# Patient Record
Sex: FEMALE | Race: WHITE | NOT HISPANIC OR LATINO | URBAN - METROPOLITAN AREA
[De-identification: names, ages, dates, MRNs, and addresses within clinical notes are randomized per-mention and may not be internally consistent; named-entity substitution may affect disease eponyms.]

---

## 2017-01-26 ENCOUNTER — EMERGENCY (EMERGENCY)
Facility: HOSPITAL | Age: 82
LOS: 1 days | Discharge: HOME | End: 2017-01-26
Attending: EMERGENCY MEDICINE
Payer: MEDICARE

## 2017-01-26 VITALS
TEMPERATURE: 98 F | HEART RATE: 72 BPM | OXYGEN SATURATION: 97 % | SYSTOLIC BLOOD PRESSURE: 139 MMHG | DIASTOLIC BLOOD PRESSURE: 84 MMHG | RESPIRATION RATE: 18 BRPM

## 2017-01-26 VITALS
DIASTOLIC BLOOD PRESSURE: 75 MMHG | HEART RATE: 77 BPM | SYSTOLIC BLOOD PRESSURE: 146 MMHG | RESPIRATION RATE: 20 BRPM | TEMPERATURE: 98 F | OXYGEN SATURATION: 96 % | HEIGHT: 64 IN | WEIGHT: 130.07 LBS

## 2017-01-26 DIAGNOSIS — R05 COUGH: ICD-10-CM

## 2017-01-26 DIAGNOSIS — Z88.1 ALLERGY STATUS TO OTHER ANTIBIOTIC AGENTS STATUS: ICD-10-CM

## 2017-01-26 DIAGNOSIS — E78.00 PURE HYPERCHOLESTEROLEMIA, UNSPECIFIED: ICD-10-CM

## 2017-01-26 DIAGNOSIS — J40 BRONCHITIS, NOT SPECIFIED AS ACUTE OR CHRONIC: ICD-10-CM

## 2017-01-26 DIAGNOSIS — I10 ESSENTIAL (PRIMARY) HYPERTENSION: ICD-10-CM

## 2017-01-26 DIAGNOSIS — J44.9 CHRONIC OBSTRUCTIVE PULMONARY DISEASE, UNSPECIFIED: ICD-10-CM

## 2017-01-26 LAB
ANION GAP SERPL CALC-SCNC: 14 MMOL/L — SIGNIFICANT CHANGE UP (ref 5–17)
APTT BLD: 26.7 SEC — LOW (ref 27.5–37.4)
BUN SERPL-MCNC: 15 MG/DL — SIGNIFICANT CHANGE UP (ref 8–20)
CALCIUM SERPL-MCNC: 10.5 MG/DL — HIGH (ref 8.6–10.2)
CHLORIDE SERPL-SCNC: 94 MMOL/L — LOW (ref 98–107)
CK MB CFR SERPL CALC: 4.4 NG/ML — SIGNIFICANT CHANGE UP (ref 0–6.7)
CK SERPL-CCNC: 161 U/L — SIGNIFICANT CHANGE UP (ref 25–170)
CO2 SERPL-SCNC: 25 MMOL/L — SIGNIFICANT CHANGE UP (ref 22–29)
CREAT SERPL-MCNC: 0.69 MG/DL — SIGNIFICANT CHANGE UP (ref 0.5–1.3)
GLUCOSE SERPL-MCNC: 153 MG/DL — HIGH (ref 70–115)
HCT VFR BLD CALC: 44.1 % — SIGNIFICANT CHANGE UP (ref 37–47)
HGB BLD-MCNC: 15.2 G/DL — SIGNIFICANT CHANGE UP (ref 12–16)
INR BLD: 1.07 RATIO — SIGNIFICANT CHANGE UP (ref 0.88–1.16)
MCHC RBC-ENTMCNC: 29.9 PG — SIGNIFICANT CHANGE UP (ref 27–31)
MCHC RBC-ENTMCNC: 34.5 G/DL — SIGNIFICANT CHANGE UP (ref 32–36)
MCV RBC AUTO: 86.6 FL — SIGNIFICANT CHANGE UP (ref 81–99)
NT-PROBNP SERPL-SCNC: 89 PG/ML — SIGNIFICANT CHANGE UP (ref 0–300)
PLATELET # BLD AUTO: 220 K/UL — SIGNIFICANT CHANGE UP (ref 150–400)
POTASSIUM SERPL-MCNC: 4.6 MMOL/L — SIGNIFICANT CHANGE UP (ref 3.5–5.3)
POTASSIUM SERPL-SCNC: 4.6 MMOL/L — SIGNIFICANT CHANGE UP (ref 3.5–5.3)
PROTHROM AB SERPL-ACNC: 11.8 SEC — SIGNIFICANT CHANGE UP (ref 10–13.1)
RBC # BLD: 5.09 M/UL — SIGNIFICANT CHANGE UP (ref 4.4–5.2)
RBC # FLD: 13.1 % — SIGNIFICANT CHANGE UP (ref 11–15.6)
SODIUM SERPL-SCNC: 133 MMOL/L — LOW (ref 135–145)
TROPONIN T SERPL-MCNC: <0.01 NG/ML — SIGNIFICANT CHANGE UP (ref 0–0.06)
WBC # BLD: 11.47 K/UL — HIGH (ref 4.8–10.8)
WBC # FLD AUTO: 11.47 K/UL — HIGH (ref 4.8–10.8)

## 2017-01-26 PROCEDURE — 99284 EMERGENCY DEPT VISIT MOD MDM: CPT | Mod: 25

## 2017-01-26 PROCEDURE — 80048 BASIC METABOLIC PNL TOTAL CA: CPT

## 2017-01-26 PROCEDURE — 85027 COMPLETE CBC AUTOMATED: CPT

## 2017-01-26 PROCEDURE — 84484 ASSAY OF TROPONIN QUANT: CPT

## 2017-01-26 PROCEDURE — 71020: CPT | Mod: 26

## 2017-01-26 PROCEDURE — 85730 THROMBOPLASTIN TIME PARTIAL: CPT

## 2017-01-26 PROCEDURE — 71046 X-RAY EXAM CHEST 2 VIEWS: CPT

## 2017-01-26 PROCEDURE — 93005 ELECTROCARDIOGRAM TRACING: CPT

## 2017-01-26 PROCEDURE — 85610 PROTHROMBIN TIME: CPT

## 2017-01-26 PROCEDURE — 82553 CREATINE MB FRACTION: CPT

## 2017-01-26 PROCEDURE — 99284 EMERGENCY DEPT VISIT MOD MDM: CPT

## 2017-01-26 PROCEDURE — 96374 THER/PROPH/DIAG INJ IV PUSH: CPT

## 2017-01-26 PROCEDURE — 83880 ASSAY OF NATRIURETIC PEPTIDE: CPT

## 2017-01-26 PROCEDURE — 82550 ASSAY OF CK (CPK): CPT

## 2017-01-26 PROCEDURE — 94640 AIRWAY INHALATION TREATMENT: CPT

## 2017-01-26 PROCEDURE — 93010 ELECTROCARDIOGRAM REPORT: CPT

## 2017-01-26 RX ORDER — IPRATROPIUM/ALBUTEROL SULFATE 18-103MCG
3 AEROSOL WITH ADAPTER (GRAM) INHALATION ONCE
Qty: 0 | Refills: 0 | Status: COMPLETED | OUTPATIENT
Start: 2017-01-26 | End: 2017-01-26

## 2017-01-26 RX ORDER — ALBUTEROL 90 UG/1
2.5 AEROSOL, METERED ORAL ONCE
Qty: 0 | Refills: 0 | Status: COMPLETED | OUTPATIENT
Start: 2017-01-26 | End: 2017-01-26

## 2017-01-26 RX ORDER — SODIUM CHLORIDE 9 MG/ML
3 INJECTION INTRAMUSCULAR; INTRAVENOUS; SUBCUTANEOUS ONCE
Qty: 0 | Refills: 0 | Status: COMPLETED | OUTPATIENT
Start: 2017-01-26 | End: 2017-01-26

## 2017-01-26 RX ORDER — ALBUTEROL 90 UG/1
1 AEROSOL, METERED ORAL
Qty: 1 | Refills: 0 | OUTPATIENT
Start: 2017-01-26

## 2017-01-26 RX ORDER — ALBUTEROL 90 UG/1
1 AEROSOL, METERED ORAL
Qty: 1 | Refills: 0
Start: 2017-01-26

## 2017-01-26 RX ADMIN — Medication 200 MILLIGRAM(S): at 16:31

## 2017-01-26 RX ADMIN — ALBUTEROL 2.5 MILLIGRAM(S): 90 AEROSOL, METERED ORAL at 16:59

## 2017-01-26 RX ADMIN — SODIUM CHLORIDE 3 MILLILITER(S): 9 INJECTION INTRAMUSCULAR; INTRAVENOUS; SUBCUTANEOUS at 15:48

## 2017-01-26 RX ADMIN — Medication 3 MILLILITER(S): at 15:03

## 2017-01-26 RX ADMIN — Medication 125 MILLIGRAM(S): at 16:32

## 2017-01-26 RX ADMIN — ALBUTEROL 2.5 MILLIGRAM(S): 90 AEROSOL, METERED ORAL at 15:03

## 2017-01-26 NOTE — ED STATDOCS - NS ED MD SCRIBE ATTENDING SCRIBE SECTIONS
VITAL SIGNS( Pullset)/REVIEW OF SYSTEMS/DISPOSITION/PHYSICAL EXAM/PAST MEDICAL/SURGICAL/SOCIAL HISTORY/HISTORY OF PRESENT ILLNESS/HIV

## 2017-01-26 NOTE — ED STATDOCS - OBJECTIVE STATEMENT
82 y/o F with h/o COPD and HTN presents to the ED c/o productive cough with green mucous and SOB x 2 weeks. Pt with no relief on Zithromax and has been on Prednisone since yesterday. PMD- Dr. Melgar sent pt to the MD to r/o pneumonia.

## 2017-01-26 NOTE — ED ADULT NURSE NOTE - DISCHARGE TEACHING
pt advised to continue taking meds and antibiotics as directed by MD. Pt advised to f/u with PMD and return to ED for any new or worsening symptoms

## 2017-01-26 NOTE — ED STATDOCS - PROGRESS NOTE DETAILS
PA NOTE: Pt seen by intake physician and hpi/orders/plan reviewed. PT presenting to ED with complaints of productive cough, sob, and congestion. She was evaluated by her PMD Dr. Mehta yesterday on started on Levaquin however the cough has not improved. PE: GEN: Awake, alert,  NAD,  EYES: PERRL CARDIAC: Reg rate and rhythm, S1,S2, RRR  RESP: No distress noted. Lungs + decreased breath sounds bilaterally.  no wheeze, ronchi, rales. ABD: soft,  non-tender, no guarding. . NEURO: AOx3, no focal deficits   PLAN: will follow up plan per intake. Pt feeling better. Denies SOB at time of discharge.

## 2017-01-26 NOTE — ED STATDOCS - ATTENDING CONTRIBUTION TO CARE
I, Meryl Adhikari, performed the initial face to face bedside interview with this patient regarding history of present illness, review of symptoms and relevant past medical, social and family history.  I completed an independent physical examination.  I was the initial provider who evaluated this patient. I have signed out the follow up of any pending tests (i.e. labs, radiological studies) to the ACP.  I have communicated the patient’s plan of care and disposition with the ACP.  The history, relevant review of systems, past medical and surgical history, medical decision making, and physical examination was documented by the scribe in my presence and I attest to the accuracy of the documentation.

## 2017-01-26 NOTE — ED ADULT NURSE NOTE - OBJECTIVE STATEMENT
Pt c/o cough, chest congestion, and body chills with SOB worsening since last night. Pt reports seeing her PMD Dumphy within the week for cough and chest congestion and was prescribed PO levaquin and prednisone which pt took. Pt came to ED d/t worsening cough and SOB over night and this AM. Denies fever, denies CP. +blood thinners, + sick contacts @ home, pt states her  "has bronchitis". Pt receiving neb tx @ this time. Resp even, unlabored, symm chest rise. MAEx4. #20G IV placed to Left AC, blood work drawn, sent to lab. Updated on POC, Family @ bedside

## 2017-01-26 NOTE — ED STATDOCS - MEDICAL DECISION MAKING DETAILS
Elderly F with productive cough, resistant to outpatient antbx x 2 weeks. Will r/o Pneumonia +/- COPD exacerbation.

## 2018-02-21 ENCOUNTER — EMERGENCY (EMERGENCY)
Facility: HOSPITAL | Age: 83
LOS: 1 days | Discharge: HOME | End: 2018-02-21
Attending: EMERGENCY MEDICINE
Payer: MEDICARE

## 2018-02-21 VITALS
DIASTOLIC BLOOD PRESSURE: 67 MMHG | SYSTOLIC BLOOD PRESSURE: 119 MMHG | RESPIRATION RATE: 18 BRPM | TEMPERATURE: 98 F | HEART RATE: 81 BPM | OXYGEN SATURATION: 94 %

## 2018-02-21 PROBLEM — I21.3 ST ELEVATION (STEMI) MYOCARDIAL INFARCTION OF UNSPECIFIED SITE: Chronic | Status: ACTIVE | Noted: 2017-01-26

## 2018-02-21 LAB
ANION GAP SERPL CALC-SCNC: 10 MMOL/L — SIGNIFICANT CHANGE UP (ref 5–17)
APPEARANCE UR: ABNORMAL
BACTERIA # UR AUTO: ABNORMAL
BILIRUB UR-MCNC: ABNORMAL
BUN SERPL-MCNC: 12 MG/DL — SIGNIFICANT CHANGE UP (ref 8–20)
CALCIUM SERPL-MCNC: 9.7 MG/DL — SIGNIFICANT CHANGE UP (ref 8.6–10.2)
CHLORIDE SERPL-SCNC: 99 MMOL/L — SIGNIFICANT CHANGE UP (ref 98–107)
CO2 SERPL-SCNC: 28 MMOL/L — SIGNIFICANT CHANGE UP (ref 22–29)
COLOR SPEC: ABNORMAL
COMMENT - URINE: SIGNIFICANT CHANGE UP
CREAT SERPL-MCNC: 0.52 MG/DL — SIGNIFICANT CHANGE UP (ref 0.5–1.3)
DIFF PNL FLD: ABNORMAL
EPI CELLS # UR: SIGNIFICANT CHANGE UP
GLUCOSE SERPL-MCNC: 157 MG/DL — HIGH (ref 70–115)
GLUCOSE UR QL: NEGATIVE MG/DL — SIGNIFICANT CHANGE UP
HCT VFR BLD CALC: 39.3 % — SIGNIFICANT CHANGE UP (ref 37–47)
HGB BLD-MCNC: 13.2 G/DL — SIGNIFICANT CHANGE UP (ref 12–16)
KETONES UR-MCNC: NEGATIVE — SIGNIFICANT CHANGE UP
LEUKOCYTE ESTERASE UR-ACNC: ABNORMAL
MCHC RBC-ENTMCNC: 29.6 PG — SIGNIFICANT CHANGE UP (ref 27–31)
MCHC RBC-ENTMCNC: 33.6 G/DL — SIGNIFICANT CHANGE UP (ref 32–36)
MCV RBC AUTO: 88.1 FL — SIGNIFICANT CHANGE UP (ref 81–99)
NITRITE UR-MCNC: POSITIVE
PH UR: 5 — SIGNIFICANT CHANGE UP (ref 5–8)
PLATELET # BLD AUTO: 218 K/UL — SIGNIFICANT CHANGE UP (ref 150–400)
POTASSIUM SERPL-MCNC: 4.1 MMOL/L — SIGNIFICANT CHANGE UP (ref 3.5–5.3)
POTASSIUM SERPL-SCNC: 4.1 MMOL/L — SIGNIFICANT CHANGE UP (ref 3.5–5.3)
PROT UR-MCNC: 30 MG/DL
RBC # BLD: 4.46 M/UL — SIGNIFICANT CHANGE UP (ref 4.4–5.2)
RBC # FLD: 12.8 % — SIGNIFICANT CHANGE UP (ref 11–15.6)
RBC CASTS # UR COMP ASSIST: ABNORMAL /HPF (ref 0–4)
SODIUM SERPL-SCNC: 137 MMOL/L — SIGNIFICANT CHANGE UP (ref 135–145)
SP GR SPEC: 1.02 — SIGNIFICANT CHANGE UP (ref 1.01–1.02)
UROBILINOGEN FLD QL: 8 MG/DL
WBC # BLD: 10.4 K/UL — SIGNIFICANT CHANGE UP (ref 4.8–10.8)
WBC # FLD AUTO: 10.4 K/UL — SIGNIFICANT CHANGE UP (ref 4.8–10.8)
WBC UR QL: >50

## 2018-02-21 PROCEDURE — 80048 BASIC METABOLIC PNL TOTAL CA: CPT

## 2018-02-21 PROCEDURE — 99284 EMERGENCY DEPT VISIT MOD MDM: CPT | Mod: 25

## 2018-02-21 PROCEDURE — 36415 COLL VENOUS BLD VENIPUNCTURE: CPT

## 2018-02-21 PROCEDURE — 99284 EMERGENCY DEPT VISIT MOD MDM: CPT

## 2018-02-21 PROCEDURE — 81001 URINALYSIS AUTO W/SCOPE: CPT

## 2018-02-21 PROCEDURE — 87086 URINE CULTURE/COLONY COUNT: CPT

## 2018-02-21 PROCEDURE — 85027 COMPLETE CBC AUTOMATED: CPT

## 2018-02-21 PROCEDURE — 96374 THER/PROPH/DIAG INJ IV PUSH: CPT

## 2018-02-21 RX ORDER — CEPHALEXIN 500 MG
1 CAPSULE ORAL
Qty: 56 | Refills: 0 | OUTPATIENT
Start: 2018-02-21 | End: 2018-03-06

## 2018-02-21 RX ORDER — CEFTRIAXONE 500 MG/1
INJECTION, POWDER, FOR SOLUTION INTRAMUSCULAR; INTRAVENOUS
Qty: 0 | Refills: 0 | Status: DISCONTINUED | OUTPATIENT
Start: 2018-02-21 | End: 2018-02-25

## 2018-02-21 RX ORDER — CEFTRIAXONE 500 MG/1
1 INJECTION, POWDER, FOR SOLUTION INTRAMUSCULAR; INTRAVENOUS EVERY 24 HOURS
Qty: 0 | Refills: 0 | Status: DISCONTINUED | OUTPATIENT
Start: 2018-02-22 | End: 2018-02-25

## 2018-02-21 RX ORDER — CEFTRIAXONE 500 MG/1
1 INJECTION, POWDER, FOR SOLUTION INTRAMUSCULAR; INTRAVENOUS ONCE
Qty: 0 | Refills: 0 | Status: COMPLETED | OUTPATIENT
Start: 2018-02-21 | End: 2018-02-21

## 2018-02-21 RX ADMIN — CEFTRIAXONE 100 GRAM(S): 500 INJECTION, POWDER, FOR SOLUTION INTRAMUSCULAR; INTRAVENOUS at 14:24

## 2018-02-21 NOTE — ED ADULT NURSE NOTE - OBJECTIVE STATEMENT
c/o urinary discomfort since last sat.  Has been on pyridium and cipro since last sat but still c/o painful urination

## 2018-02-21 NOTE — ED STATDOCS - PHYSICAL EXAMINATION
PE: General: NAD.  Eyes: PERRLA, EOM intact. CV: RRR, no m/r/g. Lungs: CTA b/l, no use of accessory muscles, no wheezes, rales, rhonchi. Skin: warm, dry, no rashes. Psych: normal mood/affect. Abdomen: Normal BS, soft, ND. TTP in lower epigastrum overlying bladder. Musculoskeletal: +left CVA tenderness. Neuro: no neuro deficits

## 2018-02-21 NOTE — ED STATDOCS - ATTENDING CONTRIBUTION TO CARE
82 year old with dysuria on cipro with no improvement.  Patient well appearing and afebrile.  Suprapubic tenderness on exam.  +UA.  Patient's antibiotics was changed and she was instructed to follow-up with PMD.

## 2018-02-21 NOTE — ED STATDOCS - OBJECTIVE STATEMENT
82 y.o F with PMH  CAD, HTN, HLD, hypothyroidsim presents to ED for evaluation of worsening UTI. As per pt, pt states saw PMD saturday, diagnosed with UTI and started on pyridium and cipro. Pt has taken the medications for past 5 days with no relief in symptoms. Pt states she is still experiencing burning on urination. Pt states in the past when she has been treated for UTI's symptoms usually clear up by day 2-3 of antibiotics. Last UTI estimated to be roughly a year ago. Pt denies n/v, fever, chills, headache, dizziness, sob, chest pain, blood in the urine or stool. Pt admits to stress incontinence (losing urine when coughing/sneezing) and also mentions she feels like she does not empty her bladder completely. Pt states has seen urologist in past. 82 y.o F with PMH  CAD, HTN, HLD, hypothyroidism presents to ED for evaluation of worsening UTI. As per pt, pt states saw PMD Saturday, diagnosed with UTI and started on pyridium and cipro. Pt has taken the medications for past 5 days with no relief in symptoms. Pt states she is still experiencing burning on urination. Pt states in the past when she has been treated for UTI's symptoms usually clear up by day 2-3 of antibiotics. Last UTI estimated to be roughly a year ago. Pt denies n/v, fever, chills, headache, dizziness, sob, chest pain, blood in the urine or stool. Pt admits to stress incontinence (losing urine when coughing/sneezing) and also mentions she feels like she does not empty her bladder completely. Pt states has seen urologist in past.

## 2018-02-21 NOTE — ED STATDOCS - CARE PLAN
Principal Discharge DX:	Pyelonephritis  Assessment and plan of treatment:	Will give 1 dose rocephin in ED  DC home with keflex  Follow up with PMD  Return to ED if symptoms worsen.

## 2018-02-21 NOTE — ED STATDOCS - PLAN OF CARE
Will give 1 dose rocephin in ED  DC home with keflex  Follow up with PMD  Return to ED if symptoms worsen.

## 2018-02-21 NOTE — ED STATDOCS - MEDICAL DECISION MAKING DETAILS
Will get UA/ucx, basic labs to assess for leukocytosis and renal function. Will get CT abdo to rule out pyelo given multiple day hx of UTI symptoms and now with Left flank pain. No systemic signs of infection, VSS. Will get UA/ucx, basic labs to assess for leukocytosis and renal function. Will get CT abdo to rule out pyelo given multiple day hx of UTI symptoms and now with Left flank pain. No systemic signs of infection, VSS. Will treat for UTI/pyelo. 1 dose rocephin in ED. Will DC on keflex (discontinue pyridium and cipro).

## 2018-02-21 NOTE — ED STATDOCS - PROGRESS NOTE DETAILS
Labs reviewed. Will treat pt for UTI/pyelo given symptoms and exam findings. PT non toxic appearing, VSS, labs WNL (no leukocytosis or left shift. Kidney function intact). Will given one dose IV rocephin now. Will DC on keflex. Pt to follow up with o/p PMD. Return to ED if symptoms worsen. Labs reviewed. Will treat pt for UTI/pyelo given symptoms and exam findings. PT non toxic appearing, VSS, labs WNL (no leukocytosis or left shift. Kidney function intact). Will given one dose IV Rocephin now. Will DC on keflex. Pt to follow up with o/p PMD. Return to ED if symptoms worsen. Labs reviewed. Will treat pt for UTI/pyel.. PT non toxic appearing, VSS, labs WNL (no leukocytosis or left shift. Kidney function intact). Will given one dose IV Rocephin now. Will DC on keflex. Pt to follow up with o/p PMD. Return to ED if symptoms worsen. Labs reviewed. Unlikely pyleo given pt is non toxic appearing, VSS, labs WNL (no leukocytosis or left shift. Kidney function intact). Will treat UTI with keflex 500 mg PO QID x 14 days.  Will given one dose IV Rocephin now.  Pt to follow up with o/p PMD. Return to ED if symptoms worsen.

## 2018-02-22 LAB
CULTURE RESULTS: SIGNIFICANT CHANGE UP
SPECIMEN SOURCE: SIGNIFICANT CHANGE UP

## 2018-04-11 ENCOUNTER — TRANSCRIPTION ENCOUNTER (OUTPATIENT)
Age: 83
End: 2018-04-11

## 2018-04-11 ENCOUNTER — INPATIENT (INPATIENT)
Facility: HOSPITAL | Age: 83
LOS: 0 days | Discharge: HOME | DRG: 247 | End: 2018-04-12
Attending: INTERNAL MEDICINE | Admitting: INTERNAL MEDICINE
Payer: COMMERCIAL

## 2018-04-11 VITALS
SYSTOLIC BLOOD PRESSURE: 174 MMHG | DIASTOLIC BLOOD PRESSURE: 70 MMHG | RESPIRATION RATE: 18 BRPM | HEART RATE: 64 BPM | OXYGEN SATURATION: 97 % | TEMPERATURE: 98 F

## 2018-04-11 DIAGNOSIS — Z90.49 ACQUIRED ABSENCE OF OTHER SPECIFIED PARTS OF DIGESTIVE TRACT: Chronic | ICD-10-CM

## 2018-04-11 DIAGNOSIS — I25.10 ATHEROSCLEROTIC HEART DISEASE OF NATIVE CORONARY ARTERY WITHOUT ANGINA PECTORIS: ICD-10-CM

## 2018-04-11 DIAGNOSIS — Z98.51 TUBAL LIGATION STATUS: Chronic | ICD-10-CM

## 2018-04-11 DIAGNOSIS — Z98.891 HISTORY OF UTERINE SCAR FROM PREVIOUS SURGERY: Chronic | ICD-10-CM

## 2018-04-11 LAB
ALBUMIN SERPL ELPH-MCNC: 4 G/DL — SIGNIFICANT CHANGE UP (ref 3.3–5.2)
ALP SERPL-CCNC: 91 U/L — SIGNIFICANT CHANGE UP (ref 40–120)
ALT FLD-CCNC: 16 U/L — SIGNIFICANT CHANGE UP
ANION GAP SERPL CALC-SCNC: 11 MMOL/L — SIGNIFICANT CHANGE UP (ref 5–17)
APTT BLD: 26.1 SEC — LOW (ref 27.5–37.4)
AST SERPL-CCNC: 18 U/L — SIGNIFICANT CHANGE UP
BILIRUB SERPL-MCNC: 0.3 MG/DL — LOW (ref 0.4–2)
BUN SERPL-MCNC: 12 MG/DL — SIGNIFICANT CHANGE UP (ref 8–20)
CALCIUM SERPL-MCNC: 10 MG/DL — SIGNIFICANT CHANGE UP (ref 8.6–10.2)
CHLORIDE SERPL-SCNC: 99 MMOL/L — SIGNIFICANT CHANGE UP (ref 98–107)
CO2 SERPL-SCNC: 28 MMOL/L — SIGNIFICANT CHANGE UP (ref 22–29)
CREAT SERPL-MCNC: 0.55 MG/DL — SIGNIFICANT CHANGE UP (ref 0.5–1.3)
GLUCOSE SERPL-MCNC: 135 MG/DL — HIGH (ref 70–115)
HCT VFR BLD CALC: 40.1 % — SIGNIFICANT CHANGE UP (ref 37–47)
HGB BLD-MCNC: 13.5 G/DL — SIGNIFICANT CHANGE UP (ref 12–16)
INR BLD: 0.96 RATIO — SIGNIFICANT CHANGE UP (ref 0.88–1.16)
MCHC RBC-ENTMCNC: 29.2 PG — SIGNIFICANT CHANGE UP (ref 27–31)
MCHC RBC-ENTMCNC: 33.7 G/DL — SIGNIFICANT CHANGE UP (ref 32–36)
MCV RBC AUTO: 86.6 FL — SIGNIFICANT CHANGE UP (ref 81–99)
PLATELET # BLD AUTO: 205 K/UL — SIGNIFICANT CHANGE UP (ref 150–400)
POTASSIUM SERPL-MCNC: 4.4 MMOL/L — SIGNIFICANT CHANGE UP (ref 3.5–5.3)
POTASSIUM SERPL-SCNC: 4.4 MMOL/L — SIGNIFICANT CHANGE UP (ref 3.5–5.3)
PROT SERPL-MCNC: 6.5 G/DL — LOW (ref 6.6–8.7)
PROTHROM AB SERPL-ACNC: 10.5 SEC — SIGNIFICANT CHANGE UP (ref 9.8–12.7)
RBC # BLD: 4.63 M/UL — SIGNIFICANT CHANGE UP (ref 4.4–5.2)
RBC # FLD: 13.3 % — SIGNIFICANT CHANGE UP (ref 11–15.6)
SODIUM SERPL-SCNC: 138 MMOL/L — SIGNIFICANT CHANGE UP (ref 135–145)
WBC # BLD: 6.5 K/UL — SIGNIFICANT CHANGE UP (ref 4.8–10.8)
WBC # FLD AUTO: 6.5 K/UL — SIGNIFICANT CHANGE UP (ref 4.8–10.8)

## 2018-04-11 PROCEDURE — 93010 ELECTROCARDIOGRAM REPORT: CPT | Mod: 77

## 2018-04-11 RX ORDER — ASPIRIN/CALCIUM CARB/MAGNESIUM 324 MG
81 TABLET ORAL DAILY
Qty: 0 | Refills: 0 | Status: DISCONTINUED | OUTPATIENT
Start: 2018-04-12 | End: 2018-04-12

## 2018-04-11 RX ORDER — SODIUM CHLORIDE 9 MG/ML
1000 INJECTION INTRAMUSCULAR; INTRAVENOUS; SUBCUTANEOUS
Qty: 0 | Refills: 0 | Status: DISCONTINUED | OUTPATIENT
Start: 2018-04-11 | End: 2018-04-12

## 2018-04-11 RX ORDER — PANTOPRAZOLE SODIUM 20 MG/1
40 TABLET, DELAYED RELEASE ORAL
Qty: 0 | Refills: 0 | Status: DISCONTINUED | OUTPATIENT
Start: 2018-04-11 | End: 2018-04-12

## 2018-04-11 RX ORDER — AMLODIPINE BESYLATE 2.5 MG/1
5 TABLET ORAL DAILY
Qty: 0 | Refills: 0 | Status: DISCONTINUED | OUTPATIENT
Start: 2018-04-11 | End: 2018-04-12

## 2018-04-11 RX ORDER — BUDESONIDE AND FORMOTEROL FUMARATE DIHYDRATE 160; 4.5 UG/1; UG/1
2 AEROSOL RESPIRATORY (INHALATION)
Qty: 0 | Refills: 0 | Status: DISCONTINUED | OUTPATIENT
Start: 2018-04-11 | End: 2018-04-12

## 2018-04-11 RX ORDER — PHENAZOPYRIDINE HCL 100 MG
0 TABLET ORAL
Qty: 0 | Refills: 0 | COMMUNITY

## 2018-04-11 RX ORDER — ALBUTEROL 90 UG/1
1 AEROSOL, METERED ORAL EVERY 4 HOURS
Qty: 0 | Refills: 0 | Status: DISCONTINUED | OUTPATIENT
Start: 2018-04-11 | End: 2018-04-12

## 2018-04-11 RX ORDER — ATORVASTATIN CALCIUM 80 MG/1
20 TABLET, FILM COATED ORAL AT BEDTIME
Qty: 0 | Refills: 0 | Status: DISCONTINUED | OUTPATIENT
Start: 2018-04-11 | End: 2018-04-12

## 2018-04-11 RX ORDER — CLOPIDOGREL BISULFATE 75 MG/1
75 TABLET, FILM COATED ORAL DAILY
Qty: 0 | Refills: 0 | Status: DISCONTINUED | OUTPATIENT
Start: 2018-04-12 | End: 2018-04-12

## 2018-04-11 RX ORDER — ASPIRIN/CALCIUM CARB/MAGNESIUM 324 MG
81 TABLET ORAL DAILY
Qty: 0 | Refills: 0 | Status: DISCONTINUED | OUTPATIENT
Start: 2018-04-11 | End: 2018-04-11

## 2018-04-11 RX ORDER — ISOSORBIDE MONONITRATE 60 MG/1
30 TABLET, EXTENDED RELEASE ORAL DAILY
Qty: 0 | Refills: 0 | Status: DISCONTINUED | OUTPATIENT
Start: 2018-04-11 | End: 2018-04-12

## 2018-04-11 RX ORDER — CIPROFLOXACIN LACTATE 400MG/40ML
0 VIAL (ML) INTRAVENOUS
Qty: 0 | Refills: 0 | COMMUNITY

## 2018-04-11 RX ORDER — ASPIRIN/CALCIUM CARB/MAGNESIUM 324 MG
324 TABLET ORAL ONCE
Qty: 0 | Refills: 0 | Status: COMPLETED | OUTPATIENT
Start: 2018-04-11 | End: 2018-04-11

## 2018-04-11 RX ADMIN — Medication 324 MILLIGRAM(S): at 12:39

## 2018-04-11 RX ADMIN — ATORVASTATIN CALCIUM 20 MILLIGRAM(S): 80 TABLET, FILM COATED ORAL at 21:40

## 2018-04-11 NOTE — DISCHARGE NOTE ADULT - MEDICATION SUMMARY - MEDICATIONS TO TAKE
I will START or STAY ON the medications listed below when I get home from the hospital:    predniSONE 10 mg oral tablet  --  by mouth   -- day 1 day 2 and day 5  -- Indication: For Copd    aspirin 325 mg oral tablet  -- tab(s) by mouth once a day  -- Indication: For CAD (coronary artery disease)    isosorbide mononitrate 30 mg oral tablet, extended release  --  by mouth 2 times a day  -- Indication: For CAD (coronary artery disease)    pravastatin 40 mg oral tablet  -- 1 tab(s) by mouth once a day  -- Indication: For CAD (coronary artery disease)    Plavix 75 mg oral tablet  -- 1 tab(s) by mouth once a day  -- Indication: For CAD (coronary artery disease)    Advair Diskus 250 mcg-50 mcg inhalation powder  -- 1 puff(s) inhaled 2 times a day  -- Indication: For Copd    Ventolin HFA 90 mcg/inh inhalation aerosol  -- 1 puff(s) inhaled every 4 hours  -- For inhalation only.  It is very important that you take or use this exactly as directed.  Do not skip doses or discontinue unless directed by your doctor.  Obtain medical advice before taking any non-prescription drugs as some may affect the action of this medication.  Shake well before use.      -- Indication: For Copd    amLODIPine 5 mg oral tablet  -- 1 tab(s) by mouth once a day  -- Indication: For Htn    pantoprazole 40 mg oral delayed release tablet  --  by mouth   -- Indication: For GERD (gastroesophageal reflux disease)

## 2018-04-11 NOTE — H&P PST ADULT - HISTORY OF PRESENT ILLNESS
81 yo female with history of CAD/PCI to the LAD and RCA, cardiac cath 2016 which revealed patent stents, hypertension, hyperlipidemia, hypothyroid, GERD on protonix who has recent complaints of chest tightness at rest.  Patient states her chest tightness is relieved when sitting up.  Her symptoms are not associated with exertion.  She presents today for PST/procedure for cardiac cath to r/o further CAD.

## 2018-04-11 NOTE — DISCHARGE NOTE ADULT - CARE PROVIDER_API CALL
Marquez Gonzales), Cardiovascular Disease; Internal Medicine  [address] [place] [address]  Phone: [phone]  Fax: [phone]

## 2018-04-11 NOTE — H&P PST ADULT - FAMILY HISTORY
Mother  Still living? No  Family history of cerebrovascular accident (CVA), Age at diagnosis: Age Unknown  Family history of acute myocardial infarction, Age at diagnosis: Age Unknown  Family history of Alzheimer's disease, Age at diagnosis: Age Unknown     Sibling  Still living? No  Diabetes mellitus, Age at diagnosis: Age Unknown

## 2018-04-11 NOTE — DISCHARGE NOTE ADULT - NS AS ACTIVITY OBS
Showering allowed/No Heavy lifting/straining/Do not make important decisions/Do not drive or operate machinery/Walking-Outdoors allowed/Walking-Indoors allowed

## 2018-04-11 NOTE — DISCHARGE NOTE ADULT - PATIENT PORTAL LINK FT
You can access the [facility] Portal, offered by [facility], by registering with the following website: [email or web address]

## 2018-04-11 NOTE — DISCHARGE NOTE ADULT - PLAN OF CARE
ADL without Chest Pain Restricted use with no heavy lifting of affected arm for 48 hours.  No submerging the arm in water for 48 hours.  You may start showering today.  Call your doctor for any bleeding, swelling, loss of sensation in the hand or fingers, or fingers turning blue.  If heavy bleeding or large lumps form, hold pressure at the spot and come to the Emergency Room.

## 2018-04-11 NOTE — H&P PST ADULT - PMH
Diabetes mellitus    Gastroenteritis    GERD (gastroesophageal reflux disease)    Hiatal hernia    High cholesterol    HTN (hypertension)    Hypothyroid    LBBB (left bundle branch block)    MI (myocardial infarction)    Myocardial infarction    PUD (peptic ulcer disease)    Stented coronary artery  x2  Vocal cord polyps

## 2018-04-11 NOTE — DISCHARGE NOTE ADULT - HOSPITAL COURSE
Assessment and Plan:   · Assessment		  83 yo female with history of CAD/PCI to the LAD and RCA, cardiac cath 2016 which revealed patent stents, hypertension, hyperlipidemia, hypothyroid, GERD on protonix who has recent complaints of chest tightness at rest.  Underwent cardiac cath yesterday and recieved stents to distral rca DESx 1 and Mid LAd Jarad x 1.       Problem/Plan - 1:  ·  Problem: CAD (coronary artery disease).  Plan: S/P JARAD to RCA  JARAD to LAD  Right radial site hematoma resolved, slight bruising, keep site dry  DAPT:  Plavix Aspirin  Life style modifications reviewed:  Diet/ exercise and life style modifications  Patient verbalizes understanding  d/c home  follow up with MD Jennings in one week.

## 2018-04-11 NOTE — PROGRESS NOTE ADULT - SUBJECTIVE AND OBJECTIVE BOX
81 yo female with history of CAD/PCI to the LAD and RCA, cardiac cath 2016 which revealed patent stents, hypertension, hyperlipidemia, hypothyroid, GERD on protonix who has recent complaints of chest tightness at rest.  Patient states her chest tightness is relieved when sitting up.  Her symptoms are not associated with exertion.  She presents today for PST/procedure for cardiac cath to r/o further CAD.    S/P CYNDI to RCA  CYNDI to LAD  Right radial site soft hematoma noted above site.  Manually compressed  Pressure dressing at site  Will monitor  Admit overnight on telemetry   Monitor for arrhythmias and bleeding at the site.  VSS  Post procedure teaching done  Patient verbalizes understanding  Check labs and EKG in the AM  If stable possible d/c home  Post procedure EKG SB LBBB HR 55 no ectopy

## 2018-04-11 NOTE — PROGRESS NOTE ADULT - SUBJECTIVE AND OBJECTIVE BOX
I have seen and examined this patient. There is no change since the last H& P. Consent obtained. Agree with cardiac cath. Risks and benefits fully explained. All questions answered.    Tee Sampson MD

## 2018-04-11 NOTE — DISCHARGE NOTE ADULT - CARE PLAN
Principal Discharge DX:	CAD (coronary artery disease)  Goal:	ADL without Chest Pain  Assessment and plan of treatment:	Restricted use with no heavy lifting of affected arm for 48 hours.  No submerging the arm in water for 48 hours.  You may start showering today.  Call your doctor for any bleeding, swelling, loss of sensation in the hand or fingers, or fingers turning blue.  If heavy bleeding or large lumps form, hold pressure at the spot and come to the Emergency Room.

## 2018-04-12 VITALS
OXYGEN SATURATION: 96 % | HEART RATE: 68 BPM | DIASTOLIC BLOOD PRESSURE: 68 MMHG | RESPIRATION RATE: 18 BRPM | SYSTOLIC BLOOD PRESSURE: 138 MMHG

## 2018-04-12 DIAGNOSIS — I25.10 ATHEROSCLEROTIC HEART DISEASE OF NATIVE CORONARY ARTERY WITHOUT ANGINA PECTORIS: ICD-10-CM

## 2018-04-12 LAB
ANION GAP SERPL CALC-SCNC: 10 MMOL/L — SIGNIFICANT CHANGE UP (ref 5–17)
BASOPHILS # BLD AUTO: 0 K/UL — SIGNIFICANT CHANGE UP (ref 0–0.2)
BASOPHILS NFR BLD AUTO: 0.3 % — SIGNIFICANT CHANGE UP (ref 0–2)
BUN SERPL-MCNC: 9 MG/DL — SIGNIFICANT CHANGE UP (ref 8–20)
CALCIUM SERPL-MCNC: 9.8 MG/DL — SIGNIFICANT CHANGE UP (ref 8.6–10.2)
CHLORIDE SERPL-SCNC: 101 MMOL/L — SIGNIFICANT CHANGE UP (ref 98–107)
CO2 SERPL-SCNC: 29 MMOL/L — SIGNIFICANT CHANGE UP (ref 22–29)
CREAT SERPL-MCNC: 0.48 MG/DL — LOW (ref 0.5–1.3)
EOSINOPHIL # BLD AUTO: 0.2 K/UL — SIGNIFICANT CHANGE UP (ref 0–0.5)
EOSINOPHIL NFR BLD AUTO: 3.3 % — SIGNIFICANT CHANGE UP (ref 0–6)
GLUCOSE SERPL-MCNC: 112 MG/DL — SIGNIFICANT CHANGE UP (ref 70–115)
HCT VFR BLD CALC: 39.7 % — SIGNIFICANT CHANGE UP (ref 37–47)
HGB BLD-MCNC: 13.4 G/DL — SIGNIFICANT CHANGE UP (ref 12–16)
LYMPHOCYTES # BLD AUTO: 1.3 K/UL — SIGNIFICANT CHANGE UP (ref 1–4.8)
LYMPHOCYTES # BLD AUTO: 16.9 % — LOW (ref 20–55)
MCHC RBC-ENTMCNC: 29.3 PG — SIGNIFICANT CHANGE UP (ref 27–31)
MCHC RBC-ENTMCNC: 33.8 G/DL — SIGNIFICANT CHANGE UP (ref 32–36)
MCV RBC AUTO: 86.7 FL — SIGNIFICANT CHANGE UP (ref 81–99)
MONOCYTES # BLD AUTO: 0.9 K/UL — HIGH (ref 0–0.8)
MONOCYTES NFR BLD AUTO: 12.1 % — HIGH (ref 3–10)
NEUTROPHILS # BLD AUTO: 5.1 K/UL — SIGNIFICANT CHANGE UP (ref 1.8–8)
NEUTROPHILS NFR BLD AUTO: 67.3 % — SIGNIFICANT CHANGE UP (ref 37–73)
PLATELET # BLD AUTO: 202 K/UL — SIGNIFICANT CHANGE UP (ref 150–400)
POTASSIUM SERPL-MCNC: 4.4 MMOL/L — SIGNIFICANT CHANGE UP (ref 3.5–5.3)
POTASSIUM SERPL-SCNC: 4.4 MMOL/L — SIGNIFICANT CHANGE UP (ref 3.5–5.3)
RBC # BLD: 4.58 M/UL — SIGNIFICANT CHANGE UP (ref 4.4–5.2)
RBC # FLD: 13.4 % — SIGNIFICANT CHANGE UP (ref 11–15.6)
SODIUM SERPL-SCNC: 140 MMOL/L — SIGNIFICANT CHANGE UP (ref 135–145)
WBC # BLD: 7.6 K/UL — SIGNIFICANT CHANGE UP (ref 4.8–10.8)
WBC # FLD AUTO: 7.6 K/UL — SIGNIFICANT CHANGE UP (ref 4.8–10.8)

## 2018-04-12 PROCEDURE — 93458 L HRT ARTERY/VENTRICLE ANGIO: CPT | Mod: 59

## 2018-04-12 PROCEDURE — C1725: CPT

## 2018-04-12 PROCEDURE — 80048 BASIC METABOLIC PNL TOTAL CA: CPT

## 2018-04-12 PROCEDURE — 99153 MOD SED SAME PHYS/QHP EA: CPT

## 2018-04-12 PROCEDURE — 99152 MOD SED SAME PHYS/QHP 5/>YRS: CPT

## 2018-04-12 PROCEDURE — 92978 ENDOLUMINL IVUS OCT C 1ST: CPT

## 2018-04-12 PROCEDURE — C1753: CPT

## 2018-04-12 PROCEDURE — C1769: CPT

## 2018-04-12 PROCEDURE — 85730 THROMBOPLASTIN TIME PARTIAL: CPT

## 2018-04-12 PROCEDURE — 85027 COMPLETE CBC AUTOMATED: CPT

## 2018-04-12 PROCEDURE — 80053 COMPREHEN METABOLIC PANEL: CPT

## 2018-04-12 PROCEDURE — C1887: CPT

## 2018-04-12 PROCEDURE — 93005 ELECTROCARDIOGRAM TRACING: CPT

## 2018-04-12 PROCEDURE — C9600: CPT | Mod: LC

## 2018-04-12 PROCEDURE — 92979 ENDOLUMINL IVUS OCT C EA: CPT | Mod: RC

## 2018-04-12 PROCEDURE — 85610 PROTHROMBIN TIME: CPT

## 2018-04-12 PROCEDURE — 36415 COLL VENOUS BLD VENIPUNCTURE: CPT

## 2018-04-12 PROCEDURE — C1894: CPT

## 2018-04-12 PROCEDURE — C1874: CPT

## 2018-04-12 PROCEDURE — 93010 ELECTROCARDIOGRAM REPORT: CPT

## 2018-04-12 RX ORDER — ACETAMINOPHEN 500 MG
650 TABLET ORAL EVERY 6 HOURS
Qty: 0 | Refills: 0 | Status: DISCONTINUED | OUTPATIENT
Start: 2018-04-12 | End: 2018-04-12

## 2018-04-12 RX ADMIN — Medication 10 MILLIGRAM(S): at 05:12

## 2018-04-12 RX ADMIN — Medication 650 MILLIGRAM(S): at 06:57

## 2018-04-12 RX ADMIN — PANTOPRAZOLE SODIUM 40 MILLIGRAM(S): 20 TABLET, DELAYED RELEASE ORAL at 05:12

## 2018-04-12 RX ADMIN — AMLODIPINE BESYLATE 5 MILLIGRAM(S): 2.5 TABLET ORAL at 05:12

## 2018-04-12 RX ADMIN — Medication 81 MILLIGRAM(S): at 11:35

## 2018-04-12 RX ADMIN — CLOPIDOGREL BISULFATE 75 MILLIGRAM(S): 75 TABLET, FILM COATED ORAL at 11:35

## 2018-04-12 RX ADMIN — ISOSORBIDE MONONITRATE 30 MILLIGRAM(S): 60 TABLET, EXTENDED RELEASE ORAL at 11:36

## 2018-04-12 NOTE — PROGRESS NOTE ADULT - ASSESSMENT
83 yo female with history of CAD/PCI to the LAD and RCA, cardiac cath 2016 which revealed patent stents, hypertension, hyperlipidemia, hypothyroid, GERD on protonix who has recent complaints of chest tightness at rest.  Underwent cardiac cath yesterday and recieved stents to distral rca DESx 1 and Mid LAd Jarad x 1.

## 2018-04-12 NOTE — PROGRESS NOTE ADULT - SUBJECTIVE AND OBJECTIVE BOX
Updates  81 yo female with history of CAD/PCI to the LAD and RCA, cardiac cath 2016 which revealed patent stents, hypertension, hyperlipidemia, hypothyroid, GERD on protonix who has recent complaints of chest tightness at rest.  Underwent cardiac cath yesterday and recieved stents to distral rca DESx 1 and Mid LAd Jarad x 1.    PMH  LBBB (left bundle branch block)  Vocal cord polyps  Gastroenteritis  PUD (peptic ulcer disease)  Hiatal hernia  Diabetes mellitus  GERD (gastroesophageal reflux disease)  Hypothyroid  MI (myocardial infarction)  Stented coronary artery  High cholesterol  HTN (hypertension)  CAD (coronary artery disease)  ATHSCL HEART DISEASE OF NATIVE    REVIEW OF SYSTEMS  General: Denies fever, chills, pain, no discomfort  Respiratory and Thorax:  Denies cough, sob, or any discomfort  Cardiovascular:  Denies chest pain, palpitations or any discomfort	  Gastrointestinal:  Denies n/v/d, constipation, or any discomfort  Genitourinary:  Denies frequency, burning, or pain  Musculoskeletal:  Denies joint pain, swelling, or any discomfort   Neurological:  Denies headache, dizziness blurred vision, numbing or tingling  Psychiatric:  Denies sadness or depression  Hematology  Mathew bleeding o swelling  	  MEDICATIONS:  amLODIPine   Tablet 5 milliGRAM(s) Oral daily  isosorbide   mononitrate ER Tablet (IMDUR) 30 milliGRAM(s) Oral daily  ALBUTerol    90 MICROgram(s) HFA Inhaler 1 Puff(s) Inhalation every 4 hours PRN  buDESOnide 160 MICROgram(s)/formoterol 4.5 MICROgram(s) Inhaler 2 Puff(s) Inhalation two times a day  acetaminophen   Tablet. 650 milliGRAM(s) Oral every 6 hours PRN  pantoprazole    Tablet 40 milliGRAM(s) Oral before breakfast  atorvastatin 20 milliGRAM(s) Oral at bedtime  predniSONE   Tablet 10 milliGRAM(s) Oral daily  aspirin  chewable 81 milliGRAM(s) Oral daily  clopidogrel Tablet 75 milliGRAM(s) Oral daily  sodium chloride 0.9%. 1000 milliLiter(s) IV Continuous <Continuous>    PHYSICAL EXAM:  T(C): 36.6 (18 @ 05:03), Max: 36.6 (18 @ 19:44)  HR: 70 (18 @ 05:03) (53 - 70)  BP: 134/68 (04-12-18 @ 05:03) (130/65 - 160/70)  RR: 18 (18 @ 05:03) (17 - 18)  SpO2: 95% (18 @ 05:03) (94% - 100%)    I&O's Summary  2018 07:01  -  2018 07:00  --------------------------------------------------------  IN: 0 mL / OUT: 725 mL / NET: -725 mL  Daily Weight in k.9 (2018 05:00)  Appearance: Normal	  HEENT:   Normal oral mucosa, PERRL, EOMI	  Lymphatic: No lymphadenopathy  Cardiovascular: Normal S1 S2, No JVD, No murmurs, No edema  Respiratory: Lungs clear to auscultation	  Psychiatry: A & O x 3, Mood & affect appropriate  Gastrointestinal:  Soft, Non-tender, + BS	  Skin: No rashes, No ecchymoses, No cyanosis  Neurologic: Non-focal  Extremities: Normal range of motion, No clubbing, cyanosis or edema  Vascular: Peripheral pulses palpable 2+ bilaterally  Procedure Site:  Right radial site, no bleeding, no pain, no bruising, no hematoma, +PP no edema.      TELEMETRY: 	  jessica WALKER sats overnight.     	  LABS:	 	               13.4   7.6   )-----------( 202      ( 2018 06:36 )             39.7     140  |  101  |  9.0  ----------------------------<  112  4.4   |  29.0  |  0.48<L>  Ca    9.8      2018 06:36  TPro  6.5<L>  /  Alb  4.0  /  TBili  0.3<L>  /  DBili  x   /  AST  18  /  ALT  16  /  AlkPhos  91  04-11 Updates  83 yo female with history of CAD/PCI to the LAD and RCA, cardiac cath 2016 which revealed patent stents, hypertension, hyperlipidemia, hypothyroid, GERD on protonix who has recent complaints of chest tightness at rest.  Underwent cardiac cath yesterday and recieved stents to distral rca DESx 1 and Mid LAd Jarad x 1.    [facility]  VENTRICLES: LVEF 55%  CORONARY VESSELS: The coronary circulation is right dominant.  LM:   --  LM: Normal.  LAD:   --  Proximal LAD: There was a 0 % stenosis at the site of a prior  stent.  CX:   --  Proximal circumflex: There was a 40 % stenosis.  --  Mid circumflex: There was a 95 % stenosis.  RCA:   --  Proximal RCA: There was a 0 % stenosis at the site of a prior  stent.  --  Distal RCA: There was a 70 % stenosis.  COMPLICATIONS: There were no complications. No complications occurred  during the cath lab visit.  DIAGNOSTIC IMPRESSIONS: Prior stents in proximal LAD and RCA are patent  with severe mid LCX and distal RCA disease treated with PTCA/STENT  (JARAD-CHARLIE) with IVUS  DIAGNOSTIC RECOMMENDATIONS: ASA and Plavix  INTERVENTIONAL IMPRESSIONS: Prior stents in proximal LAD and RCA are patent  with severe mid LCX and distal RCA disease treated with PTCA/STENT  (JARAD-CHARLIE) with IVUS  INTERVENTIONAL RECOMMENDATIONS: ASA and Plavix      PMH  LBBB (left bundle branch block)  Vocal cord polyps  Gastroenteritis  PUD (peptic ulcer disease)  Hiatal hernia  Diabetes mellitus  GERD (gastroesophageal reflux disease)  Hypothyroid  MI (myocardial infarction)  Stented coronary artery  High cholesterol  HTN (hypertension)  CAD (coronary artery disease)  ATHSCL HEART DISEASE OF NATIVE    REVIEW OF SYSTEMS  General: Denies fever, chills, pain, no discomfort  Respiratory and Thorax:  Denies cough, sob, or any discomfort  Cardiovascular:  Denies chest pain, palpitations or any discomfort	  Gastrointestinal:  Denies n/v/d, constipation, or any discomfort  Genitourinary:  Denies frequency, burning, or pain  Musculoskeletal:  Denies joint pain, swelling, or any discomfort   Neurological:  Denies headache, dizziness blurred vision, numbing or tingling  Psychiatric:  Denies sadness or depression  Hematology  Mathew bleeding o swelling  	  MEDICATIONS:  amLODIPine   Tablet 5 milliGRAM(s) Oral daily  isosorbide   mononitrate ER Tablet (IMDUR) 30 milliGRAM(s) Oral daily  ALBUTerol    90 MICROgram(s) HFA Inhaler 1 Puff(s) Inhalation every 4 hours PRN  buDESOnide 160 MICROgram(s)/formoterol 4.5 MICROgram(s) Inhaler 2 Puff(s) Inhalation two times a day  acetaminophen   Tablet. 650 milliGRAM(s) Oral every 6 hours PRN  pantoprazole    Tablet 40 milliGRAM(s) Oral before breakfast  atorvastatin 20 milliGRAM(s) Oral at bedtime  predniSONE   Tablet 10 milliGRAM(s) Oral daily  aspirin  chewable 81 milliGRAM(s) Oral daily  clopidogrel Tablet 75 milliGRAM(s) Oral daily  sodium chloride 0.9%. 1000 milliLiter(s) IV Continuous <Continuous>    PHYSICAL EXAM:  T(C): 36.6 (18 @ 05:03), Max: 36.6 (18 @ 19:44)  HR: 70 (18 @ 05:03) (53 - 70)  BP: 134/68 (18 @ 05:03) (130/65 - 160/70)  RR: 18 (18 @ 05:03) (17 - 18)  SpO2: 95% (18 @ 05:03) (94% - 100%)    I&O's Summary  2018 07:01  -  2018 07:00  --------------------------------------------------------  IN: 0 mL / OUT: 725 mL / NET: -725 mL  Daily Weight in k.9 (2018 05:00)  Appearance: Normal	  HEENT:   Normal oral mucosa, PERRL, EOMI	  Lymphatic: No lymphadenopathy  Cardiovascular: Normal S1 S2, No JVD, No murmurs, No edema  Respiratory: Lungs clear to auscultation	  Psychiatry: A & O x 3, Mood & affect appropriate  Gastrointestinal:  Soft, Non-tender, + BS	  Skin: No rashes, No ecchymoses, No cyanosis  Neurologic: Non-focal  Extremities: Normal range of motion, No clubbing, cyanosis or edema  Vascular: Peripheral pulses palpable 2+ bilaterally  Procedure Site:  Right radial site, no bleeding, no pain, no bruising, no hematoma, +PP no edema.      TELEMETRY: 	  jessica WALKER sats overnight.     	  LABS:	 	               13.4   7.6   )-----------( 202      ( 2018 06:36 )             39.7     140  |  101  |  9.0  ----------------------------<  112  4.4   |  29.0  |  0.48<L>  Ca    9.8      2018 06:36  TPro  6.5<L>  /  Alb  4.0  /  TBili  0.3<L>  /  DBili  x   /  AST  18  /  ALT  16  /  AlkPhos  91  04-11

## 2018-08-24 ENCOUNTER — EMERGENCY (EMERGENCY)
Facility: HOSPITAL | Age: 83
LOS: 1 days | Discharge: HOME | End: 2018-08-24
Attending: EMERGENCY MEDICINE
Payer: COMMERCIAL

## 2018-08-24 VITALS
OXYGEN SATURATION: 100 % | TEMPERATURE: 98 F | RESPIRATION RATE: 20 BRPM | DIASTOLIC BLOOD PRESSURE: 74 MMHG | SYSTOLIC BLOOD PRESSURE: 122 MMHG | HEART RATE: 75 BPM

## 2018-08-24 VITALS — HEIGHT: 64 IN | WEIGHT: 119.93 LBS

## 2018-08-24 DIAGNOSIS — Z90.49 ACQUIRED ABSENCE OF OTHER SPECIFIED PARTS OF DIGESTIVE TRACT: Chronic | ICD-10-CM

## 2018-08-24 DIAGNOSIS — Z98.891 HISTORY OF UTERINE SCAR FROM PREVIOUS SURGERY: Chronic | ICD-10-CM

## 2018-08-24 DIAGNOSIS — Z98.51 TUBAL LIGATION STATUS: Chronic | ICD-10-CM

## 2018-08-24 PROBLEM — E03.9 HYPOTHYROIDISM, UNSPECIFIED: Chronic | Status: ACTIVE | Noted: 2018-04-11

## 2018-08-24 PROBLEM — K21.9 GASTRO-ESOPHAGEAL REFLUX DISEASE WITHOUT ESOPHAGITIS: Chronic | Status: ACTIVE | Noted: 2018-04-11

## 2018-08-24 PROBLEM — I21.9 ACUTE MYOCARDIAL INFARCTION, UNSPECIFIED: Chronic | Status: ACTIVE | Noted: 2018-04-11

## 2018-08-24 PROBLEM — K44.9 DIAPHRAGMATIC HERNIA WITHOUT OBSTRUCTION OR GANGRENE: Chronic | Status: ACTIVE | Noted: 2018-04-11

## 2018-08-24 PROBLEM — K52.9 NONINFECTIVE GASTROENTERITIS AND COLITIS, UNSPECIFIED: Chronic | Status: ACTIVE | Noted: 2018-04-11

## 2018-08-24 PROBLEM — K27.9 PEPTIC ULCER, SITE UNSPECIFIED, UNSPECIFIED AS ACUTE OR CHRONIC, WITHOUT HEMORRHAGE OR PERFORATION: Chronic | Status: ACTIVE | Noted: 2018-04-11

## 2018-08-24 PROBLEM — I44.7 LEFT BUNDLE-BRANCH BLOCK, UNSPECIFIED: Chronic | Status: ACTIVE | Noted: 2018-04-11

## 2018-08-24 PROBLEM — J38.1 POLYP OF VOCAL CORD AND LARYNX: Chronic | Status: ACTIVE | Noted: 2018-04-11

## 2018-08-24 PROCEDURE — 72125 CT NECK SPINE W/O DYE: CPT | Mod: 26

## 2018-08-24 PROCEDURE — 71250 CT THORAX DX C-: CPT | Mod: 26

## 2018-08-24 PROCEDURE — 73562 X-RAY EXAM OF KNEE 3: CPT | Mod: 26,RT

## 2018-08-24 PROCEDURE — 99284 EMERGENCY DEPT VISIT MOD MDM: CPT

## 2018-08-24 PROCEDURE — 71250 CT THORAX DX C-: CPT

## 2018-08-24 PROCEDURE — 73562 X-RAY EXAM OF KNEE 3: CPT

## 2018-08-24 PROCEDURE — 90471 IMMUNIZATION ADMIN: CPT

## 2018-08-24 PROCEDURE — 99284 EMERGENCY DEPT VISIT MOD MDM: CPT | Mod: 25

## 2018-08-24 PROCEDURE — 72125 CT NECK SPINE W/O DYE: CPT

## 2018-08-24 PROCEDURE — 90715 TDAP VACCINE 7 YRS/> IM: CPT

## 2018-08-24 RX ORDER — TETANUS TOXOID, REDUCED DIPHTHERIA TOXOID AND ACELLULAR PERTUSSIS VACCINE, ADSORBED 5; 2.5; 8; 8; 2.5 [IU]/.5ML; [IU]/.5ML; UG/.5ML; UG/.5ML; UG/.5ML
0.5 SUSPENSION INTRAMUSCULAR ONCE
Qty: 0 | Refills: 0 | Status: COMPLETED | OUTPATIENT
Start: 2018-08-24 | End: 2018-08-24

## 2018-08-24 RX ORDER — ACETAMINOPHEN WITH CODEINE 300MG-30MG
1 TABLET ORAL ONCE
Qty: 0 | Refills: 0 | Status: DISCONTINUED | OUTPATIENT
Start: 2018-08-24 | End: 2018-08-24

## 2018-08-24 RX ORDER — ACETAMINOPHEN WITH CODEINE 300MG-30MG
1 TABLET ORAL
Qty: 10 | Refills: 0
Start: 2018-08-24 | End: 2018-08-26

## 2018-08-24 RX ORDER — IBUPROFEN 200 MG
600 TABLET ORAL ONCE
Qty: 0 | Refills: 0 | Status: COMPLETED | OUTPATIENT
Start: 2018-08-24 | End: 2018-08-24

## 2018-08-24 RX ADMIN — TETANUS TOXOID, REDUCED DIPHTHERIA TOXOID AND ACELLULAR PERTUSSIS VACCINE, ADSORBED 0.5 MILLILITER(S): 5; 2.5; 8; 8; 2.5 SUSPENSION INTRAMUSCULAR at 14:29

## 2018-08-24 RX ADMIN — Medication 1 TABLET(S): at 16:13

## 2018-08-24 RX ADMIN — Medication 600 MILLIGRAM(S): at 14:30
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
## 2018-08-24 NOTE — ED STATDOCS - ATTENDING CONTRIBUTION TO CARE
I, Con Torres, performed the initial face to face bedside interview with this patient regarding history of present illness, review of symptoms and relevant past medical, social and family history.  I completed an independent physical examination.  I was the initial provider who evaluated this patient. I have signed out the follow up of any pending tests (i.e. labs, radiological studies) to the ACP.  I have communicated the patient’s plan of care and disposition with the ACP.
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
## 2018-08-24 NOTE — ED STATDOCS - PROGRESS NOTE DETAILS
Pt moved form intake Room. Pt seen and evaluated by intake Physician. HPI, Physical examination performed by intake Physician . Note reviewed and followup examination performed by me consistent with initial assessment. Agrees with intake Physician plan and tests. Pt CT result and X-ray negative for fracture . Pt informed about her results. Pt will F/U with her PCP as discussed. Pt admits to pain on her left rib. Pt tx with Tylenol #3.
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
## 2018-08-24 NOTE — ED STATDOCS - MUSCULOSKELETAL, MLM
range of motion is not limited, cervical paraspinal tenderness, no thoracic or lumbar spinal tenderness , left lower rib tenderness, right knee tenderness
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
## 2018-08-24 NOTE — ED ADULT NURSE NOTE - NSIMPLEMENTINTERV_GEN_ALL_ED
Implemented All Universal Safety Interventions:  [place] to call system. Call bell, personal items and telephone within reach. Instruct patient to call for assistance. Room bathroom lighting operational. Non-slip footwear when patient is off stretcher. Physically safe environment: no spills, clutter or unnecessary equipment. Stretcher in lowest position, wheels locked, appropriate side rails in place.

## 2018-10-11 ENCOUNTER — OTHER (OUTPATIENT)
Age: 83
End: 2018-10-11

## 2018-10-11 DIAGNOSIS — M25.559 PAIN IN UNSPECIFIED HIP: ICD-10-CM

## 2018-10-11 DIAGNOSIS — M25.569 PAIN IN UNSPECIFIED KNEE: ICD-10-CM

## 2018-10-18 ENCOUNTER — APPOINTMENT (OUTPATIENT)
Dept: ORTHOPEDIC SURGERY | Facility: CLINIC | Age: 83
End: 2018-10-18
Payer: COMMERCIAL

## 2018-10-18 VITALS
SYSTOLIC BLOOD PRESSURE: 152 MMHG | HEIGHT: 64 IN | DIASTOLIC BLOOD PRESSURE: 68 MMHG | HEART RATE: 66 BPM | BODY MASS INDEX: 21.17 KG/M2 | WEIGHT: 124 LBS

## 2018-10-18 DIAGNOSIS — M70.61 TROCHANTERIC BURSITIS, RIGHT HIP: ICD-10-CM

## 2018-10-18 DIAGNOSIS — Z78.9 OTHER SPECIFIED HEALTH STATUS: ICD-10-CM

## 2018-10-18 PROCEDURE — 73502 X-RAY EXAM HIP UNI 2-3 VIEWS: CPT | Mod: RT

## 2018-10-18 PROCEDURE — 73564 X-RAY EXAM KNEE 4 OR MORE: CPT | Mod: RT

## 2018-10-18 PROCEDURE — 20610 DRAIN/INJ JOINT/BURSA W/O US: CPT | Mod: 59,RT

## 2018-10-18 PROCEDURE — 99204 OFFICE O/P NEW MOD 45 MIN: CPT | Mod: 25

## 2018-11-05 ENCOUNTER — OTHER (OUTPATIENT)
Age: 83
End: 2018-11-05

## 2018-11-06 ENCOUNTER — APPOINTMENT (OUTPATIENT)
Dept: ORTHOPEDIC SURGERY | Facility: CLINIC | Age: 83
End: 2018-11-06
Payer: MEDICARE

## 2018-11-06 VITALS
HEART RATE: 65 BPM | SYSTOLIC BLOOD PRESSURE: 151 MMHG | DIASTOLIC BLOOD PRESSURE: 69 MMHG | WEIGHT: 124 LBS | HEIGHT: 64 IN | BODY MASS INDEX: 21.17 KG/M2

## 2018-11-06 DIAGNOSIS — M16.12 UNILATERAL PRIMARY OSTEOARTHRITIS, LEFT HIP: ICD-10-CM

## 2018-11-06 PROCEDURE — 99214 OFFICE O/P EST MOD 30 MIN: CPT

## 2018-11-06 PROCEDURE — 73502 X-RAY EXAM HIP UNI 2-3 VIEWS: CPT | Mod: LT

## 2018-11-12 ENCOUNTER — OUTPATIENT (OUTPATIENT)
Dept: OUTPATIENT SERVICES | Facility: HOSPITAL | Age: 83
LOS: 1 days | End: 2018-11-12

## 2018-11-12 ENCOUNTER — APPOINTMENT (OUTPATIENT)
Dept: ULTRASOUND IMAGING | Facility: CLINIC | Age: 83
End: 2018-11-12
Payer: MEDICARE

## 2018-11-12 DIAGNOSIS — Z90.49 ACQUIRED ABSENCE OF OTHER SPECIFIED PARTS OF DIGESTIVE TRACT: Chronic | ICD-10-CM

## 2018-11-12 DIAGNOSIS — Z98.51 TUBAL LIGATION STATUS: Chronic | ICD-10-CM

## 2018-11-12 DIAGNOSIS — M25.559 PAIN IN UNSPECIFIED HIP: ICD-10-CM

## 2018-11-12 DIAGNOSIS — Z98.891 HISTORY OF UTERINE SCAR FROM PREVIOUS SURGERY: Chronic | ICD-10-CM

## 2018-11-12 PROCEDURE — 73525 CONTRAST X-RAY OF HIP: CPT | Mod: 26,LT

## 2018-11-12 PROCEDURE — 27093 INJECTION FOR HIP X-RAY: CPT | Mod: LT

## 2018-11-19 ENCOUNTER — APPOINTMENT (OUTPATIENT)
Dept: ORTHOPEDIC SURGERY | Facility: CLINIC | Age: 83
End: 2018-11-19
Payer: MEDICARE

## 2018-11-19 VITALS
BODY MASS INDEX: 21.17 KG/M2 | DIASTOLIC BLOOD PRESSURE: 57 MMHG | HEART RATE: 68 BPM | SYSTOLIC BLOOD PRESSURE: 145 MMHG | HEIGHT: 64 IN | WEIGHT: 124 LBS

## 2018-11-19 PROCEDURE — 99214 OFFICE O/P EST MOD 30 MIN: CPT | Mod: 25

## 2018-11-19 PROCEDURE — 20610 DRAIN/INJ JOINT/BURSA W/O US: CPT | Mod: LT

## 2019-01-01 ENCOUNTER — TRANSCRIPTION ENCOUNTER (OUTPATIENT)
Age: 84
End: 2019-01-01

## 2019-01-02 ENCOUNTER — OUTPATIENT (OUTPATIENT)
Dept: OUTPATIENT SERVICES | Facility: HOSPITAL | Age: 84
LOS: 1 days | End: 2019-01-02
Payer: MEDICARE

## 2019-01-02 DIAGNOSIS — Z98.891 HISTORY OF UTERINE SCAR FROM PREVIOUS SURGERY: Chronic | ICD-10-CM

## 2019-01-02 DIAGNOSIS — M25.552 PAIN IN LEFT HIP: ICD-10-CM

## 2019-01-02 DIAGNOSIS — Z98.51 TUBAL LIGATION STATUS: Chronic | ICD-10-CM

## 2019-01-02 DIAGNOSIS — Z90.49 ACQUIRED ABSENCE OF OTHER SPECIFIED PARTS OF DIGESTIVE TRACT: Chronic | ICD-10-CM

## 2019-01-02 PROCEDURE — 77002 NEEDLE LOCALIZATION BY XRAY: CPT

## 2019-01-02 PROCEDURE — 20610 DRAIN/INJ JOINT/BURSA W/O US: CPT | Mod: LT

## 2019-01-05 ENCOUNTER — OTHER (OUTPATIENT)
Age: 84
End: 2019-01-05

## 2019-04-10 ENCOUNTER — MOBILE ON CALL (OUTPATIENT)
Age: 84
End: 2019-04-10

## 2019-04-11 ENCOUNTER — APPOINTMENT (OUTPATIENT)
Dept: GASTROENTEROLOGY | Facility: CLINIC | Age: 84
End: 2019-04-11
Payer: MEDICARE

## 2019-04-11 VITALS
WEIGHT: 123 LBS | BODY MASS INDEX: 21 KG/M2 | DIASTOLIC BLOOD PRESSURE: 80 MMHG | HEIGHT: 64 IN | HEART RATE: 78 BPM | SYSTOLIC BLOOD PRESSURE: 130 MMHG

## 2019-04-11 PROCEDURE — 99204 OFFICE O/P NEW MOD 45 MIN: CPT

## 2019-04-11 PROCEDURE — 82272 OCCULT BLD FECES 1-3 TESTS: CPT

## 2019-04-11 RX ORDER — HYDROCODONE BITARTRATE AND ACETAMINOPHEN 10; 325 MG/1; MG/1
10-325 TABLET ORAL
Qty: 60 | Refills: 0 | Status: DISCONTINUED | COMMUNITY
Start: 2019-01-19

## 2019-04-11 RX ORDER — CIPROFLOXACIN HYDROCHLORIDE 500 MG/1
500 TABLET, FILM COATED ORAL
Qty: 14 | Refills: 0 | Status: DISCONTINUED | COMMUNITY
Start: 2019-02-02

## 2019-04-11 NOTE — PHYSICAL EXAM
[General Appearance - Alert] : alert [General Appearance - In No Acute Distress] : in no acute distress [Sclera] : the sclera and conjunctiva were normal [PERRL With Normal Accommodation] : pupils were equal in size, round, and reactive to light [Extraocular Movements] : extraocular movements were intact [Outer Ear] : the ears and nose were normal in appearance [Oropharynx] : the oropharynx was normal [Neck Appearance] : the appearance of the neck was normal [Neck Cervical Mass (___cm)] : no neck mass was observed [Jugular Venous Distention Increased] : there was no jugular-venous distention [Thyroid Nodule] : there were no palpable thyroid nodules [Thyroid Diffuse Enlargement] : the thyroid was not enlarged [Auscultation Breath Sounds / Voice Sounds] : lungs were clear to auscultation bilaterally [Heart Rate And Rhythm] : heart rate was normal and rhythm regular [Heart Sounds] : normal S1 and S2 [Heart Sounds Gallop] : no gallops [Murmurs] : no murmurs [Heart Sounds Pericardial Friction Rub] : no pericardial rub [Edema] : there was no peripheral edema [Bowel Sounds] : normal bowel sounds [Abdomen Soft] : soft [Abdomen Tenderness] : non-tender [Abdomen Mass (___ Cm)] : no abdominal mass palpated [] : no hepato-splenomegaly [Normal Sphincter Tone] : normal sphincter tone [No Rectal Mass] : no rectal mass [Occult Blood Positive] : stool was negative for occult blood [External Hemorrhoid] : external hemorrhoids [No CVA Tenderness] : no ~M costovertebral angle tenderness [Skin Color & Pigmentation] : normal skin color and pigmentation [Oriented To Time, Place, And Person] : oriented to person, place, and time [Impaired Insight] : insight and judgment were intact [Affect] : the affect was normal

## 2019-04-11 NOTE — HISTORY OF PRESENT ILLNESS
[de-identified] : patient presented with complaints of rectal irritation with discomfort and burning sometimes difficulty evacuating but no rectal bleeding or abdominal pain. A colonoscopy done 5 years ago just demonstrated a hyperplastic an inflammatory polyp. She has no nausea vomiting or weight loss.

## 2019-04-11 NOTE — ASSESSMENT
[FreeTextEntry1] : I discussed with the patient I thought her symptoms are related to her hemorrhoids. I did not think she had a colonoscopy this time as she had an unremarkable in 5 years ago she having no bleeding. I advised the following empiric therapy including Citrucel 1 tablespoon in 8 ounces of cold water daily for her bowel movements as well as Tucks wipes, sitz baths and Balmex bid. She will call me in 2 weeks to assess the success of this therapy

## 2019-09-25 ENCOUNTER — MOBILE ON CALL (OUTPATIENT)
Age: 84
End: 2019-09-25

## 2019-10-08 ENCOUNTER — APPOINTMENT (OUTPATIENT)
Dept: GASTROENTEROLOGY | Facility: CLINIC | Age: 84
End: 2019-10-08
Payer: MEDICARE

## 2019-10-08 VITALS
BODY MASS INDEX: 21 KG/M2 | WEIGHT: 123 LBS | HEART RATE: 63 BPM | DIASTOLIC BLOOD PRESSURE: 70 MMHG | HEIGHT: 64 IN | SYSTOLIC BLOOD PRESSURE: 126 MMHG

## 2019-10-08 PROCEDURE — 99214 OFFICE O/P EST MOD 30 MIN: CPT

## 2019-10-08 NOTE — REASON FOR VISIT
[FreeTextEntry1] : rectal bleeding and difficulty moving bowels [Follow-Up: _____] : a [unfilled] follow-up visit

## 2019-10-08 NOTE — HISTORY OF PRESENT ILLNESS
[de-identified] : patient Senior last April complaining of rectal burning. She said she used some cream and felt better. However last few months she's noticed difficulty evacuating moving her bowels and also has had some rectal bleeding with blood mixed with the stool. She has also had some intermittent left lower quadrant discomfort. Her last colonoscopy was almost 6 years ago.\par Patient has a history of angina and was recently put on Isordil as well as Brillinta. She states that her chest pain symptoms are not much better. She last saw her cardiologist 6 weeks ago. The patient states her CBC was normal but that blood work is not available for my review. In addition the patient has a history of asthma and says she's having some bleeding issues and the last time she saw her pulmonologist was 3 years ago.

## 2019-10-08 NOTE — ASSESSMENT
[FreeTextEntry1] : I discussed with the patient given her findings of change in bowel habits rectal bleeding and intermittent left lower quadrant discomfort it would be appropriate to do a colonoscopy to evaluate the symptoms and rule out polyp or neoplasm. However the patient has significant cardiac and pulmonary issues and I advised her that she should follow up with with her cardiologist and pulmonologist to make sure that she is a reasonable candidate to have this procedure performed. In addition I discussed with her that we will would like to stop the balloon to prior to the procedure and she should review this with her cardiologist as well. She will get back to me after she sees both physicians. I also asked her to have her cardiologist for her recent blood work.

## 2019-10-15 ENCOUNTER — APPOINTMENT (OUTPATIENT)
Dept: PULMONOLOGY | Facility: CLINIC | Age: 84
End: 2019-10-15
Payer: MEDICARE

## 2019-10-15 VITALS — WEIGHT: 120 LBS | BODY MASS INDEX: 22.08 KG/M2 | HEIGHT: 62 IN

## 2019-10-15 VITALS
HEART RATE: 60 BPM | SYSTOLIC BLOOD PRESSURE: 124 MMHG | DIASTOLIC BLOOD PRESSURE: 78 MMHG | RESPIRATION RATE: 14 BRPM | OXYGEN SATURATION: 95 %

## 2019-10-15 DIAGNOSIS — Z87.19 PERSONAL HISTORY OF OTHER DISEASES OF THE DIGESTIVE SYSTEM: ICD-10-CM

## 2019-10-15 DIAGNOSIS — Z86.79 PERSONAL HISTORY OF OTHER DISEASES OF THE CIRCULATORY SYSTEM: ICD-10-CM

## 2019-10-15 DIAGNOSIS — I25.2 OLD MYOCARDIAL INFARCTION: ICD-10-CM

## 2019-10-15 DIAGNOSIS — Z01.811 ENCOUNTER FOR PREPROCEDURAL RESPIRATORY EXAMINATION: ICD-10-CM

## 2019-10-15 DIAGNOSIS — Z87.891 PERSONAL HISTORY OF NICOTINE DEPENDENCE: ICD-10-CM

## 2019-10-15 PROCEDURE — 94664 DEMO&/EVAL PT USE INHALER: CPT | Mod: 59

## 2019-10-15 PROCEDURE — 99204 OFFICE O/P NEW MOD 45 MIN: CPT | Mod: 25

## 2019-10-15 PROCEDURE — 94060 EVALUATION OF WHEEZING: CPT

## 2019-10-15 RX ORDER — RANOLAZINE 1000 MG/1
1000 TABLET, FILM COATED, EXTENDED RELEASE ORAL
Refills: 0 | Status: COMPLETED | COMMUNITY
End: 2019-10-15

## 2019-10-15 RX ORDER — ROSUVASTATIN CALCIUM 20 MG/1
20 TABLET, FILM COATED ORAL
Qty: 90 | Refills: 0 | Status: COMPLETED | COMMUNITY
Start: 2018-12-28 | End: 2019-10-15

## 2019-10-15 RX ORDER — LEVOTHYROXINE SODIUM 0.07 MG/1
75 TABLET ORAL
Refills: 0 | Status: COMPLETED | COMMUNITY
End: 2019-10-15

## 2019-10-15 RX ORDER — AMLODIPINE BESYLATE 5 MG/1
5 TABLET ORAL
Refills: 0 | Status: COMPLETED | COMMUNITY
End: 2019-10-15

## 2019-10-15 RX ORDER — PRAVASTATIN SODIUM 40 MG/1
40 TABLET ORAL
Refills: 0 | Status: COMPLETED | COMMUNITY
End: 2019-10-15

## 2019-10-15 RX ORDER — ASPIRIN 81 MG
81 TABLET, DELAYED RELEASE (ENTERIC COATED) ORAL
Refills: 0 | Status: ACTIVE | COMMUNITY

## 2019-10-15 RX ORDER — ASPIRIN 81 MG
81 TABLET, DELAYED RELEASE (ENTERIC COATED) ORAL
Refills: 0 | Status: COMPLETED | COMMUNITY
End: 2019-10-15

## 2019-10-15 RX ORDER — ISOSORBIDE MONONITRATE 60 MG/1
60 TABLET, EXTENDED RELEASE ORAL
Refills: 0 | Status: COMPLETED | COMMUNITY
End: 2019-10-15

## 2019-10-15 RX ORDER — TOBRAMYCIN AND DEXAMETHASONE 3; 1 MG/ML; MG/ML
0.3-0.1 SUSPENSION/ DROPS OPHTHALMIC
Qty: 5 | Refills: 0 | Status: DISCONTINUED | COMMUNITY
Start: 2019-02-11 | End: 2019-10-15

## 2019-10-15 NOTE — REVIEW OF SYSTEMS
[Hypertension] : ~T hypertension [As Noted in HPI] : as noted in HPI [Abdominal Pain] : abdominal pain [Bleeding] : bleeding [Negative] : Sleep Disorder

## 2019-10-15 NOTE — CONSULT LETTER
[Dear  ___] : Dear  [unfilled], [Consult Letter:] : I had the pleasure of evaluating your patient, [unfilled]. [Please see my note below.] : Please see my note below. [Consult Closing:] : Thank you very much for allowing me to participate in the care of this patient.  If you have any questions, please do not hesitate to contact me. [Sincerely,] : Sincerely, [FreeTextEntry3] : Mansoor Romeo MD\par

## 2019-10-16 ENCOUNTER — MOBILE ON CALL (OUTPATIENT)
Age: 84
End: 2019-10-16

## 2019-10-17 ENCOUNTER — OTHER (OUTPATIENT)
Age: 84
End: 2019-10-17

## 2019-11-19 ENCOUNTER — OUTPATIENT (OUTPATIENT)
Dept: OUTPATIENT SERVICES | Facility: HOSPITAL | Age: 84
LOS: 1 days | End: 2019-11-19
Payer: MEDICARE

## 2019-11-19 ENCOUNTER — RESULT REVIEW (OUTPATIENT)
Age: 84
End: 2019-11-19

## 2019-11-19 ENCOUNTER — APPOINTMENT (OUTPATIENT)
Dept: GASTROENTEROLOGY | Facility: GI CENTER | Age: 84
End: 2019-11-19
Payer: MEDICARE

## 2019-11-19 DIAGNOSIS — K64.0 FIRST DEGREE HEMORRHOIDS: ICD-10-CM

## 2019-11-19 DIAGNOSIS — Z98.51 TUBAL LIGATION STATUS: Chronic | ICD-10-CM

## 2019-11-19 DIAGNOSIS — K62.5 HEMORRHAGE OF ANUS AND RECTUM: ICD-10-CM

## 2019-11-19 DIAGNOSIS — Z90.49 ACQUIRED ABSENCE OF OTHER SPECIFIED PARTS OF DIGESTIVE TRACT: Chronic | ICD-10-CM

## 2019-11-19 DIAGNOSIS — Z98.891 HISTORY OF UTERINE SCAR FROM PREVIOUS SURGERY: Chronic | ICD-10-CM

## 2019-11-19 DIAGNOSIS — K63.5 POLYP OF COLON: ICD-10-CM

## 2019-11-19 DIAGNOSIS — K57.90 DIVERTICULOSIS OF INTESTINE, PART UNSPECIFIED, W/OUT PERFORATION OR ABSCESS W/OUT BLEEDING: ICD-10-CM

## 2019-11-19 PROCEDURE — 45380 COLONOSCOPY AND BIOPSY: CPT

## 2019-11-19 PROCEDURE — 88305 TISSUE EXAM BY PATHOLOGIST: CPT | Mod: 26

## 2019-11-19 PROCEDURE — 88305 TISSUE EXAM BY PATHOLOGIST: CPT

## 2019-11-19 NOTE — PROCEDURE
[With Polypectomy] : polypectomy [With Biopsy] : with biopsy [Allergies Reviewed] : allergies reviewed. [Hematochezia] : hematochezia [Procedure Explained] : The procedure was explained [Risks] : Risks [Benefits] : benefits [Alternatives] : alternatives [Bleeding] : bleeding risk [Consent Obtained] : written consent was obtained prior to the procedure and is detailed in the patient's record [Patient] : the patient [Automated Blood Pressure Cuff] : automated blood pressure cuff [Cardiac Monitor] : cardiac monitor [Propofol ___ mg IV] : Propofol [unfilled] ~Umg intravenously [Pulse Oximeter] : pulse oximeter [Prep Qualtiy: ___] : Prep Quality:  [unfilled] [___ L/min Oxygen via NC] : [unfilled] ~Uliters/minute oxygen via nasal cannula [Cecum (Landmarks/Transillum)] : and guided to the cecum which was identified by the anatomic landmarks of the appendiceal orifice and ileocecal valve and by transillumination in the right lower quadrant [Withdrawal Time: ___] : Withdrawal Time:  [unfilled] [Polyps] : polyps [Biopsy] : biopsy [Normal] : Normal [Diverticulosis] : diverticulosis [FreeTextEntry2] : [facility] [phone] [Hemorrhoids] : hemorrhoids [de-identified] : Polyps\par 3 mm prox ascending\par 4 mm misd ascending both removed with forceps

## 2019-11-19 NOTE — PHYSICAL EXAM
[General Appearance - Alert] : alert [Auscultation Breath Sounds / Voice Sounds] : lungs were clear to auscultation bilaterally [General Appearance - In No Acute Distress] : in no acute distress [Heart Rate And Rhythm] : heart rate was normal and rhythm regular [Heart Sounds] : normal S1 and S2 [Murmurs] : no murmurs [Heart Sounds Gallop] : no gallops [Heart Sounds Pericardial Friction Rub] : no pericardial rub [Abdomen Tenderness] : non-tender [Abdomen Soft] : soft [Bowel Sounds] : normal bowel sounds [Abdomen Mass (___ Cm)] : no abdominal mass palpated [] : no hepato-splenomegaly

## 2019-11-19 NOTE — PROCEDURE
[With Biopsy] : with biopsy [With Polypectomy] : polypectomy [Hematochezia] : hematochezia [Allergies Reviewed] : allergies reviewed. [Procedure Explained] : The procedure was explained [Risks] : Risks [Alternatives] : alternatives [Bleeding] : bleeding risk [Benefits] : benefits [Consent Obtained] : written consent was obtained prior to the procedure and is detailed in the patient's record [Automated Blood Pressure Cuff] : automated blood pressure cuff [Patient] : the patient [Cardiac Monitor] : cardiac monitor [Pulse Oximeter] : pulse oximeter [Propofol ___ mg IV] : Propofol [unfilled] ~Umg intravenously [___ L/min Oxygen via NC] : [unfilled] ~Uliters/minute oxygen via nasal cannula [Prep Qualtiy: ___] : Prep Quality:  [unfilled] [Withdrawal Time: ___] : Withdrawal Time:  [unfilled] [Cecum (Landmarks/Transillum)] : and guided to the cecum which was identified by the anatomic landmarks of the appendiceal orifice and ileocecal valve and by transillumination in the right lower quadrant [Biopsy] : biopsy [Polyps] : polyps [Normal] : Normal [Diverticulosis] : diverticulosis [FreeTextEntry2] : [facility] [phone] [Hemorrhoids] : hemorrhoids [de-identified] : Polyps\par 3 mm prox ascending\par 4 mm misd ascending both removed with forceps

## 2019-11-19 NOTE — PHYSICAL EXAM
[General Appearance - Alert] : alert [General Appearance - In No Acute Distress] : in no acute distress [Auscultation Breath Sounds / Voice Sounds] : lungs were clear to auscultation bilaterally [Heart Rate And Rhythm] : heart rate was normal and rhythm regular [Heart Sounds] : normal S1 and S2 [Heart Sounds Pericardial Friction Rub] : no pericardial rub [Murmurs] : no murmurs [Heart Sounds Gallop] : no gallops [Abdomen Tenderness] : non-tender [Bowel Sounds] : normal bowel sounds [Abdomen Soft] : soft [Abdomen Mass (___ Cm)] : no abdominal mass palpated [] : no hepato-splenomegaly

## 2019-11-19 NOTE — REASON FOR VISIT
[Procedure: _________] : a [unfilled] procedure visit [FreeTextEntry1] : Rectal Bleeding [Colonoscopy] : a colonoscopy [FreeTextEntry2] : Rectal Bleeding

## 2019-11-21 LAB — SURGICAL PATHOLOGY STUDY: SIGNIFICANT CHANGE UP

## 2019-12-13 ENCOUNTER — APPOINTMENT (OUTPATIENT)
Dept: OBGYN | Facility: CLINIC | Age: 84
End: 2019-12-13
Payer: MEDICARE

## 2019-12-13 VITALS
HEIGHT: 64 IN | WEIGHT: 118 LBS | BODY MASS INDEX: 20.14 KG/M2 | DIASTOLIC BLOOD PRESSURE: 58 MMHG | SYSTOLIC BLOOD PRESSURE: 118 MMHG

## 2019-12-13 DIAGNOSIS — Z01.419 ENCOUNTER FOR GYNECOLOGICAL EXAMINATION (GENERAL) (ROUTINE) W/OUT ABNORMAL FINDINGS: ICD-10-CM

## 2019-12-13 DIAGNOSIS — Z87.39 PERSONAL HISTORY OF OTHER DISEASES OF THE MUSCULOSKELETAL SYSTEM AND CONNECTIVE TISSUE: ICD-10-CM

## 2019-12-13 DIAGNOSIS — Z82.49 FAMILY HISTORY OF ISCHEMIC HEART DISEASE AND OTHER DISEASES OF THE CIRCULATORY SYSTEM: ICD-10-CM

## 2019-12-13 DIAGNOSIS — Z83.3 FAMILY HISTORY OF DIABETES MELLITUS: ICD-10-CM

## 2019-12-13 PROCEDURE — 99214 OFFICE O/P EST MOD 30 MIN: CPT

## 2019-12-13 RX ORDER — ROSUVASTATIN CALCIUM 20 MG/1
20 TABLET, FILM COATED ORAL
Refills: 0 | Status: DISCONTINUED | COMMUNITY
End: 2019-12-13

## 2020-01-08 ENCOUNTER — APPOINTMENT (OUTPATIENT)
Dept: FAMILY MEDICINE | Facility: CLINIC | Age: 85
End: 2020-01-08
Payer: MEDICARE

## 2020-01-08 VITALS
WEIGHT: 121 LBS | HEIGHT: 64 IN | BODY MASS INDEX: 20.66 KG/M2 | HEART RATE: 62 BPM | DIASTOLIC BLOOD PRESSURE: 60 MMHG | SYSTOLIC BLOOD PRESSURE: 140 MMHG

## 2020-01-08 DIAGNOSIS — I25.10 ATHEROSCLEROTIC HEART DISEASE OF NATIVE CORONARY ARTERY W/OUT ANGINA PECTORIS: ICD-10-CM

## 2020-01-08 PROCEDURE — 99204 OFFICE O/P NEW MOD 45 MIN: CPT

## 2020-01-08 RX ORDER — SODIUM SULFATE, POTASSIUM SULFATE, MAGNESIUM SULFATE 17.5; 3.13; 1.6 G/ML; G/ML; G/ML
17.5-3.13-1.6 SOLUTION, CONCENTRATE ORAL
Qty: 1 | Refills: 0 | Status: DISCONTINUED | COMMUNITY
Start: 2019-10-17 | End: 2020-01-08

## 2020-01-09 RX ORDER — ISOSORBIDE MONONITRATE 60 MG/1
60 TABLET, EXTENDED RELEASE ORAL DAILY
Qty: 90 | Refills: 0 | Status: ACTIVE | COMMUNITY
Start: 1900-01-01 | End: 1900-01-01

## 2020-01-11 PROBLEM — I25.10 CAD (CORONARY ARTERY DISEASE): Status: ACTIVE | Noted: 2020-01-08

## 2020-01-11 NOTE — PHYSICAL EXAM
[No Acute Distress] : no acute distress [Well Nourished] : well nourished [Well Developed] : well developed [Well-Appearing] : well-appearing [Normal Sclera/Conjunctiva] : normal sclera/conjunctiva [EOMI] : extraocular movements intact [PERRL] : pupils equal round and reactive to light [Normal Outer Ear/Nose] : the outer ears and nose were normal in appearance [No JVD] : no jugular venous distention [Normal Oropharynx] : the oropharynx was normal [No Lymphadenopathy] : no lymphadenopathy [Supple] : supple [No Respiratory Distress] : no respiratory distress  [Thyroid Normal, No Nodules] : the thyroid was normal and there were no nodules present [No Accessory Muscle Use] : no accessory muscle use [Normal Rate] : normal rate  [Regular Rhythm] : with a regular rhythm [Normal S1, S2] : normal S1 and S2 [No Murmur] : no murmur heard [No Carotid Bruits] : no carotid bruits [No Abdominal Bruit] : a ~M bruit was not heard ~T in the abdomen [No Varicosities] : no varicosities [Pedal Pulses Present] : the pedal pulses are present [No Edema] : there was no peripheral edema [No Palpable Aorta] : no palpable aorta [No Extremity Clubbing/Cyanosis] : no extremity clubbing/cyanosis [Soft] : abdomen soft [Non Tender] : non-tender [Non-distended] : non-distended [No Masses] : no abdominal mass palpated [No HSM] : no HSM [Normal Bowel Sounds] : normal bowel sounds [Normal Posterior Cervical Nodes] : no posterior cervical lymphadenopathy [No CVA Tenderness] : no CVA  tenderness [Normal Anterior Cervical Nodes] : no anterior cervical lymphadenopathy [No Spinal Tenderness] : no spinal tenderness [No Joint Swelling] : no joint swelling [Grossly Normal Strength/Tone] : grossly normal strength/tone [No Rash] : no rash [Coordination Grossly Intact] : coordination grossly intact [No Focal Deficits] : no focal deficits [Normal Gait] : normal gait [Deep Tendon Reflexes (DTR)] : deep tendon reflexes were 2+ and symmetric [Normal Insight/Judgement] : insight and judgment were intact [Normal Affect] : the affect was normal [de-identified] : decrease air entry through out the lung

## 2020-01-11 NOTE — PLAN
[FreeTextEntry1] : COPD exacebaration- start prednisone 40 mg for 3 days and then reduce by 10 mg every 3 days\par albuterol via nebulizer\par Z pack\par .PND-Supportive and conservative therapies reviewed and advised: to consider:\par Increase fluids  and rest.\par Consider saline nasal spray/ irrigation  3-4 times daily\par Salt water gargles 3-4 times daily\par Cool mist humidifier. \par HAND WASHING/PURELL\par Acetaminophen/Advil for fever,  headache, myalgias\par \par follow up in 3 months

## 2020-01-11 NOTE — HISTORY OF PRESENT ILLNESS
[Cold Symptoms] : cold symptoms [Moderate] : moderate [Constant] : constant [___ Weeks ago] :  [unfilled] weeks ago [Congestion] : congestion [Cough] : cough [Wheezing] : wheezing [Chills] : chills [Shortness Of Breath] : shortness of breath [Fatigue] : fatigue [Fever] : fever [Rest] : rest [Worsening] : worsening [Anorexia] : no anorexia [Earache] : no earache [Headache] : no headache [FreeTextEntry1] : using Advair  and not helping much [FreeTextEntry2] : and chest tightness

## 2020-01-16 NOTE — ASU PATIENT PROFILE, ADULT - PATIENT KNOW
yes Localized Dermabrasion Text: The patient was draped in routine manner.  Localized dermabrasion using 3 x 17 mm wire brush was performed in routine manner to papillary dermis. This spot dermabrasion is being performed to complete skin cancer reconstruction. It also will eliminate the other sun damaged precancerous cells that are known to be part of the regional effect of a lifetime's worth of sun exposure. This localized dermabrasion is therapeutic and should not be considered cosmetic in any regard. Localized Dermabrasion With Wire Brush Text: The patient was draped in routine manner.  Localized dermabrasion using 3 x 17 mm wire brush was performed in routine manner to papillary dermis. This spot dermabrasion is being performed to complete skin cancer reconstruction. It also will eliminate the other sun damaged precancerous cells that are known to be part of the regional effect of a lifetime's worth of sun exposure. This localized dermabrasion is therapeutic and should not be considered cosmetic in any regard.

## 2020-01-21 ENCOUNTER — APPOINTMENT (OUTPATIENT)
Dept: FAMILY MEDICINE | Facility: CLINIC | Age: 85
End: 2020-01-21
Payer: MEDICARE

## 2020-01-21 VITALS
HEIGHT: 64 IN | DIASTOLIC BLOOD PRESSURE: 60 MMHG | BODY MASS INDEX: 20.32 KG/M2 | SYSTOLIC BLOOD PRESSURE: 126 MMHG | WEIGHT: 119 LBS | HEART RATE: 66 BPM

## 2020-01-21 DIAGNOSIS — E03.9 HYPOTHYROIDISM, UNSPECIFIED: ICD-10-CM

## 2020-01-21 DIAGNOSIS — E78.00 PURE HYPERCHOLESTEROLEMIA, UNSPECIFIED: ICD-10-CM

## 2020-01-21 PROCEDURE — 99214 OFFICE O/P EST MOD 30 MIN: CPT

## 2020-01-21 NOTE — HISTORY OF PRESENT ILLNESS
[FreeTextEntry1] : Patient here for follow up and labs [de-identified] : Patient is here for a routine follow up and to have labs done. She states that she feels well today and previously reported cough and congestion has resolved. \par Here also to get routine labs

## 2020-01-21 NOTE — END OF VISIT
[FreeTextEntry3] : All medical entries made by the Scribe were at my, Dr. Monsalve, direction and personally dictated by me on [1/21/2020]. I have reviewed the chart and agree that the record accurately reflects my personal performance of the history, physical exam, assessment and plan. I have also personally directed, reviewed, and agreed with the chart.\par

## 2020-01-21 NOTE — PLAN
[FreeTextEntry1] : Labs ordered today. \par \par Mammogram/ Sonogram done on 1/15/2020- normal \par \par Physical next visit/follow up in 3 months

## 2020-01-21 NOTE — ADDENDUM
[FreeTextEntry1] : I, Luz Carlos, acted solely as a scribe for Dr. Yang on this date [1/21/2020].\par

## 2020-01-21 NOTE — PHYSICAL EXAM
[No Acute Distress] : no acute distress [Well Nourished] : well nourished [Well Developed] : well developed [Normal Sclera/Conjunctiva] : normal sclera/conjunctiva [Well-Appearing] : well-appearing [PERRL] : pupils equal round and reactive to light [EOMI] : extraocular movements intact [Normal Outer Ear/Nose] : the outer ears and nose were normal in appearance [Normal Oropharynx] : the oropharynx was normal [No JVD] : no jugular venous distention [No Lymphadenopathy] : no lymphadenopathy [Supple] : supple [Thyroid Normal, No Nodules] : the thyroid was normal and there were no nodules present [No Respiratory Distress] : no respiratory distress  [No Accessory Muscle Use] : no accessory muscle use [Clear to Auscultation] : lungs were clear to auscultation bilaterally [Normal Rate] : normal rate  [Regular Rhythm] : with a regular rhythm [Normal S1, S2] : normal S1 and S2 [No Murmur] : no murmur heard [No Carotid Bruits] : no carotid bruits [No Abdominal Bruit] : a ~M bruit was not heard ~T in the abdomen [No Varicosities] : no varicosities [Pedal Pulses Present] : the pedal pulses are present [No Edema] : there was no peripheral edema [No Palpable Aorta] : no palpable aorta [No Extremity Clubbing/Cyanosis] : no extremity clubbing/cyanosis [Soft] : abdomen soft [Non Tender] : non-tender [Non-distended] : non-distended [No HSM] : no HSM [No Masses] : no abdominal mass palpated [Normal Bowel Sounds] : normal bowel sounds [Normal Posterior Cervical Nodes] : no posterior cervical lymphadenopathy [Normal Anterior Cervical Nodes] : no anterior cervical lymphadenopathy [No CVA Tenderness] : no CVA  tenderness [No Joint Swelling] : no joint swelling [No Spinal Tenderness] : no spinal tenderness [Grossly Normal Strength/Tone] : grossly normal strength/tone [Coordination Grossly Intact] : coordination grossly intact [No Rash] : no rash [No Focal Deficits] : no focal deficits [Deep Tendon Reflexes (DTR)] : deep tendon reflexes were 2+ and symmetric [Normal Gait] : normal gait [Normal Affect] : the affect was normal [Normal Insight/Judgement] : insight and judgment were intact

## 2020-03-02 ENCOUNTER — APPOINTMENT (OUTPATIENT)
Dept: FAMILY MEDICINE | Facility: CLINIC | Age: 85
End: 2020-03-02
Payer: MEDICARE

## 2020-03-02 VITALS
DIASTOLIC BLOOD PRESSURE: 60 MMHG | WEIGHT: 121 LBS | BODY MASS INDEX: 20.66 KG/M2 | HEIGHT: 64 IN | OXYGEN SATURATION: 95 % | HEART RATE: 54 BPM | SYSTOLIC BLOOD PRESSURE: 110 MMHG | TEMPERATURE: 98.5 F

## 2020-03-02 DIAGNOSIS — R07.0 PAIN IN THROAT: ICD-10-CM

## 2020-03-02 DIAGNOSIS — I10 ESSENTIAL (PRIMARY) HYPERTENSION: ICD-10-CM

## 2020-03-02 PROCEDURE — 99214 OFFICE O/P EST MOD 30 MIN: CPT

## 2020-03-02 RX ORDER — PNEUMOCOCCAL 23-VAL P-SAC VAC 25MCG/0.5
25 VIAL (ML) INJECTION
Qty: 1 | Refills: 0 | Status: ACTIVE | COMMUNITY
Start: 2019-10-21

## 2020-03-02 NOTE — PHYSICAL EXAM
[Well Nourished] : well nourished [No Acute Distress] : no acute distress [Well Developed] : well developed [Well-Appearing] : well-appearing [Normal Outer Ear/Nose] : the outer ears and nose were normal in appearance [EOMI] : extraocular movements intact [Normal Sclera/Conjunctiva] : normal sclera/conjunctiva [PERRL] : pupils equal round and reactive to light [No JVD] : no jugular venous distention [Thyroid Normal, No Nodules] : the thyroid was normal and there were no nodules present [No Lymphadenopathy] : no lymphadenopathy [Supple] : supple [No Accessory Muscle Use] : no accessory muscle use [No Respiratory Distress] : no respiratory distress  [Normal Rate] : normal rate  [Normal S1, S2] : normal S1 and S2 [Regular Rhythm] : with a regular rhythm [No Murmur] : no murmur heard [No Carotid Bruits] : no carotid bruits [No Abdominal Bruit] : a ~M bruit was not heard ~T in the abdomen [No Varicosities] : no varicosities [Pedal Pulses Present] : the pedal pulses are present [No Edema] : there was no peripheral edema [No Extremity Clubbing/Cyanosis] : no extremity clubbing/cyanosis [No Palpable Aorta] : no palpable aorta [Non Tender] : non-tender [Soft] : abdomen soft [No HSM] : no HSM [Non-distended] : non-distended [No Masses] : no abdominal mass palpated [Normal Posterior Cervical Nodes] : no posterior cervical lymphadenopathy [Normal Bowel Sounds] : normal bowel sounds [No CVA Tenderness] : no CVA  tenderness [Normal Anterior Cervical Nodes] : no anterior cervical lymphadenopathy [No Spinal Tenderness] : no spinal tenderness [No Rash] : no rash [No Joint Swelling] : no joint swelling [Grossly Normal Strength/Tone] : grossly normal strength/tone [Coordination Grossly Intact] : coordination grossly intact [No Focal Deficits] : no focal deficits [Deep Tendon Reflexes (DTR)] : deep tendon reflexes were 2+ and symmetric [Normal Gait] : normal gait [Normal Affect] : the affect was normal [Normal Insight/Judgement] : insight and judgment were intact [de-identified] : Injected Pharynx [de-identified] : Decrease air entry through out lung fields

## 2020-03-02 NOTE — END OF VISIT
[FreeTextEntry3] : All medical entries made by the Scribe were at my, Dr. Yang's, direction and personally dictated by me on [3/2/2020]. I have reviewed the chart and agree that the record accurately reflects my personal performance of the history, physical exam, assessment and plan. I have also personally directed, reviewed, and agreed with the chart.\par

## 2020-03-02 NOTE — PLAN
[FreeTextEntry1] : COPD exacerbation- start prednisone 10 mg / Levaquin 500 mg PO 1 tab daily/ Advair \par .PND-Supportive and conservative therapies reviewed and advised: to consider:\par Increase fluids  and rest.\par Consider saline nasal spray/ irrigation  3-4 times daily\par Salt water gargles 3-4 times daily\par Cool mist humidifier. \par HAND WASHING/PURELL\par Acetaminophen/Advil for fever,  headache, myalgias\par Throat pain- levaquin\par HTN- stable\par If no improvement in next few days return to office may need chest xray\par follow up 2 weeks

## 2020-03-02 NOTE — ADDENDUM
[FreeTextEntry1] : I, Luz Carlos, acted solely as a scribe for Dr. Yang on this date [3/2/2020].\par

## 2020-03-02 NOTE — HISTORY OF PRESENT ILLNESS
[Cold Symptoms] : cold symptoms [Constant] : constant [Cough] : cough [Congestion] : congestion [Sore Throat] : sore throat [Shortness Of Breath] : shortness of breath [Chills] : chills [Fatigue] : fatigue [Headache] : headache [OTC Remedies] : OTC remedies [Worsening] : worsening [At Night] : at night [Wheezing] : no wheezing [Earache] : no earache [Fever] : no fever [FreeTextEntry1] : couple of weeks  [FreeTextEntry5] : Nexium [FreeTextEntry8] : She states cough presents at night and after she eats. She feels like she has something stuck in her throat. \par She has also been taking Advair and albuterol with little to no improvement. \par

## 2020-03-10 ENCOUNTER — APPOINTMENT (OUTPATIENT)
Dept: FAMILY MEDICINE | Facility: CLINIC | Age: 85
End: 2020-03-10
Payer: MEDICARE

## 2020-03-10 VITALS
TEMPERATURE: 99.5 F | BODY MASS INDEX: 20.49 KG/M2 | DIASTOLIC BLOOD PRESSURE: 60 MMHG | HEART RATE: 61 BPM | OXYGEN SATURATION: 96 % | WEIGHT: 120 LBS | HEIGHT: 64 IN | SYSTOLIC BLOOD PRESSURE: 122 MMHG

## 2020-03-10 VITALS — TEMPERATURE: 98.7 F

## 2020-03-10 DIAGNOSIS — Z87.09 PERSONAL HISTORY OF OTHER DISEASES OF THE RESPIRATORY SYSTEM: ICD-10-CM

## 2020-03-10 PROCEDURE — 99214 OFFICE O/P EST MOD 30 MIN: CPT

## 2020-03-16 NOTE — PLAN
[FreeTextEntry1] : COPD exacerbation- continue Advair 1 puff daily/ continue Allegra \par CXR ordered \par .PND- advised Flonase and azelastine \par Supportive and conservative therapies reviewed and advised: to consider:\par Increase fluids  and rest.\par Consider saline nasal spray/ irrigation  3-4 times daily\par Salt water gargles 3-4 times daily\par Cool mist humidifier. \par HAND WASHING/PURELL\par Acetaminophen/Advil for fever,  headache, myalgias\par \par HTN- stable\par

## 2020-03-16 NOTE — PHYSICAL EXAM
[No Acute Distress] : no acute distress [Well Nourished] : well nourished [Well Developed] : well developed [Well-Appearing] : well-appearing [Normal Sclera/Conjunctiva] : normal sclera/conjunctiva [PERRL] : pupils equal round and reactive to light [EOMI] : extraocular movements intact [Normal Outer Ear/Nose] : the outer ears and nose were normal in appearance [Normal TMs] : both tympanic membranes were normal [Normal Nasal Mucosa] : the nasal mucosa was normal [No JVD] : no jugular venous distention [No Lymphadenopathy] : no lymphadenopathy [Supple] : supple [Thyroid Normal, No Nodules] : the thyroid was normal and there were no nodules present [No Respiratory Distress] : no respiratory distress  [No Accessory Muscle Use] : no accessory muscle use [Clear to Auscultation] : lungs were clear to auscultation bilaterally [Normal Rate] : normal rate  [Regular Rhythm] : with a regular rhythm [Normal S1, S2] : normal S1 and S2 [No Murmur] : no murmur heard [No Carotid Bruits] : no carotid bruits [No Abdominal Bruit] : a ~M bruit was not heard ~T in the abdomen [No Varicosities] : no varicosities [Pedal Pulses Present] : the pedal pulses are present [No Edema] : there was no peripheral edema [No Palpable Aorta] : no palpable aorta [No Extremity Clubbing/Cyanosis] : no extremity clubbing/cyanosis [Soft] : abdomen soft [Non Tender] : non-tender [Non-distended] : non-distended [No Masses] : no abdominal mass palpated [No HSM] : no HSM [Normal Bowel Sounds] : normal bowel sounds [Normal Posterior Cervical Nodes] : no posterior cervical lymphadenopathy [Normal Anterior Cervical Nodes] : no anterior cervical lymphadenopathy [No CVA Tenderness] : no CVA  tenderness [No Spinal Tenderness] : no spinal tenderness [No Joint Swelling] : no joint swelling [Grossly Normal Strength/Tone] : grossly normal strength/tone [No Rash] : no rash [Coordination Grossly Intact] : coordination grossly intact [No Focal Deficits] : no focal deficits [Normal Gait] : normal gait [Deep Tendon Reflexes (DTR)] : deep tendon reflexes were 2+ and symmetric [Normal Affect] : the affect was normal [Normal Insight/Judgement] : insight and judgment were intact [de-identified] : pharynx with erythema no exudate

## 2020-03-16 NOTE — REVIEW OF SYSTEMS
[Chills] : chills [Fatigue] : fatigue [Sore Throat] : sore throat [Cough] : cough [Negative] : Heme/Lymph [Fever] : no fever [Shortness Of Breath] : no shortness of breath [Wheezing] : no wheezing [Dyspnea on Exertion] : no dyspnea on exertion [FreeTextEntry4] : HPI

## 2020-03-16 NOTE — HISTORY OF PRESENT ILLNESS
[Moderate] : moderate [___ Weeks ago] :  [unfilled] weeks ago [Constant] : constant [Congestion] : congestion [Cough] : cough [Sore Throat] : sore throat [Chills] : chills [Fatigue] : fatigue [Headache] : headache [Stable] : stable [Wheezing] : no wheezing [Anorexia] : no anorexia [Shortness Of Breath] : no shortness of breath [Earache] : no earache [Fever] : no fever [FreeTextEntry8] : She states inhalers and antibiotics mildly helped. She is also taking Allegra at night. Cough presents mostly during the daytime. She states she feels a tickle in her throat.

## 2020-03-16 NOTE — END OF VISIT
[FreeTextEntry3] : All medical entries made by the Scribe were at my, Dr. Yang's, direction and personally dictated by me on [3/10/2020]. I have reviewed the chart and agree that the record accurately reflects my personal performance of the history, physical exam, assessment and plan. I have also personally directed, reviewed, and agreed with the chart.\par

## 2020-03-16 NOTE — ADDENDUM
[FreeTextEntry1] : I, Luz Carlos, acted solely as a scribe for Dr. Yang on this date [3/10/2020].\par

## 2020-03-20 ENCOUNTER — APPOINTMENT (OUTPATIENT)
Dept: FAMILY MEDICINE | Facility: CLINIC | Age: 85
End: 2020-03-20

## 2020-03-22 ENCOUNTER — INPATIENT (INPATIENT)
Facility: HOSPITAL | Age: 85
LOS: 2 days | Discharge: HOME | DRG: 641 | End: 2020-03-25
Attending: HOSPITALIST
Payer: MEDICARE

## 2020-03-22 VITALS
DIASTOLIC BLOOD PRESSURE: 61 MMHG | SYSTOLIC BLOOD PRESSURE: 129 MMHG | RESPIRATION RATE: 18 BRPM | WEIGHT: 139.99 LBS | HEART RATE: 43 BPM | TEMPERATURE: 98 F | OXYGEN SATURATION: 99 % | HEIGHT: 67 IN

## 2020-03-22 DIAGNOSIS — Z98.891 HISTORY OF UTERINE SCAR FROM PREVIOUS SURGERY: Chronic | ICD-10-CM

## 2020-03-22 DIAGNOSIS — Z98.51 TUBAL LIGATION STATUS: Chronic | ICD-10-CM

## 2020-03-22 DIAGNOSIS — Z90.49 ACQUIRED ABSENCE OF OTHER SPECIFIED PARTS OF DIGESTIVE TRACT: Chronic | ICD-10-CM

## 2020-03-22 DIAGNOSIS — R55 SYNCOPE AND COLLAPSE: ICD-10-CM

## 2020-03-22 LAB
ALBUMIN SERPL ELPH-MCNC: 3.9 G/DL — SIGNIFICANT CHANGE UP (ref 3.3–5.2)
ALP SERPL-CCNC: 66 U/L — SIGNIFICANT CHANGE UP (ref 40–120)
ALT FLD-CCNC: 20 U/L — SIGNIFICANT CHANGE UP
ANION GAP SERPL CALC-SCNC: 12 MMOL/L — SIGNIFICANT CHANGE UP (ref 5–17)
APPEARANCE UR: CLEAR — SIGNIFICANT CHANGE UP
AST SERPL-CCNC: 21 U/L — SIGNIFICANT CHANGE UP
BACTERIA # UR AUTO: NEGATIVE — SIGNIFICANT CHANGE UP
BASOPHILS # BLD AUTO: 0.03 K/UL — SIGNIFICANT CHANGE UP (ref 0–0.2)
BASOPHILS NFR BLD AUTO: 0.3 % — SIGNIFICANT CHANGE UP (ref 0–2)
BILIRUB SERPL-MCNC: 0.3 MG/DL — LOW (ref 0.4–2)
BILIRUB UR-MCNC: NEGATIVE — SIGNIFICANT CHANGE UP
BUN SERPL-MCNC: 13 MG/DL — SIGNIFICANT CHANGE UP (ref 8–20)
CALCIUM SERPL-MCNC: 10.6 MG/DL — HIGH (ref 8.6–10.2)
CHLORIDE SERPL-SCNC: 94 MMOL/L — LOW (ref 98–107)
CO2 SERPL-SCNC: 28 MMOL/L — SIGNIFICANT CHANGE UP (ref 22–29)
COLOR SPEC: YELLOW — SIGNIFICANT CHANGE UP
CREAT SERPL-MCNC: 0.5 MG/DL — SIGNIFICANT CHANGE UP (ref 0.5–1.3)
DIFF PNL FLD: NEGATIVE — SIGNIFICANT CHANGE UP
EOSINOPHIL # BLD AUTO: 0.11 K/UL — SIGNIFICANT CHANGE UP (ref 0–0.5)
EOSINOPHIL NFR BLD AUTO: 1.1 % — SIGNIFICANT CHANGE UP (ref 0–6)
EPI CELLS # UR: SIGNIFICANT CHANGE UP
GLUCOSE SERPL-MCNC: 162 MG/DL — HIGH (ref 70–99)
GLUCOSE UR QL: NEGATIVE MG/DL — SIGNIFICANT CHANGE UP
HCT VFR BLD CALC: 42.1 % — SIGNIFICANT CHANGE UP (ref 34.5–45)
HGB BLD-MCNC: 14.2 G/DL — SIGNIFICANT CHANGE UP (ref 11.5–15.5)
IMM GRANULOCYTES NFR BLD AUTO: 0.4 % — SIGNIFICANT CHANGE UP (ref 0–1.5)
KETONES UR-MCNC: NEGATIVE — SIGNIFICANT CHANGE UP
LEUKOCYTE ESTERASE UR-ACNC: ABNORMAL
LIDOCAIN IGE QN: 5 U/L — LOW (ref 22–51)
LYMPHOCYTES # BLD AUTO: 0.75 K/UL — LOW (ref 1–3.3)
LYMPHOCYTES # BLD AUTO: 7.6 % — LOW (ref 13–44)
MCHC RBC-ENTMCNC: 30 PG — SIGNIFICANT CHANGE UP (ref 27–34)
MCHC RBC-ENTMCNC: 33.7 GM/DL — SIGNIFICANT CHANGE UP (ref 32–36)
MCV RBC AUTO: 88.8 FL — SIGNIFICANT CHANGE UP (ref 80–100)
MONOCYTES # BLD AUTO: 0.62 K/UL — SIGNIFICANT CHANGE UP (ref 0–0.9)
MONOCYTES NFR BLD AUTO: 6.3 % — SIGNIFICANT CHANGE UP (ref 2–14)
NEUTROPHILS # BLD AUTO: 8.36 K/UL — HIGH (ref 1.8–7.4)
NEUTROPHILS NFR BLD AUTO: 84.3 % — HIGH (ref 43–77)
NITRITE UR-MCNC: NEGATIVE — SIGNIFICANT CHANGE UP
NT-PROBNP SERPL-SCNC: 127 PG/ML — SIGNIFICANT CHANGE UP (ref 0–300)
PH UR: 6 — SIGNIFICANT CHANGE UP (ref 5–8)
PLATELET # BLD AUTO: 192 K/UL — SIGNIFICANT CHANGE UP (ref 150–400)
POTASSIUM SERPL-MCNC: 4.9 MMOL/L — SIGNIFICANT CHANGE UP (ref 3.5–5.3)
POTASSIUM SERPL-SCNC: 4.9 MMOL/L — SIGNIFICANT CHANGE UP (ref 3.5–5.3)
PROT SERPL-MCNC: 6.7 G/DL — SIGNIFICANT CHANGE UP (ref 6.6–8.7)
PROT UR-MCNC: NEGATIVE MG/DL — SIGNIFICANT CHANGE UP
RBC # BLD: 4.74 M/UL — SIGNIFICANT CHANGE UP (ref 3.8–5.2)
RBC # FLD: 13.4 % — SIGNIFICANT CHANGE UP (ref 10.3–14.5)
RBC CASTS # UR COMP ASSIST: SIGNIFICANT CHANGE UP /HPF (ref 0–4)
SODIUM SERPL-SCNC: 134 MMOL/L — LOW (ref 135–145)
SP GR SPEC: 1.02 — SIGNIFICANT CHANGE UP (ref 1.01–1.02)
TROPONIN T SERPL-MCNC: <0.01 NG/ML — SIGNIFICANT CHANGE UP (ref 0–0.06)
UROBILINOGEN FLD QL: NEGATIVE MG/DL — SIGNIFICANT CHANGE UP
WBC # BLD: 9.91 K/UL — SIGNIFICANT CHANGE UP (ref 3.8–10.5)
WBC # FLD AUTO: 9.91 K/UL — SIGNIFICANT CHANGE UP (ref 3.8–10.5)
WBC UR QL: SIGNIFICANT CHANGE UP

## 2020-03-22 PROCEDURE — 93010 ELECTROCARDIOGRAM REPORT: CPT

## 2020-03-22 PROCEDURE — 93880 EXTRACRANIAL BILAT STUDY: CPT | Mod: 26

## 2020-03-22 PROCEDURE — 70450 CT HEAD/BRAIN W/O DYE: CPT | Mod: 26

## 2020-03-22 PROCEDURE — 99223 1ST HOSP IP/OBS HIGH 75: CPT | Mod: AI

## 2020-03-22 PROCEDURE — 71045 X-RAY EXAM CHEST 1 VIEW: CPT | Mod: 26

## 2020-03-22 PROCEDURE — 99285 EMERGENCY DEPT VISIT HI MDM: CPT

## 2020-03-22 RX ORDER — TIOTROPIUM BROMIDE 18 UG/1
1 CAPSULE ORAL; RESPIRATORY (INHALATION) DAILY
Refills: 0 | Status: DISCONTINUED | OUTPATIENT
Start: 2020-03-22 | End: 2020-03-25

## 2020-03-22 RX ORDER — ATORVASTATIN CALCIUM 80 MG/1
20 TABLET, FILM COATED ORAL AT BEDTIME
Refills: 0 | Status: DISCONTINUED | OUTPATIENT
Start: 2020-03-22 | End: 2020-03-25

## 2020-03-22 RX ORDER — IPRATROPIUM/ALBUTEROL SULFATE 18-103MCG
3 AEROSOL WITH ADAPTER (GRAM) INHALATION ONCE
Refills: 0 | Status: DISCONTINUED | OUTPATIENT
Start: 2020-03-22 | End: 2020-03-22

## 2020-03-22 RX ORDER — ISOSORBIDE MONONITRATE 60 MG/1
30 TABLET, EXTENDED RELEASE ORAL DAILY
Refills: 0 | Status: DISCONTINUED | OUTPATIENT
Start: 2020-03-22 | End: 2020-03-25

## 2020-03-22 RX ORDER — ASPIRIN/CALCIUM CARB/MAGNESIUM 324 MG
325 TABLET ORAL DAILY
Refills: 0 | Status: DISCONTINUED | OUTPATIENT
Start: 2020-03-22 | End: 2020-03-25

## 2020-03-22 RX ORDER — CLOPIDOGREL BISULFATE 75 MG/1
75 TABLET, FILM COATED ORAL DAILY
Refills: 0 | Status: DISCONTINUED | OUTPATIENT
Start: 2020-03-22 | End: 2020-03-23

## 2020-03-22 RX ORDER — ONDANSETRON 8 MG/1
4 TABLET, FILM COATED ORAL EVERY 6 HOURS
Refills: 0 | Status: DISCONTINUED | OUTPATIENT
Start: 2020-03-22 | End: 2020-03-22

## 2020-03-22 RX ORDER — BENZOCAINE AND MENTHOL 5; 1 G/100ML; G/100ML
1 LIQUID ORAL
Refills: 0 | Status: DISCONTINUED | OUTPATIENT
Start: 2020-03-22 | End: 2020-03-25

## 2020-03-22 RX ORDER — PANTOPRAZOLE SODIUM 20 MG/1
40 TABLET, DELAYED RELEASE ORAL
Refills: 0 | Status: DISCONTINUED | OUTPATIENT
Start: 2020-03-22 | End: 2020-03-25

## 2020-03-22 RX ORDER — CEFTRIAXONE 500 MG/1
1000 INJECTION, POWDER, FOR SOLUTION INTRAMUSCULAR; INTRAVENOUS ONCE
Refills: 0 | Status: DISCONTINUED | OUTPATIENT
Start: 2020-03-22 | End: 2020-03-22

## 2020-03-22 RX ORDER — IPRATROPIUM/ALBUTEROL SULFATE 18-103MCG
3 AEROSOL WITH ADAPTER (GRAM) INHALATION EVERY 6 HOURS
Refills: 0 | Status: DISCONTINUED | OUTPATIENT
Start: 2020-03-22 | End: 2020-03-25

## 2020-03-22 RX ORDER — IPRATROPIUM/ALBUTEROL SULFATE 18-103MCG
3 AEROSOL WITH ADAPTER (GRAM) INHALATION
Refills: 0 | Status: DISCONTINUED | OUTPATIENT
Start: 2020-03-22 | End: 2020-03-22

## 2020-03-22 RX ORDER — ASPIRIN/CALCIUM CARB/MAGNESIUM 324 MG
162 TABLET ORAL ONCE
Refills: 0 | Status: COMPLETED | OUTPATIENT
Start: 2020-03-22 | End: 2020-03-22

## 2020-03-22 RX ORDER — AZITHROMYCIN 500 MG/1
500 TABLET, FILM COATED ORAL ONCE
Refills: 0 | Status: DISCONTINUED | OUTPATIENT
Start: 2020-03-22 | End: 2020-03-22

## 2020-03-22 RX ORDER — AMLODIPINE BESYLATE 2.5 MG/1
5 TABLET ORAL DAILY
Refills: 0 | Status: DISCONTINUED | OUTPATIENT
Start: 2020-03-22 | End: 2020-03-23

## 2020-03-22 RX ORDER — ONDANSETRON 8 MG/1
4 TABLET, FILM COATED ORAL EVERY 6 HOURS
Refills: 0 | Status: DISCONTINUED | OUTPATIENT
Start: 2020-03-22 | End: 2020-03-25

## 2020-03-22 RX ORDER — ALBUTEROL 90 UG/1
2 AEROSOL, METERED ORAL EVERY 4 HOURS
Refills: 0 | Status: DISCONTINUED | OUTPATIENT
Start: 2020-03-22 | End: 2020-03-25

## 2020-03-22 RX ORDER — FLUTICASONE PROPIONATE 50 MCG
1 SPRAY, SUSPENSION NASAL
Refills: 0 | Status: DISCONTINUED | OUTPATIENT
Start: 2020-03-22 | End: 2020-03-25

## 2020-03-22 RX ORDER — BUDESONIDE AND FORMOTEROL FUMARATE DIHYDRATE 160; 4.5 UG/1; UG/1
2 AEROSOL RESPIRATORY (INHALATION)
Refills: 0 | Status: DISCONTINUED | OUTPATIENT
Start: 2020-03-22 | End: 2020-03-25

## 2020-03-22 RX ADMIN — Medication 1 SPRAY(S): at 17:26

## 2020-03-22 RX ADMIN — BENZOCAINE AND MENTHOL 1 LOZENGE: 5; 1 LIQUID ORAL at 23:10

## 2020-03-22 RX ADMIN — Medication 3 MILLILITER(S): at 14:15

## 2020-03-22 RX ADMIN — Medication 162 MILLIGRAM(S): at 12:01

## 2020-03-22 RX ADMIN — Medication 3 MILLILITER(S): at 20:09

## 2020-03-22 RX ADMIN — BENZOCAINE AND MENTHOL 1 LOZENGE: 5; 1 LIQUID ORAL at 19:04

## 2020-03-22 RX ADMIN — ATORVASTATIN CALCIUM 20 MILLIGRAM(S): 80 TABLET, FILM COATED ORAL at 21:49

## 2020-03-22 RX ADMIN — Medication 125 MILLIGRAM(S): at 12:02

## 2020-03-22 NOTE — ED PROVIDER NOTE - CARE PLAN
Principal Discharge DX:	Near syncope  Secondary Diagnosis:	COPD (chronic obstructive pulmonary disease)  Secondary Diagnosis:	Pneumonia

## 2020-03-22 NOTE — H&P ADULT - HISTORY OF PRESENT ILLNESS
84 year old female with PMH COPD, CAD s/p PCI, HTN, Hypothyroidism presents with almost passing out. As per patient she has a chronic cough which is productive of clear to scant phlegm and was fine up till this morning when she had a coughing fit associated with throat pain, rhinorrhea, dyspnea, vomiting and diarrhea.  She felt like she had a 'seizure or heart attack' this morning, and felt ready to faint.  Denies any palpitation syncope, dysuria, abdominal pain or chest pain.  Of note, she had tooth extraction 2 days ago and is currently on Amoxil.  Patient poor historian as far as medications are concerned- unsure if on BB; as per ER physician patient with bradycardia in 40-50s

## 2020-03-22 NOTE — H&P ADULT - ASSESSMENT
84 year old female with PMH COPD, CAD s/p PCI, HTN, Hypothyroidism presents with almost passing out.      Near Syncope - bradycardia on EKG; doubt infectious etiology. Possibly ischemic  - Admit to monitored bed  - Orthostatic VS  - TTE  - Carotids  - Avoid any BB, CCB  - Cycle CE, Troponin  - cardiology evaluation - [facility] called as per ER physician    COPD, unsure if acute exacerbation. Chest X-Ray with LLL opacity (likely scar tissue or atelectasis)  -   - Supplemental O2  - Albuterol  - Advair or substitute      CAD  - Continue DAPT, statin  - TTE  - CE  - Cardiology    HTN  - Continue CCB    Hypothyroidism  - Unclear home dose- will obtain more information  - Check TSH    GERD  - Continue PPI    Prophylactic measure  - VCD for VTEp  - PT evaluation    Disposition - pending work up

## 2020-03-22 NOTE — CONSULT NOTE ADULT - ASSESSMENT
Assessment and Plan:  In summary, PRESLEY BECK is an 84y Female with LBBB, CAD s/p MI with remote PCI to RCA, LAD and then LCx in 2018, HTN, LBBB, COPD, former smoker presents with diarrhea, vomiting and near syncope.      Near syncope- in the setting of vomiting/diarrhea/coughing fit.  EKG shows sinus bradycardia with 1st degree block and LBBB.  Can monitor on telemetry.  Echo pending.  It is unclear whether this is cardiac in nature.  Can perform modified stress test to evaluate for chronotropic competence and need for PPM.  This is not an emergency can can be done prior to discharge.  If she does not increase her heart rate to stress she will likely need PPM.    -tele monitroing  -if heart rate does not improve, can order treadmill stress to eval for chronotropy  -f/u echo    Vomiting/Diarrhea- viral gastroenteritis as potential etiology.  Unlikely cardiac.  CXR suggests LLL opacity c/w PNA.  Rx per medicine team if necessary.     CAD- can discontinue plavix since last intervention was in 2018          Thank you for allowing [facility] to participate in the care of this patient.  Please feel free to call with any questions or concerns.

## 2020-03-22 NOTE — ED PROVIDER NOTE - PRINCIPAL DIAGNOSIS
CHIEF COMPLAINT:    Chief Complaint   Patient presents with   • Vaginal Discharge        HISTORY OF PRESENT ILLNESS:  Marielos Olson is a 46 year old female who presents with vaginal itching and discharge that is white, as well as vaginal odor for the past 4-5 days.  She has been sexually active with one male partner, has also been taking baths more frequently. Has a history of BV but has not had it in many months. She believes that she has BV again but would also like GC/C testing.      HISTORIES:      Past Medical History:   Past Medical History:   Diagnosis Date   • Abnormal Pap 10/09/2006    ASC-US AND HPV   • Allergic rhinitis, cause unspecified 5/6/2002    active problem   • Allergic rhinitis, cause unspecified 4/30/2007   • Anxiety    • Bicipital tenosynovitis 10/2/2002   • Esophageal reflux    • Essential (primary) hypertension     Controlled on PO medication   • Herpes Simplex    • Hypovitaminosis D 2013   • Nongonococcal urethritis (KING) due to chlamydia trachomatis     Chlamydia   • Obesity, unspecified 1/23/2004    BMI 46   • Other genital herpes 2005    2-3x per year   • PONV (postoperative nausea and vomiting)    • 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 1 Inhaler 0   • olopatadine (PATANOL) 0.1 % ophthalmic solution Place 1 drop into both eyes 2 times daily. 5 mL 5   • aspirin 81 MG tablet Take 1 tablet by mouth daily. 90 tablet 3   • losartan-hydrochlorothiazide (HYZAAR) 100-25 MG per tablet Take 1 tablet by mouth daily. 90 tablet 0   • EPINEPHrine (EPIPEN 2-JOSE) 0.3 MG/0.3ML Solution Auto-injector Inject 0.3 mLs into the muscle as needed (To be given for acute allergic  emergencies. May repreat dose in 20 minutes if needed.). 1 each 0   • valACYclovir (VALTREX) 500 MG tablet TAKE 1 TABLET BY MOUTH ONE TIME A DAY  30 tablet 3   • triamcinolone (NASACORT AQ) 55 MCG/ACT nasal inhaler 2 drops in each nostril daily. 1 Inhaler 11   • montelukast (SINGULAIR) 10 MG tablet 1 tablet by mouth daily. 30 tablet 11   • Olopatadine HCl (PATANASE) 0.6 % Solution 2 sprays into each nostril BID prn. 1 Bottle 3   • cetirizine (ZYRTEC) 10 MG tablet TAKE 1 TABLET BY MOUTH ONE TIME A DAY  30 tablet 7   • citalopram (CELEXA) 40 MG tablet TAKE 1 TABLET BY MOUTH ONE TIME A DAY  90 tablet 3   • Probiotic Product (PROBIOTIC DAILY) Cap Take 1 tablet by mouth daily. 90 each 4   • azelaic acid (FINACEA) 15 % gel Apply  topically 2 times daily. 50 g prn   • pantoprazole (PROTONIX) 40 MG tablet Take 1 tablet by mouth daily. 30 tablet 11   • metroNIDAZOLE (FLAGYL) 500 MG tablet Take 1 tablet by mouth 2 times daily for 7 days. 14 tablet 0   • DISPENSE Boric Acid Suppositories 600 mg one to vagina nightly for 21 days 21 each 0   • metroNIDAZOLE (METROGEL VAGINAL) 0.75 % vaginal gel One applicator per vagina for 5 nights 70 g 0   • predniSONE (DELTASONE) 20 MG tablet 1 pill PO BID x 5 days (starting 1/6/2017) 10 tablet 0   • omeprazole (PRILOSEC) 40 MG capsule 1 capsule by mouth thirty minutes before eating once daily. 30 capsule 11   • triamcinolone (ARISTOCORT) 0.1 % ointment Apply to affected area on scalp and ears bid prn. 80 g 2   • urea (CARMOL 20) 20 % cream Apply to affected area on back and elbows bid orn. 85 g 3   • CARBOXYMethylcellulose (REFRESH) 1 % ophthalmic solution Refresh Liquigel.  One drop in each eye at bedtime and repeat 1-2 times daily 30 mL 12     No current facility-administered medications for this visit.        Allergies:    ALLERGIES:   Allergen Reactions   • Sulfa Antibiotics HIVES   • Percocet [Oxycodone-Acetaminophen] NAUSEA       SOCIAL HISTORY:  Social History   Substance Use Topics    • Smoking status: Never Smoker   • Smokeless tobacco: Never Used   • Alcohol use 1.8 oz/week     3 Standard drinks or equivalent per week      Comment: soc         Drug Use:    No                I have reviewed the past medical history, medications and allergies listed in the medical record as obtained by my nursing staff and support staff. I also reviewed RN's note today.      REVIEW OF SYSTEMS:     GENERAL: denies recent weight changes, fatigue, weakness, fever, chills.  CARDIOVASCULAR: denies chest pain, palpitations, fatigue, dyspnea with exertion, or peripheral edema.  RESPIRATORY: denies chest tightness, shortness of breath, or wheezing.  GASTROINTESTINAL: denies black or tarry stools, rectal pain or bleeding.  denies nausea, vomiting, abdominal pain, diarrhea, or change in appetite.  GENITOURINARY: denies dysuria, urgency, frequency, hematuria or flank pain. Denies vaginal pain or lesions. Positive for vaginal itching and discharge.      PHYSICAL EXAM:   Visit Vitals  /90 (BP Location: [facility], Patient Position: Sitting, Cuff Size: Regular)   Pulse 80   Temp 99.3 °F (37.4 °C) (Oral)   Resp 18   Wt 124.7 kg   LMP 06/27/2013 (Exact Date)   BMI 45.76 kg/m²       GENERAL: Healthy, alert, in no distress, cooperative   HEART: regular rate and rhythm and no murmurs, clicks, or gallops   LUNGS: clear to auscultation   ABD: soft, non-tender, bowel sounds normal, no masses, hepatomegaly or splenomegaly noted   BACK: CVA tenderness is not present}   /FEMALE: External genitalia including the vulva without open sores.  On internal exam, the vaginal walls are not erythematous, with watery discharge, positive odor.  Exam of the Os, reveals  non-friable mucosa.  Cultures preformed. negative cervical motion tenderness.            DATA:    [place]et [place]: positive for BV  GC/C Pending      ASSESSMENT/PLAN:    Marielos was seen today for vaginal discharge.    Diagnoses and all orders for this visit:    BV (bacterial  vaginosis)    Vaginal discharge  -     CHLAMYDIA/GC BY NUCLEIC ACID AMPLIFICATION  -     WET [place]    Other orders  -     metroNIDAZOLE (FLAGYL) 500 MG tablet; Take 1 tablet by mouth 2 times daily for 7 days.        Follow up: The patient was advised to follow up with primary physician or to recheck with the urgent care clinic sooner if symptoms get worse or if new symptoms appear.    The patient indicated understanding of the diagnosis and agreed with the plan of care.      SHIMON Lipscomb  Collaborating physician Dr. Kel Flowers     Near syncope

## 2020-03-22 NOTE — ED ADULT NURSE NOTE - OBJECTIVE STATEMENT
Patient A&Ox4 complaining of slight headache. Stated is chronically short of breath, is at baseline at this time. Stated went to take laundry out this morning, "felt tired, awful", vomited & had large BM. Was unable to get off toilet, felt weak. Cough x couple of months. Stated feels like a spasm in throat. Was evaluated by PCP for it. Airway patent. Lung sounds diminished B/L. Respirations even & unlabored. Cardiac monitor in place. Patient actively coughing during assessment, producing clear phlegm. Has O2 in place via nasal cannula @3LPM.

## 2020-03-22 NOTE — CONSULT NOTE ADULT - SUBJECTIVE AND OBJECTIVE BOX
[facility], [facility]                                   [address] [place] [address]                                                      PHONE: [phone]                                                         FAX: [phone]  [email or web address]  ---------------------------------------------------------------------------------------------------------------------------------    HPI:  PRESLEY BECK is an 84y Female with LBBB, CAD s/p MI with remote PCI to RCA, LAD and then LCx in 2018, HTN, LBBB, COPD, former smoker presents with diarrhea, vomiting and near syncope.  She reports a dry cough for the last 1-2 months.  She feels that her throat is dry and then starts to cough. This has not wrosened.  No fevers, chills, myalgias or throat pain.  Today, she was on the toilet and had loose stool.  She began to vomit and felt like her head was spinning.  Then, she start to cough and felt that she might pass out.  She did not pass out or lose consciousness.  She decided to come to the ER for further workup.     PAST MEDICAL & SURGICAL HISTORY:  LBBB (left bundle branch block)  Vocal cord polyps  Gastroenteritis  PUD (peptic ulcer disease)  Hiatal hernia  Diabetes mellitus: Patient denies T2DM  Myocardial infarction  GERD (gastroesophageal reflux disease)  Hypothyroid  MI (myocardial infarction)  Stented coronary artery: x2  High cholesterol  HTN (hypertension)  S/P  section  S/P cholecystectomy  S/P tubal ligation  S/P appendectomy      Bactrim (Vomiting)  No Known Allergies      MEDICATIONS  (STANDING):  albuterol/ipratropium for Nebulization 3 milliLiter(s) Nebulizer every 6 hours  amLODIPine   Tablet 5 milliGRAM(s) Oral daily  aspirin 325 milliGRAM(s) Oral daily  atorvastatin 20 milliGRAM(s) Oral at bedtime  budesonide 160 MICROgram(s)/formoterol 4.5 MICROgram(s) Inhaler 2 Puff(s) Inhalation two times a day  clopidogrel Tablet 75 milliGRAM(s) Oral daily  fluticasone propionate 50 MICROgram(s)/spray Nasal Spray 1 Spray(s) Both Nostrils two times a day  isosorbide   mononitrate ER Tablet (IMDUR) 30 milliGRAM(s) Oral daily  pantoprazole    Tablet 40 milliGRAM(s) Oral before breakfast  predniSONE   Tablet 10 milliGRAM(s) Oral daily  tiotropium 18 MICROgram(s) Capsule 1 Capsule(s) Inhalation daily    MEDICATIONS  (PRN):  ALBUTerol    90 MICROgram(s) HFA Inhaler 2 Puff(s) Inhalation every 4 hours PRN Shortness of Breath  benzocaine 15 mG/menthol 3.6 mG (Sugar-Free) Lozenge 1 Lozenge Oral five times a day PRN Sore Throat  ondansetron Injectable 4 milliGRAM(s) IV Push every 6 hours PRN Nausea and/or Vomiting      Family History: Pt denies hx of early cad, SCD, or congenital heart disease.      Social History:  Cigarettes:   former                 Alchohol:   no              Illicit Drug Abuse:  no    ROS:    Extensively Reviewed with pertinents as per HPI the remainder were negative.      Vital Signs Last 24 Hrs  T(C): 36.4 (22 Mar 2020 15:41), Max: 36.4 (22 Mar 2020 11:01)  T(F): 97.5 (22 Mar 2020 15:41), Max: 97.5 (22 Mar 2020 11:01)  HR: 63 (22 Mar 2020 15:41) (18 - 63)  BP: 129/54 (22 Mar 2020 15:41) (129/54 - 129/61)  BP(mean): 83 (22 Mar 2020 14:13) (83 - 83)  RR: 18 (22 Mar 2020 15:41) (18 - 18)  SpO2: 97% (22 Mar 2020 15:41) (97% - 99%)  ICU Vital Signs Last 24 Hrs  [facility]  I&O's Detail    I&O's Summary    Drug Dosing Weight  PRES[facility]      PHYSICAL EXAM:  General: Appears well developed, well nourished alert and cooperative.  HEENT: Head; normocephalic, atraumatic.  Eyes: Pupils reactive, cornea wnl.  Neck: Supple, no nodes adenopathy, no NVD or carotid bruit or thyromegaly.  CARDIOVASCULAR: Normal S1 and S2, No murmur, rub, gallop or lift.   LUNGS: No rales, rhonchi or wheeze. Normal breath sounds bilaterally.  ABDOMEN: Soft, nontender without mass or organomegaly. bowel sounds normoactive.  EXTREMITIES: No clubbing, cyanosis or edema. Distal pulses wnl.   SKIN: warm and dry with normal turgor.  NEURO: Alert/oriented x 3/normal motor exam.   PSYCH: normal affect.        LABS:                        14.2   9.91  )-----------( 192      ( 22 Mar 2020 11:59 )             42.1     03-    134<L>  |  94<L>  |  13.0  ----------------------------<  162<H>  4.9   |  28.0  |  0.50    Ca    10.6<H>      22 Mar 2020 11:59    TPro  6.7  /  Alb  3.9  /  TBili  0.3<L>  /  DBili  x   /  AST  21  /  ALT  20  /  AlkPhos  66  03-22    [facility]  CARDIAC MARKERS ( 22 Mar 2020 11:59 )  x     / <0.01 ng/mL / x     / x     / x            Urinalysis Basic - ( 22 Mar 2020 15:00 )    Color: Yellow / Appearance: Clear / S.020 / pH: x  Gluc: x / Ketone: Negative  / Bili: Negative / Urobili: Negative mg/dL   Blood: x / Protein: Negative mg/dL / Nitrite: Negative   Leuk Esterase: Trace / RBC: 0-2 /HPF / WBC 3-5   Sq Epi: x / Non Sq Epi: Occasional / Bacteria: Negative        RADIOLOGY & ADDITIONAL STUDIES:    INTERPRETATION OF TELEMETRY (personally reviewed):    ECG: Sinus bradycardia, LBBB, 1st degree AV block    ECHO: pending

## 2020-03-22 NOTE — H&P ADULT - NSICDXPASTSURGICALHX_GEN_ALL_CORE_FT
PAST SURGICAL HISTORY:  S/P appendectomy     S/P  section     S/P cholecystectomy     S/P tubal ligation

## 2020-03-22 NOTE — H&P ADULT - NSICDXFAMILYHX_GEN_ALL_CORE_FT
FAMILY HISTORY:  Family history of acute myocardial infarction  Family history of Alzheimer's disease  Family history of cerebrovascular accident (CVA)    Sibling  Still living? No  Diabetes mellitus, Age at diagnosis: Age Unknown

## 2020-03-22 NOTE — H&P ADULT - NSICDXPASTMEDICALHX_GEN_ALL_CORE_FT
PAST MEDICAL HISTORY:  Diabetes mellitus Patient denies T2DM    Gastroenteritis     GERD (gastroesophageal reflux disease)     Hiatal hernia     High cholesterol     HTN (hypertension)     Hypothyroid     LBBB (left bundle branch block)     MI (myocardial infarction)     Myocardial infarction     PUD (peptic ulcer disease)     Stented coronary artery x2    Vocal cord polyps

## 2020-03-22 NOTE — ED ADULT NURSE REASSESSMENT NOTE - NS ED NURSE REASSESS COMMENT FT1
Patient A&Ox4, denies any pain or discomfort. Cardiac monitor in place. Denies any chest pain, shortness of breath, nausea or dizziness. Respirations even & unlabored. Saline lock in place, patent, negative s/s phlebitis or infiltration. Spoke to family on phone with patients permission. Aware of plan of care. Will continue to monitor.

## 2020-03-23 LAB
ALBUMIN SERPL ELPH-MCNC: 3.8 G/DL — SIGNIFICANT CHANGE UP (ref 3.3–5.2)
ALP SERPL-CCNC: 67 U/L — SIGNIFICANT CHANGE UP (ref 40–120)
ALT FLD-CCNC: 18 U/L — SIGNIFICANT CHANGE UP
ANION GAP SERPL CALC-SCNC: 13 MMOL/L — SIGNIFICANT CHANGE UP (ref 5–17)
APPEARANCE UR: CLEAR — SIGNIFICANT CHANGE UP
AST SERPL-CCNC: 17 U/L — SIGNIFICANT CHANGE UP
BILIRUB SERPL-MCNC: 0.3 MG/DL — LOW (ref 0.4–2)
BILIRUB UR-MCNC: NEGATIVE — SIGNIFICANT CHANGE UP
BUN SERPL-MCNC: 11 MG/DL — SIGNIFICANT CHANGE UP (ref 8–20)
CALCIUM SERPL-MCNC: 10.7 MG/DL — HIGH (ref 8.6–10.2)
CALCIUM SERPL-MCNC: 11.1 MG/DL — HIGH (ref 8.4–10.5)
CHLORIDE SERPL-SCNC: 92 MMOL/L — LOW (ref 98–107)
CO2 SERPL-SCNC: 25 MMOL/L — SIGNIFICANT CHANGE UP (ref 22–29)
COLOR SPEC: YELLOW — SIGNIFICANT CHANGE UP
CREAT SERPL-MCNC: 0.59 MG/DL — SIGNIFICANT CHANGE UP (ref 0.5–1.3)
DIFF PNL FLD: NEGATIVE — SIGNIFICANT CHANGE UP
EPI CELLS # UR: SIGNIFICANT CHANGE UP
GLUCOSE SERPL-MCNC: 166 MG/DL — HIGH (ref 70–99)
GLUCOSE UR QL: NEGATIVE MG/DL — SIGNIFICANT CHANGE UP
HCT VFR BLD CALC: 41.9 % — SIGNIFICANT CHANGE UP (ref 34.5–45)
HGB BLD-MCNC: 14.1 G/DL — SIGNIFICANT CHANGE UP (ref 11.5–15.5)
KETONES UR-MCNC: NEGATIVE — SIGNIFICANT CHANGE UP
LEUKOCYTE ESTERASE UR-ACNC: ABNORMAL
MCHC RBC-ENTMCNC: 29.9 PG — SIGNIFICANT CHANGE UP (ref 27–34)
MCHC RBC-ENTMCNC: 33.7 GM/DL — SIGNIFICANT CHANGE UP (ref 32–36)
MCV RBC AUTO: 88.8 FL — SIGNIFICANT CHANGE UP (ref 80–100)
NITRITE UR-MCNC: NEGATIVE — SIGNIFICANT CHANGE UP
OSMOLALITY SERPL: 292 MOSMOL/KG — SIGNIFICANT CHANGE UP (ref 280–301)
OSMOLALITY UR: 593 MOSM/KG — SIGNIFICANT CHANGE UP (ref 300–1000)
PH UR: 6 — SIGNIFICANT CHANGE UP (ref 5–8)
PLATELET # BLD AUTO: 227 K/UL — SIGNIFICANT CHANGE UP (ref 150–400)
POTASSIUM SERPL-MCNC: 4.6 MMOL/L — SIGNIFICANT CHANGE UP (ref 3.5–5.3)
POTASSIUM SERPL-SCNC: 4.6 MMOL/L — SIGNIFICANT CHANGE UP (ref 3.5–5.3)
PROT SERPL-MCNC: 6.3 G/DL — LOW (ref 6.6–8.7)
PROT UR-MCNC: 15 MG/DL
PTH-INTACT FLD-MCNC: 84 PG/ML — HIGH (ref 15–65)
RBC # BLD: 4.72 M/UL — SIGNIFICANT CHANGE UP (ref 3.8–5.2)
RBC # FLD: 13.3 % — SIGNIFICANT CHANGE UP (ref 10.3–14.5)
RBC CASTS # UR COMP ASSIST: NEGATIVE /HPF — SIGNIFICANT CHANGE UP (ref 0–4)
SODIUM SERPL-SCNC: 130 MMOL/L — LOW (ref 135–145)
SODIUM UR-SCNC: 30 MMOL/L — SIGNIFICANT CHANGE UP
SP GR SPEC: 1.02 — SIGNIFICANT CHANGE UP (ref 1.01–1.02)
UROBILINOGEN FLD QL: NEGATIVE MG/DL — SIGNIFICANT CHANGE UP
WBC # BLD: 13.95 K/UL — HIGH (ref 3.8–10.5)
WBC # FLD AUTO: 13.95 K/UL — HIGH (ref 3.8–10.5)
WBC UR QL: SIGNIFICANT CHANGE UP

## 2020-03-23 PROCEDURE — 99232 SBSQ HOSP IP/OBS MODERATE 35: CPT

## 2020-03-23 PROCEDURE — 93306 TTE W/DOPPLER COMPLETE: CPT | Mod: 26

## 2020-03-23 PROCEDURE — 71250 CT THORAX DX C-: CPT | Mod: 26

## 2020-03-23 RX ORDER — AMOXICILLIN 250 MG/5ML
500 SUSPENSION, RECONSTITUTED, ORAL (ML) ORAL THREE TIMES A DAY
Refills: 0 | Status: DISCONTINUED | OUTPATIENT
Start: 2020-03-23 | End: 2020-03-25

## 2020-03-23 RX ORDER — AMLODIPINE BESYLATE 2.5 MG/1
10 TABLET ORAL DAILY
Refills: 0 | Status: DISCONTINUED | OUTPATIENT
Start: 2020-03-23 | End: 2020-03-25

## 2020-03-23 RX ORDER — SACCHAROMYCES BOULARDII 250 MG
250 POWDER IN PACKET (EA) ORAL
Refills: 0 | Status: COMPLETED | OUTPATIENT
Start: 2020-03-23 | End: 2020-03-25

## 2020-03-23 RX ADMIN — ISOSORBIDE MONONITRATE 30 MILLIGRAM(S): 60 TABLET, EXTENDED RELEASE ORAL at 13:17

## 2020-03-23 RX ADMIN — Medication 1 SPRAY(S): at 05:53

## 2020-03-23 RX ADMIN — CLOPIDOGREL BISULFATE 75 MILLIGRAM(S): 75 TABLET, FILM COATED ORAL at 13:18

## 2020-03-23 RX ADMIN — PANTOPRAZOLE SODIUM 40 MILLIGRAM(S): 20 TABLET, DELAYED RELEASE ORAL at 05:53

## 2020-03-23 RX ADMIN — Medication 500 MILLIGRAM(S): at 21:21

## 2020-03-23 RX ADMIN — AMLODIPINE BESYLATE 5 MILLIGRAM(S): 2.5 TABLET ORAL at 05:53

## 2020-03-23 RX ADMIN — Medication 10 MILLIGRAM(S): at 05:53

## 2020-03-23 RX ADMIN — Medication 325 MILLIGRAM(S): at 13:17

## 2020-03-23 RX ADMIN — BENZOCAINE AND MENTHOL 1 LOZENGE: 5; 1 LIQUID ORAL at 17:54

## 2020-03-23 RX ADMIN — Medication 3 MILLILITER(S): at 10:43

## 2020-03-23 RX ADMIN — BENZOCAINE AND MENTHOL 1 LOZENGE: 5; 1 LIQUID ORAL at 21:21

## 2020-03-23 RX ADMIN — Medication 250 MILLIGRAM(S): at 17:54

## 2020-03-23 RX ADMIN — Medication 1 SPRAY(S): at 17:55

## 2020-03-23 RX ADMIN — Medication 3 MILLILITER(S): at 16:15

## 2020-03-23 RX ADMIN — ATORVASTATIN CALCIUM 20 MILLIGRAM(S): 80 TABLET, FILM COATED ORAL at 21:21

## 2020-03-23 NOTE — PROGRESS NOTE ADULT - ASSESSMENT
84 year old female with PMH COPD former smoker, , LBBB, CAD s/p MI with remote PCI to RCA, LAD and then LCx in 2018, HTN, Hypothyroidism presents with pre-syncopal episode associated w nausea and diarrhea, now resolved.    Near Syncope   - EKG shows sinus bradycardia with 1st degree block and LBBB.    - Setting of vomiting/diarrhea/coughing fit. Telemetry shows normal sinus rhythm HR 60-80 bpm  - cardiology consult appreciated  - Negative orthostatics  - TTE performed awaiting results  - Carotid US without stenosis  - Avoid BB, holding home dose Metoprolol  - Troponin negative     Vomiting/Diarrhea  - viral gastroenteritis as potential etiology  - no further episodes at this time      poss PNA  - CXR suggests LLL opacity   - Pt afebrile, non toxic  - Was on Amoxicillin at home for dental infection and received Azithro and Ceftriaxone X 1 dose in ED  - Resume Amoxicillin   - Patient complains of bad cough X 2 weeks  - Obtain CT chest  - Urine legionella pending    COPD, unsure if acute exacerbation. Chest X-Ray with LLL opacity (poss scar tissue or atelectasis)  -   - Supplemental O2  - Albuterol  - Advair or substitute  - CT chest pending    CAD  - Continue DAPT, statin  - TTE pending results  - CE negative  - Cardiology consult appreciated    HTN  - Continue Amlodipine- will increase dose to 10 mg qd as BP elevated  - Holding Metoprolol 2/2 bradycardia    Hypothyroidism  - Unclear home dose- will obtain more information  - Check TSH    GERD  - Continue PPI    Prophylactic measure  - VCD for VTEp  - PT evaluation    Disposition - pending TTE, CT chest

## 2020-03-23 NOTE — PHYSICAL THERAPY INITIAL EVALUATION ADULT - CRITERIA FOR SKILLED THERAPEUTIC INTERVENTIONS
therapy frequency/anticipated equipment needs at discharge/anticipated discharge recommendation/impairments found/functional limitations in following categories/risk reduction/prevention/rehab potential/predicted duration of therapy intervention

## 2020-03-23 NOTE — PROGRESS NOTE ADULT - SUBJECTIVE AND OBJECTIVE BOX
PRESLEY BECK Female 84y [id number]    Patient is a 84y old  Female who presents with a chief complaint of almost fainted (23 Mar 2020 10:11)      Subjective/objective:  Pt seen and examined at bedside by Attending and PA, no over night event reported by night staff. Pt reports a headache, cough productive of clear sputum. States no further vomiting or diarrhea.     Review of system:  No fever, chills, nausea, vomiting, headache, dizziness, chest pain, SOB or palpitation at this time.      PHYSICAL EXAM:    Vital Signs Last 24 Hrs  T(C): 36.7 (23 Mar 2020 15:48), Max: 36.9 (22 Mar 2020 21:43)  T(F): 98.1 (23 Mar 2020 15:48), Max: 98.5 (22 Mar 2020 21:43)  HR: 63 (23 Mar 2020 15:48) (63 - 75)  BP: 152/66 (23 Mar 2020 15:48) (121/62 - 152/66)  BP(mean): --  RR: 18 (23 Mar 2020 15:48) (18 - 18)  SpO2: 94% (23 Mar 2020 15:48) (94% - 96%)    GENERAL: Elderly female, sitting up in bed, alert and appears comfortable.   HEENT: NC/AT.  NECK: soft, Supple, No JVD,   CHEST/LUNG: Clear to auscultation bilaterally; No wheezing.  HEART: S1S2+, Regular rate and rhythm; No murmurs.  ABDOMEN: Soft, Nontender, Nondistended; Bowel sounds present.  MUSCULOSKELETAL: No obvious deformity.  SKIN: No rashes or lesions.  NEURO: AAOX3, grossly intact.  PSYCH: normal mood.          MEDICATIONS  (STANDING):  albuterol/ipratropium for Nebulization 3 milliLiter(s) Nebulizer every 6 hours  amLODIPine   Tablet 5 milliGRAM(s) Oral daily  aspirin 325 milliGRAM(s) Oral daily  atorvastatin 20 milliGRAM(s) Oral at bedtime  budesonide 160 MICROgram(s)/formoterol 4.5 MICROgram(s) Inhaler 2 Puff(s) Inhalation two times a day  clopidogrel Tablet 75 milliGRAM(s) Oral daily  fluticasone propionate 50 MICROgram(s)/spray Nasal Spray 1 Spray(s) Both Nostrils two times a day  isosorbide   mononitrate ER Tablet (IMDUR) 30 milliGRAM(s) Oral daily  pantoprazole    Tablet 40 milliGRAM(s) Oral before breakfast  predniSONE   Tablet 10 milliGRAM(s) Oral daily  tiotropium 18 MICROgram(s) Capsule 1 Capsule(s) Inhalation daily    MEDICATIONS  (PRN):  ALBUTerol    90 MICROgram(s) HFA Inhaler 2 Puff(s) Inhalation every 4 hours PRN Shortness of Breath  benzocaine 15 mG/menthol 3.6 mG (Sugar-Free) Lozenge 1 Lozenge Oral five times a day PRN Sore Throat  ondansetron Injectable 4 milliGRAM(s) IV Push every 6 hours PRN Nausea and/or Vomiting        Labs:  LABS:                        14.1   13.95 )-----------( 227      ( 23 Mar 2020 07:16 )             41.9     03-23    130<L>  |  92<L>  |  11.0  ----------------------------<  166<H>  4.6   |  25.0  |  0.59    Ca    10.7<H>      23 Mar 2020 07:16    TPro  6.3<L>  /  Alb  3.8  /  TBili  0.3<L>  /  DBili  x   /  AST  17  /  ALT  18  /  AlkPhos  67  03-23        LIVER FUNCTIONS - ( 23 Mar 2020 07:16 )  Alb: 3.8 g/dL / Pro: 6.3 g/dL / ALK PHOS: 67 U/L / ALT: 18 U/L / AST: 17 U/L / GGT: x           Urinalysis Basic - ( 22 Mar 2020 15:00 )    Color: Yellow / Appearance: Clear / S.020 / pH: x  Gluc: x / Ketone: Negative  / Bili: Negative / Urobili: Negative mg/dL   Blood: x / Protein: Negative mg/dL / Nitrite: Negative   Leuk Esterase: Trace / RBC: 0-2 /HPF / WBC 3-5   Sq Epi: x / Non Sq Epi: Occasional / Bacteria: Negative        CARDIAC MARKERS ( 22 Mar 2020 11:59 )  x     / <0.01 ng/mL / x     / x     / x            < from: Xray Chest 1 View AP/PA. (20 @ 12:41) >    IMPRESSION: Left basilar opacity suspicious for pneumonia.   Calcific tendinosis of the right shoulder noted.         < from: CT Head No Cont (20 @ 11:49) >  IMPRESSION:    1)  scattered chronic ischemic changes with volume loss and involutional change. These have progressed as compared to prior CT. However no acute abnormality is suggested.  2)  clear sinuses and mastoids..        < from: US Duplex Carotid Arteries Complete, Bilateral (20 @ 15:20) >    IMPRESSION:    Bilateral carotid plaque. No hemodynamically significant stenosis bilaterally. PRESLEY BECK Female 84y [id number]    Patient is a 84y old  Female who presents with a chief complaint of almost fainted (23 Mar 2020 10:11)      Subjective/objective:  Pt seen and examined at bedside by Attending and PA, no over night event reported by night staff. Pt reports a headache, cough productive of clear sputum. States no further vomiting or diarrhea.     Review of system:  No fever, chills, nausea, vomiting, headache, dizziness, chest pain, SOB or palpitation at this time.      PHYSICAL EXAM:    Vital Signs Last 24 Hrs  T(C): 36.7 (23 Mar 2020 15:48), Max: 36.9 (22 Mar 2020 21:43)  T(F): 98.1 (23 Mar 2020 15:48), Max: 98.5 (22 Mar 2020 21:43)  HR: 63 (23 Mar 2020 15:48) (63 - 75)  BP: 152/66 (23 Mar 2020 15:48) (121/62 - 152/66)  BP(mean): --  RR: 18 (23 Mar 2020 15:48) (18 - 18)  SpO2: 94% (23 Mar 2020 15:48) (94% - 96%)    GENERAL: Elderly female, sitting up in bed, alert and appears comfortable.   HEENT: NC/AT.  NECK: soft, Supple, No JVD,   CHEST/LUNG: Clear to auscultation bilaterally; No wheezing.  HEART: S1S2+, Regular rate and rhythm; No murmurs.  ABDOMEN: Soft, Nontender, Nondistended; Bowel sounds present.  MUSCULOSKELETAL: No obvious deformity.  SKIN: No rashes or lesions.  NEURO: AAOX3, grossly intact.  PSYCH: normal mood.          MEDICATIONS  (STANDING):  albuterol/ipratropium for Nebulization 3 milliLiter(s) Nebulizer every 6 hours  amLODIPine   Tablet 5 milliGRAM(s) Oral daily  aspirin 325 milliGRAM(s) Oral daily  atorvastatin 20 milliGRAM(s) Oral at bedtime  budesonide 160 MICROgram(s)/formoterol 4.5 MICROgram(s) Inhaler 2 Puff(s) Inhalation two times a day  clopidogrel Tablet 75 milliGRAM(s) Oral daily  fluticasone propionate 50 MICROgram(s)/spray Nasal Spray 1 Spray(s) Both Nostrils two times a day  isosorbide   mononitrate ER Tablet (IMDUR) 30 milliGRAM(s) Oral daily  pantoprazole    Tablet 40 milliGRAM(s) Oral before breakfast  predniSONE   Tablet 10 milliGRAM(s) Oral daily  tiotropium 18 MICROgram(s) Capsule 1 Capsule(s) Inhalation daily    MEDICATIONS  (PRN):  ALBUTerol    90 MICROgram(s) HFA Inhaler 2 Puff(s) Inhalation every 4 hours PRN Shortness of Breath  benzocaine 15 mG/menthol 3.6 mG (Sugar-Free) Lozenge 1 Lozenge Oral five times a day PRN Sore Throat  ondansetron Injectable 4 milliGRAM(s) IV Push every 6 hours PRN Nausea and/or Vomiting        Labs:  LABS:                        14.1   13.95 )-----------( 227      ( 23 Mar 2020 07:16 )             41.9     03-23    130<L>  |  92<L>  |  11.0  ----------------------------<  166<H>  4.6   |  25.0  |  0.59    Ca    10.7<H>      23 Mar 2020 07:16    TPro  6.3<L>  /  Alb  3.8  /  TBili  0.3<L>  /  DBili  x   /  AST  17  /  ALT  18  /  AlkPhos  67  03-23        LIVER FUNCTIONS - ( 23 Mar 2020 07:16 )  Alb: 3.8 g/dL / Pro: 6.3 g/dL / ALK PHOS: 67 U/L / ALT: 18 U/L / AST: 17 U/L / GGT: x           Urinalysis Basic - ( 22 Mar 2020 15:00 )    Color: Yellow / Appearance: Clear / S.020 / pH: x  Gluc: x / Ketone: Negative  / Bili: Negative / Urobili: Negative mg/dL   Blood: x / Protein: Negative mg/dL / Nitrite: Negative   Leuk Esterase: Trace / RBC: 0-2 /HPF / WBC 3-5   Sq Epi: x / Non Sq Epi: Occasional / Bacteria: Negative        CARDIAC MARKERS ( 22 Mar 2020 11:59 )  x     / <0.01 ng/mL / x     / x     / x            < from: Xray Chest 1 View AP/PA. (20 @ 12:41) >    IMPRESSION: Left basilar opacity suspicious for pneumonia.   Calcific tendinosis of the right shoulder noted.         < from: CT Head No Cont (20 @ 11:49) >  IMPRESSION:    1)  scattered chronic ischemic changes with volume loss and involutional change. These have progressed as compared to prior CT. However no acute abnormality is suggested.  2)  clear sinuses and mastoids..        < from: US Duplex Carotid Arteries Complete, Bilateral (20 @ 15:20) >    IMPRESSION:    Bilateral carotid plaque. No hemodynamically significant stenosis bilaterally.

## 2020-03-23 NOTE — PHYSICAL THERAPY INITIAL EVALUATION ADULT - ADDITIONAL COMMENTS
pt. lives in the mother daughter house with no steps to enter. (-) DME, daughter available to assist as needed upon D/C home

## 2020-03-23 NOTE — PATIENT PROFILE ADULT - NSPROSPIRITUALVALUESFT_GEN_A_NUR
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  At her first post operative examination she was found to have bilateral cellulitis; she did not start antibiotics until POD 4 and had not been taking clindamycin appropriately.  On October 1, her antibiotics were switched to Bactrim which she completed.  Her Bactrim was refilled on October 8.  \par \par She reports improvement in her pain, going from pain scale 3/10 at her last visit, to 0/10 today.  Chest wall erythema is no longer present on the left side and is significantly decreased on the right.  Her temperature is normal today and she denies fever and chills.  All of her dressings were changed.  She does not have an accurate recording of her Lance drain output, she left it at home, however, the output is now mostly clear and monique in color, and is approximately 50 cc or more per 24 hours.  Both Lance drains will remain in place.  She will follow up on Tuesday for another post operative check. I refilled her prescription for Bactrim DS.  All of her and her family member's questions were appropriately answered. 
Faith

## 2020-03-23 NOTE — PROGRESS NOTE ADULT - SUBJECTIVE AND OBJECTIVE BOX
[facility], THE [facility]ART [facility]ENTER                                   [address] [place] [address]                                                      PHONE: [phone]                                                         FAX: [phone]  [email or web address]  ---------------------------------------------------------------------------------------------------------------------------------    Overnight events/patient complaints: patient seen at bedside. she complains of a cough.      Bactrim (Vomiting)  No Known Allergies    MEDICATIONS  (STANDING):  albuterol/ipratropium for Nebulization 3 milliLiter(s) Nebulizer every 6 hours  amLODIPine   Tablet 5 milliGRAM(s) Oral daily  aspirin 325 milliGRAM(s) Oral daily  atorvastatin 20 milliGRAM(s) Oral at bedtime  budesonide 160 MICROgram(s)/formoterol 4.5 MICROgram(s) Inhaler 2 Puff(s) Inhalation two times a day  clopidogrel Tablet 75 milliGRAM(s) Oral daily  fluticasone propionate 50 MICROgram(s)/spray Nasal Spray 1 Spray(s) Both Nostrils two times a day  isosorbide   mononitrate ER Tablet (IMDUR) 30 milliGRAM(s) Oral daily  pantoprazole    Tablet 40 milliGRAM(s) Oral before breakfast  predniSONE   Tablet 10 milliGRAM(s) Oral daily  tiotropium 18 MICROgram(s) Capsule 1 Capsule(s) Inhalation daily    MEDICATIONS  (PRN):  ALBUTerol    90 MICROgram(s) HFA Inhaler 2 Puff(s) Inhalation every 4 hours PRN Shortness of Breath  benzocaine 15 mG/menthol 3.6 mG (Sugar-Free) Lozenge 1 Lozenge Oral five times a day PRN Sore Throat  ondansetron Injectable 4 milliGRAM(s) IV Push every 6 hours PRN Nausea and/or Vomiting      Vital Signs Last 24 Hrs  T(C): 36.6 (23 Mar 2020 08:05), Max: 36.9 (22 Mar 2020 21:43)  T(F): 97.8 (23 Mar 2020 08:05), Max: 98.5 (22 Mar 2020 21:43)  HR: 75 (23 Mar 2020 08:05) (18 - 75)  BP: 130/68 (23 Mar 2020 08:05) (121/62 - 147/72)  BP(mean): 83 (22 Mar 2020 14:13) (83 - 83)  RR: 18 (23 Mar 2020 08:05) (18 - 18)  SpO2: 95% (23 Mar 2020 08:05) (95% - 99%)  ICU Vital Signs Last 24 Hrs  [facility]  I&O's Detail    Drug Dosing Weight  [facility]      PHYSICAL EXAM:  General: Appears well developed, well nourished alert and cooperative.  HEENT: Head; normocephalic, atraumatic.  Eyes: Pupils reactive, cornea wnl.  Neck: Supple, no nodes adenopathy, no NVD or carotid bruit or thyromegaly.  CARDIOVASCULAR: Normal S1 and S2, No murmur, rub, gallop or lift.   LUNGS: No rales, rhonchi or wheeze. Normal breath sounds bilaterally.  ABDOMEN: Soft, nontender without mass or organomegaly. bowel sounds normoactive.  EXTREMITIES: No clubbing, cyanosis or edema. Distal pulses wnl.   SKIN: warm and dry with normal turgor.  NEURO: Alert/oriented x 3/normal motor exam. No pathologic reflexes.    PSYCH: normal affect.        LABS:                        14.1   13.95 )-----------( 227      ( 23 Mar 2020 07:16 )             41.9     03-23    130<L>  |  92<L>  |  11.0  ----------------------------<  166<H>  4.6   |  25.0  |  0.59    Ca    10.7<H>      23 Mar 2020 07:16    TPro  6.3<L>  /  Alb  3.8  /  TBili  0.3<L>  /  DBili  x   /  AST  17  /  ALT  18  /  AlkPhos  67  03-23    PRESLEY BECK  CARDIAC MARKERS ( 22 Mar 2020 11:59 )  x     / <0.01 ng/mL / x     / x     / x            Urinalysis Basic - ( 22 Mar 2020 15:00 )    Color: Yellow / Appearance: Clear / S.020 / pH: x  Gluc: x / Ketone: Negative  / Bili: Negative / Urobili: Negative mg/dL   Blood: x / Protein: Negative mg/dL / Nitrite: Negative   Leuk Esterase: Trace / RBC: 0-2 /HPF / WBC 3-5   Sq Epi: x / Non Sq Epi: Occasional / Bacteria: Negative        RADIOLOGY & ADDITIONAL STUDIES:    INTERPRETATION OF TELEMETRY (personally reviewed): normal sinus rhythm HR 60-80 bpm    ASSESSMENT AND PLAN:  In summary, PRESLEY BECK is an 84y Female with LBBB, CAD s/p MI with remote PCI to RCA, LAD and then LCx in 2018, HTN, LBBB, COPD, former smoker presents with diarrhea, vomiting and near syncope.      Near syncope- in the setting of vomiting/diarrhea/coughing fit.  EKG shows sinus bradycardia with 1st degree block and LBBB.  Telemetry overnight shows normal sinus rhythm HR 60-80 bpm  - f/u echo    Vomiting/Diarrhea- viral gastroenteritis as potential etiology.  Unlikely cardiac.  CXR suggests LLL opacity c/w PNA.  Patient complains of bad cough. Would rule out viral pneumonia.    Will follow with you.

## 2020-03-23 NOTE — PHYSICAL THERAPY INITIAL EVALUATION ADULT - IMPAIRMENTS FOUND, PT EVAL
gait, locomotion, and balance/muscle strength/neuromotor development and sensory integration/aerobic capacity/endurance

## 2020-03-24 ENCOUNTER — APPOINTMENT (OUTPATIENT)
Dept: RHEUMATOLOGY | Facility: CLINIC | Age: 85
End: 2020-03-24

## 2020-03-24 LAB
ALBUMIN SERPL ELPH-MCNC: 3.7 G/DL — SIGNIFICANT CHANGE UP (ref 3.3–5.2)
ALP SERPL-CCNC: 60 U/L — SIGNIFICANT CHANGE UP (ref 40–120)
ALT FLD-CCNC: 21 U/L — SIGNIFICANT CHANGE UP
ANION GAP SERPL CALC-SCNC: 11 MMOL/L — SIGNIFICANT CHANGE UP (ref 5–17)
AST SERPL-CCNC: 22 U/L — SIGNIFICANT CHANGE UP
BILIRUB SERPL-MCNC: 0.3 MG/DL — LOW (ref 0.4–2)
BUN SERPL-MCNC: 13 MG/DL — SIGNIFICANT CHANGE UP (ref 8–20)
CALCIUM SERPL-MCNC: 10.4 MG/DL — HIGH (ref 8.6–10.2)
CHLORIDE SERPL-SCNC: 91 MMOL/L — LOW (ref 98–107)
CO2 SERPL-SCNC: 27 MMOL/L — SIGNIFICANT CHANGE UP (ref 22–29)
CREAT SERPL-MCNC: 0.58 MG/DL — SIGNIFICANT CHANGE UP (ref 0.5–1.3)
GLUCOSE SERPL-MCNC: 126 MG/DL — HIGH (ref 70–99)
HCT VFR BLD CALC: 38.6 % — SIGNIFICANT CHANGE UP (ref 34.5–45)
HGB BLD-MCNC: 12.7 G/DL — SIGNIFICANT CHANGE UP (ref 11.5–15.5)
LEGIONELLA AG UR QL: NEGATIVE — SIGNIFICANT CHANGE UP
MAGNESIUM SERPL-MCNC: 2 MG/DL — SIGNIFICANT CHANGE UP (ref 1.6–2.6)
MCHC RBC-ENTMCNC: 29.5 PG — SIGNIFICANT CHANGE UP (ref 27–34)
MCHC RBC-ENTMCNC: 32.9 GM/DL — SIGNIFICANT CHANGE UP (ref 32–36)
MCV RBC AUTO: 89.8 FL — SIGNIFICANT CHANGE UP (ref 80–100)
PLATELET # BLD AUTO: 214 K/UL — SIGNIFICANT CHANGE UP (ref 150–400)
POTASSIUM SERPL-MCNC: 4 MMOL/L — SIGNIFICANT CHANGE UP (ref 3.5–5.3)
POTASSIUM SERPL-SCNC: 4 MMOL/L — SIGNIFICANT CHANGE UP (ref 3.5–5.3)
PROT SERPL-MCNC: 5.9 G/DL — LOW (ref 6.6–8.7)
RBC # BLD: 4.3 M/UL — SIGNIFICANT CHANGE UP (ref 3.8–5.2)
RBC # FLD: 13.7 % — SIGNIFICANT CHANGE UP (ref 10.3–14.5)
SODIUM SERPL-SCNC: 129 MMOL/L — LOW (ref 135–145)
VIT D25+D1,25 OH+D1,25 PNL SERPL-MCNC: 91.3 PG/ML — HIGH (ref 19.9–79.3)
WBC # BLD: 10.68 K/UL — HIGH (ref 3.8–10.5)
WBC # FLD AUTO: 10.68 K/UL — HIGH (ref 3.8–10.5)

## 2020-03-24 PROCEDURE — 99232 SBSQ HOSP IP/OBS MODERATE 35: CPT

## 2020-03-24 RX ADMIN — BENZOCAINE AND MENTHOL 1 LOZENGE: 5; 1 LIQUID ORAL at 11:53

## 2020-03-24 RX ADMIN — Medication 10 MILLIGRAM(S): at 05:39

## 2020-03-24 RX ADMIN — Medication 1 SPRAY(S): at 17:28

## 2020-03-24 RX ADMIN — PANTOPRAZOLE SODIUM 40 MILLIGRAM(S): 20 TABLET, DELAYED RELEASE ORAL at 05:39

## 2020-03-24 RX ADMIN — Medication 1 SPRAY(S): at 05:39

## 2020-03-24 RX ADMIN — Medication 250 MILLIGRAM(S): at 05:40

## 2020-03-24 RX ADMIN — Medication 3 MILLILITER(S): at 03:49

## 2020-03-24 RX ADMIN — Medication 500 MILLIGRAM(S): at 13:10

## 2020-03-24 RX ADMIN — Medication 250 MILLIGRAM(S): at 17:29

## 2020-03-24 RX ADMIN — Medication 3 MILLILITER(S): at 09:05

## 2020-03-24 RX ADMIN — ISOSORBIDE MONONITRATE 30 MILLIGRAM(S): 60 TABLET, EXTENDED RELEASE ORAL at 11:53

## 2020-03-24 RX ADMIN — Medication 500 MILLIGRAM(S): at 21:54

## 2020-03-24 RX ADMIN — Medication 325 MILLIGRAM(S): at 11:53

## 2020-03-24 RX ADMIN — Medication 3 MILLILITER(S): at 15:55

## 2020-03-24 RX ADMIN — BENZOCAINE AND MENTHOL 1 LOZENGE: 5; 1 LIQUID ORAL at 21:56

## 2020-03-24 RX ADMIN — Medication 500 MILLIGRAM(S): at 05:40

## 2020-03-24 RX ADMIN — Medication 3 MILLILITER(S): at 21:02

## 2020-03-24 RX ADMIN — BENZOCAINE AND MENTHOL 1 LOZENGE: 5; 1 LIQUID ORAL at 05:45

## 2020-03-24 RX ADMIN — AMLODIPINE BESYLATE 10 MILLIGRAM(S): 2.5 TABLET ORAL at 05:40

## 2020-03-24 RX ADMIN — ATORVASTATIN CALCIUM 20 MILLIGRAM(S): 80 TABLET, FILM COATED ORAL at 21:54

## 2020-03-24 NOTE — PROGRESS NOTE ADULT - SUBJECTIVE AND OBJECTIVE BOX
[facility], THE [facility]                                   [address] [place] [address]                                                      PHONE: [phone]                                                         FAX: [phone]  [email or web address]  ---------------------------------------------------------------------------------------------------------------------------------    Overnight events/patient complaints: no events overnight.  Normal sinus rhythm on telemetry.       PAST MEDICAL & SURGICAL HISTORY:  LBBB (left bundle branch block)  Vocal cord polyps  Gastroenteritis  PUD (peptic ulcer disease)  Hiatal hernia  Diabetes mellitus: Patient denies T2DM  Myocardial infarction  GERD (gastroesophageal reflux disease)  Hypothyroid  MI (myocardial infarction)  Stented coronary artery: x2  High cholesterol  HTN (hypertension)  S/P  section  S/P cholecystectomy  S/P tubal ligation  S/P appendectomy      Bactrim (Vomiting)  No Known Allergies    MEDICATIONS  (STANDING):  albuterol/ipratropium for Nebulization 3 milliLiter(s) Nebulizer every 6 hours  amLODIPine   Tablet 10 milliGRAM(s) Oral daily  amoxicillin 500 milliGRAM(s) Oral three times a day  aspirin 325 milliGRAM(s) Oral daily  atorvastatin 20 milliGRAM(s) Oral at bedtime  budesonide 160 MICROgram(s)/formoterol 4.5 MICROgram(s) Inhaler 2 Puff(s) Inhalation two times a day  fluticasone propionate 50 MICROgram(s)/spray Nasal Spray 1 Spray(s) Both Nostrils two times a day  isosorbide   mononitrate ER Tablet (IMDUR) 30 milliGRAM(s) Oral daily  pantoprazole    Tablet 40 milliGRAM(s) Oral before breakfast  predniSONE   Tablet 10 milliGRAM(s) Oral daily  saccharomyces boulardii 250 milliGRAM(s) Oral two times a day  tiotropium 18 MICROgram(s) Capsule 1 Capsule(s) Inhalation daily    MEDICATIONS  (PRN):  ALBUTerol    90 MICROgram(s) HFA Inhaler 2 Puff(s) Inhalation every 4 hours PRN Shortness of Breath  benzocaine 15 mG/menthol 3.6 mG (Sugar-Free) Lozenge 1 Lozenge Oral five times a day PRN Sore Throat  ondansetron Injectable 4 milliGRAM(s) IV Push every 6 hours PRN Nausea and/or Vomiting      Vital Signs Last 24 Hrs  T(C): 36.5 (24 Mar 2020 07:44), Max: 36.7 (23 Mar 2020 15:48)  T(F): 97.7 (24 Mar 2020 07:44), Max: 98.1 (23 Mar 2020 15:48)  HR: 65 (24 Mar 2020 07:44) (63 - 80)  BP: 151/56 (24 Mar 2020 07:44) (131/70 - 152/66)  BP(mean): --  RR: 18 (24 Mar 2020 07:44) (18 - 18)  SpO2: 96% (24 Mar 2020 07:44) (94% - 99%)  ICU Vital Signs Last 24 Hrs  [facility]  I&O's Detail    I&O's Summary    Drug Dosing Weight  [facility]      PHYSICAL EXAM:  General: Appears well developed, well nourished alert and cooperative.  HEENT: Head; normocephalic, atraumatic.  Eyes: Pupils reactive, cornea wnl.  Neck: Supple, no nodes adenopathy, no NVD or carotid bruit or thyromegaly.  CARDIOVASCULAR: Normal S1 and S2, No murmur, rub, gallop or lift.   LUNGS: No rales, rhonchi or wheeze. Normal breath sounds bilaterally.  ABDOMEN: Soft, nontender without mass or organomegaly. bowel sounds normoactive.  EXTREMITIES: No clubbing, cyanosis or edema. Distal pulses wnl.   SKIN: warm and dry with normal turgor.  NEURO: Alert/oriented x 3/normal motor exam.   PSYCH: normal affect.        LABS:                        12.7   10.68 )-----------( 214      ( 24 Mar 2020 07:18 )             38.6     03-24    129<L>  |  91<L>  |  13.0  ----------------------------<  126<H>  4.0   |  27.0  |  0.58    Ca    10.4<H>      24 Mar 2020 07:18  Mg     2.0     03-24    TPro  5.9<L>  /  Alb  3.7  /  TBili  0.3<L>  /  DBili  x   /  AST  22  /  ALT  21  /  AlkPhos  60  03-24    [facility]  CARDIAC MARKERS ( 22 Mar 2020 11:59 )  x     / <0.01 ng/mL / x     / x     / x            Urinalysis Basic - ( 23 Mar 2020 21:47 )    Color: Yellow / Appearance: Clear / S.020 / pH: x  Gluc: x / Ketone: Negative  / Bili: Negative / Urobili: Negative mg/dL   Blood: x / Protein: 15 mg/dL / Nitrite: Negative   Leuk Esterase: Trace / RBC: Negative /HPF / WBC 0-2   Sq Epi: x / Non Sq Epi: Few / Bacteria: x        RADIOLOGY & ADDITIONAL STUDIES:    INTERPRETATION OF TELEMETRY (personally reviewed):    ECG: LBBB, sinus rhythm, 1st degree block    ECHO:     Summary:   1. Normal global left ventricular systolic function. Left ventricular ejection fraction, by visual estimation, is 65 to 70%.   2. Spectral Doppler shows impaired relaxation pattern of left ventricular myocardial filling (Grade I diastolic dysfunction).   3. There is mild concentric left ventricular hypertrophy.   4. Normal right ventricular size and function.   5. Mild mitral valve regurgitation.   6. There is no evidence of pericardial effusion.   7. ** Compared to TTE dated 2016, no significant changes.      STRESS TEST:    CARDIAC CATHETERIZATION:

## 2020-03-24 NOTE — PROGRESS NOTE ADULT - SUBJECTIVE AND OBJECTIVE BOX
Interval History: 84 year old female with CC of near syncope.     Subjective: Patient seen and evaluated at bedside. Patient was sitting up in bed comfortably. Patient has no new complaints and states she is feeling much better. Denies any chest pain, SOB, dizziness, N/V/D.     PHYSICAL EXAM:    Vital Signs Last 24 Hrs  T(C): 36.5 (24 Mar 2020 07:44), Max: 36.7 (23 Mar 2020 15:48)  T(F): 97.7 (24 Mar 2020 07:44), Max: 98.1 (23 Mar 2020 15:48)  HR: 75 (24 Mar 2020 09:06) (63 - 80)  BP: 151/56 (24 Mar 2020 07:44) (131/70 - 152/66)  BP(mean): --  RR: 18 (24 Mar 2020 07:44) (18 - 18)  SpO2: 96% (24 Mar 2020 09:06) (94% - 99%)    GENERAL: Pt sitting up in bed comfortably, NAD.  ENMT: PERRL, +EOMI.  NECK: soft, Supple, No JVD,   CHEST/LUNG: Clear to auscultation bilaterally; No wheezing.  HEART: S1S2+, Regular rate and rhythm; No murmurs.  ABDOMEN: Soft, Nontender, Nondistended; Bowel sounds present.  MUSCULOSKELETAL: Normal range of motion.  SKIN: No rashes or lesions.  NEURO: AAOX3, no focal deficits, no motor r sensory loss.  PSYCH: pleasant, normal mood.    MEDICATIONS  (STANDING):  albuterol/ipratropium for Nebulization 3 milliLiter(s) Nebulizer every 6 hours  amLODIPine   Tablet 10 milliGRAM(s) Oral daily  amoxicillin 500 milliGRAM(s) Oral three times a day  aspirin 325 milliGRAM(s) Oral daily  atorvastatin 20 milliGRAM(s) Oral at bedtime  budesonide 160 MICROgram(s)/formoterol 4.5 MICROgram(s) Inhaler 2 Puff(s) Inhalation two times a day  fluticasone propionate 50 MICROgram(s)/spray Nasal Spray 1 Spray(s) Both Nostrils two times a day  isosorbide   mononitrate ER Tablet (IMDUR) 30 milliGRAM(s) Oral daily  pantoprazole    Tablet 40 milliGRAM(s) Oral before breakfast  predniSONE   Tablet 10 milliGRAM(s) Oral daily  saccharomyces boulardii 250 milliGRAM(s) Oral two times a day  tiotropium 18 MICROgram(s) Capsule 1 Capsule(s) Inhalation daily    MEDICATIONS  (PRN):  ALBUTerol    90 MICROgram(s) HFA Inhaler 2 Puff(s) Inhalation every 4 hours PRN Shortness of Breath  benzocaine 15 mG/menthol 3.6 mG (Sugar-Free) Lozenge 1 Lozenge Oral five times a day PRN Sore Throat  ondansetron Injectable 4 milliGRAM(s) IV Push every 6 hours PRN Nausea and/or Vomiting    LABS:                        12.7   10.68 )-----------( 214      ( 24 Mar 2020 07:18 )             38.6     03-24    129<L>  |  91<L>  |  13.0  ----------------------------<  126<H>  4.0   |  27.0  |  0.58    Ca    10.4<H>      24 Mar 2020 07:18  Mg     2.0     -    TPro  5.9<L>  /  Alb  3.7  /  TBili  0.3<L>  /  DBili  x   /  AST  22  /  ALT  21  /  AlkPhos  60  03-24        LIVER FUNCTIONS - ( 24 Mar 2020 07:18 )  Alb: 3.7 g/dL / Pro: 5.9 g/dL / ALK PHOS: 60 U/L / ALT: 21 U/L / AST: 22 U/L / GGT: x           Urinalysis Basic - ( 23 Mar 2020 21:47 )    Color: Yellow / Appearance: Clear / S.020 / pH: x  Gluc: x / Ketone: Negative  / Bili: Negative / Urobili: Negative mg/dL   Blood: x / Protein: 15 mg/dL / Nitrite: Negative   Leuk Esterase: Trace / RBC: Negative /HPF / WBC 0-2   Sq Epi: x / Non Sq Epi: Few / Bacteria: x    < from: CT Chest No Cont (20 @ 20:13) >  IMPRESSION:     Bibasilar mild bronchiectasis. No evidence of pulmonary infiltrate.      < end of copied text >  < from: TTE Echo Complete w/ Contrast w/ Doppler (20 @ 14:34) >  Summary:   1. Normal global left ventricular systolic function. Left ventricular ejection fraction, by visual estimation, is 65 to 70%.   2. Spectral Doppler shows impaired relaxation pattern of left ventricular myocardial filling (Grade I diastolic dysfunction).   3. There is mild concentric left ventricular hypertrophy.   4. Normal right ventricular size and function.   5. Mild mitral valve regurgitation.   6. There is no evidence of pericardial effusion.   7. ** Compared to TTE dated 2016, no significant changes.    < end of copied text > Interval History: 84 year old female with CC of near syncope.     Subjective: Patient seen and evaluated at bedside. Patient was sitting up in bed comfortably. Patient has no new complaints and states she is feeling much better. Denies any chest pain, SOB, dizziness, N/V/D.     PHYSICAL EXAM:    Vital Signs Last 24 Hrs  T(C): 36.5 (24 Mar 2020 07:44), Max: 36.7 (23 Mar 2020 15:48)  T(F): 97.7 (24 Mar 2020 07:44), Max: 98.1 (23 Mar 2020 15:48)  HR: 75 (24 Mar 2020 09:06) (63 - 80)  BP: 151/56 (24 Mar 2020 07:44) (131/70 - 152/66)   RR: 18 (24 Mar 2020 07:44) (18 - 18)  SpO2: 96% (24 Mar 2020 09:06) (94% - 99%)    GENERAL: Pt sitting up in bed comfortably, NAD.  ENMT: PERRL, +EOMI.  NECK: soft, Supple, No JVD,   CHEST/LUNG: Clear to auscultation bilaterally; No wheezing.  HEART: S1S2+, Regular rate and rhythm; No murmurs.  ABDOMEN: Soft, Nontender, Nondistended; Bowel sounds present.  MUSCULOSKELETAL: Normal range of motion.  SKIN: No rashes or lesions.  NEURO: AAOX3, no focal deficits, no motor r sensory loss.  PSYCH: pleasant, normal mood.    MEDICATIONS  (STANDING):  albuterol/ipratropium for Nebulization 3 milliLiter(s) Nebulizer every 6 hours  amLODIPine   Tablet 10 milliGRAM(s) Oral daily  amoxicillin 500 milliGRAM(s) Oral three times a day  aspirin 325 milliGRAM(s) Oral daily  atorvastatin 20 milliGRAM(s) Oral at bedtime  budesonide 160 MICROgram(s)/formoterol 4.5 MICROgram(s) Inhaler 2 Puff(s) Inhalation two times a day  fluticasone propionate 50 MICROgram(s)/spray Nasal Spray 1 Spray(s) Both Nostrils two times a day  isosorbide   mononitrate ER Tablet (IMDUR) 30 milliGRAM(s) Oral daily  pantoprazole    Tablet 40 milliGRAM(s) Oral before breakfast  predniSONE   Tablet 10 milliGRAM(s) Oral daily  saccharomyces boulardii 250 milliGRAM(s) Oral two times a day  tiotropium 18 MICROgram(s) Capsule 1 Capsule(s) Inhalation daily    MEDICATIONS  (PRN):  ALBUTerol    90 MICROgram(s) HFA Inhaler 2 Puff(s) Inhalation every 4 hours PRN Shortness of Breath  benzocaine 15 mG/menthol 3.6 mG (Sugar-Free) Lozenge 1 Lozenge Oral five times a day PRN Sore Throat  ondansetron Injectable 4 milliGRAM(s) IV Push every 6 hours PRN Nausea and/or Vomiting    LABS:                        12.7   10.68 )-----------( 214      ( 24 Mar 2020 07:18 )             38.6     03-24    129<L>  |  91<L>  |  13.0  ----------------------------<  126<H>  4.0   |  27.0  |  0.58    Ca    10.4<H>      24 Mar 2020 07:18  Intact PTH: 84: PTH METHOD: Roche  Guide for Interpretation of PTH and Calcium Results                           Calcium             PTH                           MG/DL               PG/ML  Normal                   8.4-10.5            15-65  Primary  Hyperparathyroidism      >10.5               >50  Non-PTH Hypercalcemia    >10.5               0-20  Hypoparathyroidism       <8.4                0-20  Pseudohypoparathyroid    <8.4                >50  This is intended as a guide only. Factors such as sunlight exposure,  Vitamin D status and renal function should be evaluated along with  clinical presentation. pg/mL (20 @ 21:49)    Vitamin D, 1, 25-Dihydroxy: 91.3 pg/mL (20 @ 21:44)      Mg     2.0     -    TPro  5.9<L>  /  Alb  3.7  /  TBili  0.3<L>  /  DBili  x   /  AST  22  /  ALT  21  /  AlkPhos  60  -24        LIVER FUNCTIONS - ( 24 Mar 2020 07:18 )  Alb: 3.7 g/dL / Pro: 5.9 g/dL / ALK PHOS: 60 U/L / ALT: 21 U/L / AST: 22 U/L / GGT: x           Urinalysis Basic - ( 23 Mar 2020 21:47 )    Color: Yellow / Appearance: Clear / S.020 / pH: x  Gluc: x / Ketone: Negative  / Bili: Negative / Urobili: Negative mg/dL   Blood: x / Protein: 15 mg/dL / Nitrite: Negative   Leuk Esterase: Trace / RBC: Negative /HPF / WBC 0-2   Sq Epi: x / Non Sq Epi: Few / Bacteria: x    < from: CT Chest No Cont (20 @ 20:13) >  IMPRESSION:     Bibasilar mild bronchiectasis. No evidence of pulmonary infiltrate.      < end of copied text >  < from: TTE Echo Complete w/ Contrast w/ Doppler (20 @ 14:34) >  Summary:   1. Normal global left ventricular systolic function. Left ventricular ejection fraction, by visual estimation, is 65 to 70%.   2. Spectral Doppler shows impaired relaxation pattern of left ventricular myocardial filling (Grade I diastolic dysfunction).   3. There is mild concentric left ventricular hypertrophy.   4. Normal right ventricular size and function.   5. Mild mitral valve regurgitation.   6. There is no evidence of pericardial effusion.   7. ** Compared to TTE dated 2016, no significant changes.    < end of copied text >

## 2020-03-24 NOTE — PROGRESS NOTE ADULT - ATTENDING COMMENTS
Near syncope - work up in progress, negative so far  Continue to monitor for bradycardia - hold AVN blocking agents  If TTE, CT negative anticipate discharge home soon - PT evaluation
84 year old female admitted with near syncope  Orthostatic -, CTH, TTE, Carotid dopplers without any contributory finding.  Hyponatremia - mild, asymptomatic. Fluid restriction, repeat in am  Hypercalcemia secondary to Primary Hyperparathyroidism - to follow up with Endocrinology    PT recommends home    Likely home in next 24 hours

## 2020-03-24 NOTE — PROGRESS NOTE ADULT - ASSESSMENT
84 year old female with PMH COPD former smoker, , LBBB, CAD s/p MI with remote PCI to RCA, LAD and then LCx in 2018, HTN, Hypothyroidism presents with pre-syncopal episode associated w nausea and diarrhea, now resolved.    Near Syncope   - EKG shows sinus bradycardia with 1st degree block and LBBB.    - Telemetry shows normal sinus rhythm HR 60-80 bpm  - Negative orthostatics  - TTE performed on 3/23 - no significant changes from previous EMILE on 1/14/16 LVEF 65-70%, grade 1 diastolic dysfunction  - Chest CT 3/23 - showed Bibasilar mild bronchiectasis. No evidence of pulmonary infiltrate.  - Carotid US without stenosis  - Troponin negative     Vomiting/Diarrhea  - viral gastroenteritis as potential etiology  - no further episodes at this time      Poss PNA  - CXR suggested LLL opacity, no consolidation seen on CT but with mild bronchiectasis  - Pt afebrile, non toxic  - Was on Amoxicillin at home for dental infection and received Azithro and Ceftriaxone X 1 dose in ED  - Continue Amoxicillin (for dental infection)   - Patient complains of persistent cough X 2 weeks with clear sputum  - Urine legionella is negative    COPD, unsure if acute exacerbation.   - Chest X-Ray with LLL opacity (poss scar tissue or atelectasis), CT with emphysematous changes and mild bronchiectasis  - , tolerating room air, 91% on RA with ambulation  - Albuterol  - Advair or substitute      CAD  - Continue ASA, statin  - TTE performed on 3/23 - no significant changes from previous EMILE on 1/14/16  - CE negative  - Cardiology ruled no further cardiac workup needed.    HTN  - Continue Amlodipine 10 mg qd   - Continue holding Metoprolol 2/2 bradycardia     Hyperparathyroidism   -Mild case of hyperparathyroidism can be treated outpatient as per Endo      Hyponatremia  - Sodium 130 on 3/23 and 129 on 3/34   - F/u AM labs      GERD  - Continue PPI    Prophylactic measure  - VCD for VTEp      Disposition - probable discharge tomorrow, if sodium is improved. 84 year old female with PMH COPD former smoker, , LBBB, CAD s/p MI with remote PCI to RCA, LAD and then LCx in 2018, HTN, Hypothyroidism presents with pre-syncopal episode associated w nausea and diarrhea, now resolved.    Near Syncope   - EKG shows sinus bradycardia with 1st degree block and LBBB - BB held at admission  - Telemetry shows normal sinus rhythm HR 60-80 bpm  - Negative orthostatics  - TTE performed on 3/23 - no significant changes from previous EMILE on 1/14/16 LVEF 65-70%, grade 1 diastolic dysfunction  - Chest CT 3/23 - showed Bibasilar mild bronchiectasis. No evidence of pulmonary infiltrate.  - Carotid US without stenosis  - Troponin negative       Vomiting/Diarrhea  - viral gastroenteritis as potential etiology  - no further episodes at this time      Poss PNA  - CXR suggested LLL opacity, no consolidation seen on CT but with mild bronchiectasis  - Pt afebrile, non toxic  - Was on Amoxicillin at home for dental infection and received Azithro and Ceftriaxone X 1 dose in ED  - Continue Amoxicillin (for dental infection)   - Patient complains of persistent cough X 2 weeks with clear sputum  - Urine legionella is negative    COPD, without acute exacerbation.   - Chest X-Ray with LLL opacity (possible scar tissue or atelectasis), CT with emphysematous changes and mild bronchiectasis  - , tolerating room air, 91% on RA with ambulation  - Albuterol  - Advair or substitute      CAD  - Continue ASA, statin  - TTE performed on 3/23 - no significant changes from previous EMILE on 1/14/16  - CE negative  - Cardiology ruled no further cardiac workup needed.    HTN  - Continue Amlodipine 10 mg qd   - Continue holding Metoprolol 2/2 bradycardia     Hyperparathyroidism   -Mild case of hyperparathyroidism can be treated outpatient as per Endo      Hyponatremia  - Sodium 130 on 3/23 and 129 on 3/34   - F/u AM labs      GERD  - Continue PPI    Prophylactic measure  - VCD for VTEp      Disposition - probable discharge tomorrow, if sodium is improved.

## 2020-03-24 NOTE — PROGRESS NOTE ADULT - ASSESSMENT
ASSESSMENT AND PLAN:  In summary, PRESLEY BECK is an 84y Female with past medical history significant for with LBBB, CAD s/p MI with PCI to RCA, LAD, LCx, COPD, former smoker presents w/ N/V and diarrhea.    Near syncope- echo normal LV function.  No events on telemetry.  She has never had an episode of syncope or another event similar to this.  No further cardiac workup necessary.  Most likely in the setting of dehydration and n/v/diarrhea.      CAD- no longer needs plavix.     Thank you for allowing [facility] to participate in the care of this patient.  Please feel free to call with any questions or concerns.

## 2020-03-24 NOTE — PROGRESS NOTE ADULT - SUBJECTIVE AND OBJECTIVE BOX
INTERVAL HPI/OVERNIGHT EVENTS:      MEDICATIONS  (STANDING):  albuterol/ipratropium for Nebulization 3 milliLiter(s) Nebulizer every 6 hours  amLODIPine   Tablet 10 milliGRAM(s) Oral daily  amoxicillin 500 milliGRAM(s) Oral three times a day  aspirin 325 milliGRAM(s) Oral daily  atorvastatin 20 milliGRAM(s) Oral at bedtime  budesonide 160 MICROgram(s)/formoterol 4.5 MICROgram(s) Inhaler 2 Puff(s) Inhalation two times a day  fluticasone propionate 50 MICROgram(s)/spray Nasal Spray 1 Spray(s) Both Nostrils two times a day  isosorbide   mononitrate ER Tablet (IMDUR) 30 milliGRAM(s) Oral daily  pantoprazole    Tablet 40 milliGRAM(s) Oral before breakfast  predniSONE   Tablet 10 milliGRAM(s) Oral daily  saccharomyces boulardii 250 milliGRAM(s) Oral two times a day  tiotropium 18 MICROgram(s) Capsule 1 Capsule(s) Inhalation daily    MEDICATIONS  (PRN):  ALBUTerol    90 MICROgram(s) HFA Inhaler 2 Puff(s) Inhalation every 4 hours PRN Shortness of Breath  benzocaine 15 mG/menthol 3.6 mG (Sugar-Free) Lozenge 1 Lozenge Oral five times a day PRN Sore Throat  ondansetron Injectable 4 milliGRAM(s) IV Push every 6 hours PRN Nausea and/or Vomiting      Allergies    No Known Allergies    Intolerances    Bactrim (Vomiting)      Vital Signs Last 24 Hrs  T(C): 36.5 (24 Mar 2020 07:44), Max: 36.7 (23 Mar 2020 15:48)  T(F): 97.7 (24 Mar 2020 07:44), Max: 98.1 (23 Mar 2020 15:48)  HR: 75 (24 Mar 2020 09:06) (63 - 80)  BP: 151/56 (24 Mar 2020 07:44) (131/70 - 152/66)  BP(mean): --  RR: 18 (24 Mar 2020 07:44) (18 - 18)  SpO2: 96% (24 Mar 2020 09:06) (94% - 99%)    LABS:                        12.7   10.68 )-----------( 214      ( 24 Mar 2020 07:18 )             38.6     03-24    129<L>  |  91<L>  |  13.0  ----------------------------<  126<H>  4.0   |  27.0  |  0.58    Ca    10.4<H>      24 Mar 2020 07:18 PTH 81 with repeat calcium of 11.1  Mg     2.0         TPro  5.9<L>  /  Alb  3.7  /  TBili  0.3<L>  /  DBili  x   /  AST  22  /  ALT  21  /  AlkPhos  60  -24    Urinalysis Basic - ( 23 Mar 2020 21:47 )    Color: Yellow / Appearance: Clear / S.020 / pH: x  Gluc: x / Ketone: Negative  / Bili: Negative / Urobili: Negative mg/dL   Blood: x / Protein: 15 mg/dL / Nitrite: Negative   Leuk Esterase: Trace / RBC: Negative /HPF / WBC 0-2   Sq Epi: x / Non Sq Epi: Few / Bacteria: x

## 2020-03-24 NOTE — PROGRESS NOTE ADULT - ASSESSMENT
Chart reviewed and discussed with attending.  lab data diagnostic of primary hyperparathyroidism, which is mild and can be followed as an outpatient. Will arrange follow up.

## 2020-03-25 ENCOUNTER — TRANSCRIPTION ENCOUNTER (OUTPATIENT)
Age: 85
End: 2020-03-25

## 2020-03-25 VITALS
TEMPERATURE: 98 F | DIASTOLIC BLOOD PRESSURE: 61 MMHG | OXYGEN SATURATION: 97 % | HEART RATE: 67 BPM | SYSTOLIC BLOOD PRESSURE: 134 MMHG | RESPIRATION RATE: 18 BRPM

## 2020-03-25 LAB
ANION GAP SERPL CALC-SCNC: 13 MMOL/L — SIGNIFICANT CHANGE UP (ref 5–17)
BUN SERPL-MCNC: 14 MG/DL — SIGNIFICANT CHANGE UP (ref 8–20)
CALCIUM SERPL-MCNC: 10.2 MG/DL — SIGNIFICANT CHANGE UP (ref 8.6–10.2)
CHLORIDE SERPL-SCNC: 95 MMOL/L — LOW (ref 98–107)
CO2 SERPL-SCNC: 28 MMOL/L — SIGNIFICANT CHANGE UP (ref 22–29)
CREAT SERPL-MCNC: 0.48 MG/DL — LOW (ref 0.5–1.3)
GLUCOSE SERPL-MCNC: 117 MG/DL — HIGH (ref 70–99)
POTASSIUM SERPL-MCNC: 4 MMOL/L — SIGNIFICANT CHANGE UP (ref 3.5–5.3)
POTASSIUM SERPL-SCNC: 4 MMOL/L — SIGNIFICANT CHANGE UP (ref 3.5–5.3)
SODIUM SERPL-SCNC: 136 MMOL/L — SIGNIFICANT CHANGE UP (ref 135–145)

## 2020-03-25 PROCEDURE — 80048 BASIC METABOLIC PNL TOTAL CA: CPT

## 2020-03-25 PROCEDURE — 97163 PT EVAL HIGH COMPLEX 45 MIN: CPT

## 2020-03-25 PROCEDURE — 82652 VIT D 1 25-DIHYDROXY: CPT

## 2020-03-25 PROCEDURE — 85027 COMPLETE CBC AUTOMATED: CPT

## 2020-03-25 PROCEDURE — 71045 X-RAY EXAM CHEST 1 VIEW: CPT

## 2020-03-25 PROCEDURE — 70450 CT HEAD/BRAIN W/O DYE: CPT

## 2020-03-25 PROCEDURE — 80053 COMPREHEN METABOLIC PANEL: CPT

## 2020-03-25 PROCEDURE — 36415 COLL VENOUS BLD VENIPUNCTURE: CPT

## 2020-03-25 PROCEDURE — 96374 THER/PROPH/DIAG INJ IV PUSH: CPT

## 2020-03-25 PROCEDURE — 83880 ASSAY OF NATRIURETIC PEPTIDE: CPT

## 2020-03-25 PROCEDURE — 97110 THERAPEUTIC EXERCISES: CPT

## 2020-03-25 PROCEDURE — 71250 CT THORAX DX C-: CPT

## 2020-03-25 PROCEDURE — C8929: CPT

## 2020-03-25 PROCEDURE — 83735 ASSAY OF MAGNESIUM: CPT

## 2020-03-25 PROCEDURE — 93880 EXTRACRANIAL BILAT STUDY: CPT

## 2020-03-25 PROCEDURE — 99285 EMERGENCY DEPT VISIT HI MDM: CPT | Mod: 25

## 2020-03-25 PROCEDURE — 94640 AIRWAY INHALATION TREATMENT: CPT

## 2020-03-25 PROCEDURE — 87449 NOS EACH ORGANISM AG IA: CPT

## 2020-03-25 PROCEDURE — 84484 ASSAY OF TROPONIN QUANT: CPT

## 2020-03-25 PROCEDURE — 83935 ASSAY OF URINE OSMOLALITY: CPT

## 2020-03-25 PROCEDURE — 83970 ASSAY OF PARATHORMONE: CPT

## 2020-03-25 PROCEDURE — 83930 ASSAY OF BLOOD OSMOLALITY: CPT

## 2020-03-25 PROCEDURE — 84300 ASSAY OF URINE SODIUM: CPT

## 2020-03-25 PROCEDURE — 82310 ASSAY OF CALCIUM: CPT

## 2020-03-25 PROCEDURE — 83690 ASSAY OF LIPASE: CPT

## 2020-03-25 PROCEDURE — 97116 GAIT TRAINING THERAPY: CPT

## 2020-03-25 PROCEDURE — 99239 HOSP IP/OBS DSCHRG MGMT >30: CPT

## 2020-03-25 PROCEDURE — 81001 URINALYSIS AUTO W/SCOPE: CPT

## 2020-03-25 PROCEDURE — 93005 ELECTROCARDIOGRAM TRACING: CPT

## 2020-03-25 RX ORDER — FLUTICASONE PROPIONATE 50 MCG
1 SPRAY, SUSPENSION NASAL
Qty: 0 | Refills: 0 | DISCHARGE
Start: 2020-03-25

## 2020-03-25 RX ORDER — METOPROLOL TARTRATE 50 MG
12.5 TABLET ORAL
Qty: 0 | Refills: 0 | DISCHARGE

## 2020-03-25 RX ORDER — ALBUTEROL 90 UG/1
2 AEROSOL, METERED ORAL
Qty: 0 | Refills: 0 | DISCHARGE
Start: 2020-03-25

## 2020-03-25 RX ORDER — TIOTROPIUM BROMIDE 18 UG/1
1 CAPSULE ORAL; RESPIRATORY (INHALATION)
Qty: 30 | Refills: 0
Start: 2020-03-25 | End: 2020-04-23

## 2020-03-25 RX ADMIN — ISOSORBIDE MONONITRATE 30 MILLIGRAM(S): 60 TABLET, EXTENDED RELEASE ORAL at 10:59

## 2020-03-25 RX ADMIN — Medication 500 MILLIGRAM(S): at 05:35

## 2020-03-25 RX ADMIN — Medication 1 SPRAY(S): at 05:36

## 2020-03-25 RX ADMIN — Medication 3 MILLILITER(S): at 08:23

## 2020-03-25 RX ADMIN — PANTOPRAZOLE SODIUM 40 MILLIGRAM(S): 20 TABLET, DELAYED RELEASE ORAL at 05:35

## 2020-03-25 RX ADMIN — Medication 250 MILLIGRAM(S): at 05:35

## 2020-03-25 RX ADMIN — AMLODIPINE BESYLATE 10 MILLIGRAM(S): 2.5 TABLET ORAL at 05:35

## 2020-03-25 RX ADMIN — Medication 10 MILLIGRAM(S): at 05:35

## 2020-03-25 RX ADMIN — Medication 325 MILLIGRAM(S): at 11:00

## 2020-03-25 NOTE — DISCHARGE NOTE PROVIDER - PROVIDER TOKENS
PROVIDER:[TOKEN:[[id number]]],FREE:[LAST:[[facility]],PHONE:[(   )    -],FAX:[(   )    -]] PROVIDER:[TOKEN:[[id number]]],PROVIDER:[TOKEN:[[id number]]],FREE:[LAST:[[facility]],PHONE:[(   )    -],FAX:[(   )    -]]

## 2020-03-25 NOTE — DISCHARGE NOTE NURSING/CASE MANAGEMENT/SOCIAL WORK - PATIENT PORTAL LINK FT
You can access the FollowMyHealth Patient Portal offered by [facility] by registering at the following website: [email or web address]. By joining [facility]’s FollowMyHealth portal, you will also be able to view your health information using other applications (apps) compatible with our system.

## 2020-03-25 NOTE — DISCHARGE NOTE PROVIDER - CARE PROVIDER_API CALL
Dunphy, Ronald (DO)  Family Medicine  [address] [place] [address]  Phone: [phone]  Fax: [phone]  Follow Up Time:     [facility],   Phone: (   )    -  Fax: (   )    -  Follow Up Time: Dunphy, Ronald ()  Family Medicine  [address] [place] [address]  Phone: [phone]  Fax: [phone]  Follow Up Time:     Barron Lowery)  EndocrinologyMetabDiabetes; Internal Medicine  [address] [place] [address]  Phone: [phone]  Fax: [phone]  Follow Up Time:     [facility],   Phone: (   )    -  Fax: (   )    -  Follow Up Time:

## 2020-03-25 NOTE — DISCHARGE NOTE PROVIDER - CARE PROVIDERS DIRECT ADDRESSES
,DirectAddress_Unknown,DirectAddress_Unknown ,DirectAddress_Unknown,[email or web address],DirectAddress_Unknown

## 2020-03-25 NOTE — DISCHARGE NOTE PROVIDER - NSDCMRMEDTOKEN_GEN_ALL_CORE_FT
Advair Diskus 250 mcg-50 mcg inhalation powder: 1 puff(s) inhaled 2 times a day  albuterol 90 mcg/inh inhalation aerosol: 2 puff(s) inhaled every 4 hours, As needed, Shortness of Breath  amLODIPine 5 mg oral tablet: 1 tab(s) orally once a day  aspirin 81 mg oral tablet: 1 tab(s) orally once a day  atorvastatin 20 mg oral tablet: 1 tab(s) orally once a day  fluticasone 50 mcg/inh nasal spray: 1 spray(s) nasal 2 times a day  isosorbide mononitrate 30 mg oral tablet, extended release:  orally 2 times a day  pantoprazole 40 mg oral delayed release tablet:  orally   predniSONE 10 mg oral tablet: 1 tab(s) orally once a day  Ranexa 500 mg oral tablet, extended release: 1 tab(s) orally 2 times a day  Synthroid 75 mcg (0.075 mg) oral tablet: 1 tab(s) orally once a day  tiotropium 18 mcg inhalation capsule: 1 cap(s) inhaled once a day

## 2020-03-25 NOTE — DISCHARGE NOTE PROVIDER - NSDCCPCAREPLAN_GEN_ALL_CORE_FT
PRINCIPAL DISCHARGE DIAGNOSIS  Diagnosis: Near syncope  Assessment and Plan of Treatment: Continue current medications as prescribed, Metoprolol discontinued.  Follow up with primary doctor.      SECONDARY DISCHARGE DIAGNOSES  Diagnosis: CAD (coronary artery disease)  Assessment and Plan of Treatment: Continue current medications as prescribed.  Follow up primary doctor and cardiology.    Diagnosis: COPD (chronic obstructive pulmonary disease)  Assessment and Plan of Treatment: Continue current medications. PRINCIPAL DISCHARGE DIAGNOSIS  Diagnosis: Near syncope  Assessment and Plan of Treatment: Continue current medications as prescribed, Metoprolol discontinued.  Follow up with primary doctor.      SECONDARY DISCHARGE DIAGNOSES  Diagnosis: Hyperparathyroidism  Assessment and Plan of Treatment: Follow up with Endocrinology    Diagnosis: Hypercalcemia  Assessment and Plan of Treatment: Follow up with Endocrinology    Diagnosis: Hyponatremia  Assessment and Plan of Treatment: Resolved    Diagnosis: CAD (coronary artery disease)  Assessment and Plan of Treatment: Continue current medications as prescribed.  Follow up primary doctor and cardiology.    Diagnosis: COPD (chronic obstructive pulmonary disease)  Assessment and Plan of Treatment: Continue current medications.

## 2020-03-25 NOTE — DISCHARGE NOTE PROVIDER - HOSPITAL COURSE
84 year old female with PMH COPD former smoker, LBBB, CAD s/p MI with remote PCI to RCA, LAD and then LCx in 2018, HTN, Hypothyroidism presents with pre-syncopal episode associated w nausea and diarrhea, now resolved.  EKG shows sinus bradycardia with 1st degree block and LBBB - BB held at admission.  Negative orthostatics.  TTE performed on 3/23 - no significant changes from previous EMILE on 1/14/16 LVEF 65-70%, grade 1 diastolic dysfunction.  CXR suggested LLL opacity, no consolidation.  Chest CT 3/23 - showed Bibasilar mild bronchiectasis; no evidence of pulmonary infiltrate.  Received Zithromax and Rocephin in ER.  No need to continue on antibiotics.  Carotid US without stenosis.  Troponin negative.  Cardiology following.  Patient found to have mild case of hyperparathyroidism, endocrine consulted, can be treated outpatient as per Endo.  Patient had hyponatremia on labs, that has improved.  Patient stable for discharge to home with outpatient follow up with primary doctor, cardiology and endocrinology.        Vital Signs Last 24 Hrs    T(C): 36.4 (25 Mar 2020 08:13), Max: 36.7 (25 Mar 2020 05:30)    T(F): 97.6 (25 Mar 2020 08:13), Max: 98.1 (25 Mar 2020 05:30)    HR: 67 (25 Mar 2020 08:13) (67 - 90)    BP: 134/61 (25 Mar 2020 08:13) (104/54 - 134/61)    BP(mean): --    RR: 18 (25 Mar 2020 08:13) (16 - 18)    SpO2: 97% (25 Mar 2020 08:13) (92% - 98%)        PHYSICAL EXAM:    GENERAL: NAD, well-groomed, well-developed    HEAD:  Atraumatic, Normocephalic    NECK: Supple, No JVD, Normal thyroid    NERVOUS SYSTEM:  Alert & Oriented X3, Good concentration; Motor Strength 5/5 B/L upper and lower extremities    CHEST/LUNG: Clear to auscultation bilaterally; No rales, rhonchi, wheezing, or rubs    HEART: Regular rate and rhythm; No murmurs, rubs, or gallops    ABDOMEN: Soft, Nontender, Nondistended; Bowel sounds present    EXTREMITIES:  2+ Peripheral Pulses, No clubbing, cyanosis, or edema 84 year old female with PMH COPD former smoker, LBBB, CAD s/p MI with remote PCI to RCA, LAD and then LCx in 2018, HTN, Hypothyroidism presents with pre-syncopal episode associated w nausea and diarrhea, now resolved.  EKG shows sinus bradycardia with 1st degree block and LBBB - BB held at admission.  Negative orthostatics.  TTE performed on 3/23 - no significant changes from previous TTE on 1/14/16 LVEF 65-70%, grade 1 diastolic dysfunction.  Carotid US without stenosis.  Troponin negative. Evaluated by Cardiology, opine near syncope secondary to dehydration.        Noted to have mild hypercalcemia, PTH and Vitamin D elevated pointing towards primary hyperparathyroidism. Endocrine consulted, can be treated outpatient as per Endo.          Also noted to have mild hyponatremia on labs, that has resolved.  Patient stable for discharge to home with outpatient follow up with primary doctor, cardiology and endocrinology.        Vital Signs Last 24 Hrs    T(C): 36.4 (25 Mar 2020 08:13), Max: 36.7 (25 Mar 2020 05:30)    T(F): 97.6 (25 Mar 2020 08:13), Max: 98.1 (25 Mar 2020 05:30)    HR: 67 (25 Mar 2020 08:13) (67 - 90)    BP: 134/61 (25 Mar 2020 08:13) (104/54 - 134/61)     RR: 18 (25 Mar 2020 08:13) (16 - 18)    SpO2: 97% (25 Mar 2020 08:13) (92% - 98%)        PHYSICAL EXAM:    GENERAL: NAD, well-groomed, well-developed    HEAD:  Atraumatic, Normocephalic    NECK: Supple, No JVD, Normal thyroid    NERVOUS SYSTEM:  Alert & Oriented X3, Good concentration; Motor Strength 5/5 B/L upper and lower extremities    CHEST/LUNG: Clear to auscultation bilaterally; No rales, rhonchi, wheezing, or rubs    HEART: Regular rate and rhythm; No murmurs, rubs, or gallops    ABDOMEN: Soft, Nontender, Nondistended; Bowel sounds present    EXTREMITIES:  2+ Peripheral Pulses, No clubbing, cyanosis, or edema                Evaluated by PT - recommendations for home        Discharge time 32 minutes

## 2020-04-06 ENCOUNTER — RX RENEWAL (OUTPATIENT)
Age: 85
End: 2020-04-06

## 2020-04-07 ENCOUNTER — APPOINTMENT (OUTPATIENT)
Dept: RHEUMATOLOGY | Facility: CLINIC | Age: 85
End: 2020-04-07

## 2020-04-09 PROBLEM — M25.559 HIP PAIN: Status: ACTIVE | Noted: 2018-10-11

## 2020-04-20 ENCOUNTER — RX RENEWAL (OUTPATIENT)
Age: 85
End: 2020-04-20

## 2020-04-28 ENCOUNTER — RX RENEWAL (OUTPATIENT)
Age: 85
End: 2020-04-28

## 2020-05-06 ENCOUNTER — RX RENEWAL (OUTPATIENT)
Age: 85
End: 2020-05-06

## 2020-08-25 ENCOUNTER — RX RENEWAL (OUTPATIENT)
Age: 85
End: 2020-08-25

## 2020-08-27 PROBLEM — E11.9 TYPE 2 DIABETES MELLITUS WITHOUT COMPLICATIONS: Chronic | Status: ACTIVE | Noted: 2018-04-11

## 2020-08-31 ENCOUNTER — APPOINTMENT (OUTPATIENT)
Dept: PULMONOLOGY | Facility: CLINIC | Age: 85
End: 2020-08-31
Payer: MEDICARE

## 2020-08-31 VITALS
WEIGHT: 113 LBS | BODY MASS INDEX: 19.4 KG/M2 | DIASTOLIC BLOOD PRESSURE: 68 MMHG | HEART RATE: 60 BPM | SYSTOLIC BLOOD PRESSURE: 142 MMHG | OXYGEN SATURATION: 95 %

## 2020-08-31 DIAGNOSIS — Z09 ENCOUNTER FOR FOLLOW-UP EXAMINATION AFTER COMPLETED TREATMENT FOR CONDITIONS OTHER THAN MALIGNANT NEOPLASM: ICD-10-CM

## 2020-08-31 PROCEDURE — 99214 OFFICE O/P EST MOD 30 MIN: CPT

## 2020-08-31 RX ORDER — RANOLAZINE 1000 MG/1
1000 TABLET, EXTENDED RELEASE ORAL
Qty: 180 | Refills: 0 | Status: ACTIVE | COMMUNITY
Start: 2020-04-06

## 2020-08-31 RX ORDER — AMLODIPINE BESYLATE 5 MG/1
5 TABLET ORAL
Qty: 90 | Refills: 0 | Status: ACTIVE | COMMUNITY
Start: 2020-04-28

## 2020-08-31 RX ORDER — LEVOFLOXACIN 500 MG/1
500 TABLET, FILM COATED ORAL DAILY
Qty: 7 | Refills: 0 | Status: DISCONTINUED | COMMUNITY
Start: 2019-12-16 | End: 2020-08-31

## 2020-08-31 RX ORDER — FLUTICASONE PROPIONATE AND SALMETEROL 250; 50 UG/1; UG/1
250-50 POWDER RESPIRATORY (INHALATION)
Qty: 3 | Refills: 1 | Status: DISCONTINUED | COMMUNITY
End: 2020-08-31

## 2020-08-31 RX ORDER — RANOLAZINE 500 MG/1
500 TABLET, EXTENDED RELEASE ORAL
Refills: 0 | Status: DISCONTINUED | COMMUNITY
End: 2020-08-31

## 2020-08-31 RX ORDER — FLUTICASONE PROPIONATE 50 UG/1
50 SPRAY, METERED NASAL
Refills: 0 | Status: ACTIVE | COMMUNITY

## 2020-08-31 RX ORDER — PREDNISONE 10 MG/1
10 TABLET ORAL
Qty: 30 | Refills: 6 | Status: DISCONTINUED | COMMUNITY
Start: 2020-01-08 | End: 2020-08-31

## 2020-08-31 RX ORDER — FLUTICASONE PROPIONATE 50 UG/1
50 SPRAY, METERED NASAL
Qty: 1 | Refills: 1 | Status: DISCONTINUED | COMMUNITY
Start: 2020-03-10 | End: 2020-08-31

## 2020-08-31 RX ORDER — AZELASTINE HYDROCHLORIDE 137 UG/1
0.1 SPRAY, METERED NASAL TWICE DAILY
Qty: 30 | Refills: 0 | Status: DISCONTINUED | COMMUNITY
Start: 2020-03-10 | End: 2020-08-31

## 2020-08-31 RX ORDER — AZITHROMYCIN 250 MG/1
250 TABLET, FILM COATED ORAL
Qty: 1 | Refills: 0 | Status: DISCONTINUED | COMMUNITY
Start: 2020-01-08 | End: 2020-08-31

## 2020-08-31 RX ORDER — PANTOPRAZOLE 40 MG/1
40 TABLET, DELAYED RELEASE ORAL
Qty: 90 | Refills: 0 | Status: ACTIVE | COMMUNITY
Start: 2020-04-20

## 2020-08-31 RX ORDER — LEVOTHYROXINE SODIUM 0.07 MG/1
75 TABLET ORAL DAILY
Qty: 90 | Refills: 0 | Status: ACTIVE | COMMUNITY

## 2020-08-31 NOTE — PHYSICAL EXAM
[No Acute Distress] : no acute distress [Normal Oropharynx] : normal oropharynx [Normal Appearance] : normal appearance [No Neck Mass] : no neck mass [Normal Rate/Rhythm] : normal rate/rhythm [Normal S1, S2] : normal s1, s2 [No Murmurs] : no murmurs [Clear to Auscultation Bilaterally] : clear to auscultation bilaterally [No Resp Distress] : no resp distress [Tenderness: ___] : tenderness: [unfilled] [Benign] : benign [Normal Gait] : normal gait [No Cyanosis] : no cyanosis [No Clubbing] : no clubbing [No Edema] : no edema [FROM] : FROM [Normal Color/ Pigmentation] : normal color/ pigmentation [No Focal Deficits] : no focal deficits [Oriented x3] : oriented x3 [Normal Affect] : normal affect

## 2020-08-31 NOTE — REASON FOR VISIT
[Follow-Up] : a follow-up visit [Chest Pain] : chest pain [Asthma] : asthma [Cough] : cough [COPD] : COPD

## 2020-09-22 ENCOUNTER — APPOINTMENT (OUTPATIENT)
Dept: PULMONOLOGY | Facility: CLINIC | Age: 85
End: 2020-09-22
Payer: MEDICARE

## 2020-09-22 VITALS
HEART RATE: 59 BPM | HEIGHT: 61.5 IN | OXYGEN SATURATION: 94 % | WEIGHT: 117 LBS | BODY MASS INDEX: 21.81 KG/M2 | DIASTOLIC BLOOD PRESSURE: 60 MMHG | SYSTOLIC BLOOD PRESSURE: 126 MMHG

## 2020-09-22 DIAGNOSIS — R93.89 ABNORMAL FINDINGS ON DIAGNOSTIC IMAGING OF OTHER SPECIFIED BODY STRUCTURES: ICD-10-CM

## 2020-09-22 DIAGNOSIS — Z11.59 ENCOUNTER FOR SCREENING FOR OTHER VIRAL DISEASES: ICD-10-CM

## 2020-09-22 PROCEDURE — 99214 OFFICE O/P EST MOD 30 MIN: CPT | Mod: CS

## 2020-09-22 RX ORDER — AZITHROMYCIN 250 MG/1
250 TABLET, FILM COATED ORAL
Qty: 1 | Refills: 0 | Status: DISCONTINUED | COMMUNITY
Start: 2020-08-31 | End: 2020-09-22

## 2020-09-22 RX ORDER — METHYLPREDNISOLONE 4 MG/1
4 TABLET ORAL
Qty: 1 | Refills: 0 | Status: DISCONTINUED | COMMUNITY
Start: 2020-08-31 | End: 2020-09-22

## 2020-09-22 NOTE — REVIEW OF SYSTEMS
[Cough] : cough [Sputum] : sputum [SOB on Exertion] : sob on exertion [Negative] : Endocrine [TextBox_30] : Per HPI

## 2020-09-22 NOTE — REASON FOR VISIT
[Follow-Up] : a follow-up visit [Cough] : cough [COPD] : COPD [Shortness of Breath] : shortness of breath

## 2020-10-02 ENCOUNTER — APPOINTMENT (OUTPATIENT)
Dept: PULMONOLOGY | Facility: CLINIC | Age: 85
End: 2020-10-02
Payer: MEDICARE

## 2020-10-02 PROCEDURE — ZZZZZ: CPT

## 2020-10-06 ENCOUNTER — APPOINTMENT (OUTPATIENT)
Dept: PULMONOLOGY | Facility: CLINIC | Age: 85
End: 2020-10-06
Payer: MEDICARE

## 2020-10-06 VITALS
OXYGEN SATURATION: 95 % | SYSTOLIC BLOOD PRESSURE: 120 MMHG | RESPIRATION RATE: 126 BRPM | HEART RATE: 66 BPM | DIASTOLIC BLOOD PRESSURE: 60 MMHG

## 2020-10-06 VITALS — BODY MASS INDEX: 21.96 KG/M2 | HEIGHT: 62 IN | WEIGHT: 119.31 LBS

## 2020-10-06 DIAGNOSIS — R06.00 DYSPNEA, UNSPECIFIED: ICD-10-CM

## 2020-10-06 DIAGNOSIS — R49.0 DYSPHONIA: ICD-10-CM

## 2020-10-06 LAB — SARS-COV-2 N GENE NPH QL NAA+PROBE: NOT DETECTED

## 2020-10-06 PROCEDURE — 94664 DEMO&/EVAL PT USE INHALER: CPT

## 2020-10-06 PROCEDURE — 94010 BREATHING CAPACITY TEST: CPT

## 2020-10-06 PROCEDURE — 94727 GAS DIL/WSHOT DETER LNG VOL: CPT

## 2020-10-06 PROCEDURE — 85018 HEMOGLOBIN: CPT | Mod: QW

## 2020-10-06 PROCEDURE — 99214 OFFICE O/P EST MOD 30 MIN: CPT | Mod: 25

## 2020-10-06 NOTE — CONSULT LETTER
[Dear  ___] : Dear  [unfilled], [Courtesy Letter:] : I had the pleasure of seeing your patient, [unfilled], in my office today. [Please see my note below.] : Please see my note below. [Consult Closing:] : Thank you very much for allowing me to participate in the care of this patient.  If you have any questions, please do not hesitate to contact me. [Sincerely,] : Sincerely, [FreeTextEntry3] : Mansoor Romeo MD\par

## 2020-10-06 NOTE — REASON FOR VISIT
[Follow-Up] : a follow-up visit [Abnormal CXR/ Chest CT] : an abnormal CXR/ chest CT [Asthma] : asthma [Cough] : cough [COPD] : COPD [Shortness of Breath] : shortness of breath

## 2020-10-06 NOTE — REVIEW OF SYSTEMS
[Cough] : cough [Sputum] : sputum [SOB on Exertion] : sob on exertion [GERD] : gerd [Negative] : Endocrine [TextBox_30] : Per HPI

## 2020-10-06 NOTE — PHYSICAL EXAM
[No Acute Distress] : no acute distress [Normal Oropharynx] : normal oropharynx [Normal Appearance] : normal appearance [No Neck Mass] : no neck mass [Normal Rate/Rhythm] : normal rate/rhythm [Normal S1, S2] : normal s1, s2 [No Murmurs] : no murmurs [No Resp Distress] : no resp distress [No Acc Muscle Use] : no acc muscle use [Normal Rhythm and Effort] : normal rhythm and effort [Clear to Auscultation Bilaterally] : clear to auscultation bilaterally [Tenderness: ___] : tenderness: [unfilled] [Benign] : benign [Normal Gait] : normal gait [No Clubbing] : no clubbing [No Cyanosis] : no cyanosis [No Edema] : no edema [FROM] : FROM [Normal Color/ Pigmentation] : normal color/ pigmentation [No Focal Deficits] : no focal deficits [Oriented x3] : oriented x3 [Normal Affect] : normal affect [TextBox_68] : Bronchial breath sounds LLL

## 2020-10-23 ENCOUNTER — APPOINTMENT (OUTPATIENT)
Dept: DISASTER EMERGENCY | Facility: CLINIC | Age: 85
End: 2020-10-23

## 2020-11-09 NOTE — PHYSICAL EXAM
Pt is calling because she is past due for her Ultrasound carotid duplex and appt with Dr Maldonado.    Need an order for her ultrasound please. Does she do these the same day?   [General Appearance - Alert] : alert [General Appearance - In No Acute Distress] : in no acute distress [Sclera] : the sclera and conjunctiva were normal [PERRL With Normal Accommodation] : pupils were equal in size, round, and reactive to light [Extraocular Movements] : extraocular movements were intact [Outer Ear] : the ears and nose were normal in appearance [Neck Appearance] : the appearance of the neck was normal [Oropharynx] : the oropharynx was normal [Neck Cervical Mass (___cm)] : no neck mass was observed [Thyroid Nodule] : there were no palpable thyroid nodules [Thyroid Diffuse Enlargement] : the thyroid was not enlarged [Jugular Venous Distention Increased] : there was no jugular-venous distention [Auscultation Breath Sounds / Voice Sounds] : lungs were clear to auscultation bilaterally [Heart Rate And Rhythm] : heart rate was normal and rhythm regular [Heart Sounds] : normal S1 and S2 [Heart Sounds Gallop] : no gallops [Murmurs] : no murmurs [Heart Sounds Pericardial Friction Rub] : no pericardial rub [Edema] : there was no peripheral edema [Bowel Sounds] : normal bowel sounds [Abdomen Soft] : soft [Abdomen Tenderness] : non-tender [] : no hepato-splenomegaly [Abdomen Mass (___ Cm)] : no abdominal mass palpated [No Rectal Mass] : no rectal mass [Normal Sphincter Tone] : normal sphincter tone [Oriented To Time, Place, And Person] : oriented to person, place, and time [Occult Blood Positive] : stool was negative for occult blood [Affect] : the affect was normal [Impaired Insight] : insight and judgment were intact

## 2020-11-10 ENCOUNTER — APPOINTMENT (OUTPATIENT)
Dept: PULMONOLOGY | Facility: CLINIC | Age: 85
End: 2020-11-10

## 2020-12-21 PROBLEM — Z01.419 ENCOUNTER FOR GYNECOLOGICAL EXAMINATION: Status: RESOLVED | Noted: 2019-12-13 | Resolved: 2020-12-21

## 2020-12-23 PROBLEM — Z87.09 HISTORY OF ACUTE PHARYNGITIS: Status: RESOLVED | Noted: 2020-03-10 | Resolved: 2020-12-23

## 2021-01-21 ENCOUNTER — APPOINTMENT (OUTPATIENT)
Dept: OBGYN | Facility: CLINIC | Age: 86
End: 2021-01-21
Payer: MEDICARE

## 2021-01-21 VITALS
BODY MASS INDEX: 22.45 KG/M2 | DIASTOLIC BLOOD PRESSURE: 60 MMHG | SYSTOLIC BLOOD PRESSURE: 130 MMHG | HEIGHT: 62 IN | WEIGHT: 122 LBS

## 2021-01-21 DIAGNOSIS — Z01.419 ENCOUNTER FOR GYNECOLOGICAL EXAMINATION (GENERAL) (ROUTINE) W/OUT ABNORMAL FINDINGS: ICD-10-CM

## 2021-01-21 DIAGNOSIS — Z78.0 ASYMPTOMATIC MENOPAUSAL STATE: ICD-10-CM

## 2021-01-21 PROCEDURE — G0101: CPT

## 2021-01-21 NOTE — HISTORY OF PRESENT ILLNESS
[Y] : Positive pregnancy history [Menarche Age: ____] : age at menarche was [unfilled] [Menopause Age: ____] : age at menopause was [unfilled] [PGHxTotal] : 3 [[facility] : [facility] [PGHxPremature] : 0 [PGHxAbortions] : 0 [[facility] Center[facility]] : 3 [PGHxABInduced] : 0 [PGHxABSpont] : 0 [PGHxEctopic] : 0 [PGHxMultBirths] : 0

## 2021-01-21 NOTE — PHYSICAL EXAM
[Awake] : awake [Alert] : alert [Soft] : soft [Oriented x3] : oriented to person, place, and time [Vulvar Atrophy] : vulvar atrophy [Normal] : uterus [Atrophy] : atrophy [No Bleeding] : there was no active vaginal bleeding [Uterine Adnexae] : were not tender and not enlarged [No Tenderness] : no rectal tenderness [Nl Sphincter Tone] : normal sphincter tone [Acute Distress] : no acute distress [Mass] : no breast mass [Nipple Discharge] : no nipple discharge [Axillary LAD] : no axillary lymphadenopathy [Tender] : non tender

## 2021-01-21 NOTE — OB HISTORY
[Breech Position] : breech position [___] : of [unfilled] [Pregnancy History] : girl [FreeTextEntry1] : Spontaneous AB

## 2021-01-29 NOTE — ED ADULT NURSE NOTE - NURSING MUSC JOINTS
Please notify her that labs have been ordered. She will need to be fasting for labs.    Px in SR - SB as low as 46 bpm- non sustained and symptomatic. no pain, swelling or deformity of joints

## 2021-03-25 ENCOUNTER — APPOINTMENT (OUTPATIENT)
Dept: MAMMOGRAPHY | Facility: CLINIC | Age: 86
End: 2021-03-25
Payer: MEDICARE

## 2021-03-25 ENCOUNTER — RESULT REVIEW (OUTPATIENT)
Age: 86
End: 2021-03-25

## 2021-03-25 ENCOUNTER — OUTPATIENT (OUTPATIENT)
Dept: OUTPATIENT SERVICES | Facility: HOSPITAL | Age: 86
LOS: 1 days | End: 2021-03-25
Payer: MEDICARE

## 2021-03-25 ENCOUNTER — TRANSCRIPTION ENCOUNTER (OUTPATIENT)
Age: 86
End: 2021-03-25

## 2021-03-25 ENCOUNTER — APPOINTMENT (OUTPATIENT)
Dept: RADIOLOGY | Facility: CLINIC | Age: 86
End: 2021-03-25
Payer: MEDICARE

## 2021-03-25 ENCOUNTER — APPOINTMENT (OUTPATIENT)
Dept: ULTRASOUND IMAGING | Facility: CLINIC | Age: 86
End: 2021-03-25
Payer: MEDICARE

## 2021-03-25 DIAGNOSIS — Z90.49 ACQUIRED ABSENCE OF OTHER SPECIFIED PARTS OF DIGESTIVE TRACT: Chronic | ICD-10-CM

## 2021-03-25 DIAGNOSIS — Z98.51 TUBAL LIGATION STATUS: Chronic | ICD-10-CM

## 2021-03-25 DIAGNOSIS — Z13.820 ENCOUNTER FOR SCREENING FOR OSTEOPOROSIS: ICD-10-CM

## 2021-03-25 DIAGNOSIS — Z98.891 HISTORY OF UTERINE SCAR FROM PREVIOUS SURGERY: Chronic | ICD-10-CM

## 2021-03-25 DIAGNOSIS — R92.8 OTHER ABNORMAL AND INCONCLUSIVE FINDINGS ON DIAGNOSTIC IMAGING OF BREAST: ICD-10-CM

## 2021-03-25 PROCEDURE — 76641 ULTRASOUND BREAST COMPLETE: CPT | Mod: 26,50

## 2021-03-25 PROCEDURE — 76641 ULTRASOUND BREAST COMPLETE: CPT

## 2021-03-25 PROCEDURE — 77063 BREAST TOMOSYNTHESIS BI: CPT | Mod: 26

## 2021-03-25 PROCEDURE — 77067 SCR MAMMO BI INCL CAD: CPT

## 2021-03-25 PROCEDURE — 77080 DXA BONE DENSITY AXIAL: CPT | Mod: 26

## 2021-03-25 PROCEDURE — 77067 SCR MAMMO BI INCL CAD: CPT | Mod: 26

## 2021-03-25 PROCEDURE — 77080 DXA BONE DENSITY AXIAL: CPT

## 2021-03-25 PROCEDURE — 77063 BREAST TOMOSYNTHESIS BI: CPT

## 2021-04-15 ENCOUNTER — APPOINTMENT (OUTPATIENT)
Dept: OBGYN | Facility: CLINIC | Age: 86
End: 2021-04-15
Payer: MEDICARE

## 2021-04-15 VITALS
SYSTOLIC BLOOD PRESSURE: 125 MMHG | BODY MASS INDEX: 22.45 KG/M2 | HEIGHT: 62 IN | DIASTOLIC BLOOD PRESSURE: 60 MMHG | WEIGHT: 122 LBS

## 2021-04-15 PROCEDURE — 99212 OFFICE O/P EST SF 10 MIN: CPT

## 2021-05-01 ENCOUNTER — TRANSCRIPTION ENCOUNTER (OUTPATIENT)
Age: 86
End: 2021-05-01

## 2021-05-01 ENCOUNTER — INPATIENT (INPATIENT)
Facility: HOSPITAL | Age: 86
LOS: 9 days | Discharge: NURSING, REHAB OR LONG-TERM CARE | DRG: 521 | End: 2021-05-11
Attending: SURGERY | Admitting: SURGERY
Payer: MEDICARE

## 2021-05-01 VITALS
OXYGEN SATURATION: 97 % | SYSTOLIC BLOOD PRESSURE: 157 MMHG | HEIGHT: 67 IN | DIASTOLIC BLOOD PRESSURE: 74 MMHG | HEART RATE: 58 BPM | TEMPERATURE: 98 F | RESPIRATION RATE: 18 BRPM

## 2021-05-01 DIAGNOSIS — Z98.51 TUBAL LIGATION STATUS: Chronic | ICD-10-CM

## 2021-05-01 DIAGNOSIS — Z90.49 ACQUIRED ABSENCE OF OTHER SPECIFIED PARTS OF DIGESTIVE TRACT: Chronic | ICD-10-CM

## 2021-05-01 DIAGNOSIS — S72.009A FRACTURE OF UNSPECIFIED PART OF NECK OF UNSPECIFIED FEMUR, INITIAL ENCOUNTER FOR CLOSED FRACTURE: ICD-10-CM

## 2021-05-01 DIAGNOSIS — Z98.891 HISTORY OF UTERINE SCAR FROM PREVIOUS SURGERY: Chronic | ICD-10-CM

## 2021-05-01 LAB
ALBUMIN SERPL ELPH-MCNC: 3.7 G/DL — SIGNIFICANT CHANGE UP (ref 3.3–5.2)
ALP SERPL-CCNC: 88 U/L — SIGNIFICANT CHANGE UP (ref 40–120)
ALT FLD-CCNC: 22 U/L — SIGNIFICANT CHANGE UP
ANION GAP SERPL CALC-SCNC: 8 MMOL/L — SIGNIFICANT CHANGE UP (ref 5–17)
APTT BLD: 26 SEC — LOW (ref 27.5–35.5)
AST SERPL-CCNC: 26 U/L — SIGNIFICANT CHANGE UP
BASOPHILS # BLD AUTO: 0.03 K/UL — SIGNIFICANT CHANGE UP (ref 0–0.2)
BASOPHILS NFR BLD AUTO: 0.4 % — SIGNIFICANT CHANGE UP (ref 0–2)
BILIRUB SERPL-MCNC: 0.3 MG/DL — LOW (ref 0.4–2)
BLD GP AB SCN SERPL QL: SIGNIFICANT CHANGE UP
BUN SERPL-MCNC: 13 MG/DL — SIGNIFICANT CHANGE UP (ref 8–20)
CALCIUM SERPL-MCNC: 9.4 MG/DL — SIGNIFICANT CHANGE UP (ref 8.6–10.2)
CHLORIDE SERPL-SCNC: 97 MMOL/L — LOW (ref 98–107)
CO2 SERPL-SCNC: 27 MMOL/L — SIGNIFICANT CHANGE UP (ref 22–29)
CREAT SERPL-MCNC: 0.75 MG/DL — SIGNIFICANT CHANGE UP (ref 0.5–1.3)
EOSINOPHIL # BLD AUTO: 0.18 K/UL — SIGNIFICANT CHANGE UP (ref 0–0.5)
EOSINOPHIL NFR BLD AUTO: 2.4 % — SIGNIFICANT CHANGE UP (ref 0–6)
GLUCOSE BLDC GLUCOMTR-MCNC: 126 MG/DL — HIGH (ref 70–99)
GLUCOSE SERPL-MCNC: 175 MG/DL — HIGH (ref 70–99)
HCT VFR BLD CALC: 40.1 % — SIGNIFICANT CHANGE UP (ref 34.5–45)
HGB BLD-MCNC: 13.3 G/DL — SIGNIFICANT CHANGE UP (ref 11.5–15.5)
IMM GRANULOCYTES NFR BLD AUTO: 0.8 % — SIGNIFICANT CHANGE UP (ref 0–1.5)
INR BLD: 1.02 RATIO — SIGNIFICANT CHANGE UP (ref 0.88–1.16)
LYMPHOCYTES # BLD AUTO: 1.22 K/UL — SIGNIFICANT CHANGE UP (ref 1–3.3)
LYMPHOCYTES # BLD AUTO: 16 % — SIGNIFICANT CHANGE UP (ref 13–44)
MCHC RBC-ENTMCNC: 29.8 PG — SIGNIFICANT CHANGE UP (ref 27–34)
MCHC RBC-ENTMCNC: 33.2 GM/DL — SIGNIFICANT CHANGE UP (ref 32–36)
MCV RBC AUTO: 89.7 FL — SIGNIFICANT CHANGE UP (ref 80–100)
MONOCYTES # BLD AUTO: 0.77 K/UL — SIGNIFICANT CHANGE UP (ref 0–0.9)
MONOCYTES NFR BLD AUTO: 10.1 % — SIGNIFICANT CHANGE UP (ref 2–14)
NEUTROPHILS # BLD AUTO: 5.35 K/UL — SIGNIFICANT CHANGE UP (ref 1.8–7.4)
NEUTROPHILS NFR BLD AUTO: 70.3 % — SIGNIFICANT CHANGE UP (ref 43–77)
PLATELET # BLD AUTO: 185 K/UL — SIGNIFICANT CHANGE UP (ref 150–400)
POTASSIUM SERPL-MCNC: 5.1 MMOL/L — SIGNIFICANT CHANGE UP (ref 3.5–5.3)
POTASSIUM SERPL-SCNC: 5.1 MMOL/L — SIGNIFICANT CHANGE UP (ref 3.5–5.3)
PROT SERPL-MCNC: 6.3 G/DL — LOW (ref 6.6–8.7)
PROTHROM AB SERPL-ACNC: 11.8 SEC — SIGNIFICANT CHANGE UP (ref 10.6–13.6)
RBC # BLD: 4.47 M/UL — SIGNIFICANT CHANGE UP (ref 3.8–5.2)
RBC # FLD: 12.6 % — SIGNIFICANT CHANGE UP (ref 10.3–14.5)
SARS-COV-2 RNA SPEC QL NAA+PROBE: SIGNIFICANT CHANGE UP
SODIUM SERPL-SCNC: 131 MMOL/L — LOW (ref 135–145)
WBC # BLD: 7.61 K/UL — SIGNIFICANT CHANGE UP (ref 3.8–10.5)
WBC # FLD AUTO: 7.61 K/UL — SIGNIFICANT CHANGE UP (ref 3.8–10.5)

## 2021-05-01 PROCEDURE — 99222 1ST HOSP IP/OBS MODERATE 55: CPT

## 2021-05-01 PROCEDURE — 72125 CT NECK SPINE W/O DYE: CPT | Mod: 26,MH

## 2021-05-01 PROCEDURE — 73502 X-RAY EXAM HIP UNI 2-3 VIEWS: CPT | Mod: 26,LT

## 2021-05-01 PROCEDURE — 99233 SBSQ HOSP IP/OBS HIGH 50: CPT | Mod: 57

## 2021-05-01 PROCEDURE — 93010 ELECTROCARDIOGRAM REPORT: CPT

## 2021-05-01 PROCEDURE — 73562 X-RAY EXAM OF KNEE 3: CPT | Mod: 26,LT

## 2021-05-01 PROCEDURE — G1004: CPT

## 2021-05-01 PROCEDURE — 73610 X-RAY EXAM OF ANKLE: CPT | Mod: 26,LT

## 2021-05-01 PROCEDURE — 71250 CT THORAX DX C-: CPT | Mod: 26,MG

## 2021-05-01 PROCEDURE — 70450 CT HEAD/BRAIN W/O DYE: CPT | Mod: 26,MH

## 2021-05-01 PROCEDURE — 74176 CT ABD & PELVIS W/O CONTRAST: CPT | Mod: 26,MG

## 2021-05-01 PROCEDURE — 73030 X-RAY EXAM OF SHOULDER: CPT | Mod: 26,LT

## 2021-05-01 PROCEDURE — 99291 CRITICAL CARE FIRST HOUR: CPT | Mod: CS

## 2021-05-01 RX ORDER — MORPHINE SULFATE 50 MG/1
4 CAPSULE, EXTENDED RELEASE ORAL ONCE
Refills: 0 | Status: DISCONTINUED | OUTPATIENT
Start: 2021-05-01 | End: 2021-05-01

## 2021-05-01 RX ORDER — KETOROLAC TROMETHAMINE 30 MG/ML
30 SYRINGE (ML) INJECTION EVERY 6 HOURS
Refills: 0 | Status: DISCONTINUED | OUTPATIENT
Start: 2021-05-01 | End: 2021-05-02

## 2021-05-01 RX ORDER — LEVOTHYROXINE SODIUM 125 MCG
75 TABLET ORAL DAILY
Refills: 0 | Status: DISCONTINUED | OUTPATIENT
Start: 2021-05-01 | End: 2021-05-11

## 2021-05-01 RX ORDER — IPRATROPIUM/ALBUTEROL SULFATE 18-103MCG
3 AEROSOL WITH ADAPTER (GRAM) INHALATION EVERY 6 HOURS
Refills: 0 | Status: DISCONTINUED | OUTPATIENT
Start: 2021-05-01 | End: 2021-05-10

## 2021-05-01 RX ORDER — INSULIN LISPRO 100/ML
VIAL (ML) SUBCUTANEOUS EVERY 6 HOURS
Refills: 0 | Status: DISCONTINUED | OUTPATIENT
Start: 2021-05-01 | End: 2021-05-04

## 2021-05-01 RX ORDER — DEXTROSE 50 % IN WATER 50 %
15 SYRINGE (ML) INTRAVENOUS ONCE
Refills: 0 | Status: DISCONTINUED | OUTPATIENT
Start: 2021-05-01 | End: 2021-05-11

## 2021-05-01 RX ORDER — PANTOPRAZOLE SODIUM 20 MG/1
40 TABLET, DELAYED RELEASE ORAL
Refills: 0 | Status: DISCONTINUED | OUTPATIENT
Start: 2021-05-01 | End: 2021-05-11

## 2021-05-01 RX ORDER — ATORVASTATIN CALCIUM 80 MG/1
40 TABLET, FILM COATED ORAL AT BEDTIME
Refills: 0 | Status: DISCONTINUED | OUTPATIENT
Start: 2021-05-01 | End: 2021-05-11

## 2021-05-01 RX ORDER — SODIUM CHLORIDE 9 MG/ML
1000 INJECTION, SOLUTION INTRAVENOUS
Refills: 0 | Status: DISCONTINUED | OUTPATIENT
Start: 2021-05-01 | End: 2021-05-04

## 2021-05-01 RX ORDER — DEXTROSE 50 % IN WATER 50 %
25 SYRINGE (ML) INTRAVENOUS ONCE
Refills: 0 | Status: DISCONTINUED | OUTPATIENT
Start: 2021-05-01 | End: 2021-05-11

## 2021-05-01 RX ORDER — ISOSORBIDE MONONITRATE 60 MG/1
60 TABLET, EXTENDED RELEASE ORAL DAILY
Refills: 0 | Status: DISCONTINUED | OUTPATIENT
Start: 2021-05-01 | End: 2021-05-11

## 2021-05-01 RX ORDER — RANOLAZINE 500 MG/1
500 TABLET, FILM COATED, EXTENDED RELEASE ORAL
Refills: 0 | Status: DISCONTINUED | OUTPATIENT
Start: 2021-05-01 | End: 2021-05-02

## 2021-05-01 RX ORDER — GABAPENTIN 400 MG/1
100 CAPSULE ORAL THREE TIMES A DAY
Refills: 0 | Status: DISCONTINUED | OUTPATIENT
Start: 2021-05-01 | End: 2021-05-02

## 2021-05-01 RX ORDER — ATORVASTATIN CALCIUM 80 MG/1
1 TABLET, FILM COATED ORAL
Qty: 0 | Refills: 0 | DISCHARGE

## 2021-05-01 RX ORDER — ACETAMINOPHEN 500 MG
1000 TABLET ORAL ONCE
Refills: 0 | Status: COMPLETED | OUTPATIENT
Start: 2021-05-01 | End: 2021-05-02

## 2021-05-01 RX ORDER — DEXTROSE 50 % IN WATER 50 %
12.5 SYRINGE (ML) INTRAVENOUS ONCE
Refills: 0 | Status: DISCONTINUED | OUTPATIENT
Start: 2021-05-01 | End: 2021-05-11

## 2021-05-01 RX ORDER — AMLODIPINE BESYLATE 2.5 MG/1
5 TABLET ORAL DAILY
Refills: 0 | Status: DISCONTINUED | OUTPATIENT
Start: 2021-05-01 | End: 2021-05-04

## 2021-05-01 RX ORDER — SODIUM CHLORIDE 9 MG/ML
1000 INJECTION, SOLUTION INTRAVENOUS
Refills: 0 | Status: DISCONTINUED | OUTPATIENT
Start: 2021-05-01 | End: 2021-05-03

## 2021-05-01 RX ORDER — GLUCAGON INJECTION, SOLUTION 0.5 MG/.1ML
1 INJECTION, SOLUTION SUBCUTANEOUS ONCE
Refills: 0 | Status: DISCONTINUED | OUTPATIENT
Start: 2021-05-01 | End: 2021-05-11

## 2021-05-01 RX ADMIN — SODIUM CHLORIDE 100 MILLILITER(S): 9 INJECTION, SOLUTION INTRAVENOUS at 23:44

## 2021-05-01 RX ADMIN — Medication 30 MILLIGRAM(S): at 23:41

## 2021-05-01 RX ADMIN — MORPHINE SULFATE 4 MILLIGRAM(S): 50 CAPSULE, EXTENDED RELEASE ORAL at 20:18

## 2021-05-01 RX ADMIN — MORPHINE SULFATE 4 MILLIGRAM(S): 50 CAPSULE, EXTENDED RELEASE ORAL at 21:25

## 2021-05-01 RX ADMIN — MORPHINE SULFATE 4 MILLIGRAM(S): 50 CAPSULE, EXTENDED RELEASE ORAL at 21:14

## 2021-05-01 NOTE — H&P ADULT - HISTORY OF PRESENT ILLNESS
Consulted at 21:15, seen at 21:35.    84yo F w PMH of HTN, COPD, and MI (11y/a) s/p stents x 2 which were replaced 3y/a on ASA only who presents after mechanical fall from standing complaining of left leg pain. Patient was stepping inside her home when she caught her foot on the door frame, She landed on her left side and hit left forehead, but denies LOC. Denies headache, lightheadedness, HA, dizziness, chest pain, or SOB. Had large forehead swelling on left which has improved on evaluation. No nausea or vomiting.

## 2021-05-01 NOTE — ED PROVIDER NOTE - OBJECTIVE STATEMENT
84 y/o female hx cad, hypothyroid, dm c/o trip and mechanical fall onto left side just PTA. hit head, no loc, no neck pain. C/o pain to left ribs, left shoulder and left hip. Later endorsed left knee pain. Unabl eto bear weight. No abd pain, no other complaints.     ROS: No fever/chills. No eye pain/changes in vision, No ear pain/sore throat/dysphagia, No chest pain/palpitations. No SOB/cough/. No abdominal pain, N/V/D, no black/bloody bm. No dysuria/frequency/discharge, No headache. No Dizziness.    No rashes or breaks in skin. No numbness/tingling/weakness.

## 2021-05-01 NOTE — H&P ADULT - ASSESSMENT
84yo F w PMH of HTN, COPD, and MI (11y/a) s/p stents x 2 who presents after mechanical fall from standing complaining of left leg pain found with left impacted subcapital femoral neck fracture. CT also with findings concerning for left-sided rib fractures, though this does not correlate clinically as she is with mild pain at anterior chest and no lateral pain. Hemodynamically stable otherwise.     #Left Femoral Neck Fracture  - Admit to trauma under Dr. Ortiz  - Ortho consulted, plan for OR when optimized, tomorrow vs monday  - Resume pertinent home meds  - Pain control  - DVT ppx held until cleared by ortho  - Anesthesia notified, to consult cardiology for clearance    Plan discussed with Dr. Hernandez who agrees.  86yo F w PMH of HTN, COPD, and MI (11y/a) s/p stents x 2 who presents after mechanical fall from standing complaining of left leg pain found with left impacted subcapital femoral neck fracture. CT also with findings concerning for left-sided rib fractures, though this does not correlate clinically as she is with mild pain at anterior chest and no lateral pain. Hemodynamically stable otherwise.     #Left Femoral Neck Fracture  - Admit to trauma under Dr. Ortiz  - Ortho consulted, plan for OR when optimized, tomorrow vs monday  - Resume pertinent home meds  - Pain control  - DVT ppx held until cleared by ortho  - Anesthesia notified, to consult cardiology for clearance    Plan discussed with Dr. Ortiz who agrees.

## 2021-05-01 NOTE — ED ADULT NURSE NOTE - NSIMPLEMENTINTERV_GEN_ALL_ED
Implemented All Fall Risk Interventions:  [place] to call system. Call bell, personal items and telephone within reach. Instruct patient to call for assistance. Room bathroom lighting operational. Non-slip footwear when patient is off stretcher. Physically safe environment: no spills, clutter or unnecessary equipment. Stretcher in lowest position, wheels locked, appropriate side rails in place. Provide visual cue, wrist band, yellow gown, etc. Monitor gait and stability. Monitor for mental status changes and reorient to person, place, and time. Review medications for side effects contributing to fall risk. Reinforce activity limits and safety measures with patient and family.

## 2021-05-01 NOTE — H&P ADULT - NSHPLABSRESULTS_GEN_ALL_CORE
Vital Signs Last 24 Hrs  T(C): 36.4 (01 May 2021 19:40), Max: 36.4 (01 May 2021 19:40)  T(F): 97.5 (01 May 2021 19:40), Max: 97.5 (01 May 2021 19:40)  HR: 58 (01 May 2021 19:40) (58 - 58)  BP: 157/74 (01 May 2021 19:40) (157/74 - 157/74)  BP(mean): --  RR: 18 (01 May 2021 19:40) (18 - 18)  SpO2: 97% (01 May 2021 19:40) (97% - 97%)        LABS:                        13.3   7.61  )-----------( 185      ( 01 May 2021 20:18 )             40.1     05-01    131<L>  |  97<L>  |  13.0  ----------------------------<  175<H>  5.1   |  27.0  |  0.75    Ca    9.4      01 May 2021 20:18    TPro  6.3<L>  /  Alb  3.7  /  TBili  0.3<L>  /  DBili  x   /  AST  26  /  ALT  22  /  AlkPhos  88  05-01    PT/INR - ( 01 May 2021 20:18 )   PT: 11.8 sec;   INR: 1.02 ratio         PTT - ( 01 May 2021 20:18 )  PTT:26.0 sec      IMAGING:  CTH:  IMPRESSION:  No acute intracranial hemorrhage, mass effect, or acute osseous fracture. Left frontal scalp hematoma.    Moderate chronic ischemic changes in the frontoparietal white matter.    MELECIO AVILA DO; Attending Radiologist  This document has been electronically signed. May 1 2021 9:06PM        CT C-spine:  IMPRESSION:  No acute cervical spine fracture or evidence of traumatic malalignment. Cervical spondylosis.    MELECIO AVILA DO; Attending Radiologist  This document has been electronically signed. May 1 2021 9:09PM        CT Ch/A/P  IMPRESSION:  1. Acute nondisplaced lateral left fifth through eighth rib fractures.  2. Acute impacted subcapital left hip fracture.    ALBA WADE MD; Attending Radiologist  This document has been electronically signed. May 1 2021 9:07PM

## 2021-05-01 NOTE — H&P ADULT - NSHPPHYSICALEXAM_GEN_ALL_CORE
Constitutional: Well-developed well nourished elderly female in no acute distress  HEENT: Head is normocephalic and atraumatic, maxillofacial structures stable, no blood or discharge from nares or oral cavity, no ma sign / raccoon eyes, EOMI b/l, no active drainage or redness  Neck: cervical collar in place, trachea midline  Respiratory: respirations are unlabored, no accessory muscle use, no conversational dyspnea  Cardiovascular: Regular rate & rhythm, +S1, S2  Chest: Chest wall is mildly non-tender to palpation, non-tender laterally on left. no subQ emphysema or crepitus palpated  Gastrointestinal: Abdomen soft, non-tender, non-distended, no rebound tenderness / guarding, no ecchymosis or external signs of abdominal trauma  Extremities: moving all extremities spontaneously, no point tenderness or deformity noted to upper b/l. Left hip pain on palpation. compartments soft  Pelvis: stable  Vascular: 2+ radial and DP pulses b/l  Neurological: GCS: 15 (4/5/6). A&O x 3; no gross sensory / motor / coordination deficits  Musculoskeletal: 5/5 strength of upper and lower extremities b/l  Back: no C/T/LS spine tenderness to palpation, no step-offs or signs of external trauma to the back

## 2021-05-01 NOTE — ED PROVIDER NOTE - PMH
Diabetes mellitus  Patient denies T2DM  Gastroenteritis    GERD (gastroesophageal reflux disease)    Hiatal hernia    High cholesterol    HTN (hypertension)    Hypothyroid    LBBB (left bundle branch block)    MI (myocardial infarction)    Myocardial infarction    PUD (peptic ulcer disease)    Stented coronary artery  x2  Vocal cord polyps

## 2021-05-01 NOTE — ED PROVIDER NOTE - CLINICAL SUMMARY MEDICAL DECISION MAKING FREE TEXT BOX
pt with left sided injury, fall from standing. pan scan and xrays showing left hip and 5-8th rib fractures. ortho and trauma, admit to trauma.

## 2021-05-01 NOTE — ED ADULT NURSE NOTE - OBJECTIVE STATEMENT
Pt AAOX3, pt c/o fall, pt states she was walking through the garage when she tripped on the rug and fell, pt states her foot got caught and she landed on her left side, pt states she did hit her head but denies LOC, pt +bloodthinner, pt has small bump/bruise on left side of her forehead, pt denies chest pain/SOB, pt denies n/v/d, pt respirations even and unlabored, pt able to move all extremities well, pt taken for priority CT

## 2021-05-01 NOTE — ED ADULT TRIAGE NOTE - CHIEF COMPLAINT QUOTE
pt s/p fall on renexa tripped on fell on object, pt with contusion to head. no LOC. pt with left hip pain , unable to move leg but has sensation.

## 2021-05-01 NOTE — ED PROVIDER NOTE - PHYSICAL EXAMINATION
Gen: No acute distress, non toxic  HEENT: Mucous membranes moist, pink conjunctivae, EOMI. 4cm hematoma left frontoparietal region.   Neck: no midline ttp  CV: RRR, nl s1/s2.  Resp: CTAB, normal rate and effort  GI: Abdomen soft, NT, ND. No rebound, no guarding  : No CVAT  Neuro: A&O x 3, moving all 4 extremities  MSK: left LE shortened and externally rotated, ttp to hip/groin. minimal ttp to knee. neurovascularly intact. mild ttp to left prox humerus with full rom. ttp left laeral ribs  Skin: No rashes. intact and perfused.

## 2021-05-01 NOTE — H&P ADULT - ATTENDING COMMENTS
84 yo F w PMH of HTN, COPD, and MI s/p stents x 2  was refereed to trauma as a consult after a fall c/o left leg pain found with left impacted subcapital femoral neck fracture. CT also with findings concerning for left-sided rib fractures.  AAOx3, obvious discomfort  PUL CTA  CV RRR  Chest Left breast tender but no reproducible chest wall tenderness  GI benign  MS decrease AROM L hip due to pain. Sensation grossly intact  Na 131 / Cl 97  Plan   1. Admit to trauma for pain control and bedrest  2. Patient cleared from trauma stand point for ortho intervention. Cardiology eval pending  3. Hyponatremia and Hypochloremia with dose with NS and follow response  4. Pt clinically does not have rib fx but will insure multmodal analgesia and pul toilet         code [id number]

## 2021-05-02 ENCOUNTER — TRANSCRIPTION ENCOUNTER (OUTPATIENT)
Age: 86
End: 2021-05-02

## 2021-05-02 DIAGNOSIS — S72.009A FRACTURE OF UNSPECIFIED PART OF NECK OF UNSPECIFIED FEMUR, INITIAL ENCOUNTER FOR CLOSED FRACTURE: ICD-10-CM

## 2021-05-02 LAB
A1C WITH ESTIMATED AVERAGE GLUCOSE RESULT: 6.3 % — HIGH (ref 4–5.6)
ANION GAP SERPL CALC-SCNC: 8 MMOL/L — SIGNIFICANT CHANGE UP (ref 5–17)
BASOPHILS # BLD AUTO: 0.04 K/UL — SIGNIFICANT CHANGE UP (ref 0–0.2)
BASOPHILS NFR BLD AUTO: 0.4 % — SIGNIFICANT CHANGE UP (ref 0–2)
BUN SERPL-MCNC: 7 MG/DL — LOW (ref 8–20)
CALCIUM SERPL-MCNC: 9 MG/DL — SIGNIFICANT CHANGE UP (ref 8.6–10.2)
CHLORIDE SERPL-SCNC: 94 MMOL/L — LOW (ref 98–107)
CO2 SERPL-SCNC: 28 MMOL/L — SIGNIFICANT CHANGE UP (ref 22–29)
CREAT SERPL-MCNC: 0.52 MG/DL — SIGNIFICANT CHANGE UP (ref 0.5–1.3)
EOSINOPHIL # BLD AUTO: 0.15 K/UL — SIGNIFICANT CHANGE UP (ref 0–0.5)
EOSINOPHIL NFR BLD AUTO: 1.5 % — SIGNIFICANT CHANGE UP (ref 0–6)
ESTIMATED AVERAGE GLUCOSE: 134 MG/DL — HIGH (ref 68–114)
GLUCOSE BLDC GLUCOMTR-MCNC: 126 MG/DL — HIGH (ref 70–99)
GLUCOSE BLDC GLUCOMTR-MCNC: 139 MG/DL — HIGH (ref 70–99)
GLUCOSE SERPL-MCNC: 127 MG/DL — HIGH (ref 70–99)
HCT VFR BLD CALC: 38.9 % — SIGNIFICANT CHANGE UP (ref 34.5–45)
HGB BLD-MCNC: 13.3 G/DL — SIGNIFICANT CHANGE UP (ref 11.5–15.5)
IMM GRANULOCYTES NFR BLD AUTO: 0.5 % — SIGNIFICANT CHANGE UP (ref 0–1.5)
LYMPHOCYTES # BLD AUTO: 1.18 K/UL — SIGNIFICANT CHANGE UP (ref 1–3.3)
LYMPHOCYTES # BLD AUTO: 11.5 % — LOW (ref 13–44)
MAGNESIUM SERPL-MCNC: 2 MG/DL — SIGNIFICANT CHANGE UP (ref 1.6–2.6)
MCHC RBC-ENTMCNC: 30.8 PG — SIGNIFICANT CHANGE UP (ref 27–34)
MCHC RBC-ENTMCNC: 34.2 GM/DL — SIGNIFICANT CHANGE UP (ref 32–36)
MCV RBC AUTO: 90 FL — SIGNIFICANT CHANGE UP (ref 80–100)
MONOCYTES # BLD AUTO: 0.96 K/UL — HIGH (ref 0–0.9)
MONOCYTES NFR BLD AUTO: 9.4 % — SIGNIFICANT CHANGE UP (ref 2–14)
NEUTROPHILS # BLD AUTO: 7.88 K/UL — HIGH (ref 1.8–7.4)
NEUTROPHILS NFR BLD AUTO: 76.7 % — SIGNIFICANT CHANGE UP (ref 43–77)
PHOSPHATE SERPL-MCNC: 2.1 MG/DL — LOW (ref 2.4–4.7)
PLATELET # BLD AUTO: 184 K/UL — SIGNIFICANT CHANGE UP (ref 150–400)
POTASSIUM SERPL-MCNC: 4.1 MMOL/L — SIGNIFICANT CHANGE UP (ref 3.5–5.3)
POTASSIUM SERPL-SCNC: 4.1 MMOL/L — SIGNIFICANT CHANGE UP (ref 3.5–5.3)
RBC # BLD: 4.32 M/UL — SIGNIFICANT CHANGE UP (ref 3.8–5.2)
RBC # FLD: 12.5 % — SIGNIFICANT CHANGE UP (ref 10.3–14.5)
SODIUM SERPL-SCNC: 130 MMOL/L — LOW (ref 135–145)
T3 SERPL-MCNC: 69 NG/DL — LOW (ref 80–200)
T4 AB SER-ACNC: 6 UG/DL — SIGNIFICANT CHANGE UP (ref 4.5–12)
TSH SERPL-MCNC: 2.52 UIU/ML — SIGNIFICANT CHANGE UP (ref 0.27–4.2)
WBC # BLD: 10.26 K/UL — SIGNIFICANT CHANGE UP (ref 3.8–10.5)
WBC # FLD AUTO: 10.26 K/UL — SIGNIFICANT CHANGE UP (ref 3.8–10.5)

## 2021-05-02 PROCEDURE — 27236 TREAT THIGH FRACTURE: CPT | Mod: LT

## 2021-05-02 PROCEDURE — 27236 TREAT THIGH FRACTURE: CPT | Mod: AS,LT

## 2021-05-02 PROCEDURE — 73501 X-RAY EXAM HIP UNI 1 VIEW: CPT | Mod: 26,LT

## 2021-05-02 PROCEDURE — 99231 SBSQ HOSP IP/OBS SF/LOW 25: CPT | Mod: GC

## 2021-05-02 RX ORDER — TRANEXAMIC ACID 100 MG/ML
1000 INJECTION, SOLUTION INTRAVENOUS ONCE
Refills: 0 | Status: DISCONTINUED | OUTPATIENT
Start: 2021-05-02 | End: 2021-05-04

## 2021-05-02 RX ORDER — ENOXAPARIN SODIUM 100 MG/ML
40 INJECTION SUBCUTANEOUS EVERY 24 HOURS
Refills: 0 | Status: DISCONTINUED | OUTPATIENT
Start: 2021-05-03 | End: 2021-05-11

## 2021-05-02 RX ORDER — ACETAMINOPHEN 500 MG
1000 TABLET ORAL ONCE
Refills: 0 | Status: COMPLETED | OUTPATIENT
Start: 2021-05-02 | End: 2021-05-02

## 2021-05-02 RX ORDER — TRAMADOL HYDROCHLORIDE 50 MG/1
50 TABLET ORAL EVERY 6 HOURS
Refills: 0 | Status: DISCONTINUED | OUTPATIENT
Start: 2021-05-02 | End: 2021-05-03

## 2021-05-02 RX ORDER — SODIUM CHLORIDE 9 MG/ML
1000 INJECTION INTRAMUSCULAR; INTRAVENOUS; SUBCUTANEOUS
Refills: 0 | Status: DISCONTINUED | OUTPATIENT
Start: 2021-05-02 | End: 2021-05-03

## 2021-05-02 RX ORDER — KETOROLAC TROMETHAMINE 30 MG/ML
30 SYRINGE (ML) INJECTION EVERY 6 HOURS
Refills: 0 | Status: DISCONTINUED | OUTPATIENT
Start: 2021-05-02 | End: 2021-05-02

## 2021-05-02 RX ORDER — SENNA PLUS 8.6 MG/1
2 TABLET ORAL AT BEDTIME
Refills: 0 | Status: DISCONTINUED | OUTPATIENT
Start: 2021-05-02 | End: 2021-05-03

## 2021-05-02 RX ORDER — ACETAMINOPHEN 500 MG
650 TABLET ORAL EVERY 6 HOURS
Refills: 0 | Status: DISCONTINUED | OUTPATIENT
Start: 2021-05-02 | End: 2021-05-02

## 2021-05-02 RX ORDER — KETOROLAC TROMETHAMINE 30 MG/ML
30 SYRINGE (ML) INJECTION EVERY 6 HOURS
Refills: 0 | Status: DISCONTINUED | OUTPATIENT
Start: 2021-05-02 | End: 2021-05-03

## 2021-05-02 RX ORDER — OXYCODONE HYDROCHLORIDE 5 MG/1
5 TABLET ORAL
Refills: 0 | Status: DISCONTINUED | OUTPATIENT
Start: 2021-05-02 | End: 2021-05-03

## 2021-05-02 RX ORDER — OXYCODONE HYDROCHLORIDE 5 MG/1
10 TABLET ORAL
Refills: 0 | Status: DISCONTINUED | OUTPATIENT
Start: 2021-05-02 | End: 2021-05-03

## 2021-05-02 RX ORDER — CEFAZOLIN SODIUM 1 G
2000 VIAL (EA) INJECTION
Refills: 0 | Status: COMPLETED | OUTPATIENT
Start: 2021-05-02 | End: 2021-05-03

## 2021-05-02 RX ORDER — ACETAMINOPHEN 500 MG
650 TABLET ORAL EVERY 6 HOURS
Refills: 0 | Status: DISCONTINUED | OUTPATIENT
Start: 2021-05-02 | End: 2021-05-03

## 2021-05-02 RX ORDER — ONDANSETRON 8 MG/1
4 TABLET, FILM COATED ORAL EVERY 6 HOURS
Refills: 0 | Status: DISCONTINUED | OUTPATIENT
Start: 2021-05-02 | End: 2021-05-11

## 2021-05-02 RX ORDER — SODIUM CHLORIDE 9 MG/ML
1000 INJECTION, SOLUTION INTRAVENOUS
Refills: 0 | Status: DISCONTINUED | OUTPATIENT
Start: 2021-05-02 | End: 2021-05-02

## 2021-05-02 RX ORDER — ASPIRIN/CALCIUM CARB/MAGNESIUM 324 MG
81 TABLET ORAL DAILY
Refills: 0 | Status: DISCONTINUED | OUTPATIENT
Start: 2021-05-02 | End: 2021-05-11

## 2021-05-02 RX ORDER — ONDANSETRON 8 MG/1
4 TABLET, FILM COATED ORAL ONCE
Refills: 0 | Status: DISCONTINUED | OUTPATIENT
Start: 2021-05-02 | End: 2021-05-02

## 2021-05-02 RX ORDER — RANOLAZINE 500 MG/1
1000 TABLET, FILM COATED, EXTENDED RELEASE ORAL
Refills: 0 | Status: DISCONTINUED | OUTPATIENT
Start: 2021-05-02 | End: 2021-05-11

## 2021-05-02 RX ORDER — HYDROMORPHONE HYDROCHLORIDE 2 MG/ML
0.5 INJECTION INTRAMUSCULAR; INTRAVENOUS; SUBCUTANEOUS EVERY 4 HOURS
Refills: 0 | Status: DISCONTINUED | OUTPATIENT
Start: 2021-05-02 | End: 2021-05-03

## 2021-05-02 RX ORDER — RANOLAZINE 500 MG/1
500 TABLET, FILM COATED, EXTENDED RELEASE ORAL
Refills: 0 | Status: DISCONTINUED | OUTPATIENT
Start: 2021-05-02 | End: 2021-05-11

## 2021-05-02 RX ORDER — GABAPENTIN 400 MG/1
300 CAPSULE ORAL THREE TIMES A DAY
Refills: 0 | Status: DISCONTINUED | OUTPATIENT
Start: 2021-05-02 | End: 2021-05-03

## 2021-05-02 RX ORDER — FENTANYL CITRATE 50 UG/ML
25 INJECTION INTRAVENOUS
Refills: 0 | Status: DISCONTINUED | OUTPATIENT
Start: 2021-05-02 | End: 2021-05-02

## 2021-05-02 RX ORDER — ACETAMINOPHEN 500 MG
975 TABLET ORAL EVERY 8 HOURS
Refills: 0 | Status: DISCONTINUED | OUTPATIENT
Start: 2021-05-02 | End: 2021-05-11

## 2021-05-02 RX ORDER — KETOROLAC TROMETHAMINE 30 MG/ML
15 SYRINGE (ML) INJECTION EVERY 6 HOURS
Refills: 0 | Status: DISCONTINUED | OUTPATIENT
Start: 2021-05-02 | End: 2021-05-03

## 2021-05-02 RX ADMIN — RANOLAZINE 1000 MILLIGRAM(S): 500 TABLET, FILM COATED, EXTENDED RELEASE ORAL at 05:50

## 2021-05-02 RX ADMIN — AMLODIPINE BESYLATE 5 MILLIGRAM(S): 2.5 TABLET ORAL at 05:49

## 2021-05-02 RX ADMIN — Medication 15 MILLIGRAM(S): at 22:25

## 2021-05-02 RX ADMIN — Medication 100 MILLIGRAM(S): at 22:26

## 2021-05-02 RX ADMIN — Medication 30 MILLIGRAM(S): at 05:51

## 2021-05-02 RX ADMIN — Medication 400 MILLIGRAM(S): at 00:49

## 2021-05-02 RX ADMIN — Medication 650 MILLIGRAM(S): at 05:46

## 2021-05-02 RX ADMIN — Medication 75 MICROGRAM(S): at 05:49

## 2021-05-02 RX ADMIN — PANTOPRAZOLE SODIUM 40 MILLIGRAM(S): 20 TABLET, DELAYED RELEASE ORAL at 06:01

## 2021-05-02 RX ADMIN — Medication 975 MILLIGRAM(S): at 22:25

## 2021-05-02 RX ADMIN — ATORVASTATIN CALCIUM 40 MILLIGRAM(S): 80 TABLET, FILM COATED ORAL at 22:25

## 2021-05-02 RX ADMIN — GABAPENTIN 300 MILLIGRAM(S): 400 CAPSULE ORAL at 22:25

## 2021-05-02 RX ADMIN — Medication 975 MILLIGRAM(S): at 23:00

## 2021-05-02 RX ADMIN — Medication 1000 MILLIGRAM(S): at 01:04

## 2021-05-02 RX ADMIN — Medication 15 MILLIGRAM(S): at 23:00

## 2021-05-02 RX ADMIN — SENNA PLUS 2 TABLET(S): 8.6 TABLET ORAL at 22:25

## 2021-05-02 RX ADMIN — Medication 400 MILLIGRAM(S): at 10:57

## 2021-05-02 RX ADMIN — GABAPENTIN 100 MILLIGRAM(S): 400 CAPSULE ORAL at 06:01

## 2021-05-02 NOTE — DISCHARGE NOTE PROVIDER - NSDCCPCAREPLAN_GEN_ALL_CORE_FT
PRINCIPAL DISCHARGE DIAGNOSIS  Diagnosis: Hip fracture  Assessment and Plan of Treatment: Follow up: Please call and make an appointment with orthopedic surgeon Dr. Abad 2-3 weeks after discharge. Also, please call and make an appointment with your primary care physician as per your usual schedule.   Activity: Weight bearing as tolerated with posterior hip precautions.  Diet: May continue regular diet.  Medications: Please take all home medications as previously prescribed. Continue lovenox x 4 weeks post-operatively for DVT prevention. Continue tylenol for pain relief, as needed.  Wound Care: Please, keep wound site clean and dry. Patient may shower after post-op day #5. The dressing is to be removed on day #9.  Patient is advised to RETURN TO THE EMERGENCY DEPARTMENT for any of the following - worsening pain, fever/chills, nausea/vomiting, altered mental status, chest pain, shortness of breath, or any other new / worsening symptom.

## 2021-05-02 NOTE — PROGRESS NOTE ADULT - SUBJECTIVE AND OBJECTIVE BOX
HPI/OVERNIGHT EVENTS:  No acute overnight events. Vital signs stable. Continues with significant pain, though reports relief with IV acetaminophen. NPO overnight for potential procedure today. Pending cardiology evaluation for clearance. Denies nausea, vomiting, fever, chills, chest pain, shortness of breath, or any new or concerning symptoms.     MEDICATIONS  (STANDING):  acetaminophen  IVPB .. 1000 milliGRAM(s) IV Intermittent once  amLODIPine   Tablet 5 milliGRAM(s) Oral daily  atorvastatin 40 milliGRAM(s) Oral at bedtime  dextrose 40% Gel 15 Gram(s) Oral once  dextrose 5%. 1000 milliLiter(s) (50 mL/Hr) IV Continuous <Continuous>  dextrose 5%. 1000 milliLiter(s) (100 mL/Hr) IV Continuous <Continuous>  dextrose 50% Injectable 25 Gram(s) IV Push once  dextrose 50% Injectable 12.5 Gram(s) IV Push once  dextrose 50% Injectable 25 Gram(s) IV Push once  gabapentin 300 milliGRAM(s) Oral three times a day  glucagon  Injectable 1 milliGRAM(s) IntraMuscular once  insulin lispro (ADMELOG) corrective regimen sliding scale   SubCutaneous every 6 hours  isosorbide   mononitrate ER Tablet (IMDUR) 60 milliGRAM(s) Oral daily  lactated ringers. 1000 milliLiter(s) (100 mL/Hr) IV Continuous <Continuous>  levothyroxine 75 MICROGram(s) Oral daily  pantoprazole    Tablet 40 milliGRAM(s) Oral before breakfast  ranolazine 1000 milliGRAM(s) Oral <User Schedule>  ranolazine 500 milliGRAM(s) Oral <User Schedule>    MEDICATIONS  (PRN):  albuterol/ipratropium for Nebulization 3 milliLiter(s) Nebulizer every 6 hours PRN Shortness of Breath and/or Wheezing  ketorolac   Injectable 30 milliGRAM(s) IV Push every 6 hours PRN Severe Pain (7 - 10)      Vital Signs Last 24 Hrs  T(C): 36.6 (02 May 2021 07:18), Max: 36.6 (02 May 2021 07:18)  T(F): 97.9 (02 May 2021 07:18), Max: 97.9 (02 May 2021 07:18)  HR: 62 (02 May 2021 07:18) (58 - 62)  BP: 151/79 (02 May 2021 07:18) (143/87 - 157/74)  BP(mean): --  RR: 18 (02 May 2021 07:18) (16 - 18)  SpO2: 98% (02 May 2021 07:18) (97% - 98%)    Constitutional: patient laying in bed, in no acute distress  HEENT: EOMI, no active drainage or redness  Neck: Full ROM without pain  Respiratory: respirations are unlabored, no accessory muscle use, no conversational dyspnea  Cardiovascular: regular rate & rhythm  Gastrointestinal: Abdomen soft, non-tender, non-distended  Neurological: A&O x 3; no gross sensory / motor / coordination deficits  Musculoskeletal: LLE tender to palpation at medial thigh, compartments soft. Motor and sensation intact.         I&O's Detail      LABS:                        13.3   7.61  )-----------( 185      ( 01 May 2021 20:18 )             40.1     05-01    131<L>  |  97<L>  |  13.0  ----------------------------<  175<H>  5.1   |  27.0  |  0.75    Ca    9.4      01 May 2021 20:18    TPro  6.3<L>  /  Alb  3.7  /  TBili  0.3<L>  /  DBili  x   /  AST  26  /  ALT  22  /  AlkPhos  88  05-01    PT/INR - ( 01 May 2021 20:18 )   PT: 11.8 sec;   INR: 1.02 ratio         PTT - ( 01 May 2021 20:18 )  PTT:26.0 sec

## 2021-05-02 NOTE — PROGRESS NOTE ADULT - ATTENDING COMMENTS
Agree with above assessment.  The patient was seen and examined by me.  The patient endorses pain in the left hip area.  She has no chest or abdominal pain.  The patient is trauma stable for ORIF as per ortho.  Cardiology evaluation is pending. Agree with above assessment.  The patient was seen and examined by me.  The patient endorses pain in the left hip area.  She has no chest or abdominal pain.  The patient is trauma stable for ORIF as per ortho.  Cardiology evaluation is pending for OR clearance.

## 2021-05-02 NOTE — DISCHARGE NOTE PROVIDER - NSDCFUADDINST_GEN_ALL_CORE_FT
ORTHOPEDIC FOLLOW UP RECOMMENDATIONS: The patient will be seen in the office between 2-3 weeks for wound check. Sutures/Staples/Tape will be removed at that time. Patient may shower after post-op day #5. The dressing is to be removed on day #9. The patient will contact the office if the wound becomes red, has increasing pain, develops bleeding or discharge, an injury occurs, or has other concerns. The patient will continue PT consistent with posterior total hip replacement protocol. The patient will continue to practice posterior total hip precautions for a minimum of 6 week. The patient will continue LOVENOX for 4 weeks for DVTP. The patient is FULL weight bearing.

## 2021-05-02 NOTE — DISCHARGE NOTE PROVIDER - NSDCMRMEDTOKEN_GEN_ALL_CORE_FT
Advair Diskus 250 mcg-50 mcg inhalation powder: 1 puff(s) inhaled 2 times a day  albuterol 90 mcg/inh inhalation aerosol: 2 puff(s) inhaled every 4 hours, As needed, Shortness of Breath  amLODIPine 5 mg oral tablet: 1 tab(s) orally once a day  aspirin 81 mg oral tablet: 1 tab(s) orally once a day  atorvastatin 40 mg oral tablet: 1 tab(s) orally once a day  fluticasone 50 mcg/inh nasal spray: 1 spray(s) nasal 2 times a day  isosorbide mononitrate 30 mg oral tablet, extended release:  orally 2 times a day  pantoprazole 40 mg oral delayed release tablet:  orally   predniSONE 10 mg oral tablet: 1 tab(s) orally once a day  Ranexa 500 mg oral tablet, extended release: 1 tab(s) orally 2 times a day  Synthroid 75 mcg (0.075 mg) oral tablet: 1 tab(s) orally once a day   acetaminophen 325 mg oral tablet: 3 tab(s) orally every 8 hours  Advair Diskus 250 mcg-50 mcg inhalation powder: 1 puff(s) inhaled 2 times a day  albuterol 90 mcg/inh inhalation aerosol: 2 puff(s) inhaled every 4 hours, As needed, Shortness of Breath  amLODIPine 5 mg oral tablet: 1 tab(s) orally once a day  aspirin 81 mg oral tablet: 1 tab(s) orally once a day  atorvastatin 40 mg oral tablet: 1 tab(s) orally once a day  budesonide-formoterol 160 mcg-4.5 mcg/inh inhalation aerosol: 2 puff(s) inhaled 2 times a day  enoxaparin: 40 milligram(s) subcutaneous once a day  fluticasone 50 mcg/inh nasal spray: 1 spray(s) nasal 2 times a day  ipratropium-albuterol 0.5 mg-2.5 mg/3 mLinhalation solution: 3 milliliter(s) inhaled every 6 hours, As needed, Shortness of Breath and/or Wheezing  isosorbide mononitrate 30 mg oral tablet, extended release:  orally 2 times a day  melatonin 5 mg oral tablet: 1 tab(s) orally once a day (at bedtime)  pantoprazole 40 mg oral delayed release tablet:  orally   predniSONE 10 mg oral tablet: 1 tab(s) orally once a day  psyllium 3.4 g/7 g oral powder for reconstitution: 1 packet(s) orally once a day  Ranexa 500 mg oral tablet, extended release: 1 tab(s) orally 2 times a day  saccharomyces boulardii lyo 250 mg oral capsule: 1 cap(s) orally 2 times a day  Synthroid 75 mcg (0.075 mg) oral tablet: 1 tab(s) orally once a day   acetaminophen 325 mg oral tablet: 3 tab(s) orally every 8 hours  Advair Diskus 250 mcg-50 mcg inhalation powder: 1 puff(s) inhaled 2 times a day  albuterol 90 mcg/inh inhalation aerosol: 2 puff(s) inhaled every 4 hours, As needed, Shortness of Breath  amLODIPine 5 mg oral tablet: 1 tab(s) orally once a day  aspirin 81 mg oral tablet: 1 tab(s) orally once a day  atorvastatin 40 mg oral tablet: 1 tab(s) orally once a day  budesonide-formoterol 160 mcg-4.5 mcg/inh inhalation aerosol: 2 puff(s) inhaled 2 times a day  enoxaparin: 40 milligram(s) subcutaneous once a day  fluticasone 50 mcg/inh nasal spray: 1 spray(s) nasal 2 times a day  ibuprofen 400 mg oral tablet: 1 tab(s) orally every 8 hours, As needed, Mild Pain (1 - 3)  ipratropium-albuterol 0.5 mg-2.5 mg/3 mLinhalation solution: 3 milliliter(s) inhaled every 6 hours, As needed, Shortness of Breath and/or Wheezing  isosorbide mononitrate 30 mg oral tablet, extended release:  orally 2 times a day  lidocaine 5% topical film: Apply topically to affected area once a day  loperamide 2 mg oral capsule: 1 cap(s) orally once a day  melatonin 5 mg oral tablet: 1 tab(s) orally once a day (at bedtime)  pantoprazole 40 mg oral delayed release tablet:  orally   predniSONE 10 mg oral tablet: 1 tab(s) orally once a day  psyllium 3.4 g/7 g oral powder for reconstitution: 1 packet(s) orally 2 times a day  Ranexa 500 mg oral tablet, extended release: 1 tab(s) orally 2 times a day  saccharomyces boulardii lyo 250 mg oral capsule: 1 cap(s) orally 2 times a day  Synthroid 75 mcg (0.075 mg) oral tablet: 1 tab(s) orally once a day

## 2021-05-02 NOTE — DISCHARGE NOTE PROVIDER - DETAILS OF MALNUTRITION DIAGNOSIS/DIAGNOSES
This patient has been assessed with a concern for Malnutrition and was treated during this hospitalization for the following Nutrition diagnosis/diagnoses:     -  05/06/2021: Severe protein-calorie malnutrition

## 2021-05-02 NOTE — PROGRESS NOTE ADULT - SUBJECTIVE AND OBJECTIVE BOX
Orthopedic PA Postop Note  Patient S/P LEFT HIP HEMIARTHROPLASTY  Patient in bed comfortable   LEFT Leg  Dressing C/D/I  DP Pulse intact  Calf Soft NT  Dorsi/Plantar Flexion/EHL/FHL Intact  Sensation intact to light touch  Abduction Pillow in place     Vital Signs Last 24 Hrs  T(C): 36 (02 May 2021 16:55), Max: 36.7 (02 May 2021 11:18)  T(F): 96.8 (02 May 2021 16:55), Max: 98.1 (02 May 2021 11:18)  HR: 51 (02 May 2021 18:55) (49 - 65)  BP: 139/51 (02 May 2021 18:55) (108/35 - 157/74)  BP(mean): --  RR: 13 (02 May 2021 18:55) (13 - 21)  SpO2: 100% (02 May 2021 18:55) (94% - 100%)    A/P:  S/P LEFT HIP HEMIARTHROPLASTY  1. DVTP - LOVENOX/ASA  2. Physical Therapy - Posterior Precautions  3. Pain Control as clinically indicated

## 2021-05-02 NOTE — DISCHARGE NOTE PROVIDER - HOSPITAL COURSE
Admission HPI: 86yo F w PMH of HTN, COPD, and MI (11y/a) s/p stents x 2 which were replaced 3y/a on ASA only who presents after mechanical fall from standing complaining of left leg pain. Patient was stepping inside her home when she caught her foot on the door frame, She landed on her left side and hit left forehead, but denies LOC. Denies headache, lightheadedness, HA, dizziness, chest pain, or SOB. Had large forehead swelling on left which has improved on evaluation. No nausea or vomiting.     Hospital Course: Imaging studies revealed acute left femoral neck fracture. Patient was admitted to the trauma service & orthopedics consulted. Taken to the OR on 5/2 for left hip hemiarthroplasty. Patient tolerated procedure well. Post-op pt noted to be hyponatremic found to be hypovolemic in nature. Was given IVF bolus & maintenance with improvement in both serum sodium & increase in UOP. She worked with PT, OT and PMR who recommended acute rehab upon discharge.  ______________________ Admission HPI: 86yo F w PMH of HTN, COPD, and MI (11y/a) s/p stents x 2 which were replaced 3y/a on ASA only who presents after mechanical fall from standing complaining of left leg pain. Patient was stepping inside her home when she caught her foot on the door frame, She landed on her left side and hit left forehead, but denies LOC. Denies headache, lightheadedness, HA, dizziness, chest pain, or SOB. Had large forehead swelling on left which has improved on evaluation. No nausea or vomiting.     Hospital Course: Imaging studies revealed acute left femoral neck fracture. Patient was admitted to the trauma service & orthopedics consulted. Taken to the OR on 5/2 for left hip hemiarthroplasty. Patient tolerated procedure well. She worked with PT, OT and PMR who recommended acute rehab upon discharge. Admission HPI: 84yo F w PMH of HTN, COPD, and MI (11y/a) s/p stents x 2 which were replaced 3y/a on ASA only who presents after mechanical fall from standing complaining of left leg pain. Patient was stepping inside her home when she caught her foot on the door frame, She landed on her left side and hit left forehead, but denies LOC. Denies headache, lightheadedness, HA, dizziness, chest pain, or SOB. Had large forehead swelling on left which has improved on evaluation. No nausea or vomiting.     Hospital Course: Imaging studies revealed acute left femoral neck fracture. Patient was admitted to the trauma service & orthopedics consulted. Taken to the OR on 5/2 for left hip hemiarthroplasty. Patient tolerated procedure well. She worked with PT, OT and PMR who recommended acute rehab upon discharge.  Admission complicated with Hypovolemic Hyponatremia now treated and resolved.  She is currently medically stable and ready for discharge to Acute rehab as recommended by PM&R and PT/OT. Admission HPI: 86yo F w PMH of HTN, COPD, and MI (11y/a) s/p stents x 2 which were replaced 3y/a on ASA only who presents after mechanical fall from standing complaining of left leg pain. Patient was stepping inside her home when she caught her foot on the door frame, She landed on her left side and hit left forehead, but denies LOC. Denies headache, lightheadedness, HA, dizziness, chest pain, or SOB. Had large forehead swelling on left which has improved on evaluation. No nausea or vomiting.     Hospital Course: Imaging studies revealed acute left femoral neck fracture. Patient was admitted to the trauma service & orthopedics consulted. Taken to the OR on 5/2 for left hip hemiarthroplasty. Patient tolerated procedure well. She worked with PT, OT and PMR who recommended acute rehab upon discharge.  Admission complicated with Hypovolemic Hyponatremia and diarrhea both now treated and resolved.  She is currently medically stable and ready for discharge to Acute rehab as recommended by PM&R and PT/OT. Admission HPI: 86yo F w PMH of HTN, COPD, and MI (11y/a) s/p stents x 2 which were replaced 3y/a on ASA only who presents after mechanical fall from standing complaining of left leg pain. Patient was stepping inside her home when she caught her foot on the door frame, She landed on her left side and hit left forehead, but denies LOC. Denies headache, lightheadedness, HA, dizziness, chest pain, or SOB. Had large forehead swelling on left which has improved on evaluation. No nausea or vomiting.     Hospital Course: Imaging studies revealed acute left femoral neck fracture. Patient was admitted to the trauma service & orthopedics consulted. Taken to the OR on 5/2 for left hip hemiarthroplasty. Patient tolerated procedure well. She worked with PT, OT and PMR who recommended acute rehab upon discharge.  Admission complicated with Hypovolemic Hyponatremia and diarrhea both now treated and resolved.  She is currently medically stable and ready for discharge to Acute rehab as recommended by PM&R and PT/OT.    Patient is advised to RETURN TO THE EMERGENCY DEPARTMENT for any of the following - worsening pain, fever/chills, nausea/vomiting, altered mental status, chest pain, shortness of breath, or any other new / worsening symptom.

## 2021-05-02 NOTE — DISCHARGE NOTE PROVIDER - CARE PROVIDER_API CALL
Marquez Moore)  Orthopaedic Surgery  [address], [address]  [place] [address]  Phone: [phone]  Fax: [phone]  Follow Up Time:

## 2021-05-03 LAB
ALBUMIN SERPL ELPH-MCNC: 2.5 G/DL — LOW (ref 3.3–5.2)
ALP SERPL-CCNC: 80 U/L — SIGNIFICANT CHANGE UP (ref 40–120)
ALT FLD-CCNC: 62 U/L — HIGH
ANION GAP SERPL CALC-SCNC: 4 MMOL/L — LOW (ref 5–17)
ANION GAP SERPL CALC-SCNC: 5 MMOL/L — SIGNIFICANT CHANGE UP (ref 5–17)
APPEARANCE UR: ABNORMAL
AST SERPL-CCNC: 50 U/L — HIGH
BACTERIA # UR AUTO: ABNORMAL
BASOPHILS # BLD AUTO: 0.03 K/UL — SIGNIFICANT CHANGE UP (ref 0–0.2)
BASOPHILS NFR BLD AUTO: 0.4 % — SIGNIFICANT CHANGE UP (ref 0–2)
BILIRUB DIRECT SERPL-MCNC: 0.3 MG/DL — SIGNIFICANT CHANGE UP (ref 0–0.3)
BILIRUB INDIRECT FLD-MCNC: 0.3 MG/DL — SIGNIFICANT CHANGE UP (ref 0.2–1)
BILIRUB SERPL-MCNC: 0.6 MG/DL — SIGNIFICANT CHANGE UP (ref 0.4–2)
BILIRUB UR-MCNC: ABNORMAL
BUN SERPL-MCNC: 13 MG/DL — SIGNIFICANT CHANGE UP (ref 8–20)
BUN SERPL-MCNC: 5 MG/DL — LOW (ref 8–20)
CALCIUM SERPL-MCNC: 7.8 MG/DL — LOW (ref 8.6–10.2)
CALCIUM SERPL-MCNC: 7.8 MG/DL — LOW (ref 8.6–10.2)
CHLORIDE SERPL-SCNC: 100 MMOL/L — SIGNIFICANT CHANGE UP (ref 98–107)
CHLORIDE SERPL-SCNC: 98 MMOL/L — SIGNIFICANT CHANGE UP (ref 98–107)
CO2 SERPL-SCNC: 25 MMOL/L — SIGNIFICANT CHANGE UP (ref 22–29)
CO2 SERPL-SCNC: 27 MMOL/L — SIGNIFICANT CHANGE UP (ref 22–29)
COLOR SPEC: ABNORMAL
COVID-19 SPIKE DOMAIN AB INTERP: POSITIVE
COVID-19 SPIKE DOMAIN ANTIBODY RESULT: 13.9 U/ML — HIGH
CREAT ?TM UR-MCNC: 147 MG/DL — SIGNIFICANT CHANGE UP
CREAT SERPL-MCNC: 0.39 MG/DL — LOW (ref 0.5–1.3)
CREAT SERPL-MCNC: 0.65 MG/DL — SIGNIFICANT CHANGE UP (ref 0.5–1.3)
DIFF PNL FLD: ABNORMAL
EOSINOPHIL # BLD AUTO: 0.15 K/UL — SIGNIFICANT CHANGE UP (ref 0–0.5)
EOSINOPHIL NFR BLD AUTO: 1.8 % — SIGNIFICANT CHANGE UP (ref 0–6)
EPI CELLS # UR: SIGNIFICANT CHANGE UP
GLUCOSE BLDC GLUCOMTR-MCNC: 118 MG/DL — HIGH (ref 70–99)
GLUCOSE BLDC GLUCOMTR-MCNC: 147 MG/DL — HIGH (ref 70–99)
GLUCOSE BLDC GLUCOMTR-MCNC: 197 MG/DL — HIGH (ref 70–99)
GLUCOSE SERPL-MCNC: 145 MG/DL — HIGH (ref 70–99)
GLUCOSE SERPL-MCNC: 81 MG/DL — SIGNIFICANT CHANGE UP (ref 70–99)
GLUCOSE UR QL: NEGATIVE MG/DL — SIGNIFICANT CHANGE UP
HCT VFR BLD CALC: 32.7 % — LOW (ref 34.5–45)
HCT VFR BLD CALC: 35.2 % — SIGNIFICANT CHANGE UP (ref 34.5–45)
HGB BLD-MCNC: 11 G/DL — LOW (ref 11.5–15.5)
HGB BLD-MCNC: 11.9 G/DL — SIGNIFICANT CHANGE UP (ref 11.5–15.5)
IMM GRANULOCYTES NFR BLD AUTO: 0.5 % — SIGNIFICANT CHANGE UP (ref 0–1.5)
KETONES UR-MCNC: ABNORMAL
LEUKOCYTE ESTERASE UR-ACNC: ABNORMAL
LYMPHOCYTES # BLD AUTO: 0.78 K/UL — LOW (ref 1–3.3)
LYMPHOCYTES # BLD AUTO: 9.3 % — LOW (ref 13–44)
MAGNESIUM SERPL-MCNC: 1.9 MG/DL — SIGNIFICANT CHANGE UP (ref 1.6–2.6)
MAGNESIUM SERPL-MCNC: 2.3 MG/DL — SIGNIFICANT CHANGE UP (ref 1.6–2.6)
MCHC RBC-ENTMCNC: 30.8 PG — SIGNIFICANT CHANGE UP (ref 27–34)
MCHC RBC-ENTMCNC: 31.2 PG — SIGNIFICANT CHANGE UP (ref 27–34)
MCHC RBC-ENTMCNC: 33.6 GM/DL — SIGNIFICANT CHANGE UP (ref 32–36)
MCHC RBC-ENTMCNC: 33.8 GM/DL — SIGNIFICANT CHANGE UP (ref 32–36)
MCV RBC AUTO: 91.6 FL — SIGNIFICANT CHANGE UP (ref 80–100)
MCV RBC AUTO: 92.4 FL — SIGNIFICANT CHANGE UP (ref 80–100)
MONOCYTES # BLD AUTO: 0.72 K/UL — SIGNIFICANT CHANGE UP (ref 0–0.9)
MONOCYTES NFR BLD AUTO: 8.6 % — SIGNIFICANT CHANGE UP (ref 2–14)
NEUTROPHILS # BLD AUTO: 6.65 K/UL — SIGNIFICANT CHANGE UP (ref 1.8–7.4)
NEUTROPHILS NFR BLD AUTO: 79.4 % — HIGH (ref 43–77)
NITRITE UR-MCNC: NEGATIVE — SIGNIFICANT CHANGE UP
OSMOLALITY UR: 542 MOSM/KG — SIGNIFICANT CHANGE UP (ref 300–1000)
PH UR: 5 — SIGNIFICANT CHANGE UP (ref 5–8)
PHOSPHATE SERPL-MCNC: 2.4 MG/DL — SIGNIFICANT CHANGE UP (ref 2.4–4.7)
PHOSPHATE SERPL-MCNC: 2.7 MG/DL — SIGNIFICANT CHANGE UP (ref 2.4–4.7)
PLATELET # BLD AUTO: 138 K/UL — LOW (ref 150–400)
PLATELET # BLD AUTO: 147 K/UL — LOW (ref 150–400)
POTASSIUM SERPL-MCNC: 4 MMOL/L — SIGNIFICANT CHANGE UP (ref 3.5–5.3)
POTASSIUM SERPL-MCNC: 4.1 MMOL/L — SIGNIFICANT CHANGE UP (ref 3.5–5.3)
POTASSIUM SERPL-SCNC: 4 MMOL/L — SIGNIFICANT CHANGE UP (ref 3.5–5.3)
POTASSIUM SERPL-SCNC: 4.1 MMOL/L — SIGNIFICANT CHANGE UP (ref 3.5–5.3)
PROT SERPL-MCNC: 4.6 G/DL — LOW (ref 6.6–8.7)
PROT UR-MCNC: 30 MG/DL
RBC # BLD: 3.57 M/UL — LOW (ref 3.8–5.2)
RBC # BLD: 3.81 M/UL — SIGNIFICANT CHANGE UP (ref 3.8–5.2)
RBC # FLD: 12.6 % — SIGNIFICANT CHANGE UP (ref 10.3–14.5)
RBC # FLD: 12.7 % — SIGNIFICANT CHANGE UP (ref 10.3–14.5)
RBC CASTS # UR COMP ASSIST: SIGNIFICANT CHANGE UP /HPF (ref 0–4)
SARS-COV-2 IGG+IGM SERPL QL IA: 13.9 U/ML — HIGH
SARS-COV-2 IGG+IGM SERPL QL IA: POSITIVE
SODIUM SERPL-SCNC: 128 MMOL/L — LOW (ref 135–145)
SODIUM SERPL-SCNC: 131 MMOL/L — LOW (ref 135–145)
SODIUM UR-SCNC: <30 MMOL/L — SIGNIFICANT CHANGE UP
SP GR SPEC: 1.02 — SIGNIFICANT CHANGE UP (ref 1.01–1.02)
UROBILINOGEN FLD QL: 8 MG/DL
WBC # BLD: 11.25 K/UL — HIGH (ref 3.8–10.5)
WBC # BLD: 8.37 K/UL — SIGNIFICANT CHANGE UP (ref 3.8–10.5)
WBC # FLD AUTO: 11.25 K/UL — HIGH (ref 3.8–10.5)
WBC # FLD AUTO: 8.37 K/UL — SIGNIFICANT CHANGE UP (ref 3.8–10.5)
WBC UR QL: SIGNIFICANT CHANGE UP

## 2021-05-03 PROCEDURE — 93010 ELECTROCARDIOGRAM REPORT: CPT

## 2021-05-03 PROCEDURE — 99233 SBSQ HOSP IP/OBS HIGH 50: CPT

## 2021-05-03 RX ORDER — SODIUM CHLORIDE 9 MG/ML
350 INJECTION INTRAMUSCULAR; INTRAVENOUS; SUBCUTANEOUS ONCE
Refills: 0 | Status: COMPLETED | OUTPATIENT
Start: 2021-05-03 | End: 2021-05-03

## 2021-05-03 RX ORDER — SODIUM CHLORIDE 9 MG/ML
1000 INJECTION INTRAMUSCULAR; INTRAVENOUS; SUBCUTANEOUS ONCE
Refills: 0 | Status: DISCONTINUED | OUTPATIENT
Start: 2021-05-03 | End: 2021-05-03

## 2021-05-03 RX ORDER — SODIUM CHLORIDE 9 MG/ML
500 INJECTION INTRAMUSCULAR; INTRAVENOUS; SUBCUTANEOUS ONCE
Refills: 0 | Status: DISCONTINUED | OUTPATIENT
Start: 2021-05-03 | End: 2021-05-03

## 2021-05-03 RX ORDER — SODIUM CHLORIDE 9 MG/ML
500 INJECTION INTRAMUSCULAR; INTRAVENOUS; SUBCUTANEOUS ONCE
Refills: 0 | Status: COMPLETED | OUTPATIENT
Start: 2021-05-03 | End: 2021-05-03

## 2021-05-03 RX ADMIN — AMLODIPINE BESYLATE 5 MILLIGRAM(S): 2.5 TABLET ORAL at 05:26

## 2021-05-03 RX ADMIN — Medication 75 MICROGRAM(S): at 05:26

## 2021-05-03 RX ADMIN — Medication 975 MILLIGRAM(S): at 14:06

## 2021-05-03 RX ADMIN — Medication 975 MILLIGRAM(S): at 06:08

## 2021-05-03 RX ADMIN — RANOLAZINE 500 MILLIGRAM(S): 500 TABLET, FILM COATED, EXTENDED RELEASE ORAL at 18:34

## 2021-05-03 RX ADMIN — Medication 100 MILLIGRAM(S): at 05:30

## 2021-05-03 RX ADMIN — TRAMADOL HYDROCHLORIDE 50 MILLIGRAM(S): 50 TABLET ORAL at 16:08

## 2021-05-03 RX ADMIN — SODIUM CHLORIDE 350 MILLILITER(S): 9 INJECTION INTRAMUSCULAR; INTRAVENOUS; SUBCUTANEOUS at 23:00

## 2021-05-03 RX ADMIN — Medication 1: at 17:26

## 2021-05-03 RX ADMIN — Medication 15 MILLIGRAM(S): at 06:09

## 2021-05-03 RX ADMIN — Medication 975 MILLIGRAM(S): at 22:42

## 2021-05-03 RX ADMIN — Medication 81 MILLIGRAM(S): at 14:04

## 2021-05-03 RX ADMIN — PANTOPRAZOLE SODIUM 40 MILLIGRAM(S): 20 TABLET, DELAYED RELEASE ORAL at 05:26

## 2021-05-03 RX ADMIN — Medication 15 MILLIGRAM(S): at 14:07

## 2021-05-03 RX ADMIN — SODIUM CHLORIDE 500 MILLILITER(S): 9 INJECTION INTRAMUSCULAR; INTRAVENOUS; SUBCUTANEOUS at 20:54

## 2021-05-03 RX ADMIN — SENNA PLUS 2 TABLET(S): 8.6 TABLET ORAL at 21:11

## 2021-05-03 RX ADMIN — GABAPENTIN 300 MILLIGRAM(S): 400 CAPSULE ORAL at 14:05

## 2021-05-03 RX ADMIN — Medication 975 MILLIGRAM(S): at 05:26

## 2021-05-03 RX ADMIN — Medication 15 MILLIGRAM(S): at 14:20

## 2021-05-03 RX ADMIN — Medication 15 MILLIGRAM(S): at 05:27

## 2021-05-03 RX ADMIN — RANOLAZINE 1000 MILLIGRAM(S): 500 TABLET, FILM COATED, EXTENDED RELEASE ORAL at 05:26

## 2021-05-03 RX ADMIN — Medication 975 MILLIGRAM(S): at 15:00

## 2021-05-03 RX ADMIN — GABAPENTIN 300 MILLIGRAM(S): 400 CAPSULE ORAL at 05:26

## 2021-05-03 RX ADMIN — ATORVASTATIN CALCIUM 40 MILLIGRAM(S): 80 TABLET, FILM COATED ORAL at 21:11

## 2021-05-03 RX ADMIN — ENOXAPARIN SODIUM 40 MILLIGRAM(S): 100 INJECTION SUBCUTANEOUS at 05:25

## 2021-05-03 RX ADMIN — ISOSORBIDE MONONITRATE 60 MILLIGRAM(S): 60 TABLET, EXTENDED RELEASE ORAL at 14:05

## 2021-05-03 RX ADMIN — Medication 975 MILLIGRAM(S): at 21:12

## 2021-05-03 NOTE — OCCUPATIONAL THERAPY INITIAL EVALUATION ADULT - GENERAL OBSERVATIONS, REHAB EVAL
Received pt in semi-shah position in bed, +IV lock, +telemetry, +2LNCO2, +VCBs, +abduction wedge, +teixeira. Pt agrees to OT evaluation.

## 2021-05-03 NOTE — OCCUPATIONAL THERAPY INITIAL EVALUATION ADULT - SPECIAL TRAINING, OT EVAL
Functional mobility for a couple steps at bedside with maximum assistance x 2 and RW due to decreased strength, decreased postural control and decreased balance; cues for foot placement, body positioning with relation to RW and weightshifting. Pt requires increased time and verbal/tactile cues throughout mobility for safety.

## 2021-05-03 NOTE — OCCUPATIONAL THERAPY INITIAL EVALUATION ADULT - NS ASR FOLLOW COMMAND OT EVAL
pt requires increased time, cues and repetition to process commands of tasks; pt requires cues to sequence and problem solve tasks/mobility/100% of the time/able to follow single-step instructions

## 2021-05-03 NOTE — PHYSICAL THERAPY INITIAL EVALUATION ADULT - ADDITIONAL COMMENTS
pt states she lives with her  and daughter in a 2 story house with no steps to enter. states she lives on the first floor. independent with all mobility prior to admit. has cane and RW, does not use.  and daughter home and able to assist.

## 2021-05-03 NOTE — OCCUPATIONAL THERAPY INITIAL EVALUATION ADULT - RANGE OF MOTION EXAMINATION, UPPER EXTREMITY
left shoulder flexion with trace movement only (chronic due to left rotator cuff injury, pt deferring PROM due to discomfort with movement), right shoulder flexion AROM to about 90 degrees, bilateral elbow flexion/extension AROM WFL, bilateral gross grasp and digit extension AROM WFL

## 2021-05-03 NOTE — PHYSICAL THERAPY INITIAL EVALUATION ADULT - MANUAL MUSCLE TESTING RESULTS, REHAB EVAL
UEs-see OT eval; right hip flex 2/5, knee ext 3/5, ankle df 4/5; left hip flex 2-/5,knee ext 3-/5, ankle df 3/5

## 2021-05-03 NOTE — OCCUPATIONAL THERAPY INITIAL EVALUATION ADULT - MODIFIED CLINICAL TEST OF SENSORY INTEGRATION IN BALANCE TEST
dynamic sitting edge of bed with contact guard assistance; dynamic standing with RW with moderate/maximum assistance x 2

## 2021-05-03 NOTE — OCCUPATIONAL THERAPY INITIAL EVALUATION ADULT - MANUAL MUSCLE TESTING RESULTS, REHAB EVAL
right shoulder flexion grossly assessed with partial AROM against gravity 2/5, left shoulder with trace active movement 1/5, bilateral elbow flexion/extension grossly assessed with AROM against gravity 3/5, bilateral gross grasp 5/5

## 2021-05-03 NOTE — OCCUPATIONAL THERAPY INITIAL EVALUATION ADULT - ADDITIONAL COMMENTS
Pt lives in house with no SAMI and no steps inside; bedroom and bathroom are on main level. Bathroom has bathtub with doors. Pt owns RW, cane. Pt is left handed. Pt drives. Pt's  and daughter are both able and available to assist as needed.

## 2021-05-03 NOTE — PROGRESS NOTE ADULT - SUBJECTIVE AND OBJECTIVE BOX
[facility]    [id number]    History: Patient is status post left hip hemiarthroplasty, POD#1. Patient is doing well. The patient's pain is controlled using the prescribed pain medications. Denies nausea, vomiting, chest pain, shortness of breath, abdominal pain or dizziness. No new complaints.                              11.9   8.37  )-----------( 147      ( 03 May 2021 05:35 )             35.2     05-03    131<L>  |  100  |  5.0<L>  ----------------------------<  81  4.1   |  27.0  |  0.39<L>    Ca    7.8<L>      03 May 2021 05:35  Phos  2.4     05-03  Mg     1.9     05-03    TPro  6.3<L>  /  Alb  3.7  /  TBili  0.3<L>  /  DBili  x   /  AST  26  /  ALT  22  /  AlkPhos  88  05-01      MEDICATIONS  (STANDING):  acetaminophen   Tablet .. 975 milliGRAM(s) Oral every 8 hours  amLODIPine   Tablet 5 milliGRAM(s) Oral daily  aspirin  chewable 81 milliGRAM(s) Oral daily  atorvastatin 40 milliGRAM(s) Oral at bedtime  dextrose 40% Gel 15 Gram(s) Oral once  dextrose 5%. 1000 milliLiter(s) (50 mL/Hr) IV Continuous <Continuous>  dextrose 5%. 1000 milliLiter(s) (100 mL/Hr) IV Continuous <Continuous>  dextrose 50% Injectable 25 Gram(s) IV Push once  dextrose 50% Injectable 12.5 Gram(s) IV Push once  dextrose 50% Injectable 25 Gram(s) IV Push once  enoxaparin Injectable 40 milliGRAM(s) SubCutaneous every 24 hours  gabapentin 300 milliGRAM(s) Oral three times a day  glucagon  Injectable 1 milliGRAM(s) IntraMuscular once  insulin lispro (ADMELOG) corrective regimen sliding scale   SubCutaneous every 6 hours  isosorbide   mononitrate ER Tablet (IMDUR) 60 milliGRAM(s) Oral daily  ketorolac   Injectable 15 milliGRAM(s) IV Push every 6 hours  lactated ringers. 1000 milliLiter(s) (100 mL/Hr) IV Continuous <Continuous>  levothyroxine 75 MICROGram(s) Oral daily  pantoprazole    Tablet 40 milliGRAM(s) Oral before breakfast  ranolazine 1000 milliGRAM(s) Oral <User Schedule>  ranolazine 500 milliGRAM(s) Oral <User Schedule>  senna 2 Tablet(s) Oral at bedtime  tranexamic acid IVPB 1000 milliGRAM(s) IV Intermittent once  tranexamic acid IVPB 1000 milliGRAM(s) IV Intermittent once    MEDICATIONS  (PRN):  acetaminophen   Tablet .. 650 milliGRAM(s) Oral every 6 hours PRN Temp greater or equal to 38C (100.4F)  albuterol/ipratropium for Nebulization 3 milliLiter(s) Nebulizer every 6 hours PRN Shortness of Breath and/or Wheezing  HYDROmorphone  Injectable 0.5 milliGRAM(s) IV Push every 4 hours PRN breakthrough pain  ketorolac   Injectable 30 milliGRAM(s) IV Push every 6 hours PRN Severe Pain (7 - 10)  ondansetron Injectable 4 milliGRAM(s) IV Push every 6 hours PRN Nausea and/or Vomiting  oxyCODONE    IR 5 milliGRAM(s) Oral every 3 hours PRN Moderate Pain (4 - 6)  oxyCODONE    IR 10 milliGRAM(s) Oral every 3 hours PRN Severe Pain (7 - 10)  traMADol 50 milliGRAM(s) Oral every 6 hours PRN Mild Pain (1 - 3)    Vital Signs Last 24 Hrs  T(C): 36.7 (03 May 2021 04:42), Max: 36.7 (02 May 2021 11:18)  T(F): 98.1 (03 May 2021 04:42), Max: 98.1 (02 May 2021 11:18)  HR: 65 (03 May 2021 04:42) (49 - 65)  BP: 116/52 (03 May 2021 04:42) (106/66 - 139/63)  BP(mean): --  RR: 18 (03 May 2021 04:42) (12 - 21)  SpO2: 99% (03 May 2021 04:42) (94% - 100%)  Lying in bed in NAD, awake and alert    Physical exam: Left lower extremity- The left hip mepilex dressing is clean, dry and intact. No drainage or discharge. No erythema is noted. No blistering. No ecchymosis. The calf is supple. No calf tenderness. Sensation to light touch is grossly intact distally. Motor function distally is 5/5. No foot drop. +EHL/FHL. 2+ dorsalis pedis pulse. Capillary refill is less than 2 seconds. No cyanosis.    Primary Orthopedic Assessment:  • s/p LEFT hip hemiarthroplasty, POD#1      Plan:   • DVT prophylaxis as prescribed- Lovenox, including use of compression devices and ankle pumps  • Continue physical therapy  • Weightbearing as tolerated of the left lower extremity with assistance of a walker  • Incentive spirometry encouraged  • Pain control as clinically indicated  • Posterior hip precautions reviewed with patient  • Discharge planning

## 2021-05-03 NOTE — PHYSICAL THERAPY INITIAL EVALUATION ADULT - PASSIVE RANGE OF MOTION EXAMINATION, REHAB EVAL
UEs-see OT eval; right LE WFL for hip flex, knee flex, knee ext, ankle df; left hip flex, knee flex limited by decreased strength, left ankle df WFL

## 2021-05-03 NOTE — PROGRESS NOTE ADULT - ATTENDING COMMENTS
seen and examined 05-03-21 @ 0815    abduction pillow in place  no pedal edema    s/p left hip hemiarthrplasty on 5/2 for displaced femoral neck fracture  -WBAT    hyponatremia  -check Albert / Uosm to r/o SIADH seen and examined 05-03-21 @ 0815    abduction pillow in place  no pedal edema    s/p left hip hemiarthroplasty on 5/2 for displaced femoral neck fracture  -WBAT  -PT evaluation    hyponatremia  -check Albert / Uosm to r/o SIADH

## 2021-05-03 NOTE — PHYSICAL THERAPY INITIAL EVALUATION ADULT - PERTINENT HX OF CURRENT PROBLEM, REHAB EVAL
86yo F w PMH of HTN, COPD, and MI (11y/a) s/p stents x 2 who presents after mechanical fall from standing complaining of left leg pain found with left impacted subcapital femoral neck fracture. CT also with findings concerning for left-sided rib fractures

## 2021-05-03 NOTE — PROGRESS NOTE ADULT - SUBJECTIVE AND OBJECTIVE BOX
INTERVAL HPI/OVERNIGHT EVENTS:  Patient is post op from a Hemiarthroplasty of left hip and is doing well. Patient reports mild pain at incision site that is well controlled with pain regimen and ice packs. Patient denies any nausea, vomiting, fevers, or chills at this time.    MEDICATIONS  (STANDING):  acetaminophen   Tablet .. 975 milliGRAM(s) Oral every 8 hours  amLODIPine   Tablet 5 milliGRAM(s) Oral daily  aspirin  chewable 81 milliGRAM(s) Oral daily  atorvastatin 40 milliGRAM(s) Oral at bedtime  ceFAZolin   IVPB 2000 milliGRAM(s) IV Intermittent <User Schedule>  dextrose 40% Gel 15 Gram(s) Oral once  dextrose 5%. 1000 milliLiter(s) (50 mL/Hr) IV Continuous <Continuous>  dextrose 5%. 1000 milliLiter(s) (100 mL/Hr) IV Continuous <Continuous>  dextrose 50% Injectable 25 Gram(s) IV Push once  dextrose 50% Injectable 12.5 Gram(s) IV Push once  dextrose 50% Injectable 25 Gram(s) IV Push once  enoxaparin Injectable 40 milliGRAM(s) SubCutaneous every 24 hours  gabapentin 300 milliGRAM(s) Oral three times a day  glucagon  Injectable 1 milliGRAM(s) IntraMuscular once  insulin lispro (ADMELOG) corrective regimen sliding scale   SubCutaneous every 6 hours  isosorbide   mononitrate ER Tablet (IMDUR) 60 milliGRAM(s) Oral daily  ketorolac   Injectable 15 milliGRAM(s) IV Push every 6 hours  lactated ringers. 1000 milliLiter(s) (100 mL/Hr) IV Continuous <Continuous>  levothyroxine 75 MICROGram(s) Oral daily  pantoprazole    Tablet 40 milliGRAM(s) Oral before breakfast  ranolazine 1000 milliGRAM(s) Oral <User Schedule>  ranolazine 500 milliGRAM(s) Oral <User Schedule>  senna 2 Tablet(s) Oral at bedtime  sodium chloride 0.9%. 1000 milliLiter(s) (100 mL/Hr) IV Continuous <Continuous>  tranexamic acid IVPB 1000 milliGRAM(s) IV Intermittent once  tranexamic acid IVPB 1000 milliGRAM(s) IV Intermittent once    MEDICATIONS  (PRN):  acetaminophen   Tablet .. 650 milliGRAM(s) Oral every 6 hours PRN Temp greater or equal to 38C (100.4F)  albuterol/ipratropium for Nebulization 3 milliLiter(s) Nebulizer every 6 hours PRN Shortness of Breath and/or Wheezing  HYDROmorphone  Injectable 0.5 milliGRAM(s) IV Push every 4 hours PRN breakthrough pain  ketorolac   Injectable 30 milliGRAM(s) IV Push every 6 hours PRN Severe Pain (7 - 10)  ondansetron Injectable 4 milliGRAM(s) IV Push every 6 hours PRN Nausea and/or Vomiting  oxyCODONE    IR 5 milliGRAM(s) Oral every 3 hours PRN Moderate Pain (4 - 6)  oxyCODONE    IR 10 milliGRAM(s) Oral every 3 hours PRN Severe Pain (7 - 10)  traMADol 50 milliGRAM(s) Oral every 6 hours PRN Mild Pain (1 - 3)      Vital Signs Last 24 Hrs  T(C): 36.7 (03 May 2021 04:42), Max: 36.7 (02 May 2021 11:18)  T(F): 98.1 (03 May 2021 04:42), Max: 98.1 (02 May 2021 11:18)  HR: 65 (03 May 2021 04:42) (49 - 65)  BP: 116/52 (03 May 2021 04:42) (106/66 - 155/55)  BP(mean): --  RR: 18 (03 May 2021 04:42) (12 - 21)  SpO2: 99% (03 May 2021 04:42) (94% - 100%)    Physical Exam  General: NAD AAOx3   Cards: RRR S1S2  Resp: CTAB  Abdomen: soft, nontender, nondistended  Ext: Left hip with dressing c/d/i, calf is soft and nontender  Pulse: DP/PT +2 palpable bilaterally    I&O's Detail    02 May 2021 07:01  -  03 May 2021 04:53  --------------------------------------------------------  IN:    Lactated Ringers: 200 mL  Total IN: 200 mL    OUT:    Estimated Blood Loss (mL): 200 mL    Indwelling Catheter - Urethral (mL): 100 mL  Total OUT: 300 mL    Total NET: -100 mL          LABS:                        13.3   10.26 )-----------( 184      ( 02 May 2021 07:21 )             38.9     05-02    130<L>  |  94<L>  |  7.0<L>  ----------------------------<  127<H>  4.1   |  28.0  |  0.52    Ca    9.0      02 May 2021 07:21  Phos  2.1     05-02  Mg     2.0     05-02    TPro  6.3<L>  /  Alb  3.7  /  TBili  0.3<L>  /  DBili  x   /  AST  26  /  ALT  22  /  AlkPhos  88  05-01    PT/INR - ( 01 May 2021 20:18 )   PT: 11.8 sec;   INR: 1.02 ratio         PTT - ( 01 May 2021 20:18 )  PTT:26.0 sec          Assessment:  Patient is a 85y old Female POD 1 s/p L hip hemiarthroplasty and is progressing well    Plan:  - f/u PT/OT recommendations  - continue with Lov/ASA  - pain control  - home meds resumed  - dressing changes by orthopedic surgery.

## 2021-05-04 DIAGNOSIS — R82.998 OTHER ABNORMAL FINDINGS IN URINE: ICD-10-CM

## 2021-05-04 LAB
ANION GAP SERPL CALC-SCNC: 6 MMOL/L — SIGNIFICANT CHANGE UP (ref 5–17)
BASOPHILS # BLD AUTO: 0.02 K/UL — SIGNIFICANT CHANGE UP (ref 0–0.2)
BASOPHILS NFR BLD AUTO: 0.1 % — SIGNIFICANT CHANGE UP (ref 0–2)
BUN SERPL-MCNC: 13 MG/DL — SIGNIFICANT CHANGE UP (ref 8–20)
CALCIUM SERPL-MCNC: 7.8 MG/DL — LOW (ref 8.6–10.2)
CHLORIDE SERPL-SCNC: 99 MMOL/L — SIGNIFICANT CHANGE UP (ref 98–107)
CK MB CFR SERPL CALC: 5.1 NG/ML — SIGNIFICANT CHANGE UP (ref 0–6.7)
CK SERPL-CCNC: 187 U/L — HIGH (ref 25–170)
CO2 SERPL-SCNC: 26 MMOL/L — SIGNIFICANT CHANGE UP (ref 22–29)
CREAT SERPL-MCNC: 0.58 MG/DL — SIGNIFICANT CHANGE UP (ref 0.5–1.3)
EOSINOPHIL # BLD AUTO: 0.04 K/UL — SIGNIFICANT CHANGE UP (ref 0–0.5)
EOSINOPHIL NFR BLD AUTO: 0.3 % — SIGNIFICANT CHANGE UP (ref 0–6)
GLUCOSE BLDC GLUCOMTR-MCNC: 115 MG/DL — HIGH (ref 70–99)
GLUCOSE BLDC GLUCOMTR-MCNC: 137 MG/DL — HIGH (ref 70–99)
GLUCOSE BLDC GLUCOMTR-MCNC: 143 MG/DL — HIGH (ref 70–99)
GLUCOSE BLDC GLUCOMTR-MCNC: 144 MG/DL — HIGH (ref 70–99)
GLUCOSE BLDC GLUCOMTR-MCNC: 155 MG/DL — HIGH (ref 70–99)
GLUCOSE BLDC GLUCOMTR-MCNC: 167 MG/DL — HIGH (ref 70–99)
GLUCOSE SERPL-MCNC: 154 MG/DL — HIGH (ref 70–99)
HCT VFR BLD CALC: 34.7 % — SIGNIFICANT CHANGE UP (ref 34.5–45)
HGB BLD-MCNC: 11.6 G/DL — SIGNIFICANT CHANGE UP (ref 11.5–15.5)
IMM GRANULOCYTES NFR BLD AUTO: 0.5 % — SIGNIFICANT CHANGE UP (ref 0–1.5)
LYMPHOCYTES # BLD AUTO: 0.66 K/UL — LOW (ref 1–3.3)
LYMPHOCYTES # BLD AUTO: 4.9 % — LOW (ref 13–44)
MAGNESIUM SERPL-MCNC: 2.4 MG/DL — SIGNIFICANT CHANGE UP (ref 1.6–2.6)
MCHC RBC-ENTMCNC: 30.4 PG — SIGNIFICANT CHANGE UP (ref 27–34)
MCHC RBC-ENTMCNC: 33.4 GM/DL — SIGNIFICANT CHANGE UP (ref 32–36)
MCV RBC AUTO: 90.8 FL — SIGNIFICANT CHANGE UP (ref 80–100)
MONOCYTES # BLD AUTO: 1.17 K/UL — HIGH (ref 0–0.9)
MONOCYTES NFR BLD AUTO: 8.7 % — SIGNIFICANT CHANGE UP (ref 2–14)
NEUTROPHILS # BLD AUTO: 11.49 K/UL — HIGH (ref 1.8–7.4)
NEUTROPHILS NFR BLD AUTO: 85.5 % — HIGH (ref 43–77)
PHOSPHATE SERPL-MCNC: 2.1 MG/DL — LOW (ref 2.4–4.7)
PLATELET # BLD AUTO: 172 K/UL — SIGNIFICANT CHANGE UP (ref 150–400)
POTASSIUM SERPL-MCNC: 4.6 MMOL/L — SIGNIFICANT CHANGE UP (ref 3.5–5.3)
POTASSIUM SERPL-SCNC: 4.6 MMOL/L — SIGNIFICANT CHANGE UP (ref 3.5–5.3)
RBC # BLD: 3.82 M/UL — SIGNIFICANT CHANGE UP (ref 3.8–5.2)
RBC # FLD: 12.8 % — SIGNIFICANT CHANGE UP (ref 10.3–14.5)
SODIUM SERPL-SCNC: 131 MMOL/L — LOW (ref 135–145)
WBC # BLD: 13.45 K/UL — HIGH (ref 3.8–10.5)
WBC # FLD AUTO: 13.45 K/UL — HIGH (ref 3.8–10.5)

## 2021-05-04 PROCEDURE — 99231 SBSQ HOSP IP/OBS SF/LOW 25: CPT | Mod: GC

## 2021-05-04 PROCEDURE — 99223 1ST HOSP IP/OBS HIGH 75: CPT

## 2021-05-04 RX ORDER — PSYLLIUM SEED (WITH DEXTROSE)
1 POWDER (GRAM) ORAL DAILY
Refills: 0 | Status: DISCONTINUED | OUTPATIENT
Start: 2021-05-04 | End: 2021-05-08

## 2021-05-04 RX ORDER — AMLODIPINE BESYLATE 2.5 MG/1
5 TABLET ORAL DAILY
Refills: 0 | Status: DISCONTINUED | OUTPATIENT
Start: 2021-05-04 | End: 2021-05-11

## 2021-05-04 RX ORDER — INSULIN LISPRO 100/ML
VIAL (ML) SUBCUTANEOUS
Refills: 0 | Status: DISCONTINUED | OUTPATIENT
Start: 2021-05-04 | End: 2021-05-11

## 2021-05-04 RX ORDER — SODIUM CHLORIDE 9 MG/ML
1000 INJECTION INTRAMUSCULAR; INTRAVENOUS; SUBCUTANEOUS
Refills: 0 | Status: DISCONTINUED | OUTPATIENT
Start: 2021-05-04 | End: 2021-05-05

## 2021-05-04 RX ORDER — SODIUM CHLORIDE 9 MG/ML
1000 INJECTION, SOLUTION INTRAVENOUS
Refills: 0 | Status: DISCONTINUED | OUTPATIENT
Start: 2021-05-04 | End: 2021-05-04

## 2021-05-04 RX ORDER — SACCHAROMYCES BOULARDII 250 MG
250 POWDER IN PACKET (EA) ORAL
Refills: 0 | Status: DISCONTINUED | OUTPATIENT
Start: 2021-05-04 | End: 2021-05-08

## 2021-05-04 RX ADMIN — RANOLAZINE 500 MILLIGRAM(S): 500 TABLET, FILM COATED, EXTENDED RELEASE ORAL at 18:20

## 2021-05-04 RX ADMIN — ATORVASTATIN CALCIUM 40 MILLIGRAM(S): 80 TABLET, FILM COATED ORAL at 21:32

## 2021-05-04 RX ADMIN — Medication 975 MILLIGRAM(S): at 22:15

## 2021-05-04 RX ADMIN — Medication 62.5 MILLIMOLE(S): at 08:25

## 2021-05-04 RX ADMIN — Medication 75 MICROGRAM(S): at 05:37

## 2021-05-04 RX ADMIN — PANTOPRAZOLE SODIUM 40 MILLIGRAM(S): 20 TABLET, DELAYED RELEASE ORAL at 05:38

## 2021-05-04 RX ADMIN — ISOSORBIDE MONONITRATE 60 MILLIGRAM(S): 60 TABLET, EXTENDED RELEASE ORAL at 11:19

## 2021-05-04 RX ADMIN — Medication 975 MILLIGRAM(S): at 13:53

## 2021-05-04 RX ADMIN — Medication 81 MILLIGRAM(S): at 11:19

## 2021-05-04 RX ADMIN — Medication 975 MILLIGRAM(S): at 06:41

## 2021-05-04 RX ADMIN — SODIUM CHLORIDE 75 MILLILITER(S): 9 INJECTION INTRAMUSCULAR; INTRAVENOUS; SUBCUTANEOUS at 06:40

## 2021-05-04 RX ADMIN — Medication 975 MILLIGRAM(S): at 13:02

## 2021-05-04 RX ADMIN — Medication 1: at 12:20

## 2021-05-04 RX ADMIN — ENOXAPARIN SODIUM 40 MILLIGRAM(S): 100 INJECTION SUBCUTANEOUS at 05:37

## 2021-05-04 RX ADMIN — RANOLAZINE 1000 MILLIGRAM(S): 500 TABLET, FILM COATED, EXTENDED RELEASE ORAL at 05:37

## 2021-05-04 RX ADMIN — Medication 975 MILLIGRAM(S): at 21:32

## 2021-05-04 RX ADMIN — Medication 1: at 06:25

## 2021-05-04 RX ADMIN — Medication 250 MILLIGRAM(S): at 21:32

## 2021-05-04 RX ADMIN — AMLODIPINE BESYLATE 5 MILLIGRAM(S): 2.5 TABLET ORAL at 11:19

## 2021-05-04 RX ADMIN — Medication 1 PACKET(S): at 22:09

## 2021-05-04 RX ADMIN — Medication 975 MILLIGRAM(S): at 05:38

## 2021-05-04 NOTE — PROGRESS NOTE ADULT - SUBJECTIVE AND OBJECTIVE BOX
[place] [facility] - [facility], THE [facility]                                   [address] [place] [address]                                                      PHONE: [phone]                                                         FAX: [phone]  [email or web address]  ---------------------------------------------------------------------------------------------------------------------------------    Overnight events/patient complaints:  NAD feeling well today     Bactrim (Vomiting)  No Known Allergies    MEDICATIONS  (STANDING):  acetaminophen   Tablet .. 975 milliGRAM(s) Oral every 8 hours  amLODIPine   Tablet 5 milliGRAM(s) Oral daily  aspirin  chewable 81 milliGRAM(s) Oral daily  atorvastatin 40 milliGRAM(s) Oral at bedtime  dextrose 40% Gel 15 Gram(s) Oral once  dextrose 50% Injectable 25 Gram(s) IV Push once  dextrose 50% Injectable 12.5 Gram(s) IV Push once  dextrose 50% Injectable 25 Gram(s) IV Push once  enoxaparin Injectable 40 milliGRAM(s) SubCutaneous every 24 hours  glucagon  Injectable 1 milliGRAM(s) IntraMuscular once  insulin lispro (ADMELOG) corrective regimen sliding scale   SubCutaneous every 6 hours  isosorbide   mononitrate ER Tablet (IMDUR) 60 milliGRAM(s) Oral daily  levothyroxine 75 MICROGram(s) Oral daily  pantoprazole    Tablet 40 milliGRAM(s) Oral before breakfast  ranolazine 1000 milliGRAM(s) Oral <User Schedule>  ranolazine 500 milliGRAM(s) Oral <User Schedule>  sodium chloride 0.9%. 1000 milliLiter(s) (75 mL/Hr) IV Continuous <Continuous>    MEDICATIONS  (PRN):  albuterol/ipratropium for Nebulization 3 milliLiter(s) Nebulizer every 6 hours PRN Shortness of Breath and/or Wheezing  ondansetron Injectable 4 milliGRAM(s) IV Push every 6 hours PRN Nausea and/or Vomiting      Vital Signs Last 24 Hrs  T(C): 37.2 (04 May 2021 04:52), Max: 37.2 (04 May 2021 04:52)  T(F): 98.9 (04 May 2021 04:52), Max: 98.9 (04 May 2021 04:52)  HR: 70 (04 May 2021 04:52) (46 - 71)  BP: 111/54 (04 May 2021 04:52) (88/41 - 118/65)  BP(mean): --  RR: 18 (04 May 2021 04:52) (18 - 18)  SpO2: 93% (04 May 2021 04:52) (93% - 98%)  ICU Vital Signs Last 24 Hrs  [facility] B[facility]K  I&O's Detail    03 May 2021 07:01  -  04 May 2021 07:00  --------------------------------------------------------  IN:  Total IN: 0 mL    OUT:    Indwelling Catheter - Urethral (mL): 650 mL  Total OUT: 650 mL    Total NET: -650 mL        I&O's Summary    03 May 2021 07:01  -  04 May 2021 07:00  --------------------------------------------------------  IN: 0 mL / OUT: 650 mL / NET: -650 mL      Drug Dosing Weight  [facility]      PHYSICAL EXAM:  General: Appears well developed, well nourished alert and cooperative.  HEENT: Head; normocephalic, atraumatic.  Eyes: Pupils reactive, cornea wnl.  Neck: Supple, no nodes adenopathy, no NVD or carotid bruit or thyromegaly.  CARDIOVASCULAR: Normal S1 and S2, No murmur, rub, gallop or lift.   LUNGS: No rales, rhonchi or wheeze. Normal breath sounds bilaterally.  ABDOMEN: Soft, nontender without mass or organomegaly. bowel sounds normoactive.  EXTREMITIES: No clubbing, cyanosis or edema. Distal pulses wnl.   SKIN: warm and dry with normal turgor.  NEURO: Alert/oriented x 3/normal motor exam. No pathologic reflexes.    PSYCH: normal affect.        LABS:                        11.6   13.45 )-----------( 172      ( 04 May 2021 05:51 )             34.7     05-04    131<L>  |  99  |  13.0  ----------------------------<  154<H>  4.6   |  26.0  |  0.58    Ca    7.8<L>      04 May 2021 05:49  Phos  2.1     05-04  Mg     2.4     05-04    TPro  4.6<L>  /  Alb  2.5<L>  /  TBili  0.6  /  DBili  0.3  /  AST  50<H>  /  ALT  62<H>  /  AlkPhos  80  05-03    [facility]  CARDIAC MARKERS ( 04 May 2021 05:48 )  x     / x     / 187 U/L / x     / 5.1 ng/mL        Urinalysis Basic - ( 03 May 2021 23:36 )    Color: Brown / Appearance: Slightly Turbid / S.020 / pH: x  Gluc: x / Ketone: Small  / Bili: Moderate / Urobili: 8 mg/dL   Blood: x / Protein: 30 mg/dL / Nitrite: Negative   Leuk Esterase: Trace / RBC: 0-2 /HPF / WBC 3-5   Sq Epi: x / Non Sq Epi: Occasional / Bacteria: Occasional        RADIOLOGY & ADDITIONAL STUDIES:    INTERPRETATION OF TELEMETRY (personally reviewed):    85y Female with prior history of CAD s/p PCI to LAD/RCA. LCx, HTN, HLD, DM, chronic chest pain on Ranexa, Imdur presents after mechanical fall from standing complaining of left leg pain. Noted to have a Left Femoral Neck Fracture post op doing well and HD stable     Continue current CV medications     Please recall if needed

## 2021-05-04 NOTE — PROGRESS NOTE ADULT - ATTENDING COMMENTS
Agree with above assessment.  The patient was seen and examined by me.  The patient is without abdominal pain, nausea, or vomit. She has no chest pain or shortness of breath.  The left hip incision dressing is clean and dry, the left leg is warm and perfused.  The patient is to continue with IV hydration as her urine is quite dark. follow renal function.  Continue PT/OT, pain control.

## 2021-05-04 NOTE — PROGRESS NOTE ADULT - SUBJECTIVE AND OBJECTIVE BOX
SUBJECTIVE/24 hour events:  Patient is a 85yFemale s/p trip over door frame sustaining a left femoral neck fracture now POD#2 of left hip hemiarthoplasty. Patient was lethargic after tramadol and gabapentin, both now discontinued, mental status back to baseline, had 2 episodes of 7 beats of unsustained vtach, electrolytes and ekg wnl. Patient had been hyponatremic now 128 on repeat labs, 1 bolus normal saline give will f/u am labs. Patient urine cola colored, attending aware, hepatic panel wnl, UA shows brown in color, moderate bilirubin, fena calculated and prerenal, vital signs wnl. To note senna can cause urine to become cola in color, senna discontinued for now until we can r/o cause of discoloration of urine. PM&R consult pending for dispo     Vital Signs Last 24 Hrs  T(C): 36.8 (03 May 2021 23:10), Max: 36.8 (03 May 2021 23:10)  T(F): 98.3 (03 May 2021 23:10), Max: 98.3 (03 May 2021 23:10)  HR: 60 (03 May 2021 23:10) (46 - 71)  BP: 103/60 (03 May 2021 23:10) (88/41 - 118/65)  BP(mean): --  RR: 18 (03 May 2021 23:10) (18 - 18)  SpO2: 98% (03 May 2021 23:10) (94% - 99%)  Drug Dosing Weight  Height (cm): 170.2 (01 May 2021 19:40)  Weight (kg): 61.7 (01 May 2021 21:53)  BMI (kg/m2): 21.3 (01 May 2021 21:53)  BSA (m2): 1.72 (01 May 2021 21:53)  I&O's Detail    02 May 2021 07:01  -  03 May 2021 07:00  --------------------------------------------------------  IN:    Lactated Ringers: 200 mL  Total IN: 200 mL    OUT:    Estimated Blood Loss (mL): 200 mL    Indwelling Catheter - Urethral (mL): 1050 mL  Total OUT: 1250 mL    Total NET: -1050 mL      03 May 2021 07:01  -  04 May 2021 03:21  --------------------------------------------------------  IN:  Total IN: 0 mL    OUT:    Indwelling Catheter - Urethral (mL): 450 mL  Total OUT: 450 mL    Total NET: -450 mL        Allergies    No Known Allergies    Intolerances    Bactrim (Vomiting)                            11.0   11.25 )-----------( 138      ( 03 May 2021 21:25 )             32.7   05-03    128<L>  |  98  |  13.0  ----------------------------<  145<H>  4.0   |  25.0  |  0.65    Ca    7.8<L>      03 May 2021 21:25  Phos  2.7     05-03  Mg     2.3     05-03    TPro  4.6<L>  /  Alb  2.5<L>  /  TBili  0.6  /  DBili  0.3  /  AST  50<H>  /  ALT  62<H>  /  AlkPhos  80  05-03      ROS:    PHYSICAL EXAM:  Constitutional: " im feeling okay, my urine is not that color at home"  Respiratory: no respiratory distress, no dyspnea, supplemental o2 via nasal canula 2 liters  Cardiovascular: NSR   Gastrointestinal: abdomen protuberant, soft,  no guarding, no peritonitis, +BM, +flatus  Genitourinary: teixeira in place draining dark brown cola colored urine   Extremities: LLE NVI, compartments soft, warm to touch, cap refill <2, WBAT  Neurological: A&OX3  Skin: warm, dry and no rashes           MEDICATIONS  (STANDING):  acetaminophen   Tablet .. 975 milliGRAM(s) Oral every 8 hours  amLODIPine   Tablet 5 milliGRAM(s) Oral daily  aspirin  chewable 81 milliGRAM(s) Oral daily  atorvastatin 40 milliGRAM(s) Oral at bedtime  dextrose 40% Gel 15 Gram(s) Oral once  dextrose 5%. 1000 milliLiter(s) (50 mL/Hr) IV Continuous <Continuous>  dextrose 5%. 1000 milliLiter(s) (100 mL/Hr) IV Continuous <Continuous>  dextrose 50% Injectable 25 Gram(s) IV Push once  dextrose 50% Injectable 12.5 Gram(s) IV Push once  dextrose 50% Injectable 25 Gram(s) IV Push once  enoxaparin Injectable 40 milliGRAM(s) SubCutaneous every 24 hours  glucagon  Injectable 1 milliGRAM(s) IntraMuscular once  insulin lispro (ADMELOG) corrective regimen sliding scale   SubCutaneous every 6 hours  isosorbide   mononitrate ER Tablet (IMDUR) 60 milliGRAM(s) Oral daily  levothyroxine 75 MICROGram(s) Oral daily  pantoprazole    Tablet 40 milliGRAM(s) Oral before breakfast  ranolazine 1000 milliGRAM(s) Oral <User Schedule>  ranolazine 500 milliGRAM(s) Oral <User Schedule>  tranexamic acid IVPB 1000 milliGRAM(s) IV Intermittent once  tranexamic acid IVPB 1000 milliGRAM(s) IV Intermittent once    MEDICATIONS  (PRN):  albuterol/ipratropium for Nebulization 3 milliLiter(s) Nebulizer every 6 hours PRN Shortness of Breath and/or Wheezing  ondansetron Injectable 4 milliGRAM(s) IV Push every 6 hours PRN Nausea and/or Vomiting      RADIOLOGY STUDIES:    CULTURES:

## 2021-05-04 NOTE — PROGRESS NOTE ADULT - PROBLEM SELECTOR PLAN 2
hold senna  monitor uop  consider maintenance fluids as fena showed prenal, was given bolus overnight, monitor hourly rate off fluids  monitor hyponatremia, f/u am labs  f/u ck

## 2021-05-04 NOTE — PROGRESS NOTE ADULT - SUBJECTIVE AND OBJECTIVE BOX
[facility]    [id number]    History: Patient is status post left hip hemiarthroplasty, POD#2. Patient is doing well. The patient's pain is controlled using the prescribed pain medications. The patient is participating in physical therapy. She was OOB and tried to take a few steps with PT. Denies nausea, vomiting, chest pain, shortness of breath, abdominal pain or dizziness. No new complaints.                              11.6   13.45 )-----------( 172      ( 04 May 2021 05:51 )             34.7     05-04    131<L>  |  99  |  13.0  ----------------------------<  154<H>  4.6   |  26.0  |  0.58    Ca    7.8<L>      04 May 2021 05:49  Phos  2.1     05-04  Mg     2.4     05-04    TPro  4.6<L>  /  Alb  2.5<L>  /  TBili  0.6  /  DBili  0.3  /  AST  50<H>  /  ALT  62<H>  /  AlkPhos  80  05-03      MEDICATIONS  (STANDING):  acetaminophen   Tablet .. 975 milliGRAM(s) Oral every 8 hours  amLODIPine   Tablet 5 milliGRAM(s) Oral daily  aspirin  chewable 81 milliGRAM(s) Oral daily  atorvastatin 40 milliGRAM(s) Oral at bedtime  dextrose 40% Gel 15 Gram(s) Oral once  dextrose 50% Injectable 25 Gram(s) IV Push once  dextrose 50% Injectable 12.5 Gram(s) IV Push once  dextrose 50% Injectable 25 Gram(s) IV Push once  enoxaparin Injectable 40 milliGRAM(s) SubCutaneous every 24 hours  glucagon  Injectable 1 milliGRAM(s) IntraMuscular once  insulin lispro (ADMELOG) corrective regimen sliding scale   SubCutaneous every 6 hours  isosorbide   mononitrate ER Tablet (IMDUR) 60 milliGRAM(s) Oral daily  levothyroxine 75 MICROGram(s) Oral daily  pantoprazole    Tablet 40 milliGRAM(s) Oral before breakfast  ranolazine 1000 milliGRAM(s) Oral <User Schedule>  ranolazine 500 milliGRAM(s) Oral <User Schedule>  sodium chloride 0.9%. 1000 milliLiter(s) (75 mL/Hr) IV Continuous <Continuous>  sodium phosphate IVPB 15 milliMole(s) IV Intermittent once    MEDICATIONS  (PRN):  albuterol/ipratropium for Nebulization 3 milliLiter(s) Nebulizer every 6 hours PRN Shortness of Breath and/or Wheezing  ondansetron Injectable 4 milliGRAM(s) IV Push every 6 hours PRN Nausea and/or Vomiting    Vital Signs Last 24 Hrs  T(C): 37.2 (04 May 2021 04:52), Max: 37.2 (04 May 2021 04:52)  T(F): 98.9 (04 May 2021 04:52), Max: 98.9 (04 May 2021 04:52)  HR: 70 (04 May 2021 04:52) (46 - 71)  BP: 111/54 (04 May 2021 04:52) (88/41 - 118/65)  BP(mean): --  RR: 18 (04 May 2021 04:52) (18 - 18)  SpO2: 93% (04 May 2021 04:52) (93% - 98%)  Lying in bed in NAD, awake and alert    Physical exam: Left lower extremity- The left hip mepilex dressing is clean, dry and intact. No drainage or discharge. No erythema is noted. No blistering. No ecchymosis. The calf is supple. No calf tenderness. Sensation to light touch is grossly intact distally. Motor function distally is 5/5. No foot drop. +EHL/FHL.  2+ dorsalis pedis pulse. Capillary refill is less than 2 seconds. No cyanosis.    Primary Orthopedic Assessment:  • s/p LEFT hip hemiarthroplasty, POD#2      Plan:   • DVT prophylaxis as prescribed- Lovenox, including use of compression devices and ankle pumps  • Continue physical therapy  • Weightbearing as tolerated of the left lower extremity with assistance of a walker  • Incentive spirometry encouraged  • Pain control as clinically indicated  • Posterior hip precautions reviewed with patient  • Discharge planning – anticipated discharge is likely subacute rehabilitation

## 2021-05-04 NOTE — CONSULT NOTE ADULT - SUBJECTIVE AND OBJECTIVE BOX
85yF was admitted on     Patient is a 85y old  Female who presents with a chief complaint of Left Hip Fracture (04 May 2021 07:22)    HPI: Ms. Deutsch is an 85 year old female with a past medical hisotry of HTN, COPD, and MI s/p stents who presented s/p mechanical fall from standing complaining of left leg pain. Patient was stepping inside her home when she caught her foot on the door frame, she then landed on her left side.  She was found to have a left femoral neck fracture and underwent a left hip hemiarthoplasty.  Today she report she is doing well.  She is still having significant difficulty with her mobility.  Her goal is to return to home.       Imaging reviewed showed:    EXAM:  XR HIP WITH PELV 1V LT INTRAOP                          PROCEDURE DATE:  2021      INTERPRETATION:  HISTORY: Hemiarthroplasty    TECHNIQUE: Two views of the left hip are submitted.    Findings: Evaluation demonstrates both femoral and acetabular components well-seated and in good anatomic alignment.    There is no fracture.    The visualized pelvis is within normal limits.    Impression:  Prosthetic Hip replacement in proper anatomical alignment.    REVIEW OF SYSTEMS  Constitutional - No fever, No weight loss, No fatigue  HEENT - No eye pain, No visual disturbances, No difficulty hearing, No tinnitus, No vertigo, No neck pain  Respiratory - No cough, No wheezing, No shortness of breath  Cardiovascular - No chest pain, No palpitations  Gastrointestinal - No abdominal pain, No nausea, No vomiting, No diarrhea, No constipation  Genitourinary - No dysuria, No frequency, No hematuria, No incontinence  Neurological - No headaches, No memory loss, + loss of strength, No numbness  Skin - No itching, No rashes, No lesions   Endocrine - No temperature intolerance  Musculoskeletal - + joint pain, No joint swelling, No muscle pain  Psychiatric - No depression, No anxiety    VITALS  T(C): 37.2 (21 @ 04:52), Max: 37.2 (21 @ 04:52)  HR: 70 (21 @ 04:52) (46 - 71)  BP: 111/54 (21 @ 04:52) (88/41 - 118/65)  RR: 18 (21 @ 04:52) (18 - 18)  SpO2: 93% (21 @ 04:52) (93% - 98%)  Wt(kg): --    PAST MEDICAL & SURGICAL HISTORY  HTN (hypertension)    High cholesterol    Stented coronary artery    MI (myocardial infarction)    Hypothyroid    GERD (gastroesophageal reflux disease)    Myocardial infarction    Diabetes mellitus    Hiatal hernia    PUD (peptic ulcer disease)    Gastroenteritis    Vocal cord polyps    LBBB (left bundle branch block)    No significant past surgical history    S/P appendectomy    S/P tubal ligation    S/P cholecystectomy    S/P  section        SOCIAL HISTORY - as per documentation/history  Smoking - None  EtOH - None  Drugs - None    FUNCTIONAL HISTORY  Lives with her  on first floor.  No steps to enter. Her daughter lives upstairs.      CURRENT FUNCTIONAL STATUS    Bed Mobility: Scooting/Bridging:     · Level of [place]	maximum assist (25% patients effort)  · Physical Assist/Nonphysical Assist	2 person assist    Bed Mobility: Sit to Supine:     · Level of [place]	maximum assist (25% patients effort)  · Physical Assist/Nonphysical Assist	2 person assist    Bed Mobility: Supine to Sit:     · Level of [place]	maximum assist (25% patients effort)  · Physical Assist/Nonphysical Assist	2 person assist    Bed Mobility Analysis:     · Bed Mobility Limitations	decreased ability to use arms for pushing/pulling; decreased ability to use legs for bridging/pushing; impaired ability to control trunk for mobility  · Impairments Contributing to Impaired Bed Mobility	impaired balance; decreased strength    Transfer: Bed to Chair:     Transfer Skill: Bed to Chair   · Level of [place]	unable to perform; safety concerns due to mobility and c/o dizziness upon sitting    Transfer: Chair to Bed:     · Level of [place]	unable to perform; safety concerns due to mobility and c/o dizziness upon sitting    Transfer: Sit to Stand:     · Level of [place]	maximum assist (25% patients effort)  · Physical Assist/Nonphysical Assist	2 person assist; verbal cues  · Weight-Bearing Restrictions	weight-bearing as tolerated; left LE  · Assistive Device	rolling walker    Transfer: Stand to Sit:     · Level of [place]	maximum assist (25% patients effort)  · Physical Assist/Nonphysical Assist	2 person assist; verbal cues  · Weight-Bearing Restrictions	weight-bearing as tolerated; left LE  · Assistive Device	rolling walker    Sit/Stand Transfer Safety Analysis:     · Transfer Safety Concerns Noted	decreased weight-shifting ability  · Impairments Contributing to Impaired Transfers	impaired balance; decreased strength    Gait Skills:     · Level of [place]	moderate assist (50% patients effort)  · Physical Assist/Nonphysical Assist	2 person assist; verbal cues  · Weight-Bearing Restrictions	weight-bearing as tolerated; left LE  · Assistive Device	rolling walker  · Gait Distance	5 feet; sidestepping at bedside      FAMILY HISTORY   No pertinent family history in first degree relatives    Family history of cerebrovascular accident (CVA)    Family history of acute myocardial infarction    Family history of Alzheimer&#x27;s disease    Diabetes mellitus (Sibling)        RECENT LABS - Reviewed  CBC Full  -  ( 04 May 2021 05:51 )  WBC Count : 13.45 K/uL  RBC Count : 3.82 M/uL  Hemoglobin : 11.6 g/dL  Hematocrit : 34.7 %  Platelet Count - Automated : 172 K/uL  Mean Cell Volume : 90.8 fl  Mean Cell Hemoglobin : 30.4 pg  Mean Cell Hemoglobin Concentration : 33.4 gm/dL  Auto Neutrophil # : 11.49 K/uL  Auto Lymphocyte # : 0.66 K/uL  Auto Monocyte # : 1.17 K/uL  Auto Eosinophil # : 0.04 K/uL  Auto Basophil # : 0.02 K/uL  Auto Neutrophil % : 85.5 %  Auto Lymphocyte % : 4.9 %  Auto Monocyte % : 8.7 %  Auto Eosinophil % : 0.3 %  Auto Basophil % : 0.1 %    05-04    131<L>  |  99  |  13.0  ----------------------------<  154<H>  4.6   |  26.0  |  0.58    Ca    7.8<L>      04 May 2021 05:49  Phos  2.1     05-04  Mg     2.4     05-04    TPro  4.6<L>  /  Alb  2.5<L>  /  TBili  0.6  /  DBili  0.3  /  AST  50<H>  /  ALT  62<H>  /  AlkPhos  80  05-03    Urinalysis Basic - ( 03 May 2021 23:36 )    Color: Brown / Appearance: Slightly Turbid / S.020 / pH: x  Gluc: x / Ketone: Small  / Bili: Moderate / Urobili: 8 mg/dL   Blood: x / Protein: 30 mg/dL / Nitrite: Negative   Leuk Esterase: Trace / RBC: 0-2 /HPF / WBC 3-5   Sq Epi: x / Non Sq Epi: Occasional / Bacteria: Occasional        ALLERGIES  Bactrim (Vomiting)  No Known Allergies      MEDICATIONS   acetaminophen   Tablet .. 975 milliGRAM(s) Oral every 8 hours  albuterol/ipratropium for Nebulization 3 milliLiter(s) Nebulizer every 6 hours PRN  amLODIPine   Tablet 5 milliGRAM(s) Oral daily  aspirin  chewable 81 milliGRAM(s) Oral daily  atorvastatin 40 milliGRAM(s) Oral at bedtime  dextrose 40% Gel 15 Gram(s) Oral once  dextrose 50% Injectable 25 Gram(s) IV Push once  dextrose 50% Injectable 12.5 Gram(s) IV Push once  dextrose 50% Injectable 25 Gram(s) IV Push once  enoxaparin Injectable 40 milliGRAM(s) SubCutaneous every 24 hours  glucagon  Injectable 1 milliGRAM(s) IntraMuscular once  insulin lispro (ADMELOG) corrective regimen sliding scale   SubCutaneous every 6 hours  isosorbide   mononitrate ER Tablet (IMDUR) 60 milliGRAM(s) Oral daily  levothyroxine 75 MICROGram(s) Oral daily  ondansetron Injectable 4 milliGRAM(s) IV Push every 6 hours PRN  pantoprazole    Tablet 40 milliGRAM(s) Oral before breakfast  ranolazine 1000 milliGRAM(s) Oral <User Schedule>  ranolazine 500 milliGRAM(s) Oral <User Schedule>  sodium chloride 0.9%. 1000 milliLiter(s) IV Continuous <Continuous>  sodium phosphate IVPB 15 milliMole(s) IV Intermittent once      ----------------------------------------------------------------------------------------  PHYSICAL EXAM  Constitutional - NAD, Comfortable  HEENT - NCAT, EOMI  Neck - Supple, No limited ROM  Chest - Breathing comfortably, No wheezing  Cardiovascular - No cyanosis    Abdomen - Soft   Extremities - No C/C/E, No calf tenderness   Neurologic Exam -                    Cognitive - Awake, Alert, AAO to self, place, date, year, situation     Communication - Fluent, No dysarthria     Cranial Nerves - CN 2-12 intact     Motor - 4/5 in b/l upper extremity except 3/5 in left shoulder abduction. 4/5 in RLE, 4/5 in Left ankle rayshawn-plantar flexion. 2/5 in left hip flexion and knee extension.         Sensory - Intact to LT     Reflexes - No clonus   Psychiatric - Mood stable, Affect WNL  ----------------------------------------------------------------------------------------  ASSESSMENT/PLAN  85yFemale with functional deficits s/p fall with femoral neck fracture s/p left hip hemiarthoplasty.   Pain - Tylenol  DVT PPX - SCDs, Lovenox  WBAT LLE with assistance from walker   Posterior Hip precautions   Rehab - Recommend ACUTE inpatient rehabilitation for the functional deficits consisting of 3 hours of therapy/day & 24 hour RN/daily PMR physician for comorbid medical management. Patient will be able to tolerate 3 hours a day.    Will continue to follow. Functional progress will determine ongoing rehab dispo recommendations, which may change.    Continue bedside therapy as well as OOB throughout the day with mobilization by staff to maintain cardiopulmonary function and prevention of secondary complications related to debility. 
Pt Name: PRESLEY BECK    MRN: [id number]      Patient is a 85y Female presenting to the emergency department with a chief complaint of fall    Patient is a 85y old  Female who presents with a chief complaint of fall.  Patient with pmhx of COPD/HTN/CAD with stents who presents c/o left hip pain s/p fall. patient tripped on step landing on left hip.  no loc. patient c/o left hip and knee pain      REVIEW OF SYSTEMS    General: Alert, responsive, in NAD    Skin/Breast: No rashes, no pruritis   	  Ophthalmologic: No visual changes. No redness.   	  ENMT:	No discharge. No swelling.    Respiratory and Thorax: No difficulty breathing. No cough.  	   Cardiovascular:	No chest pain. No palpitations.    Gastrointestinal:	 No abdominal pain. No diarrhea.     Genitourinary: No dysuria. No bleeding.    Musculoskeletal: SEE HPI.    Neurological: No sensory or motor changes.     Psychiatric: No anxiety or depression.    Hematology/Lymphatics: No swelling.    Endocrine: No Hx of diabetes.    ROS is otherwise negative.    PAST MEDICAL & SURGICAL HISTORY:  PAST MEDICAL & SURGICAL HISTORY:  HTN (hypertension)    High cholesterol    Stented coronary artery  x2    MI (myocardial infarction)    Hypothyroid    GERD (gastroesophageal reflux disease)    Myocardial infarction    Diabetes mellitus  Patient denies T2DM    Hiatal hernia    PUD (peptic ulcer disease)    Gastroenteritis    Vocal cord polyps    LBBB (left bundle branch block)    S/P appendectomy    S/P tubal ligation    S/P cholecystectomy    S/P  section        Allergies: Bactrim (Vomiting)  No Known Allergies      Medications:     FAMILY HISTORY:  Family history of cerebrovascular accident (CVA)    Family history of acute myocardial infarction    Family history of Alzheimer&#x27;s disease    Diabetes mellitus (Sibling)    : non-contributory    Social History: lives at home,     Ambulation: Walking independently                           13.3   7.61  )-----------( 185      ( 01 May 2021 20:18 )             40.1       05-    131<L>  |  97<L>  |  13.0  ----------------------------<  175<H>  5.1   |  27.0  |  0.75    Ca    9.4      01 May 2021 20:18    TPro  6.3<L>  /  Alb  3.7  /  TBili  0.3<L>  /  DBili  x   /  AST  26  /  ALT  22  /  AlkPhos  88  05-      Vital Signs Last 24 Hrs  T(C): 36.4 (01 May 2021 19:40), Max: 36.4 (01 May 2021 19:40)  T(F): 97.5 (01 May 2021 19:40), Max: 97.5 (01 May 2021 19:40)  HR: 58 (01 May 2021 19:40) (58 - 58)  BP: 157/74 (01 May 2021 19:40) (157/74 - 157/74)  BP(mean): --  RR: 18 (01 May 2021 19:40) (18 - 18)  SpO2: 97% (01 May 2021 19:40) (97% - 97%)    Daily Height in cm: 170.18 (01 May 2021 19:40)    Daily       PHYSICAL EXAM:      Appearance: Alert, responsive, in no acute distress.    Neurological: Sensation is grossly intact to light touch. 5/5 motor function of all extremities. No focal deficits or weaknesses found.    Skin: no rash on visible skin. Skin is clean, dry and intact. No bleeding. No abrasions. No ulcerations.    Vascular: 2+ distal pulses. Cap refill < 2 sec. No signs of venous insufficiency or stasis. No extremity ulcerations. No cyanosis.    Musculoskeletal:        Left Lower Extremity: Left leg positive shortening, positive rotation.  positive tenderness to anterior hip and anterior knee.  no tenderness to lower leg, DF/PF intact, pulse intact to DP and PT   sensation intact to light touch       Imaging Studies: left hip xray reveals positive femoral neck fx     Case discussed with Dr. Moore    A/P:  Pt is a 85y Female with left hip femoral neck fx     PLAN:   * NPO for OR tomorrow    *Routine daily anticoagulation held for OR  * Single dose anticoaguation ordered  * OR pending optimization for procedure  * Bed rest  *needs knee xray 
                                           [facility]                              [address] [address]                                                 PHONE: [phone]                                                 FAX: [phone]  ------------------------------------------------------------------------------------------------    85y Female with past medical history as under presents after mechanical fall from standing complaining of left leg pain. Patient was stepping inside her home when she caught her foot on the door frame, She landed on her left side and hit left forehead, but denies LOC. Noted to have a Left Femoral Neck Fracture and planned for surgery.  At the time of evaluation, pt reports mild reproducible L rib pain that she attributes to her fall. She does have chronic stable angina that has bee stable on medical therapy.    PAST MEDICAL & SURGICAL HISTORY:  HTN (hypertension)    High cholesterol    Stented coronary artery  x2    MI (myocardial infarction)    Hypothyroid    GERD (gastroesophageal reflux disease)    Myocardial infarction    Diabetes mellitus  Patient denies T2DM    Hiatal hernia    PUD (peptic ulcer disease)    Gastroenteritis    Vocal cord polyps    LBBB (left bundle branch block)    S/P appendectomy    S/P tubal ligation    S/P cholecystectomy    S/P  section        Bactrim (Vomiting)  No Known Allergies      Review of Systems:  Positive for chest pain, L rib pain, L hip pain  Rest of the systems were reviewed and was negative.     Family history:   No pertinent family history in first degree relative of early CAD, sudden cardiac death or  congenital heart disease    Social History:  No smoking   No alcohol  No other drug use    Vital Signs Last 24 Hrs  T(C): 36.5 (02 May 2021 07:48), Max: 36.6 (02 May 2021 07:18)  T(F): 97.7 (02 May 2021 07:48), Max: 97.9 (02 May 2021 07:18)  HR: 64 (02 May 2021 07:48) (58 - 64)  BP: 155/55 (02 May 2021 07:48) (143/87 - 157/74)  BP(mean): --  RR: 18 (02 May 2021 07:48) (16 - 18)  SpO2: 96% (02 May 2021 07:48) (96% - 98%)    PHYSICAL EXAM:  Constitutional: Appears well developed, well nourished; alert and co-operative  HEENT:     Head: Normocephalic and atraumatic  Eyes: Conjunctiva normal, No scleral icterus  Neck: Supple, No JVD  Mouth/Throat: Mucous membranes are normal. Mucosa are not pale or dry.  Cardiovascular: regular S1, S2 + tenderness  Respiratory: Lungs clear to auscultation; no crepitations, no wheeze  Gastrointestinal:  Soft, Non-tender, + BS	  Musculoskeletal: L leg rotated, No edema  Skin: Warm and dry. No cyanosis . No diaphoresis.  Neurologic: Alert oriented to time place and person. Normal strength and no tremor.  Psychiatric: Normal mood and affect, Speech is normal and behavior is normal.        LABS:                        13.3   10.26 )-----------( 184      ( 02 May 2021 07:21 )             38.9     05-02    130<L>  |  94<L>  |  7.0<L>  ----------------------------<  127<H>  4.1   |  28.0  |  0.52    Ca    9.0      02 May 2021 07:21  Phos  2.1     05-02  Mg     2.0     05-02    TPro  6.3<L>  /  Alb  3.7  /  TBili  0.3<L>  /  DBili  x   /  AST  26  /  ALT  22  /  AlkPhos  88  05-01        PT/INR - ( 01 May 2021 20:18 )   PT: 11.8 sec;   INR: 1.02 ratio         PTT - ( 01 May 2021 20:18 )  PTT:26.0 sec    CARDIOLOGY TESTING:(reviewed)     ECG (Independent visualization): NSR with LBBB documented previously     ECHOCARDIOGRAM : normal LV/RV function, mild MR, trace TR    STRESS TEST: Normal perfusion    MEDICATIONS:(reviewed)  Home Medications:    MEDICATIONS  (STANDING):  acetaminophen  IVPB .. 1000 milliGRAM(s) IV Intermittent once  amLODIPine   Tablet 5 milliGRAM(s) Oral daily  aspirin  chewable 81 milliGRAM(s) Oral daily  atorvastatin 40 milliGRAM(s) Oral at bedtime  dextrose 40% Gel 15 Gram(s) Oral once  dextrose 5%. 1000 milliLiter(s) (50 mL/Hr) IV Continuous <Continuous>  dextrose 5%. 1000 milliLiter(s) (100 mL/Hr) IV Continuous <Continuous>  dextrose 50% Injectable 25 Gram(s) IV Push once  dextrose 50% Injectable 12.5 Gram(s) IV Push once  dextrose 50% Injectable 25 Gram(s) IV Push once  gabapentin 300 milliGRAM(s) Oral three times a day  glucagon  Injectable 1 milliGRAM(s) IntraMuscular once  insulin lispro (ADMELOG) corrective regimen sliding scale   SubCutaneous every 6 hours  isosorbide   mononitrate ER Tablet (IMDUR) 60 milliGRAM(s) Oral daily  lactated ringers. 1000 milliLiter(s) (100 mL/Hr) IV Continuous <Continuous>  levothyroxine 75 MICROGram(s) Oral daily  pantoprazole    Tablet 40 milliGRAM(s) Oral before breakfast  ranolazine 1000 milliGRAM(s) Oral <User Schedule>  ranolazine 500 milliGRAM(s) Oral <User Schedule>    MEDICATIONS  (PRN):  albuterol/ipratropium for Nebulization 3 milliLiter(s) Nebulizer every 6 hours PRN Shortness of Breath and/or Wheezing  ketorolac   Injectable 30 milliGRAM(s) IV Push every 6 hours PRN Severe Pain (7 - 10)      ASSESSMENT AND PLAN:    85y Female with prior history of CAD s/p PCI to LAD/RCA. LCx, HTN, HLD, DM, chronic chest pain on Ranexa, Imdur presents after mechanical fall from standing complaining of left leg pain. Noted to have a Left Femoral Neck Fracture and planned for surgery.    Pre-op cardiovascular evaluation  - Can proceed for planned surgery with moderate perioperative risk without cardiac contraindications  - Close monitoring of BP and volume status  - Post-op ECG and telemetry monitoring  - Telemetry monitoring    CAD, LBBB  - Continue ASA    HTN, angina  - Continue Imdur, Ranexa with amlodipine    Dyslipidemia  - Continue statin

## 2021-05-05 LAB
ANION GAP SERPL CALC-SCNC: 7 MMOL/L — SIGNIFICANT CHANGE UP (ref 5–17)
ANION GAP SERPL CALC-SCNC: 7 MMOL/L — SIGNIFICANT CHANGE UP (ref 5–17)
ANION GAP SERPL CALC-SCNC: 9 MMOL/L — SIGNIFICANT CHANGE UP (ref 5–17)
BASO STIPL BLD QL SMEAR: PRESENT — SIGNIFICANT CHANGE UP
BASOPHILS # BLD AUTO: 0 K/UL — SIGNIFICANT CHANGE UP (ref 0–0.2)
BASOPHILS NFR BLD AUTO: 0 % — SIGNIFICANT CHANGE UP (ref 0–2)
BUN SERPL-MCNC: 10 MG/DL — SIGNIFICANT CHANGE UP (ref 8–20)
BUN SERPL-MCNC: 9 MG/DL — SIGNIFICANT CHANGE UP (ref 8–20)
BUN SERPL-MCNC: 9 MG/DL — SIGNIFICANT CHANGE UP (ref 8–20)
BURR CELLS BLD QL SMEAR: PRESENT — SIGNIFICANT CHANGE UP
CALCIUM SERPL-MCNC: 6.9 MG/DL — LOW (ref 8.6–10.2)
CALCIUM SERPL-MCNC: 7 MG/DL — LOW (ref 8.6–10.2)
CALCIUM SERPL-MCNC: 7.2 MG/DL — LOW (ref 8.6–10.2)
CHLORIDE SERPL-SCNC: 95 MMOL/L — LOW (ref 98–107)
CHLORIDE SERPL-SCNC: 95 MMOL/L — LOW (ref 98–107)
CHLORIDE SERPL-SCNC: 96 MMOL/L — LOW (ref 98–107)
CO2 SERPL-SCNC: 22 MMOL/L — SIGNIFICANT CHANGE UP (ref 22–29)
CO2 SERPL-SCNC: 23 MMOL/L — SIGNIFICANT CHANGE UP (ref 22–29)
CO2 SERPL-SCNC: 23 MMOL/L — SIGNIFICANT CHANGE UP (ref 22–29)
CREAT SERPL-MCNC: 0.36 MG/DL — LOW (ref 0.5–1.3)
CREAT SERPL-MCNC: 0.37 MG/DL — LOW (ref 0.5–1.3)
CREAT SERPL-MCNC: 0.38 MG/DL — LOW (ref 0.5–1.3)
ELLIPTOCYTES BLD QL SMEAR: SLIGHT — SIGNIFICANT CHANGE UP
EOSINOPHIL # BLD AUTO: 0.16 K/UL — SIGNIFICANT CHANGE UP (ref 0–0.5)
EOSINOPHIL NFR BLD AUTO: 1.7 % — SIGNIFICANT CHANGE UP (ref 0–6)
GLUCOSE BLDC GLUCOMTR-MCNC: 108 MG/DL — HIGH (ref 70–99)
GLUCOSE BLDC GLUCOMTR-MCNC: 120 MG/DL — HIGH (ref 70–99)
GLUCOSE BLDC GLUCOMTR-MCNC: 157 MG/DL — HIGH (ref 70–99)
GLUCOSE BLDC GLUCOMTR-MCNC: 157 MG/DL — HIGH (ref 70–99)
GLUCOSE SERPL-MCNC: 105 MG/DL — HIGH (ref 70–99)
GLUCOSE SERPL-MCNC: 120 MG/DL — HIGH (ref 70–99)
GLUCOSE SERPL-MCNC: 121 MG/DL — HIGH (ref 70–99)
HCT VFR BLD CALC: 28.9 % — LOW (ref 34.5–45)
HGB BLD-MCNC: 10.1 G/DL — LOW (ref 11.5–15.5)
LYMPHOCYTES # BLD AUTO: 0.41 K/UL — LOW (ref 1–3.3)
LYMPHOCYTES # BLD AUTO: 4.3 % — LOW (ref 13–44)
MAGNESIUM SERPL-MCNC: 2 MG/DL — SIGNIFICANT CHANGE UP (ref 1.6–2.6)
MAGNESIUM SERPL-MCNC: 2.1 MG/DL — SIGNIFICANT CHANGE UP (ref 1.8–2.6)
MAGNESIUM SERPL-MCNC: 2.1 MG/DL — SIGNIFICANT CHANGE UP (ref 1.8–2.6)
MANUAL SMEAR VERIFICATION: SIGNIFICANT CHANGE UP
MCHC RBC-ENTMCNC: 30.5 PG — SIGNIFICANT CHANGE UP (ref 27–34)
MCHC RBC-ENTMCNC: 34.9 GM/DL — SIGNIFICANT CHANGE UP (ref 32–36)
MCV RBC AUTO: 87.3 FL — SIGNIFICANT CHANGE UP (ref 80–100)
METAMYELOCYTES # FLD: 0.9 % — HIGH (ref 0–0)
MONOCYTES # BLD AUTO: 0.41 K/UL — SIGNIFICANT CHANGE UP (ref 0–0.9)
MONOCYTES NFR BLD AUTO: 4.3 % — SIGNIFICANT CHANGE UP (ref 2–14)
NEUTROPHILS # BLD AUTO: 8.28 K/UL — HIGH (ref 1.8–7.4)
NEUTROPHILS NFR BLD AUTO: 80.9 % — HIGH (ref 43–77)
NEUTS BAND # BLD: 7 % — SIGNIFICANT CHANGE UP (ref 0–8)
OSMOLALITY SERPL: 264 MOSMOL/KG — LOW (ref 280–301)
OSMOLALITY UR: 503 MOSM/KG — SIGNIFICANT CHANGE UP (ref 300–1000)
OVALOCYTES BLD QL SMEAR: SLIGHT — SIGNIFICANT CHANGE UP
PHOSPHATE SERPL-MCNC: 0.9 MG/DL — CRITICAL LOW (ref 2.4–4.7)
PHOSPHATE SERPL-MCNC: 1 MG/DL — CRITICAL LOW (ref 2.4–4.7)
PHOSPHATE SERPL-MCNC: 2 MG/DL — LOW (ref 2.4–4.7)
PLAT MORPH BLD: NORMAL — SIGNIFICANT CHANGE UP
PLATELET # BLD AUTO: 154 K/UL — SIGNIFICANT CHANGE UP (ref 150–400)
POIKILOCYTOSIS BLD QL AUTO: SLIGHT — SIGNIFICANT CHANGE UP
POLYCHROMASIA BLD QL SMEAR: SLIGHT — SIGNIFICANT CHANGE UP
POTASSIUM SERPL-MCNC: 3.4 MMOL/L — LOW (ref 3.5–5.3)
POTASSIUM SERPL-MCNC: 3.7 MMOL/L — SIGNIFICANT CHANGE UP (ref 3.5–5.3)
POTASSIUM SERPL-MCNC: 3.8 MMOL/L — SIGNIFICANT CHANGE UP (ref 3.5–5.3)
POTASSIUM SERPL-SCNC: 3.4 MMOL/L — LOW (ref 3.5–5.3)
POTASSIUM SERPL-SCNC: 3.7 MMOL/L — SIGNIFICANT CHANGE UP (ref 3.5–5.3)
POTASSIUM SERPL-SCNC: 3.8 MMOL/L — SIGNIFICANT CHANGE UP (ref 3.5–5.3)
RBC # BLD: 3.31 M/UL — LOW (ref 3.8–5.2)
RBC # FLD: 12.6 % — SIGNIFICANT CHANGE UP (ref 10.3–14.5)
RBC BLD AUTO: ABNORMAL
SARS-COV-2 RNA SPEC QL NAA+PROBE: SIGNIFICANT CHANGE UP
SODIUM SERPL-SCNC: 125 MMOL/L — LOW (ref 135–145)
SODIUM SERPL-SCNC: 125 MMOL/L — LOW (ref 135–145)
SODIUM SERPL-SCNC: 126 MMOL/L — LOW (ref 135–145)
SODIUM UR-SCNC: <30 MMOL/L — SIGNIFICANT CHANGE UP
VARIANT LYMPHS # BLD: 0.9 % — SIGNIFICANT CHANGE UP (ref 0–6)
WBC # BLD: 9.42 K/UL — SIGNIFICANT CHANGE UP (ref 3.8–10.5)
WBC # FLD AUTO: 9.42 K/UL — SIGNIFICANT CHANGE UP (ref 3.8–10.5)

## 2021-05-05 PROCEDURE — 74018 RADEX ABDOMEN 1 VIEW: CPT | Mod: 26

## 2021-05-05 PROCEDURE — 99233 SBSQ HOSP IP/OBS HIGH 50: CPT

## 2021-05-05 RX ORDER — POTASSIUM CHLORIDE 20 MEQ
20 PACKET (EA) ORAL
Refills: 0 | Status: COMPLETED | OUTPATIENT
Start: 2021-05-05 | End: 2021-05-05

## 2021-05-05 RX ORDER — SODIUM CHLORIDE 9 MG/ML
1000 INJECTION INTRAMUSCULAR; INTRAVENOUS; SUBCUTANEOUS
Refills: 0 | Status: DISCONTINUED | OUTPATIENT
Start: 2021-05-05 | End: 2021-05-06

## 2021-05-05 RX ADMIN — Medication 1 PACKET(S): at 12:11

## 2021-05-05 RX ADMIN — SODIUM CHLORIDE 100 MILLILITER(S): 9 INJECTION INTRAMUSCULAR; INTRAVENOUS; SUBCUTANEOUS at 21:18

## 2021-05-05 RX ADMIN — ENOXAPARIN SODIUM 40 MILLIGRAM(S): 100 INJECTION SUBCUTANEOUS at 05:48

## 2021-05-05 RX ADMIN — Medication 1: at 21:21

## 2021-05-05 RX ADMIN — Medication 975 MILLIGRAM(S): at 05:47

## 2021-05-05 RX ADMIN — ATORVASTATIN CALCIUM 40 MILLIGRAM(S): 80 TABLET, FILM COATED ORAL at 21:18

## 2021-05-05 RX ADMIN — Medication 975 MILLIGRAM(S): at 21:24

## 2021-05-05 RX ADMIN — PANTOPRAZOLE SODIUM 40 MILLIGRAM(S): 20 TABLET, DELAYED RELEASE ORAL at 05:47

## 2021-05-05 RX ADMIN — Medication 85 MILLIMOLE(S): at 12:56

## 2021-05-05 RX ADMIN — RANOLAZINE 500 MILLIGRAM(S): 500 TABLET, FILM COATED, EXTENDED RELEASE ORAL at 18:39

## 2021-05-05 RX ADMIN — RANOLAZINE 1000 MILLIGRAM(S): 500 TABLET, FILM COATED, EXTENDED RELEASE ORAL at 05:48

## 2021-05-05 RX ADMIN — AMLODIPINE BESYLATE 5 MILLIGRAM(S): 2.5 TABLET ORAL at 05:47

## 2021-05-05 RX ADMIN — Medication 975 MILLIGRAM(S): at 14:03

## 2021-05-05 RX ADMIN — Medication 975 MILLIGRAM(S): at 06:52

## 2021-05-05 RX ADMIN — Medication 975 MILLIGRAM(S): at 13:04

## 2021-05-05 RX ADMIN — Medication 1: at 12:18

## 2021-05-05 RX ADMIN — Medication 20 MILLIEQUIVALENT(S): at 21:17

## 2021-05-05 RX ADMIN — Medication 975 MILLIGRAM(S): at 21:18

## 2021-05-05 RX ADMIN — Medication 20 MILLIEQUIVALENT(S): at 21:22

## 2021-05-05 RX ADMIN — ISOSORBIDE MONONITRATE 60 MILLIGRAM(S): 60 TABLET, EXTENDED RELEASE ORAL at 12:11

## 2021-05-05 RX ADMIN — Medication 75 MICROGRAM(S): at 05:47

## 2021-05-05 RX ADMIN — Medication 85 MILLIMOLE(S): at 21:31

## 2021-05-05 RX ADMIN — Medication 250 MILLIGRAM(S): at 05:49

## 2021-05-05 RX ADMIN — Medication 250 MILLIGRAM(S): at 18:39

## 2021-05-05 RX ADMIN — Medication 81 MILLIGRAM(S): at 12:11

## 2021-05-05 NOTE — PROGRESS NOTE ADULT - SUBJECTIVE AND OBJECTIVE BOX
PRESLEY [facility]    [id number]    History: Patient is status post left posterior hip wang-arthroplasty POD # 3. Patient is doing well and is comfortable. The patient's pain is controlled using the prescribed pain medications. The patient is participating in physical therapy. Denies fever/chills, numbness or tingling. No new complaints.    Vital Signs Last 24 Hrs  T(C): 36.7 (05 May 2021 04:51), Max: 37.1 (04 May 2021 11:58)  T(F): 98 (05 May 2021 04:51), Max: 98.8 (04 May 2021 11:58)  HR: 68 (05 May 2021 04:51) (68 - 79)  BP: 126/54 (05 May 2021 04:51) (126/49 - 137/80)  BP(mean): --  RR: 18 (05 May 2021 04:51) (18 - 18)  SpO2: 92% (05 May 2021 04:51) (92% - 94%)                          10.1   9.42  )-----------( 154      ( 05 May 2021 06:03 )             28.9     05-05    125<L>  |  96<L>  |  9.0  ----------------------------<  105<H>  3.7   |  22.0  |  0.37<L>    Ca    7.0<L>      05 May 2021 06:03  Phos  0.9     05-05  Mg     2.0     05-05    TPro  4.6<L>  /  Alb  2.5<L>  /  TBili  0.6  /  DBili  0.3  /  AST  50<H>  /  ALT  62<H>  /  AlkPhos  80  05-03      General: Alert, awake, NAD, abduction pillow in place  Physical exam: The left hip dressing is clean, dry and intact. No drainage, discharge, erythema, blistering or ecchymosis. The calf is supple nontender b/l. SILT. +EHL/FHL/AT/GC. No foot drop. 2+ DP pulse. BCR. No cyanosis.      Plan:   - DVT prophylaxis as prescribed, including use of compression devices and ankle pumps  -  Continue physical therapy  - Weight bearing status of surgical extremity: WBAT  - Incentive spirometry encouraged  - Pain control as clinically indicated  - Posterior hip precautions reviewed with patient  - Discharge planning – anticipated discharge is likely [facility]  - continue care per primary team

## 2021-05-05 NOTE — PROGRESS NOTE ADULT - SUBJECTIVE AND OBJECTIVE BOX
SUBJECTIVE/24 hour events:  Patient is a 85yFemale s/p trip and fall sustaining a left femoral neck fracture now pod#3 of left hip arthoplasty. Patient with no acute events overnight, hyponatremia hypovolemia resolving, urine color returning to normal with  hydration, hemoglobin and finger sticks stable. Patient seen by PM&R and deemed appropriate for acute rehab, patient medically stable once bed is available. Patient complaining of increased bowel movements , bowel regimen discontinued, probiotics and metamucil started.       Vital Signs Last 24 Hrs  T(C): 36.9 (04 May 2021 19:18), Max: 37.2 (04 May 2021 04:52)  T(F): 98.4 (04 May 2021 19:18), Max: 98.9 (04 May 2021 04:52)  HR: 74 (04 May 2021 19:18) (69 - 79)  BP: 129/60 (04 May 2021 19:18) (111/54 - 137/80)  BP(mean): --  RR: 18 (04 May 2021 19:18) (18 - 18)  SpO2: 94% (04 May 2021 19:18) (93% - 94%)  Drug Dosing Weight  Height (cm): 170.2 (01 May 2021 19:40)  Weight (kg): 61.7 (01 May 2021 21:53)  BMI (kg/m2): 21.3 (01 May 2021 21:53)  BSA (m2): 1.72 (01 May 2021 21:53)  I&O's Detail    03 May 2021 07:01  -  04 May 2021 07:00  --------------------------------------------------------  IN:  Total IN: 0 mL    OUT:    Indwelling Catheter - Urethral (mL): 650 mL  Total OUT: 650 mL    Total NET: -650 mL      04 May 2021 07:01  -  05 May 2021 04:13  --------------------------------------------------------  IN:    IV PiggyBack: 250 mL    Oral Fluid: 1700 mL    sodium chloride 0.9%: 675 mL  Total IN: 2625 mL    OUT:    Indwelling Catheter - Urethral (mL): 250 mL  Total OUT: 250 mL    Total NET: 2375 mL        Allergies    No Known Allergies    Intolerances    Bactrim (Vomiting)                            11.6   13.45 )-----------( 172      ( 04 May 2021 05:51 )             34.7   05-04    131<L>  |  99  |  13.0  ----------------------------<  154<H>  4.6   |  26.0  |  0.58    Ca    7.8<L>      04 May 2021 05:49  Phos  2.1     05-04  Mg     2.4     05-04    TPro  4.6<L>  /  Alb  2.5<L>  /  TBili  0.6  /  DBili  0.3  /  AST  50<H>  /  ALT  62<H>  /  AlkPhos  80  05-03      ROS:    PHYSICAL EXAM:  Constitutional: " im doing better, but the bowel movements are too much"  Respiratory: no respiratory distress, no dyspnea, on chronic o2 via nasal canula   Cardiovascular: NSR  Gastrointestinal: abdomen softly distended , non-tender, atraumatic   Genitourinary: teixeira in place draining dark yellow urine improved from 5/4   Extremities: LLE NVI, compartments soft, warm to touch, surgical dressing c/d/i, cap refill <2 seconds   Neurological: A&Ox3  Skin: warm, dry and no rashes         MEDICATIONS  (STANDING):  acetaminophen   Tablet .. 975 milliGRAM(s) Oral every 8 hours  amLODIPine   Tablet 5 milliGRAM(s) Oral daily  aspirin  chewable 81 milliGRAM(s) Oral daily  atorvastatin 40 milliGRAM(s) Oral at bedtime  dextrose 40% Gel 15 Gram(s) Oral once  dextrose 50% Injectable 25 Gram(s) IV Push once  dextrose 50% Injectable 12.5 Gram(s) IV Push once  dextrose 50% Injectable 25 Gram(s) IV Push once  enoxaparin Injectable 40 milliGRAM(s) SubCutaneous every 24 hours  glucagon  Injectable 1 milliGRAM(s) IntraMuscular once  insulin lispro (ADMELOG) corrective regimen sliding scale   SubCutaneous Before meals and at bedtime  isosorbide   mononitrate ER Tablet (IMDUR) 60 milliGRAM(s) Oral daily  levothyroxine 75 MICROGram(s) Oral daily  pantoprazole    Tablet 40 milliGRAM(s) Oral before breakfast  psyllium Powder 1 Packet(s) Oral daily  ranolazine 1000 milliGRAM(s) Oral <User Schedule>  ranolazine 500 milliGRAM(s) Oral <User Schedule>  saccharomyces boulardii 250 milliGRAM(s) Oral two times a day  sodium chloride 0.9%. 1000 milliLiter(s) (75 mL/Hr) IV Continuous <Continuous>    MEDICATIONS  (PRN):  albuterol/ipratropium for Nebulization 3 milliLiter(s) Nebulizer every 6 hours PRN Shortness of Breath and/or Wheezing  ondansetron Injectable 4 milliGRAM(s) IV Push every 6 hours PRN Nausea and/or Vomiting      RADIOLOGY STUDIES:    CULTURES:

## 2021-05-05 NOTE — PROGRESS NOTE ADULT - ATTENDING COMMENTS
seen and examined 05-05-21 @ 0902    soft / mild diffuse tenderness / moderately distended / tympanitic  no pedal edema    s/p left hip hemiarthroplasty on 5/2 for displaced femoral neck fracture  -WBAT  -plan acute rehab    hypovolemic hyponatremia  inadequate oral intake  -IVF    hypophosphatemia  -IV repletion    abdominal distension  -X-ray abdomen

## 2021-05-05 NOTE — PROGRESS NOTE ADULT - PROBLEM SELECTOR PLAN 2
continue teixeira   monitor hyponatremia  continue fluids  encourage PO intake of fluids Clearing, discontinue teixeira today, TOV 14:00  monitor hyponatremia, repeat this am labs  discontinue fluids  encourage PO intake of fluids    Abdominal distention  -Will check KUB

## 2021-05-06 DIAGNOSIS — E87.1 HYPO-OSMOLALITY AND HYPONATREMIA: ICD-10-CM

## 2021-05-06 DIAGNOSIS — R10.9 UNSPECIFIED ABDOMINAL PAIN: ICD-10-CM

## 2021-05-06 LAB
ANION GAP SERPL CALC-SCNC: 8 MMOL/L — SIGNIFICANT CHANGE UP (ref 5–17)
APPEARANCE UR: CLEAR — SIGNIFICANT CHANGE UP
BACTERIA # UR AUTO: NEGATIVE — SIGNIFICANT CHANGE UP
BASE EXCESS BLDA CALC-SCNC: 0.6 MMOL/L — SIGNIFICANT CHANGE UP (ref -2–2)
BILIRUB UR-MCNC: NEGATIVE — SIGNIFICANT CHANGE UP
BLOOD GAS COMMENTS ARTERIAL: SIGNIFICANT CHANGE UP
BUN SERPL-MCNC: 6 MG/DL — LOW (ref 8–20)
CALCIUM SERPL-MCNC: 6.8 MG/DL — LOW (ref 8.6–10.2)
CHLORIDE SERPL-SCNC: 95 MMOL/L — LOW (ref 98–107)
CHLORIDE UR-SCNC: 92 MMOL/L — SIGNIFICANT CHANGE UP
CO2 SERPL-SCNC: 23 MMOL/L — SIGNIFICANT CHANGE UP (ref 22–29)
COLOR SPEC: YELLOW — SIGNIFICANT CHANGE UP
CREAT ?TM UR-MCNC: 16 MG/DL — SIGNIFICANT CHANGE UP
CREAT SERPL-MCNC: 0.34 MG/DL — LOW (ref 0.5–1.3)
DIFF PNL FLD: ABNORMAL
EPI CELLS # UR: SIGNIFICANT CHANGE UP
GAS PNL BLDA: SIGNIFICANT CHANGE UP
GLUCOSE BLDC GLUCOMTR-MCNC: 102 MG/DL — HIGH (ref 70–99)
GLUCOSE BLDC GLUCOMTR-MCNC: 123 MG/DL — HIGH (ref 70–99)
GLUCOSE SERPL-MCNC: 109 MG/DL — HIGH (ref 70–99)
GLUCOSE UR QL: NEGATIVE MG/DL — SIGNIFICANT CHANGE UP
HCO3 BLDA-SCNC: 25 MMOL/L — SIGNIFICANT CHANGE UP (ref 20–26)
HCT VFR BLD CALC: 28.6 % — LOW (ref 34.5–45)
HGB BLD-MCNC: 10.4 G/DL — LOW (ref 11.5–15.5)
HOROWITZ INDEX BLDA+IHG-RTO: 2 — SIGNIFICANT CHANGE UP
KETONES UR-MCNC: NEGATIVE — SIGNIFICANT CHANGE UP
LEUKOCYTE ESTERASE UR-ACNC: NEGATIVE — SIGNIFICANT CHANGE UP
MAGNESIUM SERPL-MCNC: 2 MG/DL — SIGNIFICANT CHANGE UP (ref 1.6–2.6)
MCHC RBC-ENTMCNC: 30.7 PG — SIGNIFICANT CHANGE UP (ref 27–34)
MCHC RBC-ENTMCNC: 36.4 GM/DL — HIGH (ref 32–36)
MCV RBC AUTO: 84.4 FL — SIGNIFICANT CHANGE UP (ref 80–100)
NITRITE UR-MCNC: NEGATIVE — SIGNIFICANT CHANGE UP
OSMOLALITY UR: 291 MOSM/KG — LOW (ref 300–1000)
PCO2 BLDA: 36 MMHG — SIGNIFICANT CHANGE UP (ref 35–45)
PH BLDA: 7.44 — SIGNIFICANT CHANGE UP (ref 7.35–7.45)
PH UR: 7 — SIGNIFICANT CHANGE UP (ref 5–8)
PHOSPHATE SERPL-MCNC: 1.8 MG/DL — LOW (ref 2.4–4.7)
PLATELET # BLD AUTO: 194 K/UL — SIGNIFICANT CHANGE UP (ref 150–400)
PO2 BLDA: 81 MMHG — LOW (ref 83–108)
POTASSIUM SERPL-MCNC: 3.2 MMOL/L — LOW (ref 3.5–5.3)
POTASSIUM SERPL-SCNC: 3.2 MMOL/L — LOW (ref 3.5–5.3)
POTASSIUM UR-SCNC: 11 MMOL/L — SIGNIFICANT CHANGE UP
PROT UR-MCNC: NEGATIVE MG/DL — SIGNIFICANT CHANGE UP
RBC # BLD: 3.39 M/UL — LOW (ref 3.8–5.2)
RBC # FLD: 12.7 % — SIGNIFICANT CHANGE UP (ref 10.3–14.5)
RBC CASTS # UR COMP ASSIST: SIGNIFICANT CHANGE UP /HPF (ref 0–4)
SAO2 % BLDA: 97 % — SIGNIFICANT CHANGE UP (ref 95–99)
SODIUM SERPL-SCNC: 126 MMOL/L — LOW (ref 135–145)
SODIUM UR-SCNC: 92 MMOL/L — SIGNIFICANT CHANGE UP
SP GR SPEC: 1 — LOW (ref 1.01–1.02)
UROBILINOGEN FLD QL: NEGATIVE MG/DL — SIGNIFICANT CHANGE UP
WBC # BLD: 11.2 K/UL — HIGH (ref 3.8–10.5)
WBC # FLD AUTO: 11.2 K/UL — HIGH (ref 3.8–10.5)
WBC UR QL: SIGNIFICANT CHANGE UP

## 2021-05-06 PROCEDURE — 74176 CT ABD & PELVIS W/O CONTRAST: CPT | Mod: 26

## 2021-05-06 PROCEDURE — 93010 ELECTROCARDIOGRAM REPORT: CPT | Mod: 76

## 2021-05-06 PROCEDURE — 99233 SBSQ HOSP IP/OBS HIGH 50: CPT

## 2021-05-06 PROCEDURE — 71045 X-RAY EXAM CHEST 1 VIEW: CPT | Mod: 26

## 2021-05-06 RX ORDER — SODIUM,POTASSIUM PHOSPHATES 278-250MG
1 POWDER IN PACKET (EA) ORAL
Refills: 0 | Status: COMPLETED | OUTPATIENT
Start: 2021-05-06 | End: 2021-05-06

## 2021-05-06 RX ORDER — POTASSIUM CHLORIDE 20 MEQ
40 PACKET (EA) ORAL ONCE
Refills: 0 | Status: COMPLETED | OUTPATIENT
Start: 2021-05-06 | End: 2021-05-06

## 2021-05-06 RX ORDER — ALBUTEROL 90 UG/1
2 AEROSOL, METERED ORAL EVERY 12 HOURS
Refills: 0 | Status: DISCONTINUED | OUTPATIENT
Start: 2021-05-06 | End: 2021-05-10

## 2021-05-06 RX ORDER — IPRATROPIUM/ALBUTEROL SULFATE 18-103MCG
3 AEROSOL WITH ADAPTER (GRAM) INHALATION ONCE
Refills: 0 | Status: COMPLETED | OUTPATIENT
Start: 2021-05-06 | End: 2021-05-06

## 2021-05-06 RX ORDER — LANOLIN ALCOHOL/MO/W.PET/CERES
5 CREAM (GRAM) TOPICAL AT BEDTIME
Refills: 0 | Status: DISCONTINUED | OUTPATIENT
Start: 2021-05-06 | End: 2021-05-11

## 2021-05-06 RX ORDER — KETOROLAC TROMETHAMINE 30 MG/ML
15 SYRINGE (ML) INJECTION ONCE
Refills: 0 | Status: DISCONTINUED | OUTPATIENT
Start: 2021-05-06 | End: 2021-05-06

## 2021-05-06 RX ORDER — SODIUM CHLORIDE 9 MG/ML
1 INJECTION INTRAMUSCULAR; INTRAVENOUS; SUBCUTANEOUS EVERY 12 HOURS
Refills: 0 | Status: DISCONTINUED | OUTPATIENT
Start: 2021-05-06 | End: 2021-05-07

## 2021-05-06 RX ORDER — LIDOCAINE 4 G/100G
2 CREAM TOPICAL ONCE
Refills: 0 | Status: COMPLETED | OUTPATIENT
Start: 2021-05-06 | End: 2021-05-06

## 2021-05-06 RX ORDER — POTASSIUM CHLORIDE 20 MEQ
10 PACKET (EA) ORAL
Refills: 0 | Status: DISCONTINUED | OUTPATIENT
Start: 2021-05-06 | End: 2021-05-06

## 2021-05-06 RX ORDER — BUDESONIDE AND FORMOTEROL FUMARATE DIHYDRATE 160; 4.5 UG/1; UG/1
2 AEROSOL RESPIRATORY (INHALATION)
Refills: 0 | Status: DISCONTINUED | OUTPATIENT
Start: 2021-05-06 | End: 2021-05-11

## 2021-05-06 RX ORDER — POTASSIUM PHOSPHATE, MONOBASIC POTASSIUM PHOSPHATE, DIBASIC 236; 224 MG/ML; MG/ML
30 INJECTION, SOLUTION INTRAVENOUS ONCE
Refills: 0 | Status: COMPLETED | OUTPATIENT
Start: 2021-05-06 | End: 2021-05-06

## 2021-05-06 RX ADMIN — Medication 975 MILLIGRAM(S): at 06:34

## 2021-05-06 RX ADMIN — Medication 975 MILLIGRAM(S): at 22:03

## 2021-05-06 RX ADMIN — Medication 250 MILLIGRAM(S): at 05:36

## 2021-05-06 RX ADMIN — LIDOCAINE 2 PATCH: 4 CREAM TOPICAL at 22:00

## 2021-05-06 RX ADMIN — POTASSIUM PHOSPHATE, MONOBASIC POTASSIUM PHOSPHATE, DIBASIC 83.33 MILLIMOLE(S): 236; 224 INJECTION, SOLUTION INTRAVENOUS at 11:24

## 2021-05-06 RX ADMIN — AMLODIPINE BESYLATE 5 MILLIGRAM(S): 2.5 TABLET ORAL at 05:36

## 2021-05-06 RX ADMIN — Medication 15 MILLIGRAM(S): at 22:05

## 2021-05-06 RX ADMIN — RANOLAZINE 1000 MILLIGRAM(S): 500 TABLET, FILM COATED, EXTENDED RELEASE ORAL at 05:36

## 2021-05-06 RX ADMIN — Medication 975 MILLIGRAM(S): at 05:36

## 2021-05-06 RX ADMIN — Medication 1 TABLET(S): at 14:21

## 2021-05-06 RX ADMIN — SODIUM CHLORIDE 1 GRAM(S): 9 INJECTION INTRAMUSCULAR; INTRAVENOUS; SUBCUTANEOUS at 18:01

## 2021-05-06 RX ADMIN — BUDESONIDE AND FORMOTEROL FUMARATE DIHYDRATE 2 PUFF(S): 160; 4.5 AEROSOL RESPIRATORY (INHALATION) at 19:40

## 2021-05-06 RX ADMIN — Medication 1 TABLET(S): at 18:01

## 2021-05-06 RX ADMIN — Medication 3 MILLILITER(S): at 06:06

## 2021-05-06 RX ADMIN — ENOXAPARIN SODIUM 40 MILLIGRAM(S): 100 INJECTION SUBCUTANEOUS at 05:37

## 2021-05-06 RX ADMIN — Medication 40 MILLIEQUIVALENT(S): at 11:22

## 2021-05-06 RX ADMIN — ATORVASTATIN CALCIUM 40 MILLIGRAM(S): 80 TABLET, FILM COATED ORAL at 22:05

## 2021-05-06 RX ADMIN — Medication 15 MILLIGRAM(S): at 21:50

## 2021-05-06 RX ADMIN — Medication 1 TABLET(S): at 11:24

## 2021-05-06 RX ADMIN — Medication 81 MILLIGRAM(S): at 11:22

## 2021-05-06 RX ADMIN — BUDESONIDE AND FORMOTEROL FUMARATE DIHYDRATE 2 PUFF(S): 160; 4.5 AEROSOL RESPIRATORY (INHALATION) at 15:03

## 2021-05-06 RX ADMIN — Medication 975 MILLIGRAM(S): at 23:00

## 2021-05-06 RX ADMIN — Medication 975 MILLIGRAM(S): at 15:00

## 2021-05-06 RX ADMIN — ISOSORBIDE MONONITRATE 60 MILLIGRAM(S): 60 TABLET, EXTENDED RELEASE ORAL at 11:23

## 2021-05-06 RX ADMIN — PANTOPRAZOLE SODIUM 40 MILLIGRAM(S): 20 TABLET, DELAYED RELEASE ORAL at 05:36

## 2021-05-06 RX ADMIN — SODIUM CHLORIDE 100 MILLILITER(S): 9 INJECTION INTRAMUSCULAR; INTRAVENOUS; SUBCUTANEOUS at 05:36

## 2021-05-06 RX ADMIN — Medication 975 MILLIGRAM(S): at 14:21

## 2021-05-06 RX ADMIN — Medication 250 MILLIGRAM(S): at 18:01

## 2021-05-06 RX ADMIN — RANOLAZINE 500 MILLIGRAM(S): 500 TABLET, FILM COATED, EXTENDED RELEASE ORAL at 18:01

## 2021-05-06 RX ADMIN — Medication 5 MILLIGRAM(S): at 22:52

## 2021-05-06 RX ADMIN — Medication 75 MICROGRAM(S): at 05:36

## 2021-05-06 RX ADMIN — Medication 1 PACKET(S): at 11:23

## 2021-05-06 NOTE — DIETITIAN NUTRITION RISK NOTIFICATION - ADDITIONAL COMMENTS/DIETITIAN RECOMMENDATIONS
Rx: MVI daily.   Noted with low Na, low K+ and low phos- continue to monitor and replete as needed.   Continue Florastor to support gut integrity.   Encourage po intake, monitor diet tolerance, and provide assistance at meals as needed.   Obtain daily weights to monitor trends.

## 2021-05-06 NOTE — DIETITIAN INITIAL EVALUATION ADULT. - ETIOLOGY
related to inability to meet sufficient protein-energy in setting of advanced age, left femoral neck fx s/p left hip arthroplasty, poor appetite, and altered GI function 2/2 persistent diarrhea

## 2021-05-06 NOTE — DIETITIAN INITIAL EVALUATION ADULT. - PERTINENT LABORATORY DATA
05-06 Na126 mmol/L<L> Glu 109 mg/dL<H> K+ 3.2 mmol/L<L> Cr  0.34 mg/dL<L> BUN 6.0 mg/dL<L> Phos 1.8 mg/dL<L> Alb n/a   PAB n/a

## 2021-05-06 NOTE — PROGRESS NOTE ADULT - SUBJECTIVE AND OBJECTIVE BOX
SUBJECTIVE/24 hour events: Patient is a 85yFemale s/p trip and fall sustaining a left femoral neck fracture now pod#4 of left hip arthoplasty. Patient with no acute events overnight, sodium is back down to 126 remains on IV hydration will f/u am labs, teixeira removed and could not void and straight cath once with 490cc drained will f/u 6a bladder scan, hemoglobin and finger sticks stable, off supplemental o2. Patient stated " my diarrhea is getting better but I have pain on the left side of my stomach when I have bowel movements and when you touch it" Patient seen by PM&R and deemed appropriate for acute rehab, patient medically stable once bed is available.     Vital Signs Last 24 Hrs  T(C): 36.8 (05 May 2021 20:22), Max: 36.8 (05 May 2021 16:55)  T(F): 98.3 (05 May 2021 20:22), Max: 98.3 (05 May 2021 20:22)  HR: 71 (05 May 2021 20:22) (68 - 71)  BP: 148/57 (05 May 2021 20:22) (126/54 - 153/72)  BP(mean): --  RR: 19 (05 May 2021 20:22) (18 - 19)  SpO2: 96% (05 May 2021 20:22) (92% - 96%)  Drug Dosing Weight  Height (cm): 170.2 (01 May 2021 19:40)  Weight (kg): 61.7 (01 May 2021 21:53)  BMI (kg/m2): 21.3 (01 May 2021 21:53)  BSA (m2): 1.72 (01 May 2021 21:53)  I&O's Detail    04 May 2021 07:01  -  05 May 2021 07:00  --------------------------------------------------------  IN:    IV PiggyBack: 250 mL    Oral Fluid: 1700 mL    sodium chloride 0.9%: 675 mL  Total IN: 2625 mL    OUT:    Indwelling Catheter - Urethral (mL): 750 mL  Total OUT: 750 mL    Total NET: 1875 mL      05 May 2021 07:01  -  06 May 2021 02:04  --------------------------------------------------------  IN:  Total IN: 0 mL    OUT:    Indwelling Catheter - Urethral (mL): 350 mL    Intermittent Catheterization - Urethral (mL): 490 mL  Total OUT: 840 mL    Total NET: -840 mL        Allergies    No Known Allergies    Intolerances    Bactrim (Vomiting)                            10.1   9.42  )-----------( 154      ( 05 May 2021 06:03 )             28.9   05-05    126<L>  |  95<L>  |  9.0  ----------------------------<  120<H>  3.4<L>   |  23.0  |  0.36<L>    Ca    6.9<L>      05 May 2021 18:07  Phos  2.0     05-05  Mg     2.1     05-05        ROS:    PHYSICAL EXAM:  Constitutional: in good spirits, " l love jeopardy and the wheel'    Respiratory: no respiratory distress, no dyspnea, off supplemental o2, PIC score 7-8  Cardiovascular: NSR on telemetry   Gastrointestinal: abdomen softly distended, no guarding, TTP to LLQ and LUQ, no peritonitis  Genitourinary: not voiding spontaneously yet    Extremities: LLE NVI, compartments soft, warm to touch , cap refill<2 seconds  Vascular:  Neurological:  Skin:  Musculoskeletal:  Psychiatric:        MEDICATIONS  (STANDING):  acetaminophen   Tablet .. 975 milliGRAM(s) Oral every 8 hours  amLODIPine   Tablet 5 milliGRAM(s) Oral daily  aspirin  chewable 81 milliGRAM(s) Oral daily  atorvastatin 40 milliGRAM(s) Oral at bedtime  dextrose 40% Gel 15 Gram(s) Oral once  dextrose 50% Injectable 25 Gram(s) IV Push once  dextrose 50% Injectable 12.5 Gram(s) IV Push once  dextrose 50% Injectable 25 Gram(s) IV Push once  enoxaparin Injectable 40 milliGRAM(s) SubCutaneous every 24 hours  glucagon  Injectable 1 milliGRAM(s) IntraMuscular once  insulin lispro (ADMELOG) corrective regimen sliding scale   SubCutaneous Before meals and at bedtime  isosorbide   mononitrate ER Tablet (IMDUR) 60 milliGRAM(s) Oral daily  levothyroxine 75 MICROGram(s) Oral daily  pantoprazole    Tablet 40 milliGRAM(s) Oral before breakfast  psyllium Powder 1 Packet(s) Oral daily  ranolazine 1000 milliGRAM(s) Oral <User Schedule>  ranolazine 500 milliGRAM(s) Oral <User Schedule>  saccharomyces boulardii 250 milliGRAM(s) Oral two times a day  sodium chloride 0.9%. 1000 milliLiter(s) (100 mL/Hr) IV Continuous <Continuous>    MEDICATIONS  (PRN):  albuterol/ipratropium for Nebulization 3 milliLiter(s) Nebulizer every 6 hours PRN Shortness of Breath and/or Wheezing  ondansetron Injectable 4 milliGRAM(s) IV Push every 6 hours PRN Nausea and/or Vomiting      RADIOLOGY STUDIES:    CULTURES:         SUBJECTIVE/24 hour events: Patient is a 85yFemale s/p trip and fall sustaining a left femoral neck fracture now pod#4 of left hip arthoplasty. Patient with no acute events overnight, sodium is back down to 126 remains on IV hydration will f/u am labs, teixeira removed and could not void and straight cath once with 490cc drained will f/u 6a bladder scan, hemoglobin and finger sticks stable, off supplemental o2. Patient stated " my diarrhea is getting better but I have pain on the left side of my stomach when I have bowel movements and when you touch it" Patient seen by PM&R and deemed appropriate for acute rehab, patient medically stable once bed is available.     Vital Signs Last 24 Hrs  T(C): 36.8 (05 May 2021 20:22), Max: 36.8 (05 May 2021 16:55)  T(F): 98.3 (05 May 2021 20:22), Max: 98.3 (05 May 2021 20:22)  HR: 71 (05 May 2021 20:22) (68 - 71)  BP: 148/57 (05 May 2021 20:22) (126/54 - 153/72)  BP(mean): --  RR: 19 (05 May 2021 20:22) (18 - 19)  SpO2: 96% (05 May 2021 20:22) (92% - 96%)  Drug Dosing Weight  Height (cm): 170.2 (01 May 2021 19:40)  Weight (kg): 61.7 (01 May 2021 21:53)  BMI (kg/m2): 21.3 (01 May 2021 21:53)  BSA (m2): 1.72 (01 May 2021 21:53)  I&O's Detail    04 May 2021 07:01  -  05 May 2021 07:00  --------------------------------------------------------  IN:    IV PiggyBack: 250 mL    Oral Fluid: 1700 mL    sodium chloride 0.9%: 675 mL  Total IN: 2625 mL    OUT:    Indwelling Catheter - Urethral (mL): 750 mL  Total OUT: 750 mL    Total NET: 1875 mL      05 May 2021 07:01  -  06 May 2021 02:04  --------------------------------------------------------  IN:  Total IN: 0 mL    OUT:    Indwelling Catheter - Urethral (mL): 350 mL    Intermittent Catheterization - Urethral (mL): 490 mL  Total OUT: 840 mL    Total NET: -840 mL        Allergies    No Known Allergies    Intolerances    Bactrim (Vomiting)                            10.1   9.42  )-----------( 154      ( 05 May 2021 06:03 )             28.9   05-05    126<L>  |  95<L>  |  9.0  ----------------------------<  120<H>  3.4<L>   |  23.0  |  0.36<L>    Ca    6.9<L>      05 May 2021 18:07  Phos  2.0     05-05  Mg     2.1     05-05        ROS:    PHYSICAL EXAM:  Constitutional: in good spirits, " l love jeopardy and the wheel'  Respiratory: no respiratory distress, no dyspnea, off supplemental o2, PIC score 7-8  Cardiovascular: NSR on telemetry   Gastrointestinal: abdomen softly distended, no guarding, TTP to LLQ and LUQ, no peritonitis  Genitourinary: not voiding spontaneously yet  Extremities: LLE NVI, compartments soft, warm to touch , cap refill<2 seconds  Neurological: A&OX3  Skin: warm, dry and no rashes         MEDICATIONS  (STANDING):  acetaminophen   Tablet .. 975 milliGRAM(s) Oral every 8 hours  amLODIPine   Tablet 5 milliGRAM(s) Oral daily  aspirin  chewable 81 milliGRAM(s) Oral daily  atorvastatin 40 milliGRAM(s) Oral at bedtime  dextrose 40% Gel 15 Gram(s) Oral once  dextrose 50% Injectable 25 Gram(s) IV Push once  dextrose 50% Injectable 12.5 Gram(s) IV Push once  dextrose 50% Injectable 25 Gram(s) IV Push once  enoxaparin Injectable 40 milliGRAM(s) SubCutaneous every 24 hours  glucagon  Injectable 1 milliGRAM(s) IntraMuscular once  insulin lispro (ADMELOG) corrective regimen sliding scale   SubCutaneous Before meals and at bedtime  isosorbide   mononitrate ER Tablet (IMDUR) 60 milliGRAM(s) Oral daily  levothyroxine 75 MICROGram(s) Oral daily  pantoprazole    Tablet 40 milliGRAM(s) Oral before breakfast  psyllium Powder 1 Packet(s) Oral daily  ranolazine 1000 milliGRAM(s) Oral <User Schedule>  ranolazine 500 milliGRAM(s) Oral <User Schedule>  saccharomyces boulardii 250 milliGRAM(s) Oral two times a day  sodium chloride 0.9%. 1000 milliLiter(s) (100 mL/Hr) IV Continuous <Continuous>    MEDICATIONS  (PRN):  albuterol/ipratropium for Nebulization 3 milliLiter(s) Nebulizer every 6 hours PRN Shortness of Breath and/or Wheezing  ondansetron Injectable 4 milliGRAM(s) IV Push every 6 hours PRN Nausea and/or Vomiting      RADIOLOGY STUDIES:    CULTURES:

## 2021-05-06 NOTE — DIETITIAN INITIAL EVALUATION ADULT. - OTHER INFO
85 year old female PMH of DM, GERD, hiatal hernia, hypothyroid, gastroenteritis, MI, PUD, LBBB, HTN, presents s/p fall sustaining a left femoral neck fracture, now s/p left hip arthoplasty. Pt reports very poor appetite/po intake since admission. States prior to admission she was eating fairly well. Confirms weight loss over the last year but unable to specify amount. Pt complains of abdominal pain and persistent diarrhea which has been ongoing for 4 days - aware of plan for CT of abdomen/pelvis pending. Limited NFPE conducted, noted with moderate muscle and moderate fat loss. RD to follow up.

## 2021-05-06 NOTE — PROGRESS NOTE ADULT - ATTENDING COMMENTS
seen and examined 05-06-21 @ 0840    soft / mild diffuse tenderness / moderately distended / tympanitic  MANISH - empty rectal vault  clean dry dressing on left hip  no pedal edema    s/p left hip hemiarthroplasty on 5/2 for displaced femoral neck fracture  -WBAT  -plan acute rehab    hyponatremia  -ongoing hypovolemia unlikely given decreased BUN and Cr  -repeat Albert and Uosm confirm SIADH (calculated serum osm = 260, so urine should be maximally dilute)  -fluid restriction  -start salt tabs    hypophosphatemia  -IV and PO repletion    abdominal distension  -CT abd/pelvis w/o contrast (5/6) - oral contrast reaches colon, but ascending and transverse colon are mildly dilated with stool  -likely has decrease colonic motility from electrolyte abnormalities    COPD  -dyspnea this morning improved after restarting budesonide/formoterol and albuterol inhalers seen and examined 05-06-21 @ 0840    soft / mild diffuse tenderness / moderately distended / tympanitic  MANISH - empty rectal vault  clean dry dressing on left hip  no pedal edema    s/p left hip hemiarthroplasty on 5/2 for displaced femoral neck fracture  -WBAT  -plan acute rehab    hyponatremia  -ongoing hypovolemia unlikely given decreased BUN and Cr  -repeat Albert and Uosm confirm SIADH (calculated serum osm = 260, so urine should be maximally dilute)  -fluid restriction  -start salt tabs    hypophosphatemia  -IV and PO repletion    abdominal distension  -CT abd/pelvis w/o contrast (5/6) - oral contrast reaches colon, but ascending and transverse colon are mildly dilated with stool  -likely has decrease colonic motility from electrolyte abnormalities    COPD  -dyspnea this morning improved after restarting budesonide/formoterol and albuterol inhalers    acute urinary retention (490 mL on bladder catheterization yesterday)  -keep Mejia catheter

## 2021-05-06 NOTE — DIETITIAN NUTRITION RISK NOTIFICATION - TREATMENT: THE FOLLOWING DIET HAS BEEN RECOMMENDED
Diet, Consistent Carbohydrate w/Evening Snack:   Supplement Feeding Modality:  Oral  Glucerna Shake Cans or Servings Per Day:  1       Frequency:  Three Times a day (05-06-21 @ 09:37) [Pending Verification By Attending]  Diet, NPO:   NPO for Procedure/Test     NPO Start Date: 06-May-2021,   NPO Start Time: 09:00  Except Medications (05-06-21 @ 09:16) [Active]  Diet, Consistent Carbohydrate Renal/No Snacks (05-06-21 @ 06:55) [Active]

## 2021-05-06 NOTE — DIETITIAN INITIAL EVALUATION ADULT. - PERTINENT MEDS FT
MEDICATIONS  (STANDING):  acetaminophen   Tablet .. 975 milliGRAM(s) Oral every 8 hours  amLODIPine   Tablet 5 milliGRAM(s) Oral daily  aspirin  chewable 81 milliGRAM(s) Oral daily  atorvastatin 40 milliGRAM(s) Oral at bedtime  budesonide 160 MICROgram(s)/formoterol 4.5 MICROgram(s) Inhaler 2 Puff(s) Inhalation two times a day  dextrose 40% Gel 15 Gram(s) Oral once  dextrose 50% Injectable 25 Gram(s) IV Push once  dextrose 50% Injectable 12.5 Gram(s) IV Push once  dextrose 50% Injectable 25 Gram(s) IV Push once  enoxaparin Injectable 40 milliGRAM(s) SubCutaneous every 24 hours  glucagon  Injectable 1 milliGRAM(s) IntraMuscular once  insulin lispro (ADMELOG) corrective regimen sliding scale   SubCutaneous Before meals and at bedtime  isosorbide   mononitrate ER Tablet (IMDUR) 60 milliGRAM(s) Oral daily  levothyroxine 75 MICROGram(s) Oral daily  pantoprazole    Tablet 40 milliGRAM(s) Oral before breakfast  potassium chloride    Tablet ER 40 milliEquivalent(s) Oral once  potassium phosphate / sodium phosphate Tablet (K-PHOS No. 2) 1 Tablet(s) Oral three times a day with meals  potassium phosphate IVPB 30 milliMole(s) IV Intermittent once  psyllium Powder 1 Packet(s) Oral daily  ranolazine 1000 milliGRAM(s) Oral <User Schedule>  ranolazine 500 milliGRAM(s) Oral <User Schedule>  saccharomyces boulardii 250 milliGRAM(s) Oral two times a day  sodium chloride 0.9%. 1000 milliLiter(s) (100 mL/Hr) IV Continuous <Continuous>    MEDICATIONS  (PRN):  ALBUTerol    90 MICROgram(s) HFA Inhaler 2 Puff(s) Inhalation every 12 hours PRN Shortness of Breath and/or Wheezing  albuterol/ipratropium for Nebulization 3 milliLiter(s) Nebulizer every 6 hours PRN Shortness of Breath and/or Wheezing  ondansetron Injectable 4 milliGRAM(s) IV Push every 6 hours PRN Nausea and/or Vomiting

## 2021-05-07 LAB
ANION GAP SERPL CALC-SCNC: 10 MMOL/L — SIGNIFICANT CHANGE UP (ref 5–17)
BASOPHILS # BLD AUTO: 0.04 K/UL — SIGNIFICANT CHANGE UP (ref 0–0.2)
BASOPHILS NFR BLD AUTO: 0.4 % — SIGNIFICANT CHANGE UP (ref 0–2)
BUN SERPL-MCNC: 3 MG/DL — LOW (ref 8–20)
CALCIUM SERPL-MCNC: 7.6 MG/DL — LOW (ref 8.6–10.2)
CHLORIDE SERPL-SCNC: 101 MMOL/L — SIGNIFICANT CHANGE UP (ref 98–107)
CO2 SERPL-SCNC: 22 MMOL/L — SIGNIFICANT CHANGE UP (ref 22–29)
CREAT SERPL-MCNC: 0.31 MG/DL — LOW (ref 0.5–1.3)
EOSINOPHIL # BLD AUTO: 0.12 K/UL — SIGNIFICANT CHANGE UP (ref 0–0.5)
EOSINOPHIL NFR BLD AUTO: 1.2 % — SIGNIFICANT CHANGE UP (ref 0–6)
GLUCOSE BLDC GLUCOMTR-MCNC: 115 MG/DL — HIGH (ref 70–99)
GLUCOSE BLDC GLUCOMTR-MCNC: 123 MG/DL — HIGH (ref 70–99)
GLUCOSE BLDC GLUCOMTR-MCNC: 98 MG/DL — SIGNIFICANT CHANGE UP (ref 70–99)
GLUCOSE BLDC GLUCOMTR-MCNC: 98 MG/DL — SIGNIFICANT CHANGE UP (ref 70–99)
GLUCOSE SERPL-MCNC: 99 MG/DL — SIGNIFICANT CHANGE UP (ref 70–99)
HCT VFR BLD CALC: 32.4 % — LOW (ref 34.5–45)
HGB BLD-MCNC: 11.8 G/DL — SIGNIFICANT CHANGE UP (ref 11.5–15.5)
IMM GRANULOCYTES NFR BLD AUTO: 1.4 % — SIGNIFICANT CHANGE UP (ref 0–1.5)
LYMPHOCYTES # BLD AUTO: 0.83 K/UL — LOW (ref 1–3.3)
LYMPHOCYTES # BLD AUTO: 8.1 % — LOW (ref 13–44)
MAGNESIUM SERPL-MCNC: 2 MG/DL — SIGNIFICANT CHANGE UP (ref 1.6–2.6)
MCHC RBC-ENTMCNC: 30.4 PG — SIGNIFICANT CHANGE UP (ref 27–34)
MCHC RBC-ENTMCNC: 36.4 GM/DL — HIGH (ref 32–36)
MCV RBC AUTO: 83.5 FL — SIGNIFICANT CHANGE UP (ref 80–100)
MONOCYTES # BLD AUTO: 0.81 K/UL — SIGNIFICANT CHANGE UP (ref 0–0.9)
MONOCYTES NFR BLD AUTO: 7.9 % — SIGNIFICANT CHANGE UP (ref 2–14)
NEUTROPHILS # BLD AUTO: 8.34 K/UL — HIGH (ref 1.8–7.4)
NEUTROPHILS NFR BLD AUTO: 81 % — HIGH (ref 43–77)
OSMOLALITY SERPL: 271 MOSMOL/KG — LOW (ref 280–301)
PHOSPHATE SERPL-MCNC: 1.9 MG/DL — LOW (ref 2.4–4.7)
PLATELET # BLD AUTO: 244 K/UL — SIGNIFICANT CHANGE UP (ref 150–400)
POTASSIUM SERPL-MCNC: 3.6 MMOL/L — SIGNIFICANT CHANGE UP (ref 3.5–5.3)
POTASSIUM SERPL-SCNC: 3.6 MMOL/L — SIGNIFICANT CHANGE UP (ref 3.5–5.3)
RBC # BLD: 3.88 M/UL — SIGNIFICANT CHANGE UP (ref 3.8–5.2)
RBC # FLD: 13.1 % — SIGNIFICANT CHANGE UP (ref 10.3–14.5)
SODIUM SERPL-SCNC: 133 MMOL/L — LOW (ref 135–145)
WBC # BLD: 10.28 K/UL — SIGNIFICANT CHANGE UP (ref 3.8–10.5)
WBC # FLD AUTO: 10.28 K/UL — SIGNIFICANT CHANGE UP (ref 3.8–10.5)

## 2021-05-07 PROCEDURE — 99231 SBSQ HOSP IP/OBS SF/LOW 25: CPT

## 2021-05-07 PROCEDURE — 99232 SBSQ HOSP IP/OBS MODERATE 35: CPT

## 2021-05-07 RX ORDER — ACETAMINOPHEN 500 MG
975 TABLET ORAL EVERY 8 HOURS
Refills: 0 | Status: DISCONTINUED | OUTPATIENT
Start: 2021-05-11 | End: 2021-05-13

## 2021-05-07 RX ORDER — ENOXAPARIN SODIUM 100 MG/ML
40 INJECTION SUBCUTANEOUS EVERY 24 HOURS
Refills: 0 | Status: DISCONTINUED | OUTPATIENT
Start: 2021-05-11 | End: 2021-05-20

## 2021-05-07 RX ORDER — GABAPENTIN 400 MG/1
300 CAPSULE ORAL THREE TIMES A DAY
Refills: 0 | Status: DISCONTINUED | OUTPATIENT
Start: 2021-05-07 | End: 2021-05-07

## 2021-05-07 RX ORDER — RANOLAZINE 500 MG/1
500 TABLET, FILM COATED, EXTENDED RELEASE ORAL
Refills: 0 | Status: DISCONTINUED | OUTPATIENT
Start: 2021-05-11 | End: 2021-05-20

## 2021-05-07 RX ORDER — SODIUM CHLORIDE 9 MG/ML
1000 INJECTION INTRAMUSCULAR; INTRAVENOUS; SUBCUTANEOUS
Refills: 0 | Status: DISCONTINUED | OUTPATIENT
Start: 2021-05-07 | End: 2021-05-09

## 2021-05-07 RX ORDER — SACCHAROMYCES BOULARDII 250 MG
1 POWDER IN PACKET (EA) ORAL
Qty: 0 | Refills: 0 | DISCHARGE
Start: 2021-05-07

## 2021-05-07 RX ORDER — ASPIRIN/CALCIUM CARB/MAGNESIUM 324 MG
81 TABLET ORAL DAILY
Refills: 0 | Status: DISCONTINUED | OUTPATIENT
Start: 2021-05-11 | End: 2021-05-20

## 2021-05-07 RX ORDER — BUDESONIDE AND FORMOTEROL FUMARATE DIHYDRATE 160; 4.5 UG/1; UG/1
2 AEROSOL RESPIRATORY (INHALATION)
Refills: 0 | Status: DISCONTINUED | OUTPATIENT
Start: 2021-05-11 | End: 2021-05-20

## 2021-05-07 RX ORDER — RANOLAZINE 500 MG/1
1000 TABLET, FILM COATED, EXTENDED RELEASE ORAL
Refills: 0 | Status: DISCONTINUED | OUTPATIENT
Start: 2021-05-11 | End: 2021-05-20

## 2021-05-07 RX ORDER — SODIUM,POTASSIUM PHOSPHATES 278-250MG
2 POWDER IN PACKET (EA) ORAL EVERY 4 HOURS
Refills: 0 | Status: COMPLETED | OUTPATIENT
Start: 2021-05-07 | End: 2021-05-07

## 2021-05-07 RX ORDER — ENOXAPARIN SODIUM 100 MG/ML
40 INJECTION SUBCUTANEOUS
Qty: 0 | Refills: 0 | DISCHARGE
Start: 2021-05-07

## 2021-05-07 RX ORDER — SACCHAROMYCES BOULARDII 250 MG
250 POWDER IN PACKET (EA) ORAL
Refills: 0 | Status: DISCONTINUED | OUTPATIENT
Start: 2021-05-11 | End: 2021-05-19

## 2021-05-07 RX ORDER — ALBUTEROL 90 UG/1
2 AEROSOL, METERED ORAL EVERY 12 HOURS
Refills: 0 | Status: DISCONTINUED | OUTPATIENT
Start: 2021-05-11 | End: 2021-05-20

## 2021-05-07 RX ORDER — LEVOTHYROXINE SODIUM 125 MCG
75 TABLET ORAL DAILY
Refills: 0 | Status: DISCONTINUED | OUTPATIENT
Start: 2021-05-11 | End: 2021-05-20

## 2021-05-07 RX ORDER — ATORVASTATIN CALCIUM 80 MG/1
40 TABLET, FILM COATED ORAL AT BEDTIME
Refills: 0 | Status: DISCONTINUED | OUTPATIENT
Start: 2021-05-11 | End: 2021-05-20

## 2021-05-07 RX ORDER — POTASSIUM PHOSPHATE, MONOBASIC POTASSIUM PHOSPHATE, DIBASIC 236; 224 MG/ML; MG/ML
30 INJECTION, SOLUTION INTRAVENOUS ONCE
Refills: 0 | Status: COMPLETED | OUTPATIENT
Start: 2021-05-07 | End: 2021-05-07

## 2021-05-07 RX ORDER — IPRATROPIUM/ALBUTEROL SULFATE 18-103MCG
3 AEROSOL WITH ADAPTER (GRAM) INHALATION
Qty: 0 | Refills: 0 | DISCHARGE
Start: 2021-05-07

## 2021-05-07 RX ORDER — POTASSIUM CHLORIDE 20 MEQ
40 PACKET (EA) ORAL ONCE
Refills: 0 | Status: COMPLETED | OUTPATIENT
Start: 2021-05-07 | End: 2021-05-07

## 2021-05-07 RX ORDER — BUDESONIDE AND FORMOTEROL FUMARATE DIHYDRATE 160; 4.5 UG/1; UG/1
2 AEROSOL RESPIRATORY (INHALATION)
Qty: 0 | Refills: 0 | DISCHARGE
Start: 2021-05-07

## 2021-05-07 RX ORDER — AMLODIPINE BESYLATE 2.5 MG/1
5 TABLET ORAL DAILY
Refills: 0 | Status: DISCONTINUED | OUTPATIENT
Start: 2021-05-11 | End: 2021-05-20

## 2021-05-07 RX ORDER — MORPHINE SULFATE 50 MG/1
2 CAPSULE, EXTENDED RELEASE ORAL ONCE
Refills: 0 | Status: DISCONTINUED | OUTPATIENT
Start: 2021-05-07 | End: 2021-05-07

## 2021-05-07 RX ORDER — PSYLLIUM SEED (WITH DEXTROSE)
1 POWDER (GRAM) ORAL DAILY
Refills: 0 | Status: DISCONTINUED | OUTPATIENT
Start: 2021-05-11 | End: 2021-05-20

## 2021-05-07 RX ORDER — PANTOPRAZOLE SODIUM 20 MG/1
40 TABLET, DELAYED RELEASE ORAL
Refills: 0 | Status: DISCONTINUED | OUTPATIENT
Start: 2021-05-11 | End: 2021-05-20

## 2021-05-07 RX ORDER — PSYLLIUM SEED (WITH DEXTROSE)
1 POWDER (GRAM) ORAL
Qty: 0 | Refills: 0 | DISCHARGE
Start: 2021-05-07

## 2021-05-07 RX ORDER — LANOLIN ALCOHOL/MO/W.PET/CERES
1 CREAM (GRAM) TOPICAL
Qty: 0 | Refills: 0 | DISCHARGE
Start: 2021-05-07

## 2021-05-07 RX ORDER — ACETAMINOPHEN 500 MG
3 TABLET ORAL
Qty: 0 | Refills: 0 | DISCHARGE
Start: 2021-05-07

## 2021-05-07 RX ADMIN — Medication 975 MILLIGRAM(S): at 21:44

## 2021-05-07 RX ADMIN — Medication 75 MICROGRAM(S): at 05:26

## 2021-05-07 RX ADMIN — RANOLAZINE 500 MILLIGRAM(S): 500 TABLET, FILM COATED, EXTENDED RELEASE ORAL at 18:13

## 2021-05-07 RX ADMIN — Medication 975 MILLIGRAM(S): at 13:45

## 2021-05-07 RX ADMIN — PANTOPRAZOLE SODIUM 40 MILLIGRAM(S): 20 TABLET, DELAYED RELEASE ORAL at 05:27

## 2021-05-07 RX ADMIN — SODIUM CHLORIDE 42 MILLILITER(S): 9 INJECTION INTRAMUSCULAR; INTRAVENOUS; SUBCUTANEOUS at 09:07

## 2021-05-07 RX ADMIN — MORPHINE SULFATE 2 MILLIGRAM(S): 50 CAPSULE, EXTENDED RELEASE ORAL at 22:06

## 2021-05-07 RX ADMIN — Medication 975 MILLIGRAM(S): at 21:14

## 2021-05-07 RX ADMIN — Medication 250 MILLIGRAM(S): at 05:26

## 2021-05-07 RX ADMIN — Medication 3 MILLILITER(S): at 20:13

## 2021-05-07 RX ADMIN — Medication 975 MILLIGRAM(S): at 05:26

## 2021-05-07 RX ADMIN — Medication 40 MILLIEQUIVALENT(S): at 11:41

## 2021-05-07 RX ADMIN — POTASSIUM PHOSPHATE, MONOBASIC POTASSIUM PHOSPHATE, DIBASIC 83.33 MILLIMOLE(S): 236; 224 INJECTION, SOLUTION INTRAVENOUS at 11:40

## 2021-05-07 RX ADMIN — RANOLAZINE 1000 MILLIGRAM(S): 500 TABLET, FILM COATED, EXTENDED RELEASE ORAL at 05:26

## 2021-05-07 RX ADMIN — BUDESONIDE AND FORMOTEROL FUMARATE DIHYDRATE 2 PUFF(S): 160; 4.5 AEROSOL RESPIRATORY (INHALATION) at 20:11

## 2021-05-07 RX ADMIN — MORPHINE SULFATE 2 MILLIGRAM(S): 50 CAPSULE, EXTENDED RELEASE ORAL at 22:36

## 2021-05-07 RX ADMIN — Medication 1 PACKET(S): at 11:41

## 2021-05-07 RX ADMIN — Medication 975 MILLIGRAM(S): at 13:19

## 2021-05-07 RX ADMIN — Medication 81 MILLIGRAM(S): at 11:41

## 2021-05-07 RX ADMIN — Medication 5 MILLIGRAM(S): at 21:14

## 2021-05-07 RX ADMIN — ENOXAPARIN SODIUM 40 MILLIGRAM(S): 100 INJECTION SUBCUTANEOUS at 05:25

## 2021-05-07 RX ADMIN — Medication 2 TABLET(S): at 11:41

## 2021-05-07 RX ADMIN — AMLODIPINE BESYLATE 5 MILLIGRAM(S): 2.5 TABLET ORAL at 05:26

## 2021-05-07 RX ADMIN — ISOSORBIDE MONONITRATE 60 MILLIGRAM(S): 60 TABLET, EXTENDED RELEASE ORAL at 11:41

## 2021-05-07 RX ADMIN — BUDESONIDE AND FORMOTEROL FUMARATE DIHYDRATE 2 PUFF(S): 160; 4.5 AEROSOL RESPIRATORY (INHALATION) at 08:24

## 2021-05-07 RX ADMIN — Medication 250 MILLIGRAM(S): at 18:13

## 2021-05-07 RX ADMIN — SODIUM CHLORIDE 1 GRAM(S): 9 INJECTION INTRAMUSCULAR; INTRAVENOUS; SUBCUTANEOUS at 05:25

## 2021-05-07 RX ADMIN — Medication 2 TABLET(S): at 18:13

## 2021-05-07 RX ADMIN — ATORVASTATIN CALCIUM 40 MILLIGRAM(S): 80 TABLET, FILM COATED ORAL at 21:14

## 2021-05-07 RX ADMIN — Medication 975 MILLIGRAM(S): at 06:00

## 2021-05-07 NOTE — PROGRESS NOTE ADULT - PROBLEM SELECTOR PLAN 2
serial abdominal exams  monitor diarrhea  monitor electrolytes   monitor leukocytosis  monitor for any signs and or symptoms or increasing infection

## 2021-05-07 NOTE — PROGRESS NOTE ADULT - PROBLEM SELECTOR PLAN 1
WBAT  dvt ppx  pain control, no narcotics  PT/OT   incentive spirometer   home medications  good glucose control  pulmonary toliet   encourage oob  continue probiotics  continue metamucil
wbat  pain control, avoid narcotics  dvt ppx  continue home meds  good glucose control   neurovascular checks  incentive spirometer  good pulmonary toliet  consider discontinuing telemetry monitor now that cardiology signed off  monitor hemoglobin
WBAT  dvt ppx  pain control, no narcotics  PT/OT   incentive spirometer   home medications  good glucose control  pulmonary toliet   encourage oob  continue probiotics  continue metamucil.  once medically stable should be placed in acute rehab
neurovascular checks  wbat  pain control--> avoid narcotics  incentive spirometer   needs good pulmonary toliet  continue home meds  continue good glucose control  PM&R for dispo
patient for OR today as per ortho  trauma cleared  cardiology evaluation pending

## 2021-05-07 NOTE — H&P ADULT - NSHPREVIEWOFSYSTEMS_GEN_ALL_CORE
REVIEW OF SYSTEMS  Constitutional: No fever, No Chills, No fatigue  HEENT: No eye pain, No visual disturbances, No difficulty hearing, No Dysphagia   Pulm: No cough,  No shortness of breath  Cardio: No chest pain, No palpitations  GI:  +++abdominal pain, No nausea, No vomiting, +++diarrhea, No constipation  : No dysuria, No frequency, No hematuria,   Neuro: No headaches, No memory loss, +loss of strength, No numbness, No tremors  Skin: No itching, No rashes, No lesions   Endo: No temperature intolerance  MSK: +joint pain, +joint swelling, No muscle pain, No Neck or back pain  Psych:  No depression, No anxiety

## 2021-05-07 NOTE — PROGRESS NOTE ADULT - SUBJECTIVE AND OBJECTIVE BOX
She reports her abdominal pain is improved.  She is eating well and denies any diarrhea.  She is still requiring assistance for her mobility.      REVIEW OF SYSTEMS  Constitutional - No fever,  No fatigue  HEENT - No vertigo, No neck pain  Neurological - No headaches, No memory loss, + loss of strength  Skin - No rashes, No lesions   Musculoskeletal - No joint pain, No joint swelling, No muscle pain  Psychiatric - No depression, No anxiety    FUNCTIONAL PROGRESS  :    Bed Mobility  Bed Mobility Training Sit-to-Supine: moderate assist (50% patient effort);  1 person assist  Bed Mobility Training Supine-to-Sit: moderate assist (50% patient effort);  1 person assist  Bed Mobility Training Limitations: decreased strength;  decreased ROM;  decreased ability to use legs for bridging/pushing    Bed-Chair Transfer Training  Transfer Training Chair-to-Bed Transfer: moderate assist (50% patient effort);  verbal cues;  nonverbal cues (demo/gestures);  rolling walker;  weight-bearing as tolerated   1 person + 1 person to manage equipment  Bed-to-Chair Transfer Training Transfer Safety Analysis: decreased balance;  decreased strength;  decreased ROM    Sit-Stand Transfer Training  Transfer Training Sit-to-Stand Transfer: moderate assist (50% patient effort);  verbal cues;  nonverbal cues (demo/gestures);  weight-bearing as tolerated   rolling walker;  1 person + 1 person to manage equipment  Transfer Training Stand-to-Sit Transfer: moderate assist (50% patient effort);  nonverbal cues (demo/gestures);  verbal cues;  weight-bearing as tolerated   rolling walker;  1 person + 1 person to manage equipment  Sit-to-Stand Transfer Training Transfer Safety Analysis: decreased balance;  decreased strength;  decreased ROM;  rolling walker    Gait Training  Gait Training: moderate assist (50% patient effort);  nonverbal cues (demo/gestures);  verbal cues;  weight-bearing as tolerated   rolling walker;  10 feet;  1 person + 1 person to manage equipment  Gait Analysis: 3-point gait   crouch;  decreased arm swing;  decreased hip/knee flexion;  decreased trunk rotation;  decreased step length;  decreased ROM;  decreased strength;  10 feet      VITALS  T(C): 36.7 (21 @ 15:42), Max: 37.1 (21 @ 16:46)  HR: 82 (21 @ 15:42) (74 - 99)  BP: 153/76 (05-07-21 @ 15:42) (138/70 - 165/66)  RR: 18 (21 @ 15:42) (18 - 18)  SpO2: 92% (21 @ 15:42) (90% - 97%)  Wt(kg): --    MEDICATIONS   acetaminophen   Tablet .. 975 milliGRAM(s) every 8 hours  ALBUTerol    90 MICROgram(s) HFA Inhaler 2 Puff(s) every 12 hours PRN  albuterol/ipratropium for Nebulization 3 milliLiter(s) every 6 hours PRN  amLODIPine   Tablet 5 milliGRAM(s) daily  aspirin  chewable 81 milliGRAM(s) daily  atorvastatin 40 milliGRAM(s) at bedtime  budesonide 160 MICROgram(s)/formoterol 4.5 MICROgram(s) Inhaler 2 Puff(s) two times a day  dextrose 40% Gel 15 Gram(s) once  dextrose 50% Injectable 25 Gram(s) once  dextrose 50% Injectable 12.5 Gram(s) once  dextrose 50% Injectable 25 Gram(s) once  enoxaparin Injectable 40 milliGRAM(s) every 24 hours  glucagon  Injectable 1 milliGRAM(s) once  insulin lispro (ADMELOG) corrective regimen sliding scale   Before meals and at bedtime  isosorbide   mononitrate ER Tablet (IMDUR) 60 milliGRAM(s) daily  levothyroxine 75 MICROGram(s) daily  melatonin 5 milliGRAM(s) at bedtime  ondansetron Injectable 4 milliGRAM(s) every 6 hours PRN  pantoprazole    Tablet 40 milliGRAM(s) before breakfast  potassium phosphate / sodium phosphate Tablet (K-PHOS No. 2) 2 Tablet(s) every 4 hours  psyllium Powder 1 Packet(s) daily  ranolazine 1000 milliGRAM(s) <User Schedule>  ranolazine 500 milliGRAM(s) <User Schedule>  saccharomyces boulardii 250 milliGRAM(s) two times a day  sodium chloride 0.9%. 1000 milliLiter(s) <Continuous>      RECENT LABS/IMAGING - Reviewed                        11.8   10 )-----------( 244      ( 07 May 2021 05:52 )             32.4     05-07    133<L>  |  101  |  3.0<L>  ----------------------------<  99  3.6   |  22.0  |  0.31<L>    Ca    7.6<L>      07 May 2021 05:52  Phos  1.9       Mg     2.0             Urinalysis Basic - ( 06 May 2021 09:37 )    Color: Yellow / Appearance: Clear / S.005 / pH: x  Gluc: x / Ketone: Negative  / Bili: Negative / Urobili: Negative mg/dL   Blood: x / Protein: Negative mg/dL / Nitrite: Negative   Leuk Esterase: Negative / RBC: 0-2 /HPF / WBC 0-2   Sq Epi: x / Non Sq Epi: Occasional / Bacteria: Negative    ----------------------------------------------------------------------------------------  PHYSICAL EXAM  Constitutional - NAD, Comfortable  HEENT - NCAT, EOMI  Neck - Supple, No limited ROM  Chest - Breathing comfortably, No wheezing  Cardiovascular - No cyanosis    Abdomen - Soft, nontender to palpatoin   Extremities - No C/C/E, No calf tenderness   Neurologic Exam -                    Cognitive - Awake, Alert, AAO to self, place, date, year, situation     Communication - Fluent, No dysarthria     Cranial Nerves - CN 2-12 intact     Motor - 4/5 in b/l upper extremity except 3/5 in left shoulder abduction. 4/5 in RLE, 4/5 in Left ankle rayshawn-plantar flexion. 3/5 in left hip flexion and knee extension.         Sensory - Intact to LT  Psychiatric - Mood stable, Affect WNL  ----------------------------------------------------------------------------------------  ASSESSMENT/PLAN  85yFemale with functional deficits s/p fall with femoral neck fracture s/p left hip hemiarthoplasty.   Pain - Tylenol  DVT PPX - SCDs, Lovenox  WBAT LLE with assistance from walker   Posterior Hip precautions   Rehab - Recommend ACUTE inpatient rehabilitation for the functional deficits consisting of 3 hours of therapy/day & 24 hour RN/daily PMR physician for comorbid medical management. Patient will be able to tolerate 3 hours a day.    Will continue to follow. Functional progress will determine ongoing rehab dispo recommendations, which may change.    Continue bedside therapy as well as OOB throughout the day with mobilization by staff to maintain cardiopulmonary function and prevention of secondary complications related to debility.

## 2021-05-07 NOTE — H&P ADULT - ASSESSMENT
Assessment/Plan:  PRESLEY BECK is a 86yo F w PMH of HTN, COPD, and MI (11y/a) s/p stents x 2 which were replaced 3y/a on ASA only who presents to [facility] on 5/1/21 after mechanical fall from standing complaining of left leg pain, found to have displaced femoral neck fracture s/p left hip hemiarthroplasty on 5/2. Hospital course was complicated with Hyponatremia on fluid restriction and salt tabs, Leukocytosis, pleural effusions, Diarrhea 2/2 Colitis. Patient now admitted for a multidisciplinary rehab program. 05-07-21 @ 22:53    -------------    Rehab Management/MEDICAL MANAGEMENT     #Displaced LEFT femoral neck fracture s/p left hip hemiarthroplasty on 5/2  - Gait Instability, ADL impairments and Functional impairments: start Comprehensive Rehab Program of PT/OT/SLP  - 3 hours a day, 5 days a week  - P&O not needed  - WBAT LLE  - May shower after post-op day #5 (after 5/7)  - The dressing is to be removed on day #9 (5/11)  - will be seen in the Dr. Moore's Ortho office between 2-3 weeks for wound check. Sutures/Staples/Tape will be removed at that time.  - Posterior Hip Precautions for 6 weeks per Ortho (5/2-6/13)  - Lovenox for DVT PPx for 4 weeks (5/2-5/30)    #Hyponatremia  - Hypovolemic?  - was getting salt tabs 1g q12h on 5/6 and IVF NS 42cc/hr on 5/7 (likely d/t diarrhea) at [facility]  - monitor CMP/BMPs    #Diarrhea 2/2 Colitis; Leukocytosis  - CT A/P w/ PO contrast on 5/6 shows colitis  - WBC was 11.2 on 5/6  - Otherwise afebrile  - PO hydration in rehab, otherwise IVF hydration as tolerated  - Florastor    #B/L Pleural Effusions; Leukocytosis  - seen on CT A/P on 5/6/21 at [facility]  - Aspiration?   - currently no fever, SOB.    #HTN  - amlodipine 5mg po daily    #COPD  - albuterol prn  - symbicort bid    #H/O MI s/p Stents  - ASA 81 qd  - Lipitor 40mg po qhs  - Ranexa 1000mg po in AM and 500mg po in PM    #hypothyroidism  - levothyroxine 75mcg po qd    #Pre-Diabetes - A1c 6.3 on 5/2  - will not continue do FS and ISS here in rehab as patient's sugars have been well controlled while in acute hospital at [facility].  - management via diet and exercise  - Diabetic educator    #Sleep  - melatonin PRN    #Skin  - Pressure injury/Skin: OOB to Chair, PT/OT     #Pain Mgmt   - Tylenol 975mg po q8h standing    #GI/Bowel Mgmt   - Continent w/ diarrhea  - no bowel meds for now  - Protonix for gi ppx    #/Bladder Mgmt   - Continent, PVR per routine    #FEN   - Diet - Regular + Thins  [CCHO, DASH/TLC]    - Dysphagia  SLP - evaluation and treatment    #Precautions / PROPHYLAXIS:   - Falls, Cardiac, Left posterior hip precautions  - ortho: Weight bearing status: WBAT LLE  - Lungs: Aspiration, Incentive Spirometer   - DVT: Lovenox        MEDICAL PROGNOSIS: GOOD                                   REHAB POTENTIAL: GOOD  ESTIMATED DISPOSITION: HOME                             ELOS: 10-14 Days   EXPECTED THERAPY:     P.T. 2hr/day       O.T. 1hr/day      S.L.P. 0hr/day      EXP FREQUENCY: 5 days per 7 day period     PRESCREEN COMPARISON: I have reviewed the prescreen information and I have found no relevant changes between the preadmission screening and my post admission evaluation     RATIONALE FOR INPATIENT ADMISSION - Patient demonstrates the following: (check all that apply)  [X] Medically appropriate for rehabilitation admission  [X] Has attainable rehab goals with an appropriate initial discharge plan  [X] Has rehabilitation potential (expected to make a significant improvement within a reasonable period of time)   [X] Requires close medical managment by a rehab physician, rehab nursing care, Hospitalist and comprehensive interdisciplinary team (including PT, OT, & or SLP, Prosthetics and Orthotics)

## 2021-05-07 NOTE — H&P ADULT - HISTORY OF PRESENT ILLNESS
86yo F w PMH of HTN, COPD, and MI (11y/a) s/p stents x 2 which were replaced 3y/a on ASA only who presents after mechanical fall from standing complaining of left leg pain. Patient was stepping inside her home when she caught her foot on the door frame, She landed on her left side and hit left forehead, but denies LOC. Denies headache, lightheadedness, HA, dizziness, chest pain, or SOB. Had large forehead swelling on left which has improved on evaluation. No nausea or vomiting. Imaging studies revealed acute left femoral neck fracture. Patient was admitted to the trauma service & orthopedics consulted. Taken to the OR on 5/2 for left hip hemiarthroplasty. Patient tolerated procedure well. She worked with PT, OT and PMR who recommended acute rehab upon discharge.  Hospital course was complicated with Hyponatremia on fluid restriction and salt tabs, Leukocytosis, pleural effusions, Diarrhea 2/2 Colitis. CT A/P on 5/6 also shows bibasilar atelectasis.

## 2021-05-07 NOTE — PROGRESS NOTE ADULT - SUBJECTIVE AND OBJECTIVE BOX
SUBJECTIVE/24 hour events: Patient is a 85yFemale s/p trip and fall sustaining a left femoral neck fracture now pod#5 of left hip arthoplasty. Patient with no acute events overnight, sodium remains @ 126 off IV hydration on fluid restriction with salt tabs, teixeira replaced after failure of tov, has been on and off the supplemental O2 and pic score ranging from 7-8, CT scan of the abdomen and pelvis obtained 2/2 persistent abdominal distention, diarrhea and tenderness, scans showed Colitis involving the descending colon with bilateral pleural effusions with R>L, and mild ascites 2/2 to likely fluid overload as heart is normal size. Patient does have a leukocytosis now to 11.20 from 9 but has been afebrile, but complaining of weakness and feeling "wiped out" and states "the diarrhea is the same" and hemoglobin stable x3. Patient once medically stable will be able to be placed in acute rehab       Vital Signs Last 24 Hrs  T(C): 37.1 (06 May 2021 23:06), Max: 37.1 (06 May 2021 04:25)  T(F): 98.8 (06 May 2021 23:06), Max: 98.8 (06 May 2021 04:25)  HR: 80 (06 May 2021 23:06) (70 - 99)  BP: 165/66 (06 May 2021 23:06) (148/54 - 165/66)  BP(mean): --  RR: 18 (06 May 2021 23:06) (18 - 18)  SpO2: 93% (06 May 2021 23:06) (93% - 98%)  Drug Dosing Weight  Height (cm): 170.2 (01 May 2021 19:40)  Weight (kg): 61.7 (01 May 2021 21:53)  BMI (kg/m2): 21.3 (01 May 2021 21:53)  BSA (m2): 1.72 (01 May 2021 21:53)  I&O's Detail    05 May 2021 07:01  -  06 May 2021 07:00  --------------------------------------------------------  IN:  Total IN: 0 mL    OUT:    Indwelling Catheter - Urethral (mL): 350 mL    Intermittent Catheterization - Urethral (mL): 490 mL  Total OUT: 840 mL    Total NET: -840 mL      06 May 2021 07:01  -  07 May 2021 01:48  --------------------------------------------------------  IN:    IV PiggyBack: 500 mL  Total IN: 500 mL    OUT:    Intermittent Catheterization - Urethral (mL): 600 mL  Total OUT: 600 mL    Total NET: -100 mL        Allergies    No Known Allergies    Intolerances    Bactrim (Vomiting)                            10.4   11.20 )-----------( 194      ( 06 May 2021 07:00 )             28.6   05-06    126<L>  |  95<L>  |  6.0<L>  ----------------------------<  109<H>  3.2<L>   |  23.0  |  0.34<L>    Ca    6.8<L>      06 May 2021 07:00  Phos  1.8     05-06  Mg     2.0     05-06        ROS:    PHYSICAL EXAM:  Constitutional: " im feeling wiped out"  Respiratory: no respiratory distress, no dyspnea, speaking in full sentences, no supplemental o2 needed at this time   Cardiovascular: NSR on telemetry   Gastrointestinal: abdomen is distended, soft, mild ttp to llq, no guarding, no peritonitis, diarrhea   Genitourinary: teixeira in place   Extremities: LLE compartments soft, warm to touch, cap refill <2, wbat, NVI  Neurological: A&Ox3   Skin: warm, dry and no rashes       MEDICATIONS  (STANDING):  acetaminophen   Tablet .. 975 milliGRAM(s) Oral every 8 hours  amLODIPine   Tablet 5 milliGRAM(s) Oral daily  aspirin  chewable 81 milliGRAM(s) Oral daily  atorvastatin 40 milliGRAM(s) Oral at bedtime  budesonide 160 MICROgram(s)/formoterol 4.5 MICROgram(s) Inhaler 2 Puff(s) Inhalation two times a day  dextrose 40% Gel 15 Gram(s) Oral once  dextrose 50% Injectable 25 Gram(s) IV Push once  dextrose 50% Injectable 12.5 Gram(s) IV Push once  dextrose 50% Injectable 25 Gram(s) IV Push once  enoxaparin Injectable 40 milliGRAM(s) SubCutaneous every 24 hours  glucagon  Injectable 1 milliGRAM(s) IntraMuscular once  insulin lispro (ADMELOG) corrective regimen sliding scale   SubCutaneous Before meals and at bedtime  isosorbide   mononitrate ER Tablet (IMDUR) 60 milliGRAM(s) Oral daily  levothyroxine 75 MICROGram(s) Oral daily  melatonin 5 milliGRAM(s) Oral at bedtime  pantoprazole    Tablet 40 milliGRAM(s) Oral before breakfast  psyllium Powder 1 Packet(s) Oral daily  ranolazine 1000 milliGRAM(s) Oral <User Schedule>  ranolazine 500 milliGRAM(s) Oral <User Schedule>  saccharomyces boulardii 250 milliGRAM(s) Oral two times a day  sodium chloride 1 Gram(s) Oral every 12 hours    MEDICATIONS  (PRN):  ALBUTerol    90 MICROgram(s) HFA Inhaler 2 Puff(s) Inhalation every 12 hours PRN Shortness of Breath and/or Wheezing  albuterol/ipratropium for Nebulization 3 milliLiter(s) Nebulizer every 6 hours PRN Shortness of Breath and/or Wheezing  ondansetron Injectable 4 milliGRAM(s) IV Push every 6 hours PRN Nausea and/or Vomiting      RADIOLOGY STUDIES:    CULTURES:

## 2021-05-07 NOTE — PROGRESS NOTE ADULT - ATTENDING COMMENTS
The patient was seen and examined  Events noted  Still having some vertigo  Diarrhea improving  Remains hyponatremic  Needs 0.9% NaCl IVF  DVT prophylaxis  PT  Discharge planning

## 2021-05-07 NOTE — PROGRESS NOTE ADULT - PROBLEM SELECTOR PLAN 3
continue abdominal exams  monitor diarrhea resolve vs worsening?  consider ct scan of the abdomen and pelvis   monitor if leukocytosis continues  monitor for signs and symptoms of increasing infection
daily labs  now on fluid restriction   monitor urine output  monitor electrolytes.   salt tabs

## 2021-05-08 LAB
ANION GAP SERPL CALC-SCNC: 9 MMOL/L — SIGNIFICANT CHANGE UP (ref 5–17)
ANISOCYTOSIS BLD QL: SLIGHT — SIGNIFICANT CHANGE UP
BASOPHILS # BLD AUTO: 0 K/UL — SIGNIFICANT CHANGE UP (ref 0–0.2)
BASOPHILS NFR BLD AUTO: 0 % — SIGNIFICANT CHANGE UP (ref 0–2)
BUN SERPL-MCNC: 6 MG/DL — LOW (ref 8–20)
BURR CELLS BLD QL SMEAR: PRESENT — SIGNIFICANT CHANGE UP
CALCIUM SERPL-MCNC: 7.6 MG/DL — LOW (ref 8.6–10.2)
CHLORIDE SERPL-SCNC: 102 MMOL/L — SIGNIFICANT CHANGE UP (ref 98–107)
CO2 SERPL-SCNC: 20 MMOL/L — LOW (ref 22–29)
CREAT SERPL-MCNC: 0.34 MG/DL — LOW (ref 0.5–1.3)
ELLIPTOCYTES BLD QL SMEAR: SLIGHT — SIGNIFICANT CHANGE UP
EOSINOPHIL # BLD AUTO: 0.07 K/UL — SIGNIFICANT CHANGE UP (ref 0–0.5)
EOSINOPHIL NFR BLD AUTO: 0.8 % — SIGNIFICANT CHANGE UP (ref 0–6)
GIANT PLATELETS BLD QL SMEAR: PRESENT — SIGNIFICANT CHANGE UP
GLUCOSE BLDC GLUCOMTR-MCNC: 103 MG/DL — HIGH (ref 70–99)
GLUCOSE BLDC GLUCOMTR-MCNC: 121 MG/DL — HIGH (ref 70–99)
GLUCOSE BLDC GLUCOMTR-MCNC: 83 MG/DL — SIGNIFICANT CHANGE UP (ref 70–99)
GLUCOSE BLDC GLUCOMTR-MCNC: 94 MG/DL — SIGNIFICANT CHANGE UP (ref 70–99)
GLUCOSE SERPL-MCNC: 87 MG/DL — SIGNIFICANT CHANGE UP (ref 70–99)
HCT VFR BLD CALC: 30.9 % — LOW (ref 34.5–45)
HGB BLD-MCNC: 11.3 G/DL — LOW (ref 11.5–15.5)
LYMPHOCYTES # BLD AUTO: 0.8 K/UL — LOW (ref 1–3.3)
LYMPHOCYTES # BLD AUTO: 8.7 % — LOW (ref 13–44)
MACROCYTES BLD QL: SLIGHT — SIGNIFICANT CHANGE UP
MAGNESIUM SERPL-MCNC: 2 MG/DL — SIGNIFICANT CHANGE UP (ref 1.6–2.6)
MANUAL SMEAR VERIFICATION: SIGNIFICANT CHANGE UP
MCHC RBC-ENTMCNC: 30.6 PG — SIGNIFICANT CHANGE UP (ref 27–34)
MCHC RBC-ENTMCNC: 36.6 GM/DL — HIGH (ref 32–36)
MCV RBC AUTO: 83.7 FL — SIGNIFICANT CHANGE UP (ref 80–100)
MONOCYTES # BLD AUTO: 0.8 K/UL — SIGNIFICANT CHANGE UP (ref 0–0.9)
MONOCYTES NFR BLD AUTO: 8.7 % — SIGNIFICANT CHANGE UP (ref 2–14)
NEUTROPHILS # BLD AUTO: 7.39 K/UL — SIGNIFICANT CHANGE UP (ref 1.8–7.4)
NEUTROPHILS NFR BLD AUTO: 79.1 % — HIGH (ref 43–77)
NEUTS BAND # BLD: 0.9 % — SIGNIFICANT CHANGE UP (ref 0–8)
OVALOCYTES BLD QL SMEAR: SLIGHT — SIGNIFICANT CHANGE UP
PHOSPHATE SERPL-MCNC: 2.5 MG/DL — SIGNIFICANT CHANGE UP (ref 2.4–4.7)
PLAT MORPH BLD: NORMAL — SIGNIFICANT CHANGE UP
PLATELET # BLD AUTO: 265 K/UL — SIGNIFICANT CHANGE UP (ref 150–400)
POIKILOCYTOSIS BLD QL AUTO: SIGNIFICANT CHANGE UP
POTASSIUM SERPL-MCNC: 4.3 MMOL/L — SIGNIFICANT CHANGE UP (ref 3.5–5.3)
POTASSIUM SERPL-SCNC: 4.3 MMOL/L — SIGNIFICANT CHANGE UP (ref 3.5–5.3)
PROMYELOCYTES # FLD: 0.9 % — HIGH (ref 0–0)
RBC # BLD: 3.69 M/UL — LOW (ref 3.8–5.2)
RBC # FLD: 13.8 % — SIGNIFICANT CHANGE UP (ref 10.3–14.5)
RBC BLD AUTO: ABNORMAL
SODIUM SERPL-SCNC: 131 MMOL/L — LOW (ref 135–145)
VARIANT LYMPHS # BLD: 0.9 % — SIGNIFICANT CHANGE UP (ref 0–6)
WBC # BLD: 9.24 K/UL — SIGNIFICANT CHANGE UP (ref 3.8–10.5)
WBC # FLD AUTO: 9.24 K/UL — SIGNIFICANT CHANGE UP (ref 3.8–10.5)

## 2021-05-08 PROCEDURE — 99231 SBSQ HOSP IP/OBS SF/LOW 25: CPT

## 2021-05-08 RX ORDER — FLUTICASONE PROPIONATE 50 MCG
1 SPRAY, SUSPENSION NASAL
Refills: 0 | Status: DISCONTINUED | OUTPATIENT
Start: 2021-05-11 | End: 2021-05-20

## 2021-05-08 RX ORDER — ISOSORBIDE MONONITRATE 60 MG/1
60 TABLET, EXTENDED RELEASE ORAL DAILY
Refills: 0 | Status: DISCONTINUED | OUTPATIENT
Start: 2021-05-11 | End: 2021-05-20

## 2021-05-08 RX ORDER — LANOLIN ALCOHOL/MO/W.PET/CERES
6 CREAM (GRAM) TOPICAL AT BEDTIME
Refills: 0 | Status: DISCONTINUED | OUTPATIENT
Start: 2021-05-11 | End: 2021-05-20

## 2021-05-08 RX ORDER — LOPERAMIDE HCL 2 MG
1 TABLET ORAL ONCE
Refills: 0 | Status: DISCONTINUED | OUTPATIENT
Start: 2021-05-08 | End: 2021-05-08

## 2021-05-08 RX ORDER — LOPERAMIDE HCL 2 MG
2 TABLET ORAL DAILY
Refills: 0 | Status: DISCONTINUED | OUTPATIENT
Start: 2021-05-08 | End: 2021-05-11

## 2021-05-08 RX ORDER — PSYLLIUM SEED (WITH DEXTROSE)
1 POWDER (GRAM) ORAL
Refills: 0 | Status: DISCONTINUED | OUTPATIENT
Start: 2021-05-08 | End: 2021-05-11

## 2021-05-08 RX ORDER — TRAMADOL HYDROCHLORIDE 50 MG/1
25 TABLET ORAL ONCE
Refills: 0 | Status: DISCONTINUED | OUTPATIENT
Start: 2021-05-08 | End: 2021-05-08

## 2021-05-08 RX ORDER — IBUPROFEN 200 MG
400 TABLET ORAL EVERY 8 HOURS
Refills: 0 | Status: DISCONTINUED | OUTPATIENT
Start: 2021-05-08 | End: 2021-05-11

## 2021-05-08 RX ORDER — LIDOCAINE 4 G/100G
1 CREAM TOPICAL DAILY
Refills: 0 | Status: DISCONTINUED | OUTPATIENT
Start: 2021-05-08 | End: 2021-05-11

## 2021-05-08 RX ADMIN — ATORVASTATIN CALCIUM 40 MILLIGRAM(S): 80 TABLET, FILM COATED ORAL at 21:21

## 2021-05-08 RX ADMIN — PANTOPRAZOLE SODIUM 40 MILLIGRAM(S): 20 TABLET, DELAYED RELEASE ORAL at 05:19

## 2021-05-08 RX ADMIN — Medication 975 MILLIGRAM(S): at 21:21

## 2021-05-08 RX ADMIN — LIDOCAINE 1 PATCH: 4 CREAM TOPICAL at 19:35

## 2021-05-08 RX ADMIN — Medication 975 MILLIGRAM(S): at 05:19

## 2021-05-08 RX ADMIN — Medication 250 MILLIGRAM(S): at 17:40

## 2021-05-08 RX ADMIN — Medication 975 MILLIGRAM(S): at 21:22

## 2021-05-08 RX ADMIN — RANOLAZINE 500 MILLIGRAM(S): 500 TABLET, FILM COATED, EXTENDED RELEASE ORAL at 17:40

## 2021-05-08 RX ADMIN — LIDOCAINE 1 PATCH: 4 CREAM TOPICAL at 13:33

## 2021-05-08 RX ADMIN — Medication 975 MILLIGRAM(S): at 12:14

## 2021-05-08 RX ADMIN — Medication 1 PACKET(S): at 12:10

## 2021-05-08 RX ADMIN — Medication 250 MILLIGRAM(S): at 05:19

## 2021-05-08 RX ADMIN — Medication 81 MILLIGRAM(S): at 12:10

## 2021-05-08 RX ADMIN — BUDESONIDE AND FORMOTEROL FUMARATE DIHYDRATE 2 PUFF(S): 160; 4.5 AEROSOL RESPIRATORY (INHALATION) at 09:33

## 2021-05-08 RX ADMIN — TRAMADOL HYDROCHLORIDE 25 MILLIGRAM(S): 50 TABLET ORAL at 16:20

## 2021-05-08 RX ADMIN — Medication 5 MILLIGRAM(S): at 21:21

## 2021-05-08 RX ADMIN — Medication 400 MILLIGRAM(S): at 15:49

## 2021-05-08 RX ADMIN — Medication 75 MICROGRAM(S): at 05:19

## 2021-05-08 RX ADMIN — Medication 3 MILLILITER(S): at 13:51

## 2021-05-08 RX ADMIN — RANOLAZINE 1000 MILLIGRAM(S): 500 TABLET, FILM COATED, EXTENDED RELEASE ORAL at 05:44

## 2021-05-08 RX ADMIN — Medication 975 MILLIGRAM(S): at 05:49

## 2021-05-08 RX ADMIN — Medication 2 MILLIGRAM(S): at 21:21

## 2021-05-08 RX ADMIN — Medication 975 MILLIGRAM(S): at 12:10

## 2021-05-08 RX ADMIN — Medication 400 MILLIGRAM(S): at 16:15

## 2021-05-08 RX ADMIN — ENOXAPARIN SODIUM 40 MILLIGRAM(S): 100 INJECTION SUBCUTANEOUS at 05:19

## 2021-05-08 RX ADMIN — BUDESONIDE AND FORMOTEROL FUMARATE DIHYDRATE 2 PUFF(S): 160; 4.5 AEROSOL RESPIRATORY (INHALATION) at 21:35

## 2021-05-08 RX ADMIN — TRAMADOL HYDROCHLORIDE 25 MILLIGRAM(S): 50 TABLET ORAL at 16:31

## 2021-05-08 RX ADMIN — ISOSORBIDE MONONITRATE 60 MILLIGRAM(S): 60 TABLET, EXTENDED RELEASE ORAL at 12:10

## 2021-05-08 RX ADMIN — AMLODIPINE BESYLATE 5 MILLIGRAM(S): 2.5 TABLET ORAL at 05:19

## 2021-05-08 NOTE — PROGRESS NOTE ADULT - SUBJECTIVE AND OBJECTIVE BOX
HPI/OVERNIGHT EVENTS: Overnight, patient had worsening pain at site of rib fractures requiring one-time dose of 2mg morphine. Otherwise no acute events. Patient notes left-sided chest pain but has no other complaints; PIC scores around 7-8. Denies abdominal pain. Tolerating PO. No f/c/n/v, chest pain, SOB.    MEDICATIONS  (STANDING):  acetaminophen   Tablet .. 975 milliGRAM(s) Oral every 8 hours  amLODIPine   Tablet 5 milliGRAM(s) Oral daily  aspirin  chewable 81 milliGRAM(s) Oral daily  atorvastatin 40 milliGRAM(s) Oral at bedtime  budesonide 160 MICROgram(s)/formoterol 4.5 MICROgram(s) Inhaler 2 Puff(s) Inhalation two times a day  dextrose 40% Gel 15 Gram(s) Oral once  dextrose 50% Injectable 25 Gram(s) IV Push once  dextrose 50% Injectable 12.5 Gram(s) IV Push once  dextrose 50% Injectable 25 Gram(s) IV Push once  enoxaparin Injectable 40 milliGRAM(s) SubCutaneous every 24 hours  glucagon  Injectable 1 milliGRAM(s) IntraMuscular once  insulin lispro (ADMELOG) corrective regimen sliding scale   SubCutaneous Before meals and at bedtime  isosorbide   mononitrate ER Tablet (IMDUR) 60 milliGRAM(s) Oral daily  levothyroxine 75 MICROGram(s) Oral daily  melatonin 5 milliGRAM(s) Oral at bedtime  pantoprazole    Tablet 40 milliGRAM(s) Oral before breakfast  psyllium Powder 1 Packet(s) Oral daily  ranolazine 1000 milliGRAM(s) Oral <User Schedule>  ranolazine 500 milliGRAM(s) Oral <User Schedule>  saccharomyces boulardii 250 milliGRAM(s) Oral two times a day  sodium chloride 0.9%. 1000 milliLiter(s) (42 mL/Hr) IV Continuous <Continuous>    MEDICATIONS  (PRN):  ALBUTerol    90 MICROgram(s) HFA Inhaler 2 Puff(s) Inhalation every 12 hours PRN Shortness of Breath and/or Wheezing  albuterol/ipratropium for Nebulization 3 milliLiter(s) Nebulizer every 6 hours PRN Shortness of Breath and/or Wheezing  ondansetron Injectable 4 milliGRAM(s) IV Push every 6 hours PRN Nausea and/or Vomiting      Vital Signs Last 24 Hrs  T(C): 36.7 (07 May 2021 23:50), Max: 36.8 (07 May 2021 05:01)  T(F): 98.1 (07 May 2021 23:50), Max: 98.3 (07 May 2021 05:01)  HR: 83 (07 May 2021 23:50) (74 - 83)  BP: 144/73 (07 May 2021 23:50) (138/70 - 153/76)  BP(mean): --  RR: 18 (07 May 2021 23:50) (18 - 18)  SpO2: 92% (07 May 2021 23:50) (90% - 94%)    Gen: patient laying in bed, A&Ox3, NAD  HEENT: EOMI / PERRL b/l  Pulm: regular respiratory rate, pain with deep inspiration but otherwise no respiratory distress  CV: RRR  GI: Abdomen soft but moderately distended, no TTP  Neurological: no gross sensory / motor deficits  Ext: LLE compartments soft, warm to touch, cap refill <2    I&O's Detail    06 May 2021 07:01  -  07 May 2021 07:00  --------------------------------------------------------  IN:    IV PiggyBack: 500 mL  Total IN: 500 mL    OUT:    Intermittent Catheterization - Urethral (mL): 1600 mL  Total OUT: 1600 mL    Total NET: -1100 mL          LABS:                        11.8   10.28 )-----------( 244      ( 07 May 2021 05:52 )             32.4     05-07    133<L>  |  101  |  3.0<L>  ----------------------------<  99  3.6   |  22.0  |  0.31<L>    Ca    7.6<L>      07 May 2021 05:52  Phos  1.9     05-07  Mg     2.0     05-07        Urinalysis Basic - ( 06 May 2021 09:37 )    Color: Yellow / Appearance: Clear / S.005 / pH: x  Gluc: x / Ketone: Negative  / Bili: Negative / Urobili: Negative mg/dL   Blood: x / Protein: Negative mg/dL / Nitrite: Negative   Leuk Esterase: Negative / RBC: 0-2 /HPF / WBC 0-2   Sq Epi: x / Non Sq Epi: Occasional / Bacteria: Negative

## 2021-05-08 NOTE — H&P ADULT - HISTORY OF PRESENT ILLNESS
84yo F w PMH of HTN, COPD, and MI (11y/a) s/p stents x 2 which were replaced 3y/a on ASA only who presents after mechanical fall from standing complaining of left leg pain. Patient was stepping inside her home when she caught her foot on the door frame, She landed on her left side and hit left forehead, but denies LOC. Denies headache, lightheadedness, HA, dizziness, chest pain, or SOB. Had large forehead swelling on left which has since improved. No nausea or vomiting. Imaging studies revealed acute left femoral neck fracture. Patient was admitted to the trauma service & orthopedics consulted. Taken to the OR on 5/2 for left hip hemiarthroplasty. Patient tolerated procedure well. She worked with PT, OT and PMR who recommended acute rehab upon discharge.  Hospital course was complicated with Hyponatremia on fluid restriction and salt tabs, Leukocytosis, pleural effusions, Diarrhea 2/2 Colitis. CT A/P on 5/6 also shows bibasilar atelectasis.     Pt cleared for d/c to acute rehab on 5/8/21.  Seen and examined on arrival at [facility].

## 2021-05-08 NOTE — H&P ADULT - ASSESSMENT
Assessment/Plan:  PRESLEY BECK is a 86yo F w PMH of HTN, COPD, and MI (11y/a) s/p stents x 2 which were replaced 3y/a on ASA only who presents to [facility] on 5/1/21 after mechanical fall from standing complaining of left leg pain, found to have displaced femoral neck fracture s/p left hip hemiarthroplasty on 5/2. Hospital course was complicated with Hyponatremia on fluid restriction and salt tabs, Leukocytosis, pleural effusions, Diarrhea 2/2 Colitis. Patient now admitted for a multidisciplinary rehab program. 05-07-21 @ 22:53    -------------    Rehab Management/MEDICAL MANAGEMENT     #Displaced LEFT femoral neck fracture s/p left hip hemiarthroplasty on 5/2  - Gait Instability, ADL impairments and Functional impairments: start Comprehensive Rehab Program of PT/OT/SLP  - 3 hours a day, 5 days a week  - P&O not needed  - WBAT LLE  - May shower after post-op day #5 (after 5/7)  - The dressing is to be removed on day #9 (5/11)  - will be seen in the Dr. Moore's Ortho office between 2-3 weeks for wound check. Sutures/Staples/Tape will be removed at that time.  - Posterior Hip Precautions for 6 weeks per Ortho (5/2-6/13)  - Lovenox for DVT PPx for 4 weeks (5/2-5/30)    #Hyponatremia  - Hypovolemic?  - was getting salt tabs 1g q12h on 5/6 and IVF NS 42cc/hr on 5/7 (likely d/t diarrhea) at [facility]  - monitor CMP/BMPs    #Diarrhea 2/2 Colitis; Leukocytosis  - CT A/P w/ PO contrast on 5/6 shows colitis  - WBC was 11.2 on 5/6  - Otherwise afebrile  - PO hydration in rehab, otherwise IVF hydration as tolerated  - Florastor    #B/L Pleural Effusions; Leukocytosis  - seen on CT A/P on 5/6/21 at [facility]  - Aspiration?   - currently no fever, SOB.    #HTN  - amlodipine 5mg po daily    #COPD  - albuterol prn  - symbicort bid    #H/O MI s/p Stents  - ASA 81 qd  - Lipitor 40mg po qhs  - Ranexa 1000mg po in AM and 500mg po in PM    #hypothyroidism  - levothyroxine 75mcg po qd    #Pre-Diabetes - A1c 6.3 on 5/2  - will not continue do FS and ISS here in rehab as patient's sugars have been well controlled while in acute hospital at [facility].  - management via diet and exercise  - Diabetic educator    #Sleep  - melatonin PRN    #Skin  - Pressure injury/Skin: OOB to Chair, PT/OT     #Pain Mgmt   - Tylenol 975mg po q8h standing    #GI/Bowel Mgmt   - Continent w/ diarrhea  - no bowel meds for now  - Protonix for gi ppx    #/Bladder Mgmt   - Continent, PVR per routine    #FEN   - Diet - Regular + Thins  [CCHO, DASH/TLC]    - Dysphagia  SLP - evaluation and treatment    #Precautions / PROPHYLAXIS:   - Falls, Cardiac, Left posterior hip precautions  - ortho: Weight bearing status: WBAT LLE  - Lungs: Aspiration, Incentive Spirometer   - DVT: Lovenox        MEDICAL PROGNOSIS: GOOD                                   REHAB POTENTIAL: GOOD  ESTIMATED DISPOSITION: HOME                             ELOS: 10-14 Days   EXPECTED THERAPY:     P.T. 2hr/day       O.T. 1hr/day      S.L.P. 0hr/day      EXP FREQUENCY: 5 days per 7 day period     PRESCREEN COMPARISON: I have reviewed the prescreen information and I have found no relevant changes between the preadmission screening and my post admission evaluation     RATIONALE FOR INPATIENT ADMISSION - Patient demonstrates the following: (check all that apply)  [X] Medically appropriate for rehabilitation admission  [X] Has attainable rehab goals with an appropriate initial discharge plan  [X] Has rehabilitation potential (expected to make a significant improvement within a reasonable period of time)   [X] Requires close medical managment by a rehab physician, rehab nursing care, Hospitalist and comprehensive interdisciplinary team (including PT, OT, & or SLP, Prosthetics and Orthotics)         Assessment/Plan:  PRESLEY BECK is a 86yo F w PMH of HTN, COPD, and MI (11y/a) s/p stents x 2 which were replaced 3y/a on ASA only who presents to [facility] on 5/1/21 after mechanical fall from standing complaining of left leg pain, found to have displaced femoral neck fracture s/p left hip hemiarthroplasty on 5/2. Hospital course was complicated with Hyponatremia on fluid restriction and salt tabs, Leukocytosis, pleural effusions, Diarrhea 2/2 Colitis. Patient now admitted for a multidisciplinary rehab program. 05-07-21 @ 22:53    -------------    Rehab Management/MEDICAL MANAGEMENT     #Displaced LEFT femoral neck fracture s/p left hip hemiarthroplasty on 5/2  - Gait Instability, ADL impairments and Functional impairments: start Comprehensive Rehab Program of PT/OT/SLP  - 3 hours a day, 5 days a week  - P&O not needed  - WBAT LLE  - May shower after post-op day #5 (after 5/7)  - The dressing is to be removed on day #9 (5/11)  - will be seen in the Dr. Moore's Ortho office between 2-3 weeks for wound check. Sutures/Staples/Tape will be removed at that time.  - Posterior Hip Precautions for 6 weeks per Ortho (5/2-6/13)  - Lovenox for DVT PPx for 4 weeks (5/2-5/30)    #Hyponatremia  - Hypovolemic?  - was getting salt tabs 1g q12h on 5/6 and IVF NS 42cc/hr on 5/7 (likely d/t diarrhea) at [facility]  - monitor CMP/BMPs    #Diarrhea 2/2 Colitis; Leukocytosis  - CT A/P w/ PO contrast on 5/6 shows colitis  - WBC was 11.2 on 5/6  - Otherwise afebrile  - PO hydration in rehab, otherwise IVF hydration as tolerated  - Florastor    #B/L Pleural Effusions; Leukocytosis  - seen on CT A/P on 5/6/21 at [facility]  - Aspiration?   - currently no fever, SOB.    #HTN  - amlodipine 5mg po daily    #COPD  - albuterol prn  - symbicort bid  - discharge records list prednisone 10mg daily; unclear why or whether this is appropriate in the setting of recent hip surgery    #H/O MI s/p Stents  - ASA 81 qd  - Lipitor 40mg po qhs  - Ranexa 1000mg po in AM and 500mg po in PM    #hypothyroidism  - levothyroxine 75mcg po qd    #Pre-Diabetes - A1c 6.3 on 5/2  - will not continue do FS and ISS here in rehab as patient's sugars have been well controlled while in acute hospital at [facility].  - management via diet and exercise  - Diabetic educator    #Sleep  - melatonin PRN    #Skin  - Pressure injury/Skin: OOB to Chair, PT/OT     #Pain Mgmt   - Tylenol 975mg po q8h standing    #GI/Bowel Mgmt   - Continent w/ diarrhea  - no bowel meds for now  - Protonix for gi ppx    #/Bladder Mgmt   - Continent, PVR per routine    #FEN   - Diet - Regular + Thins  [CCHO, DASH/TLC]    - Dysphagia  SLP - evaluation and treatment    #Precautions / PROPHYLAXIS:   - Falls, Cardiac, Left posterior hip precautions  - ortho: Weight bearing status: WBAT LLE  - Lungs: Aspiration, Incentive Spirometer   - DVT: Lovenox        MEDICAL PROGNOSIS: GOOD                                   REHAB POTENTIAL: GOOD  ESTIMATED DISPOSITION: HOME                             ELOS: 10-14 Days   EXPECTED THERAPY:     P.T. 2hr/day       O.T. 1hr/day      S.L.P. 0hr/day      EXP FREQUENCY: 5 days per 7 day period     PRESCREEN COMPARISON: I have reviewed the prescreen information and I have found no relevant changes between the preadmission screening and my post admission evaluation     RATIONALE FOR INPATIENT ADMISSION - Patient demonstrates the following: (check all that apply)  [X] Medically appropriate for rehabilitation admission  [X] Has attainable rehab goals with an appropriate initial discharge plan  [X] Has rehabilitation potential (expected to make a significant improvement within a reasonable period of time)   [X] Requires close medical management by a rehab physician, rehab nursing care, Hospitalist and comprehensive interdisciplinary team (including PT, OT, & or SLP, Prosthetics and Orthotics)

## 2021-05-08 NOTE — H&P ADULT - NSHPPOASURGSITEINCISION_GEN_ALL_CORE
Problem Dx:  Splenomegaly  Tumor lysis syndrome  Anemia due to other cause  Hyperleukocytosis  Hemolysis in   Hyperbilirubinemia  Miguel Ángel positive  Hyperuricemia  Observation for suspected malignant neoplasm  Lymphocytosis  Thrombocytopenia  Anemia, unspecified type  Other elevated white blood cell (WBC) count    Protocol: [id number]  Cycle: Induction   Day: 2  Interval History:  No acute events overnight.  Eating and drinking well.  Afebrile.  Found to have a white spot on her tongue today which is likely thrush.  Getting BID prednisolone with no issues taking the med.  No transfusions needed.     Change from previous past medical, family or social history:	[] No	[] Yes:      REVIEW OF SYSTEMS  All review of systems negative, except for those marked:  General:		[] Abnormal:  Pulmonary:		[] Abnormal:  Cardiac:		[] Abnormal:  Gastrointestinal:	[] Abnormal:  ENT:			[x] Abnormal: thrush   Renal/Urologic:		[] Abnormal:  Musculoskeletal		[] Abnormal:  Endocrine:		[] Abnormal:  Hematologic:		[] Abnormal:  Neurologic:		[] Abnormal:  Skin:			[] Abnormal:  Allergy/Immune		[] Abnormal:  Psychiatric:		[] Abnormal:    Allergies    No Known Allergies    Intolerances      MEDICATIONS  (STANDING):  allopurinol  Oral Liquid - Peds 14 milliGRAM(s) Oral three times a day after meals  dextrose 10% -  250 milliLiter(s) (16 mL/Hr) IV Continuous <Continuous>  famotidine IV Intermittent - Peds 1.6 milliGRAM(s) IV Intermittent every 24 hours  heparin   Infusion -  0.155 Unit(s)/kG/Hr (1 mL/Hr) IV Continuous <Continuous>  nystatin Oral Liquid - Peds [id number] Unit(s) Oral every 6 hours  prednisoLONE    Syrup 3 mG/mL (Chemo) 2.1 milliGRAM(s) Oral three times a day    MEDICATIONS  (PRN):  hydrOXYzine IV Intermittent - Peds. 1.6 milliGRAM(s) IV Intermittent every 6 hours PRN Nausea/vomiting  ondansetron IV Intermittent - Peds 0.5 milliGRAM(s) IV Intermittent every 8 hours PRN Nausea and/or Vomiting(First-line)    DIET:  Pediatric Regular    Vital Signs Last 24 Hrs  T(C): 37.2 (2018 15:00), Max: 37.2 (2018 09:00)  T(F): 98.9 (2018 15:00), Max: 98.9 (2018 09:00)  HR: 165 (2018 15:00) (123 - 168)  BP: 97/63 (2018 15:00) (76/51 - 97/63)  BP(mean): 75 (2018 15:00) (59 - 75)  RR: 30 (2018 15:00) (30 - 54)  SpO2: 97% (2018 15:00) (96% - 100%)  I&O's Summary    2018 07:01  -  2018 07:00  --------------------------------------------------------  IN: 591 mL / OUT: 575 mL / NET: 16 mL    2018 07:01  -  2018 16:46  --------------------------------------------------------  IN: 271 mL / OUT: 204 mL / NET: 67 mL    Pain Score (0-10): 0     Lansky/Karnofsky Score: unable to designate score due to age less than 1. Currently at baseline expected for age      PATIENT CARE ACCESS  [] Peripheral IV  [] Central Venous Line	[] R	[] L	[] IJ	[] Fem	[] SC			[] Placed:  [] PICC:				[x] Broviac - dbl lumen		[] Mediport  [] Urinary Catheter, Date Placed:  [] Necessity of urinary, arterial, and venous catheters discussed    PHYSICAL EXAM  All physical exam findings normal, except those marked:  Constitutional	Well appearing, in no apparent distress, in crib  Eyes		ANAMARIA, no conjunctival injection, symmetric gaze  ENT		Mucus membranes moist, no mouth sores or mucosal bleeding  Neck		No thyromegaly or masses appreciated  Cardiovascular	Regular rate and rhythm, normal S1, S2, no murmurs, rubs or gallops  Respiratory	Clear to auscultation bilaterally, no wheezing  Abdominal	+splenomagely  		Normal external female genitalia  Lymphatic	No adenopathy appreciated  Extremities	No cyanosis or edema, symmetric pulses  Skin		No rashes or nodules  Neurologic	No focal deficits, gait normal and normal motor exam  Psychiatric	Appropriate affect   Musculoskeletal		Full range of motion and no deformities appreciated, normal strength in all extremities    Lab Results:  CBC  CBC Full  -  ( 2018 15:00 )  WBC Count : 3.43 K/uL  Hemoglobin : 11.5 g/dL  Hematocrit : 33.0 %  Platelet Count - Automated : 68 K/uL  Mean Cell Volume : 96.2 fL  Mean Cell Hemoglobin : 33.5 pg  Mean Cell Hemoglobin Concentration : 34.8 %  Auto Neutrophil # : 1.04 K/uL  Auto Lymphocyte # : 2.02 K/uL  Auto Monocyte # : 0.15 K/uL  Auto Eosinophil # : 0.15 K/uL  Auto Basophil # : 0.01 K/uL  Auto Neutrophil % : 30.3 %  Auto Lymphocyte % : 58.9 %  Auto Monocyte % : 4.4 %  Auto Eosinophil % : 4.4 %  Auto Basophil % : 0.3 %    .		Differential:	[] Automated		[] Manual  Chemistry      139  |  103  |  19  ----------------------------<  166<H>  5.2   |  23  |  0.39    Ca    9.5      2018 15:00  Phos  7.6     02-24  Mg     1.9     02-24    TPro  5.6<L>  /  Alb  3.5  /  TBili  4.5<H>  /  DBili  0.4<H>  /  AST  26  /  ALT  12  /  AlkPhos  134  02-23    LIVER FUNCTIONS - ( 2018 03:00 )  Alb: 3.5 g/dL / Pro: 5.6 g/dL / ALK PHOS: 134 u/L / ALT: 12 u/L / AST: 26 u/L / GGT: x yes

## 2021-05-08 NOTE — PROGRESS NOTE ADULT - ATTENDING COMMENTS
I approve that she gets her request.  Please provide completion of any necessary forms/letters. The patient was seen and examined  Events noted  No new problems  Less vertigo  Urine flow better  Will remove teixeira catheter  Mobilize with PT  DVT prophylaxis

## 2021-05-08 NOTE — H&P ADULT - NSHPPHYSICALEXAM_GEN_ALL_CORE
PHYSICAL EXAM  VITALS  T(C): 36.9 (05-08-21 @ 11:01), Max: 36.9 (05-08-21 @ 11:01)  HR: 80 (05-08-21 @ 11:01) (77 - 83)  BP: 158/62 (05-08-21 @ 11:01) (144/73 - 160/71)  RR: 18 (05-08-21 @ 11:01) (18 - 18)  SpO2: 92% (05-08-21 @ 11:01) (92% - 94%)    Gen - NAD, Comfortable  HEENT - NCAT, EOMI, MMM  Neck - Supple, No limited ROM  Pulm - CTAB, No wheeze, No rhonchi, No crackles  Cardiovascular - RRR, S1S2, No murmurs  Abdomen - Soft, NT/ND, +BS  Extremities - No C/C/E, No calf tenderness  Neuro-     Cognitive - AAOx3     Communication - Fluent, No dysarthria     Attention: Intact      Judgement: Good evidence of judgement     Memory: Recall 3 objects immediate and 3 min later         Cranial Nerves - CN 2-12 intact     Motor -                     LEFT    UE - ShAB 5/5, EF 5/5, EE 5/5, WE 5/5,  5/5                    RIGHT UE - ShAB 5/5, EF 5/5, EE 5/5, WE 5/5,  5/5                    LEFT    LE - HF 5/5, KE 5/5, DF 5/5, PF 5/5                    RIGHT LE - HF 5/5, KE 5/5, DF 5/5, PF 5/5        Sensory - Intact to LT     Reflexes - DTR Intact, No primitive reflexive     Coordination - FTN intact     Tone - normal  Psychiatric - Mood stable, Affect WNL  Skin:  all skin intact

## 2021-05-09 LAB
ANION GAP SERPL CALC-SCNC: 9 MMOL/L — SIGNIFICANT CHANGE UP (ref 5–17)
BASOPHILS # BLD AUTO: 0.03 K/UL — SIGNIFICANT CHANGE UP (ref 0–0.2)
BASOPHILS NFR BLD AUTO: 0.3 % — SIGNIFICANT CHANGE UP (ref 0–2)
BUN SERPL-MCNC: 6 MG/DL — LOW (ref 8–20)
CALCIUM SERPL-MCNC: 8.1 MG/DL — LOW (ref 8.6–10.2)
CHLORIDE SERPL-SCNC: 101 MMOL/L — SIGNIFICANT CHANGE UP (ref 98–107)
CO2 SERPL-SCNC: 22 MMOL/L — SIGNIFICANT CHANGE UP (ref 22–29)
CREAT SERPL-MCNC: 0.32 MG/DL — LOW (ref 0.5–1.3)
EOSINOPHIL # BLD AUTO: 0.24 K/UL — SIGNIFICANT CHANGE UP (ref 0–0.5)
EOSINOPHIL NFR BLD AUTO: 2.4 % — SIGNIFICANT CHANGE UP (ref 0–6)
GLUCOSE BLDC GLUCOMTR-MCNC: 120 MG/DL — HIGH (ref 70–99)
GLUCOSE BLDC GLUCOMTR-MCNC: 149 MG/DL — HIGH (ref 70–99)
GLUCOSE BLDC GLUCOMTR-MCNC: 175 MG/DL — HIGH (ref 70–99)
GLUCOSE BLDC GLUCOMTR-MCNC: 187 MG/DL — HIGH (ref 70–99)
GLUCOSE SERPL-MCNC: 108 MG/DL — HIGH (ref 70–99)
HCT VFR BLD CALC: 32.9 % — LOW (ref 34.5–45)
HGB BLD-MCNC: 11.7 G/DL — SIGNIFICANT CHANGE UP (ref 11.5–15.5)
IMM GRANULOCYTES NFR BLD AUTO: 8 % — HIGH (ref 0–1.5)
LYMPHOCYTES # BLD AUTO: 1.24 K/UL — SIGNIFICANT CHANGE UP (ref 1–3.3)
LYMPHOCYTES # BLD AUTO: 12.5 % — LOW (ref 13–44)
MAGNESIUM SERPL-MCNC: 2 MG/DL — SIGNIFICANT CHANGE UP (ref 1.6–2.6)
MCHC RBC-ENTMCNC: 30 PG — SIGNIFICANT CHANGE UP (ref 27–34)
MCHC RBC-ENTMCNC: 35.6 GM/DL — SIGNIFICANT CHANGE UP (ref 32–36)
MCV RBC AUTO: 84.4 FL — SIGNIFICANT CHANGE UP (ref 80–100)
MONOCYTES # BLD AUTO: 1.15 K/UL — HIGH (ref 0–0.9)
MONOCYTES NFR BLD AUTO: 11.6 % — SIGNIFICANT CHANGE UP (ref 2–14)
NEUTROPHILS # BLD AUTO: 6.46 K/UL — SIGNIFICANT CHANGE UP (ref 1.8–7.4)
NEUTROPHILS NFR BLD AUTO: 65.2 % — SIGNIFICANT CHANGE UP (ref 43–77)
PHOSPHATE SERPL-MCNC: 3.1 MG/DL — SIGNIFICANT CHANGE UP (ref 2.4–4.7)
PLATELET # BLD AUTO: 292 K/UL — SIGNIFICANT CHANGE UP (ref 150–400)
POTASSIUM SERPL-MCNC: 3.7 MMOL/L — SIGNIFICANT CHANGE UP (ref 3.5–5.3)
POTASSIUM SERPL-SCNC: 3.7 MMOL/L — SIGNIFICANT CHANGE UP (ref 3.5–5.3)
RBC # BLD: 3.9 M/UL — SIGNIFICANT CHANGE UP (ref 3.8–5.2)
RBC # FLD: 14.1 % — SIGNIFICANT CHANGE UP (ref 10.3–14.5)
SODIUM SERPL-SCNC: 132 MMOL/L — LOW (ref 135–145)
WBC # BLD: 9.91 K/UL — SIGNIFICANT CHANGE UP (ref 3.8–10.5)
WBC # FLD AUTO: 9.91 K/UL — SIGNIFICANT CHANGE UP (ref 3.8–10.5)

## 2021-05-09 PROCEDURE — 99231 SBSQ HOSP IP/OBS SF/LOW 25: CPT | Mod: GC

## 2021-05-09 RX ADMIN — RANOLAZINE 500 MILLIGRAM(S): 500 TABLET, FILM COATED, EXTENDED RELEASE ORAL at 17:17

## 2021-05-09 RX ADMIN — Medication 75 MICROGRAM(S): at 05:23

## 2021-05-09 RX ADMIN — Medication 2 MILLIGRAM(S): at 13:05

## 2021-05-09 RX ADMIN — Medication 975 MILLIGRAM(S): at 06:10

## 2021-05-09 RX ADMIN — Medication 1: at 11:50

## 2021-05-09 RX ADMIN — Medication 81 MILLIGRAM(S): at 11:31

## 2021-05-09 RX ADMIN — Medication 975 MILLIGRAM(S): at 05:24

## 2021-05-09 RX ADMIN — Medication 975 MILLIGRAM(S): at 20:52

## 2021-05-09 RX ADMIN — BUDESONIDE AND FORMOTEROL FUMARATE DIHYDRATE 2 PUFF(S): 160; 4.5 AEROSOL RESPIRATORY (INHALATION) at 08:41

## 2021-05-09 RX ADMIN — BUDESONIDE AND FORMOTEROL FUMARATE DIHYDRATE 2 PUFF(S): 160; 4.5 AEROSOL RESPIRATORY (INHALATION) at 20:12

## 2021-05-09 RX ADMIN — Medication 975 MILLIGRAM(S): at 20:51

## 2021-05-09 RX ADMIN — ATORVASTATIN CALCIUM 40 MILLIGRAM(S): 80 TABLET, FILM COATED ORAL at 20:51

## 2021-05-09 RX ADMIN — Medication 975 MILLIGRAM(S): at 12:21

## 2021-05-09 RX ADMIN — Medication 1: at 20:51

## 2021-05-09 RX ADMIN — ENOXAPARIN SODIUM 40 MILLIGRAM(S): 100 INJECTION SUBCUTANEOUS at 05:23

## 2021-05-09 RX ADMIN — LIDOCAINE 1 PATCH: 4 CREAM TOPICAL at 05:24

## 2021-05-09 RX ADMIN — Medication 975 MILLIGRAM(S): at 11:31

## 2021-05-09 RX ADMIN — LIDOCAINE 1 PATCH: 4 CREAM TOPICAL at 13:06

## 2021-05-09 RX ADMIN — ISOSORBIDE MONONITRATE 60 MILLIGRAM(S): 60 TABLET, EXTENDED RELEASE ORAL at 13:05

## 2021-05-09 RX ADMIN — Medication 5 MILLIGRAM(S): at 20:51

## 2021-05-09 RX ADMIN — AMLODIPINE BESYLATE 5 MILLIGRAM(S): 2.5 TABLET ORAL at 05:23

## 2021-05-09 RX ADMIN — RANOLAZINE 1000 MILLIGRAM(S): 500 TABLET, FILM COATED, EXTENDED RELEASE ORAL at 05:24

## 2021-05-09 RX ADMIN — LIDOCAINE 1 PATCH: 4 CREAM TOPICAL at 18:23

## 2021-05-09 RX ADMIN — Medication 1 PACKET(S): at 05:23

## 2021-05-09 RX ADMIN — PANTOPRAZOLE SODIUM 40 MILLIGRAM(S): 20 TABLET, DELAYED RELEASE ORAL at 05:23

## 2021-05-09 NOTE — PROGRESS NOTE ADULT - SUBJECTIVE AND OBJECTIVE BOX
no acute events overnight. still has      MEDICATIONS  (STANDING):  acetaminophen   Tablet .. 975 milliGRAM(s) Oral every 8 hours  amLODIPine   Tablet 5 milliGRAM(s) Oral daily  aspirin  chewable 81 milliGRAM(s) Oral daily  atorvastatin 40 milliGRAM(s) Oral at bedtime  budesonide 160 MICROgram(s)/formoterol 4.5 MICROgram(s) Inhaler 2 Puff(s) Inhalation two times a day  dextrose 40% Gel 15 Gram(s) Oral once  dextrose 50% Injectable 25 Gram(s) IV Push once  dextrose 50% Injectable 12.5 Gram(s) IV Push once  dextrose 50% Injectable 25 Gram(s) IV Push once  enoxaparin Injectable 40 milliGRAM(s) SubCutaneous every 24 hours  glucagon  Injectable 1 milliGRAM(s) IntraMuscular once  insulin lispro (ADMELOG) corrective regimen sliding scale   SubCutaneous Before meals and at bedtime  isosorbide   mononitrate ER Tablet (IMDUR) 60 milliGRAM(s) Oral daily  levothyroxine 75 MICROGram(s) Oral daily  lidocaine   Patch 1 Patch Transdermal daily  loperamide 2 milliGRAM(s) Oral daily  melatonin 5 milliGRAM(s) Oral at bedtime  pantoprazole    Tablet 40 milliGRAM(s) Oral before breakfast  psyllium Powder 1 Packet(s) Oral two times a day  ranolazine 1000 milliGRAM(s) Oral <User Schedule>  ranolazine 500 milliGRAM(s) Oral <User Schedule>  sodium chloride 0.9%. 1000 milliLiter(s) (42 mL/Hr) IV Continuous <Continuous>    MEDICATIONS  (PRN):  ALBUTerol    90 MICROgram(s) HFA Inhaler 2 Puff(s) Inhalation every 12 hours PRN Shortness of Breath and/or Wheezing  albuterol/ipratropium for Nebulization 3 milliLiter(s) Nebulizer every 6 hours PRN Shortness of Breath and/or Wheezing  ibuprofen  Tablet. 400 milliGRAM(s) Oral every 8 hours PRN Mild Pain (1 - 3)  ondansetron Injectable 4 milliGRAM(s) IV Push every 6 hours PRN Nausea and/or Vomiting      Vital Signs Last 24 Hrs  T(C): 36.7 (09 May 2021 00:05), Max: 36.9 (08 May 2021 11:01)  T(F): 98.1 (09 May 2021 00:05), Max: 98.5 (08 May 2021 11:01)  HR: 85 (09 May 2021 00:05) (77 - 88)  BP: 157/72 (09 May 2021 00:05) (149/71 - 160/71)  BP(mean): --  RR: 18 (09 May 2021 00:05) (18 - 18)  SpO2: 96% (09 May 2021 00:05) (92% - 97%)    Physical Exam:    Neurological:  No sensory/motor deficits    HEENT: PERRLA, EOMI, no drainage or redness    Neck: No bruits; no thyromegaly or nodules,  No JVD    Back: Normal spine flexure, No CVA tenderness, No deformity or limitation of movement    Respiratory: Breath Sounds equal & clear to auscultation, no accessory muscle use    Cardiovascular: Regular rate & rhythm, normal S1, S2; no murmurs, gallops or rubs    Gastrointestinal: Soft, non-tender, normal bowel sounds    Extremities: No peripheral edema, No cyanosis, clubbing     Vascular: Equal and normal pulses: 2+ peripheral pulses throughout    Musculoskeletal: No joint pain, swelling or deformity; no limitation of movement    Skin: No rashes      I&O's Detail    07 May 2021 07:01  -  08 May 2021 07:00  --------------------------------------------------------  IN:    sodium chloride 0.9%: 504 mL  Total IN: 504 mL    OUT:    Intermittent Catheterization - Urethral (mL): 750 mL  Total OUT: 750 mL    Total NET: -246 mL      08 May 2021 07:01  -  09 May 2021 01:46  --------------------------------------------------------  IN:    Oral Fluid: 240 mL    sodium chloride 0.9%: 252 mL  Total IN: 492 mL    OUT:    Voided (mL): 520 mL  Total OUT: 520 mL    Total NET: -28 mL          LABS:                        11.3   9.24  )-----------( 265      ( 08 May 2021 06:55 )             30.9     05-08    131<L>  |  102  |  6.0<L>  ----------------------------<  87  4.3   |  20.0<L>  |  0.34<L>    Ca    7.6<L>      08 May 2021 06:55  Phos  2.5     05-08  Mg     2.0     05-08            RADIOLOGY & ADDITIONAL STUDIES: no acute events overnight. still had diarrhea during day, nothing overnight. started on immodium and increased metamucil. tolerating diet. no abdominal pain. pain controlled in hip.       MEDICATIONS  (STANDING):  acetaminophen   Tablet .. 975 milliGRAM(s) Oral every 8 hours  amLODIPine   Tablet 5 milliGRAM(s) Oral daily  aspirin  chewable 81 milliGRAM(s) Oral daily  atorvastatin 40 milliGRAM(s) Oral at bedtime  budesonide 160 MICROgram(s)/formoterol 4.5 MICROgram(s) Inhaler 2 Puff(s) Inhalation two times a day  dextrose 40% Gel 15 Gram(s) Oral once  dextrose 50% Injectable 25 Gram(s) IV Push once  dextrose 50% Injectable 12.5 Gram(s) IV Push once  dextrose 50% Injectable 25 Gram(s) IV Push once  enoxaparin Injectable 40 milliGRAM(s) SubCutaneous every 24 hours  glucagon  Injectable 1 milliGRAM(s) IntraMuscular once  insulin lispro (ADMELOG) corrective regimen sliding scale   SubCutaneous Before meals and at bedtime  isosorbide   mononitrate ER Tablet (IMDUR) 60 milliGRAM(s) Oral daily  levothyroxine 75 MICROGram(s) Oral daily  lidocaine   Patch 1 Patch Transdermal daily  loperamide 2 milliGRAM(s) Oral daily  melatonin 5 milliGRAM(s) Oral at bedtime  pantoprazole    Tablet 40 milliGRAM(s) Oral before breakfast  psyllium Powder 1 Packet(s) Oral two times a day  ranolazine 1000 milliGRAM(s) Oral <User Schedule>  ranolazine 500 milliGRAM(s) Oral <User Schedule>  sodium chloride 0.9%. 1000 milliLiter(s) (42 mL/Hr) IV Continuous <Continuous>    MEDICATIONS  (PRN):  ALBUTerol    90 MICROgram(s) HFA Inhaler 2 Puff(s) Inhalation every 12 hours PRN Shortness of Breath and/or Wheezing  albuterol/ipratropium for Nebulization 3 milliLiter(s) Nebulizer every 6 hours PRN Shortness of Breath and/or Wheezing  ibuprofen  Tablet. 400 milliGRAM(s) Oral every 8 hours PRN Mild Pain (1 - 3)  ondansetron Injectable 4 milliGRAM(s) IV Push every 6 hours PRN Nausea and/or Vomiting      Vital Signs Last 24 Hrs  T(C): 36.7 (09 May 2021 00:05), Max: 36.9 (08 May 2021 11:01)  T(F): 98.1 (09 May 2021 00:05), Max: 98.5 (08 May 2021 11:01)  HR: 85 (09 May 2021 00:05) (77 - 88)  BP: 157/72 (09 May 2021 00:05) (149/71 - 160/71)  BP(mean): --  RR: 18 (09 May 2021 00:05) (18 - 18)  SpO2: 96% (09 May 2021 00:05) (92% - 97%)    Physical Exam:  general: no acute distress, aox3    Respiratory: Breath Sounds equal & clear to auscultation, no accessory muscle use    Cardiovascular: Regular rate & rhythm, normal S1, S2; no murmurs, gallops or rubs    Gastrointestinal: Soft, non-tender, nondistended, normal bowel sounds    Extremities: No peripheral edema, No cyanosis, clubbing     Vascular: Equal and normal pulses: 2+ peripheral pulses throughout    Musculoskeletal: No joint pain, swelling or deformity; no limitation of movement    Skin: No rashes      I&O's Detail    07 May 2021 07:01  -  08 May 2021 07:00  --------------------------------------------------------  IN:    sodium chloride 0.9%: 504 mL  Total IN: 504 mL    OUT:    Intermittent Catheterization - Urethral (mL): 750 mL  Total OUT: 750 mL    Total NET: -246 mL      08 May 2021 07:01  -  09 May 2021 01:46  --------------------------------------------------------  IN:    Oral Fluid: 240 mL    sodium chloride 0.9%: 252 mL  Total IN: 492 mL    OUT:    Voided (mL): 520 mL  Total OUT: 520 mL    Total NET: -28 mL          LABS:                        11.3   9.24  )-----------( 265      ( 08 May 2021 06:55 )             30.9     05-08    131<L>  |  102  |  6.0<L>  ----------------------------<  87  4.3   |  20.0<L>  |  0.34<L>    Ca    7.6<L>      08 May 2021 06:55  Phos  2.5     05-08  Mg     2.0     05-08            RADIOLOGY & ADDITIONAL STUDIES:

## 2021-05-09 NOTE — PROGRESS NOTE ADULT - ATTENDING COMMENTS
Pt seen and examined by me  agree with findings  hyponatremia improving  pt ana po  PT for mobilzation  placement pending

## 2021-05-10 LAB
ANION GAP SERPL CALC-SCNC: 7 MMOL/L — SIGNIFICANT CHANGE UP (ref 5–17)
BASOPHILS # BLD AUTO: 0.1 K/UL — SIGNIFICANT CHANGE UP (ref 0–0.2)
BASOPHILS NFR BLD AUTO: 0.9 % — SIGNIFICANT CHANGE UP (ref 0–2)
BUN SERPL-MCNC: 7 MG/DL — LOW (ref 8–20)
CALCIUM SERPL-MCNC: 8.7 MG/DL — SIGNIFICANT CHANGE UP (ref 8.6–10.2)
CHLORIDE SERPL-SCNC: 101 MMOL/L — SIGNIFICANT CHANGE UP (ref 98–107)
CO2 SERPL-SCNC: 26 MMOL/L — SIGNIFICANT CHANGE UP (ref 22–29)
CREAT SERPL-MCNC: 0.45 MG/DL — LOW (ref 0.5–1.3)
EOSINOPHIL # BLD AUTO: 0.28 K/UL — SIGNIFICANT CHANGE UP (ref 0–0.5)
EOSINOPHIL NFR BLD AUTO: 2.6 % — SIGNIFICANT CHANGE UP (ref 0–6)
GIANT PLATELETS BLD QL SMEAR: PRESENT — SIGNIFICANT CHANGE UP
GLUCOSE BLDC GLUCOMTR-MCNC: 113 MG/DL — HIGH (ref 70–99)
GLUCOSE BLDC GLUCOMTR-MCNC: 124 MG/DL — HIGH (ref 70–99)
GLUCOSE BLDC GLUCOMTR-MCNC: 140 MG/DL — HIGH (ref 70–99)
GLUCOSE BLDC GLUCOMTR-MCNC: 140 MG/DL — HIGH (ref 70–99)
GLUCOSE SERPL-MCNC: 113 MG/DL — HIGH (ref 70–99)
HCT VFR BLD CALC: 30.7 % — LOW (ref 34.5–45)
HGB BLD-MCNC: 10.8 G/DL — LOW (ref 11.5–15.5)
LYMPHOCYTES # BLD AUTO: 0.85 K/UL — LOW (ref 1–3.3)
LYMPHOCYTES # BLD AUTO: 7.8 % — LOW (ref 13–44)
MAGNESIUM SERPL-MCNC: 1.8 MG/DL — SIGNIFICANT CHANGE UP (ref 1.6–2.6)
MANUAL SMEAR VERIFICATION: SIGNIFICANT CHANGE UP
MCHC RBC-ENTMCNC: 30.3 PG — SIGNIFICANT CHANGE UP (ref 27–34)
MCHC RBC-ENTMCNC: 35.2 GM/DL — SIGNIFICANT CHANGE UP (ref 32–36)
MCV RBC AUTO: 86.2 FL — SIGNIFICANT CHANGE UP (ref 80–100)
METAMYELOCYTES # FLD: 1.7 % — HIGH (ref 0–0)
MONOCYTES # BLD AUTO: 0.85 K/UL — SIGNIFICANT CHANGE UP (ref 0–0.9)
MONOCYTES NFR BLD AUTO: 7.8 % — SIGNIFICANT CHANGE UP (ref 2–14)
MYELOCYTES NFR BLD: 0.9 % — HIGH (ref 0–0)
NEUTROPHILS # BLD AUTO: 8.01 K/UL — HIGH (ref 1.8–7.4)
NEUTROPHILS NFR BLD AUTO: 73 % — SIGNIFICANT CHANGE UP (ref 43–77)
NEUTS BAND # BLD: 0.9 % — SIGNIFICANT CHANGE UP (ref 0–8)
PHOSPHATE SERPL-MCNC: 3.2 MG/DL — SIGNIFICANT CHANGE UP (ref 2.4–4.7)
PLAT MORPH BLD: NORMAL — SIGNIFICANT CHANGE UP
PLATELET # BLD AUTO: 302 K/UL — SIGNIFICANT CHANGE UP (ref 150–400)
POTASSIUM SERPL-MCNC: 3.7 MMOL/L — SIGNIFICANT CHANGE UP (ref 3.5–5.3)
POTASSIUM SERPL-SCNC: 3.7 MMOL/L — SIGNIFICANT CHANGE UP (ref 3.5–5.3)
PROMYELOCYTES # FLD: 0.9 % — HIGH (ref 0–0)
RBC # BLD: 3.56 M/UL — LOW (ref 3.8–5.2)
RBC # FLD: 14.1 % — SIGNIFICANT CHANGE UP (ref 10.3–14.5)
RBC BLD AUTO: NORMAL — SIGNIFICANT CHANGE UP
SARS-COV-2 RNA SPEC QL NAA+PROBE: SIGNIFICANT CHANGE UP
SODIUM SERPL-SCNC: 134 MMOL/L — LOW (ref 135–145)
VARIANT LYMPHS # BLD: 3.5 % — SIGNIFICANT CHANGE UP (ref 0–6)
WBC # BLD: 10.84 K/UL — HIGH (ref 3.8–10.5)
WBC # FLD AUTO: 10.84 K/UL — HIGH (ref 3.8–10.5)

## 2021-05-10 PROCEDURE — 99232 SBSQ HOSP IP/OBS MODERATE 35: CPT

## 2021-05-10 PROCEDURE — 99233 SBSQ HOSP IP/OBS HIGH 50: CPT

## 2021-05-10 RX ORDER — ALBUTEROL 90 UG/1
2 AEROSOL, METERED ORAL EVERY 6 HOURS
Refills: 0 | Status: DISCONTINUED | OUTPATIENT
Start: 2021-05-10 | End: 2021-05-11

## 2021-05-10 RX ADMIN — Medication 5 MILLIGRAM(S): at 21:46

## 2021-05-10 RX ADMIN — ENOXAPARIN SODIUM 40 MILLIGRAM(S): 100 INJECTION SUBCUTANEOUS at 05:00

## 2021-05-10 RX ADMIN — ATORVASTATIN CALCIUM 40 MILLIGRAM(S): 80 TABLET, FILM COATED ORAL at 21:46

## 2021-05-10 RX ADMIN — Medication 2 MILLIGRAM(S): at 12:24

## 2021-05-10 RX ADMIN — LIDOCAINE 1 PATCH: 4 CREAM TOPICAL at 12:24

## 2021-05-10 RX ADMIN — LIDOCAINE 1 PATCH: 4 CREAM TOPICAL at 05:01

## 2021-05-10 RX ADMIN — AMLODIPINE BESYLATE 5 MILLIGRAM(S): 2.5 TABLET ORAL at 05:01

## 2021-05-10 RX ADMIN — RANOLAZINE 500 MILLIGRAM(S): 500 TABLET, FILM COATED, EXTENDED RELEASE ORAL at 17:47

## 2021-05-10 RX ADMIN — Medication 975 MILLIGRAM(S): at 05:02

## 2021-05-10 RX ADMIN — BUDESONIDE AND FORMOTEROL FUMARATE DIHYDRATE 2 PUFF(S): 160; 4.5 AEROSOL RESPIRATORY (INHALATION) at 09:53

## 2021-05-10 RX ADMIN — Medication 975 MILLIGRAM(S): at 13:34

## 2021-05-10 RX ADMIN — Medication 975 MILLIGRAM(S): at 21:46

## 2021-05-10 RX ADMIN — Medication 81 MILLIGRAM(S): at 12:24

## 2021-05-10 RX ADMIN — Medication 75 MICROGRAM(S): at 05:01

## 2021-05-10 RX ADMIN — LIDOCAINE 1 PATCH: 4 CREAM TOPICAL at 19:30

## 2021-05-10 RX ADMIN — Medication 975 MILLIGRAM(S): at 22:30

## 2021-05-10 RX ADMIN — BUDESONIDE AND FORMOTEROL FUMARATE DIHYDRATE 2 PUFF(S): 160; 4.5 AEROSOL RESPIRATORY (INHALATION) at 20:55

## 2021-05-10 RX ADMIN — RANOLAZINE 1000 MILLIGRAM(S): 500 TABLET, FILM COATED, EXTENDED RELEASE ORAL at 05:01

## 2021-05-10 RX ADMIN — Medication 975 MILLIGRAM(S): at 05:01

## 2021-05-10 RX ADMIN — PANTOPRAZOLE SODIUM 40 MILLIGRAM(S): 20 TABLET, DELAYED RELEASE ORAL at 05:01

## 2021-05-10 RX ADMIN — ISOSORBIDE MONONITRATE 60 MILLIGRAM(S): 60 TABLET, EXTENDED RELEASE ORAL at 12:24

## 2021-05-10 NOTE — PROGRESS NOTE ADULT - SUBJECTIVE AND OBJECTIVE BOX
no acute events overnight. still had diarrhea during day, nothing overnight. started on immodium and increased metamucil. tolerating diet. no abdominal pain. pain controlled in hip.       MEDICATIONS  (STANDING):  acetaminophen   Tablet .. 975 milliGRAM(s) Oral every 8 hours  amLODIPine   Tablet 5 milliGRAM(s) Oral daily  aspirin  chewable 81 milliGRAM(s) Oral daily  atorvastatin 40 milliGRAM(s) Oral at bedtime  budesonide 160 MICROgram(s)/formoterol 4.5 MICROgram(s) Inhaler 2 Puff(s) Inhalation two times a day  dextrose 40% Gel 15 Gram(s) Oral once  dextrose 50% Injectable 25 Gram(s) IV Push once  dextrose 50% Injectable 12.5 Gram(s) IV Push once  dextrose 50% Injectable 25 Gram(s) IV Push once  enoxaparin Injectable 40 milliGRAM(s) SubCutaneous every 24 hours  glucagon  Injectable 1 milliGRAM(s) IntraMuscular once  insulin lispro (ADMELOG) corrective regimen sliding scale   SubCutaneous Before meals and at bedtime  isosorbide   mononitrate ER Tablet (IMDUR) 60 milliGRAM(s) Oral daily  levothyroxine 75 MICROGram(s) Oral daily  lidocaine   Patch 1 Patch Transdermal daily  loperamide 2 milliGRAM(s) Oral daily  melatonin 5 milliGRAM(s) Oral at bedtime  pantoprazole    Tablet 40 milliGRAM(s) Oral before breakfast  psyllium Powder 1 Packet(s) Oral two times a day  ranolazine 1000 milliGRAM(s) Oral <User Schedule>  ranolazine 500 milliGRAM(s) Oral <User Schedule>  sodium chloride 0.9%. 1000 milliLiter(s) (42 mL/Hr) IV Continuous <Continuous>    MEDICATIONS  (PRN):  ALBUTerol    90 MICROgram(s) HFA Inhaler 2 Puff(s) Inhalation every 12 hours PRN Shortness of Breath and/or Wheezing  albuterol/ipratropium for Nebulization 3 milliLiter(s) Nebulizer every 6 hours PRN Shortness of Breath and/or Wheezing  ibuprofen  Tablet. 400 milliGRAM(s) Oral every 8 hours PRN Mild Pain (1 - 3)  ondansetron Injectable 4 milliGRAM(s) IV Push every 6 hours PRN Nausea and/or Vomiting      Vital Signs Last 24 Hrs  T(C): 36.7 (09 May 2021 00:05), Max: 36.9 (08 May 2021 11:01)  T(F): 98.1 (09 May 2021 00:05), Max: 98.5 (08 May 2021 11:01)  HR: 85 (09 May 2021 00:05) (77 - 88)  BP: 157/72 (09 May 2021 00:05) (149/71 - 160/71)  BP(mean): --  RR: 18 (09 May 2021 00:05) (18 - 18)  SpO2: 96% (09 May 2021 00:05) (92% - 97%)    Gen: patient laying in bed, A&Ox3, NAD  HEENT: EOMI / PERRL b/l  Pulm: regular respiratory rate, no distress  CV: RRR  GI: Abdomen soft but moderately distended, no TTP  Neurological: no gross sensory / motor deficits  Ext: LLE compartments soft, warm to touch, cap refill <2      I&O's Detail    07 May 2021 07:01  -  08 May 2021 07:00  --------------------------------------------------------  IN:    sodium chloride 0.9%: 504 mL  Total IN: 504 mL    OUT:    Intermittent Catheterization - Urethral (mL): 750 mL  Total OUT: 750 mL    Total NET: -246 mL      08 May 2021 07:01  -  09 May 2021 01:46  --------------------------------------------------------  IN:    Oral Fluid: 240 mL    sodium chloride 0.9%: 252 mL  Total IN: 492 mL    OUT:    Voided (mL): 520 mL  Total OUT: 520 mL    Total NET: -28 mL          LABS:                        11.3   9.24  )-----------( 265      ( 08 May 2021 06:55 )             30.9     05-08    131<L>  |  102  |  6.0<L>  ----------------------------<  87  4.3   |  20.0<L>  |  0.34<L>    Ca    7.6<L>      08 May 2021 06:55  Phos  2.5     05-08  Mg     2.0     05-08            RADIOLOGY & ADDITIONAL STUDIES:

## 2021-05-10 NOTE — PROGRESS NOTE ADULT - SUBJECTIVE AND OBJECTIVE BOX
Patient fatigued.  Reports mild pain.     REVIEW OF SYSTEMS  Constitutional - No fever,  +fatigue  Neurological - +loss of strength   Musculoskeletal - +joint pain, No joint swelling, No muscle pain  Psychiatric - No depression, No anxiety    FUNCTIONAL PROGRESS  5/10  Bed Mobility  Bed Mobility Training Sit-to-Supine: contact guard;  1 person assist  Bed Mobility Training Supine-to-Sit: moderate assist (50% patient effort);  1 person assist  Bed Mobility Training Limitations: decreased strength;  decreased ROM    Sit-Stand Transfer Training  Transfer Training Sit-to-Stand Transfer: minimum assist (75% patient effort);  1 person assist;  weight-bearing as tolerated   rolling walker  Transfer Training Stand-to-Sit Transfer: contact guard;  weight-bearing as tolerated   rolling walker  Sit-to-Stand Transfer Training Transfer Safety Analysis: decreased balance;  decreased strength;  decreased ROM;  rolling walker    Gait Training  Gait Training: contact guard;  1 person assist;  weight-bearing as tolerated   rolling walker;  25 feet  Gait Analysis: 3-point gait   crouch;  decreased frederic;  decreased hip/knee flexion;  decreased velocity of limb motion;  decreased step length;  decreased strength;  decreased ROM;  25 feet;  rolling walker        VITALS  T(C): 36.6 (05-10-21 @ 11:16), Max: 37 (05-09-21 @ 19:30)  HR: 77 (05-10-21 @ 11:16) (77 - 94)  BP: 145/68 (05-10-21 @ 11:16) (138/73 - 161/65)  RR: 18 (05-10-21 @ 11:16) (18 - 19)  SpO2: 98% (05-10-21 @ 11:16) (92% - 98%)  Wt(kg): --    MEDICATIONS   acetaminophen   Tablet .. 975 milliGRAM(s) every 8 hours  ALBUTerol    90 MICROgram(s) HFA Inhaler 2 Puff(s) every 12 hours PRN  albuterol/ipratropium for Nebulization 3 milliLiter(s) every 6 hours PRN  amLODIPine   Tablet 5 milliGRAM(s) daily  aspirin  chewable 81 milliGRAM(s) daily  atorvastatin 40 milliGRAM(s) at bedtime  budesonide 160 MICROgram(s)/formoterol 4.5 MICROgram(s) Inhaler 2 Puff(s) two times a day  dextrose 40% Gel 15 Gram(s) once  dextrose 50% Injectable 25 Gram(s) once  dextrose 50% Injectable 12.5 Gram(s) once  dextrose 50% Injectable 25 Gram(s) once  enoxaparin Injectable 40 milliGRAM(s) every 24 hours  glucagon  Injectable 1 milliGRAM(s) once  ibuprofen  Tablet. 400 milliGRAM(s) every 8 hours PRN  insulin lispro (ADMELOG) corrective regimen sliding scale   Before meals and at bedtime  isosorbide   mononitrate ER Tablet (IMDUR) 60 milliGRAM(s) daily  levothyroxine 75 MICROGram(s) daily  lidocaine   Patch 1 Patch daily  loperamide 2 milliGRAM(s) daily  melatonin 5 milliGRAM(s) at bedtime  ondansetron Injectable 4 milliGRAM(s) every 6 hours PRN  pantoprazole    Tablet 40 milliGRAM(s) before breakfast  psyllium Powder 1 Packet(s) two times a day  ranolazine 1000 milliGRAM(s) <User Schedule>  ranolazine 500 milliGRAM(s) <User Schedule>      RECENT LABS/IMAGING                          10.8   10.84 )-----------( 302      ( 10 May 2021 05:50 )             30.7     05-10    134<L>  |  101  |  7.0<L>  ----------------------------<  113<H>  3.7   |  26.0  |  0.45<L>    Ca    8.7      10 May 2021 05:50  Phos  3.2     05-10  Mg     1.8     05-10                    CT ABD/PEL 5/6 - Small bilateral pleural effusions with bibasilar atelectasis, right greater than left. Small volume of ascites. Diffuse subcutaneous edema. Since the heart is normal in size, these findings may be secondary to fluid overload. Colitis involving the descending colon.    RIGHT HIP XR 5/2 - Prosthetic Hip replacement in proper anatomical alignment..    ----------------------------------------------------------------------------------------  PHYSICAL EXAM  Constitutional - NAD, Comfortable  Extremities - Mild BLE swelling  Neurologic Exam -                    Cognitive - Awake, Alert, AAO to self, place, date, year, situation     Motor -                     LEFT    UE - ShAB 3/5, EF 4/5, EE 4/5,  4/5                    RIGHT UE - ShAB 4/5, EF 4/5, EE 4/5,  4/5                    LEFT    LE - HF 1/5, KE 3/5, DF 5/5                     RIGHT LE - HF 4/5, KE 3/5, DF 5/5      Sensory - Intact to LT  Psychiatric - Mood calm  ----------------------------------------------------------------------------------------  ASSESSMENT/PLAN  85yFemale with functional deficits s/p fall sustaining a hip fracture  Left femoral neck fracture s/p KASSANDRA - Posterior precautions, WBAT  CAD - ASA, Lipitor  HTN - Norvasc, Imdur  Pulm - Proventil, Symbicort, duoneb  Pain - Tylenol, Ibuprofen, Lidoderm  DVT PPX - SCDs, Lovenox  Rehab - Recommend ACUTE inpatient rehabilitation for the functional deficits consisting of 3 hours of therapy/day & 24 hour RN/daily PMR physician for comorbid medical management. Will continue to follow for ongoing rehab needs and recommendations. Patient will be able to tolerate 3 hours a day.    Continue bedside therapy as well as OOB throughout the day with mobilization throughout the day with staff to maintain cardiopulmonary function and prevention of secondary complications related to debility.     Discussed with rehab clinical team.

## 2021-05-11 ENCOUNTER — TRANSCRIPTION ENCOUNTER (OUTPATIENT)
Age: 86
End: 2021-05-11

## 2021-05-11 ENCOUNTER — INPATIENT (INPATIENT)
Facility: HOSPITAL | Age: 86
LOS: 8 days | Discharge: HOME HEALTH | DRG: 949 | End: 2021-05-20
Attending: SPECIALIST | Admitting: SPECIALIST
Payer: MEDICARE

## 2021-05-11 VITALS
TEMPERATURE: 98 F | RESPIRATION RATE: 18 BRPM | DIASTOLIC BLOOD PRESSURE: 72 MMHG | OXYGEN SATURATION: 92 % | SYSTOLIC BLOOD PRESSURE: 132 MMHG | HEART RATE: 73 BPM

## 2021-05-11 VITALS
RESPIRATION RATE: 16 BRPM | OXYGEN SATURATION: 96 % | DIASTOLIC BLOOD PRESSURE: 57 MMHG | SYSTOLIC BLOOD PRESSURE: 136 MMHG | HEART RATE: 84 BPM | HEIGHT: 64 IN | TEMPERATURE: 97 F | WEIGHT: 146.83 LBS

## 2021-05-11 DIAGNOSIS — S72.146A NONDISPLACED INTERTROCHANTERIC FRACTURE OF UNSPECIFIED FEMUR, INITIAL ENCOUNTER FOR CLOSED FRACTURE: ICD-10-CM

## 2021-05-11 DIAGNOSIS — Z98.891 HISTORY OF UTERINE SCAR FROM PREVIOUS SURGERY: Chronic | ICD-10-CM

## 2021-05-11 DIAGNOSIS — Z90.49 ACQUIRED ABSENCE OF OTHER SPECIFIED PARTS OF DIGESTIVE TRACT: Chronic | ICD-10-CM

## 2021-05-11 DIAGNOSIS — Z98.51 TUBAL LIGATION STATUS: Chronic | ICD-10-CM

## 2021-05-11 LAB
GLUCOSE BLDC GLUCOMTR-MCNC: 117 MG/DL — HIGH (ref 70–99)
GLUCOSE BLDC GLUCOMTR-MCNC: 124 MG/DL — HIGH (ref 70–99)
GLUCOSE BLDC GLUCOMTR-MCNC: 138 MG/DL — HIGH (ref 70–99)

## 2021-05-11 PROCEDURE — 99231 SBSQ HOSP IP/OBS SF/LOW 25: CPT

## 2021-05-11 PROCEDURE — 80048 BASIC METABOLIC PNL TOTAL CA: CPT

## 2021-05-11 PROCEDURE — 97116 GAIT TRAINING THERAPY: CPT

## 2021-05-11 PROCEDURE — 86850 RBC ANTIBODY SCREEN: CPT

## 2021-05-11 PROCEDURE — 84480 ASSAY TRIIODOTHYRONINE (T3): CPT

## 2021-05-11 PROCEDURE — 82803 BLOOD GASES ANY COMBINATION: CPT

## 2021-05-11 PROCEDURE — 73502 X-RAY EXAM HIP UNI 2-3 VIEWS: CPT

## 2021-05-11 PROCEDURE — 85027 COMPLETE CBC AUTOMATED: CPT

## 2021-05-11 PROCEDURE — 99239 HOSP IP/OBS DSCHRG MGMT >30: CPT

## 2021-05-11 PROCEDURE — 73562 X-RAY EXAM OF KNEE 3: CPT

## 2021-05-11 PROCEDURE — 84100 ASSAY OF PHOSPHORUS: CPT

## 2021-05-11 PROCEDURE — 80076 HEPATIC FUNCTION PANEL: CPT

## 2021-05-11 PROCEDURE — 97530 THERAPEUTIC ACTIVITIES: CPT

## 2021-05-11 PROCEDURE — 85025 COMPLETE CBC W/AUTO DIFF WBC: CPT

## 2021-05-11 PROCEDURE — 99222 1ST HOSP IP/OBS MODERATE 55: CPT | Mod: GC

## 2021-05-11 PROCEDURE — 74176 CT ABD & PELVIS W/O CONTRAST: CPT

## 2021-05-11 PROCEDURE — 73030 X-RAY EXAM OF SHOULDER: CPT

## 2021-05-11 PROCEDURE — 94640 AIRWAY INHALATION TREATMENT: CPT

## 2021-05-11 PROCEDURE — U0003: CPT

## 2021-05-11 PROCEDURE — 70450 CT HEAD/BRAIN W/O DYE: CPT

## 2021-05-11 PROCEDURE — 83935 ASSAY OF URINE OSMOLALITY: CPT

## 2021-05-11 PROCEDURE — 85610 PROTHROMBIN TIME: CPT

## 2021-05-11 PROCEDURE — 82553 CREATINE MB FRACTION: CPT

## 2021-05-11 PROCEDURE — 84133 ASSAY OF URINE POTASSIUM: CPT

## 2021-05-11 PROCEDURE — 80053 COMPREHEN METABOLIC PANEL: CPT

## 2021-05-11 PROCEDURE — 73610 X-RAY EXAM OF ANKLE: CPT

## 2021-05-11 PROCEDURE — 84436 ASSAY OF TOTAL THYROXINE: CPT

## 2021-05-11 PROCEDURE — 71250 CT THORAX DX C-: CPT

## 2021-05-11 PROCEDURE — 83930 ASSAY OF BLOOD OSMOLALITY: CPT

## 2021-05-11 PROCEDURE — 81001 URINALYSIS AUTO W/SCOPE: CPT

## 2021-05-11 PROCEDURE — 86901 BLOOD TYPING SEROLOGIC RH(D): CPT

## 2021-05-11 PROCEDURE — 97167 OT EVAL HIGH COMPLEX 60 MIN: CPT

## 2021-05-11 PROCEDURE — 99285 EMERGENCY DEPT VISIT HI MDM: CPT

## 2021-05-11 PROCEDURE — U0005: CPT

## 2021-05-11 PROCEDURE — 82570 ASSAY OF URINE CREATININE: CPT

## 2021-05-11 PROCEDURE — 84443 ASSAY THYROID STIM HORMONE: CPT

## 2021-05-11 PROCEDURE — 86900 BLOOD TYPING SEROLOGIC ABO: CPT

## 2021-05-11 PROCEDURE — 74018 RADEX ABDOMEN 1 VIEW: CPT

## 2021-05-11 PROCEDURE — 83735 ASSAY OF MAGNESIUM: CPT

## 2021-05-11 PROCEDURE — 36415 COLL VENOUS BLD VENIPUNCTURE: CPT

## 2021-05-11 PROCEDURE — C9399: CPT

## 2021-05-11 PROCEDURE — 84300 ASSAY OF URINE SODIUM: CPT

## 2021-05-11 PROCEDURE — 85730 THROMBOPLASTIN TIME PARTIAL: CPT

## 2021-05-11 PROCEDURE — 72125 CT NECK SPINE W/O DYE: CPT

## 2021-05-11 PROCEDURE — 82962 GLUCOSE BLOOD TEST: CPT

## 2021-05-11 PROCEDURE — 97163 PT EVAL HIGH COMPLEX 45 MIN: CPT

## 2021-05-11 PROCEDURE — 93005 ELECTROCARDIOGRAM TRACING: CPT

## 2021-05-11 PROCEDURE — C1776: CPT

## 2021-05-11 PROCEDURE — 86769 SARS-COV-2 COVID-19 ANTIBODY: CPT

## 2021-05-11 PROCEDURE — 82436 ASSAY OF URINE CHLORIDE: CPT

## 2021-05-11 PROCEDURE — 71045 X-RAY EXAM CHEST 1 VIEW: CPT

## 2021-05-11 PROCEDURE — 73501 X-RAY EXAM HIP UNI 1 VIEW: CPT

## 2021-05-11 PROCEDURE — 82550 ASSAY OF CK (CPK): CPT

## 2021-05-11 PROCEDURE — 99233 SBSQ HOSP IP/OBS HIGH 50: CPT

## 2021-05-11 PROCEDURE — 83036 HEMOGLOBIN GLYCOSYLATED A1C: CPT

## 2021-05-11 RX ORDER — LOPERAMIDE HCL 2 MG
1 TABLET ORAL
Qty: 0 | Refills: 0 | DISCHARGE
Start: 2021-05-11

## 2021-05-11 RX ORDER — IBUPROFEN 200 MG
1 TABLET ORAL
Qty: 0 | Refills: 0 | DISCHARGE
Start: 2021-05-11

## 2021-05-11 RX ORDER — PSYLLIUM SEED (WITH DEXTROSE)
1 POWDER (GRAM) ORAL
Qty: 0 | Refills: 0 | DISCHARGE
Start: 2021-05-11

## 2021-05-11 RX ORDER — LIDOCAINE 4 G/100G
1 CREAM TOPICAL
Qty: 0 | Refills: 0 | DISCHARGE
Start: 2021-05-11

## 2021-05-11 RX ORDER — LIDOCAINE 4 G/100G
1 CREAM TOPICAL DAILY
Refills: 0 | Status: DISCONTINUED | OUTPATIENT
Start: 2021-05-11 | End: 2021-05-20

## 2021-05-11 RX ADMIN — BUDESONIDE AND FORMOTEROL FUMARATE DIHYDRATE 2 PUFF(S): 160; 4.5 AEROSOL RESPIRATORY (INHALATION) at 11:40

## 2021-05-11 RX ADMIN — LIDOCAINE 1 PATCH: 4 CREAM TOPICAL at 01:39

## 2021-05-11 RX ADMIN — Medication 1 PACKET(S): at 05:52

## 2021-05-11 RX ADMIN — Medication 975 MILLIGRAM(S): at 22:54

## 2021-05-11 RX ADMIN — Medication 81 MILLIGRAM(S): at 11:08

## 2021-05-11 RX ADMIN — Medication 2 MILLIGRAM(S): at 11:08

## 2021-05-11 RX ADMIN — ATORVASTATIN CALCIUM 40 MILLIGRAM(S): 80 TABLET, FILM COATED ORAL at 22:23

## 2021-05-11 RX ADMIN — ISOSORBIDE MONONITRATE 60 MILLIGRAM(S): 60 TABLET, EXTENDED RELEASE ORAL at 11:08

## 2021-05-11 RX ADMIN — Medication 975 MILLIGRAM(S): at 22:23

## 2021-05-11 RX ADMIN — Medication 6 MILLIGRAM(S): at 22:23

## 2021-05-11 RX ADMIN — RANOLAZINE 1000 MILLIGRAM(S): 500 TABLET, FILM COATED, EXTENDED RELEASE ORAL at 05:52

## 2021-05-11 RX ADMIN — Medication 975 MILLIGRAM(S): at 06:37

## 2021-05-11 RX ADMIN — Medication 250 MILLIGRAM(S): at 17:13

## 2021-05-11 RX ADMIN — BUDESONIDE AND FORMOTEROL FUMARATE DIHYDRATE 2 PUFF(S): 160; 4.5 AEROSOL RESPIRATORY (INHALATION) at 22:25

## 2021-05-11 RX ADMIN — Medication 1 SPRAY(S): at 17:13

## 2021-05-11 RX ADMIN — ENOXAPARIN SODIUM 40 MILLIGRAM(S): 100 INJECTION SUBCUTANEOUS at 05:52

## 2021-05-11 RX ADMIN — RANOLAZINE 500 MILLIGRAM(S): 500 TABLET, FILM COATED, EXTENDED RELEASE ORAL at 17:12

## 2021-05-11 RX ADMIN — Medication 75 MICROGRAM(S): at 05:52

## 2021-05-11 RX ADMIN — Medication 975 MILLIGRAM(S): at 05:52

## 2021-05-11 RX ADMIN — AMLODIPINE BESYLATE 5 MILLIGRAM(S): 2.5 TABLET ORAL at 05:52

## 2021-05-11 RX ADMIN — PANTOPRAZOLE SODIUM 40 MILLIGRAM(S): 20 TABLET, DELAYED RELEASE ORAL at 06:42

## 2021-05-11 RX ADMIN — LIDOCAINE 1 PATCH: 4 CREAM TOPICAL at 11:08

## 2021-05-11 NOTE — H&P ADULT - ASSESSMENT
Assessment/Plan:  PRESLEY BECK is a 84yo F w PMH of HTN, COPD, and MI (11y/a) s/p stents x 2 which were replaced 3y/a on ASA only who presents to [facility] on 5/1/21 after mechanical fall from standing complaining of left leg pain, found to have displaced femoral neck fracture s/p left hip hemiarthroplasty on 5/2. Hospital course was complicated with Hyponatremia on fluid restriction and salt tabs, Leukocytosis, pleural effusions, Diarrhea 2/2 Colitis. Patient now admitted for a multidisciplinary rehab program. 05-07-21 @ 22:53    -------------    Rehab Management/MEDICAL MANAGEMENT     #Displaced LEFT femoral neck fracture s/p left hip hemiarthroplasty on 5/2  - Gait Instability, ADL impairments and Functional impairments: start Comprehensive Rehab Program of PT/OT/SLP  - 3 hours a day, 5 days a week  - P&O not needed  - WBAT LLE  - May shower after post-op day #5 (after 5/7)  - The dressing removed on day #9 (5/11)  - will be seen in the Dr. Moore's Ortho office between 2-3 weeks for wound check. Sutures/Staples/Tape will be removed at that time.  - Posterior Hip Precautions for 6 weeks per Ortho (5/2-6/13)  - Lovenox for DVT PPx for 4 weeks (5/2-5/30)    #Hyponatremia  - 5/11 Na 134  - was getting salt tabs 1g q12h on 5/6 and IVF NS 42cc/hr on 5/7 (likely d/t diarrhea) at [facility]  - monitor CMP/BMPs    #Diarrhea 2/2 Colitis; improving  - leukocytosis, resolved  - CT A/P w/ PO contrast on 5/6 shows colitis  - WBC was 11.2 on 5/6  - Otherwise afebrile  - PO hydration in rehab, otherwise IVF hydration as tolerated  - Florastor, immodium, metamucil    #B/L Pleural Effusions; Leukocytosis  - seen on CT A/P on 5/6/21 at [facility]  - Aspiration?   - currently no fever, SOB.    #HTN  - amlodipine 5mg po daily    #COPD  - albuterol prn  - symbicort bid  - discharge records list prednisone 10mg daily; unclear why or whether this is appropriate in the setting of recent hip surgery    #H/O MI s/p Stents  - ASA 81 qd  - Lipitor 40mg po qhs  - Ranexa 1000mg po in AM and 500mg po in PM    #hypothyroidism  - levothyroxine 75mcg po qd    #Pre-Diabetes - A1c 6.3 on 5/2  - will not continue do FS and ISS here in rehab as patient's sugars have been well controlled while in acute hospital at [facility].  - management via diet and exercise  - Diabetic educator    #Sleep  - melatonin PRN    #Skin  - Pressure injury/Skin: OOB to Chair, PT/OT     #Pain Mgmt   - Tylenol 975mg po q8h standing    #GI/Bowel Mgmt   - Continent w/ diarrhea  - no bowel meds for now  - Protonix for gi ppx    #/Bladder Mgmt   - Continent, PVR per routine    #FEN   - Diet - Regular + Thins  [CCHO, DASH/TLC]    - Dysphagia  SLP - evaluation and treatment    #Precautions / PROPHYLAXIS:   - Falls, Cardiac, Left posterior hip precautions  - ortho: Weight bearing status: WBAT LLE  - Lungs: Aspiration, Incentive Spirometer   - DVT: Lovenox        MEDICAL PROGNOSIS: GOOD                                   REHAB POTENTIAL: GOOD  ESTIMATED DISPOSITION: HOME                             ELOS: 10-14 Days   EXPECTED THERAPY:     P.T. 2hr/day       O.T. 1hr/day      S.L.P. 0hr/day      EXP FREQUENCY: 5 days per 7 day period     PRESCREEN COMPARISON: I have reviewed the prescreen information and I have found no relevant changes between the preadmission screening and my post admission evaluation     RATIONALE FOR INPATIENT ADMISSION - Patient demonstrates the following: (check all that apply)  [X] Medically appropriate for rehabilitation admission  [X] Has attainable rehab goals with an appropriate initial discharge plan  [X] Has rehabilitation potential (expected to make a significant improvement within a reasonable period of time)   [X] Requires close medical management by a rehab physician, rehab nursing care, Hospitalist and comprehensive interdisciplinary team (including PT, OT, & or SLP, Prosthetics and Orthotics)     Assessment/Plan:  PRESLEY BECK is a 86yo F w PMH of HTN, COPD, and MI (11y/a) s/p stents x 2 which were replaced 3y/a on ASA only who presents to [facility] on 5/1/21 after mechanical fall from standing complaining of left leg pain, found to have displaced femoral neck fracture s/p left hip hemiarthroplasty on 5/2. Hospital course was complicated with Hyponatremia on fluid restriction and salt tabs, Leukocytosis, pleural effusions, Diarrhea 2/2 Colitis. Patient now admitted for a multidisciplinary rehab program. 05-07-21 @ 22:53    -------------    Rehab Management/MEDICAL MANAGEMENT     #Displaced LEFT femoral neck fracture s/p left hip hemiarthroplasty on 5/2  - Gait Instability, ADL impairments and Functional impairments: start Comprehensive Rehab Program of PT/OT/SLP  - 3 hours a day, 5 days a week  - P&O not needed  - WBAT LLE  - May shower after post-op day #5 (after 5/7)  - The dressing removed/changed on day #9 (5/11)   - will be seen in the Dr. Moore's Ortho office between 2-3 weeks for wound check. Sutures/Staples/Tape will be removed at that time.  - Posterior Hip Precautions for 6 weeks per Ortho (5/2-6/13)  - Lovenox for DVT PPx for 4 weeks (5/2-5/30)    #Peripheral neuropathy?  - finger tips feel numb  - A1c on 5/2 is 6.3 - prediabetic, likey not due to DM    #Left Rib fracture  - CT on 5/2 shows Acute nondisplaced lateral left fifth through eighth rib fractures.  - Incentive Spirometry  - Encouraged slow, deep breaths  - Avoid trauma to area  - Pain control as below.    #Hyponatremia  - 5/11 Na 134  - was getting salt tabs 1g q12h on 5/6 and IVF NS 42cc/hr on 5/7 (likely d/t diarrhea) at [facility]  - monitor CMP/BMPs    #Diarrhea 2/2 Colitis; Resolved  - leukocytosis, resolved  - CT A/P w/ PO contrast on 5/6 shows colitis  - WBC was 11.2 on 5/6  - Otherwise afebrile  - PO hydration in rehab, otherwise IVF hydration as tolerated  - Florastor,, metamucil    #B/L Pleural Effusions; Leukocytosis  - seen on CT A/P on 5/6/21 at [facility]  - Aspiration? possible risk from her rib fx  - currently no fever, SOB.    #HTN  - amlodipine 5mg po daily    #COPD  - albuterol prn  - symbicort bid  - discharge records list prednisone 10mg daily; unclear why or whether this is appropriate in the setting of recent hip surgery    #H/O MI s/p Stents  - ASA 81 qd  - Lipitor 40mg po qhs  - Ranexa 1000mg po in AM and 500mg po in PM    #hypothyroidism  - levothyroxine 75mcg po qd    #Pre-Diabetes - A1c 6.3 on 5/2  - will not continue do FS and ISS here in rehab as patient's sugars have been well controlled while in acute hospital at [facility].  - management via diet and exercise  - Diabetic educator    #Sleep  - melatonin PRN    #Skin  - Skin on admission:  Coccygeal area nonblanchable erythema, right between gluteal cleft. Left hip w/ aquacel dressing over surgical site, eccymhosiss throughout  - Pressure injury/Skin: OOB to Chair, PT/OT   - Barrier cream w/ allvyne foam to offload pressure, bid and prn if soiled/removed    #Pain Mgmt   - Tylenol 975mg po q8h standing    #GI/Bowel Mgmt   - Continent w/ RESOLVED diarrhea  - no bowel meds for now  - Protonix for gi ppx    #/Bladder Mgmt   - Continent, PVR per routine    #FEN   - Diet - Regular + Thins  [CCHO]    #Precautions / PROPHYLAXIS:   - Falls, Cardiac, Left posterior hip precautions  - ortho: Weight bearing status: WBAT LLE  - Lungs: Aspiration, Incentive Spirometer   - DVT: Lovenox        MEDICAL PROGNOSIS: GOOD                                   REHAB POTENTIAL: GOOD  ESTIMATED DISPOSITION: HOME                             ELOS: 10-14 Days   EXPECTED THERAPY:     P.T. 2hr/day       O.T. 1hr/day      S.L.P. 0hr/day      EXP FREQUENCY: 5 days per 7 day period     PRESCREEN COMPARISON: I have reviewed the prescreen information and I have found no relevant changes between the preadmission screening and my post admission evaluation     RATIONALE FOR INPATIENT ADMISSION - Patient demonstrates the following: (check all that apply)  [X] Medically appropriate for rehabilitation admission  [X] Has attainable rehab goals with an appropriate initial discharge plan  [X] Has rehabilitation potential (expected to make a significant improvement within a reasonable period of time)   [X] Requires close medical management by a rehab physician, rehab nursing care, Hospitalist and comprehensive interdisciplinary team (including PT, OT, & or SLP, Prosthetics and Orthotics)     Assessment/Plan:  PRESLEY BECK is a 86yo F w PMH of HTN, COPD, and MI (11y/a) s/p stents x 2 which were replaced 3y/a on ASA only who presents to [facility] on 5/1/21 after mechanical fall from standing complaining of left leg pain, found to have displaced femoral neck fracture s/p left hip hemiarthroplasty on 5/2. Hospital course was complicated with Hyponatremia on fluid restriction and salt tabs, Leukocytosis, pleural effusions, Diarrhea 2/2 Colitis. Patient now admitted for a multidisciplinary rehab program. 05-07-21 @ 22:53    -------------    Rehab Management/MEDICAL MANAGEMENT     #Displaced LEFT femoral neck fracture s/p left hip hemiarthroplasty on 5/2  - Gait Instability, ADL impairments and Functional impairments: start Comprehensive Rehab Program of PT/OT  - 3 hours a day, 5 days a week  - WBAT LLE  - May shower after post-op day #5 (after 5/7)  - The dressing removed/changed on day #9 (5/11)   - will be seen in the Dr. Moore's Ortho office between 2-3 weeks for wound check. Sutures/Staples/Tape will be removed at that time.  - Posterior Hip Precautions for 6 weeks per Ortho (5/2-6/13)  - Lovenox for DVT PPx for 4 weeks (5/2-5/30)    #Peripheral neuropathy?  - finger tips feel numb  - A1c on 5/2 is 6.3 - prediabetic, likey not due to DM    #Left Rib fracture  - CT on 5/2 shows Acute nondisplaced lateral left fifth through eighth rib fractures.  - Incentive Spirometry  - Encouraged slow, deep breaths  - Avoid trauma to area  - Pain control as below.    #Hyponatremia  - 5/11 Na 134  - was getting salt tabs 1g q12h on 5/6 and IVF NS 42cc/hr on 5/7 (likely d/t diarrhea) at [facility]  - monitor CMP/BMPs    #Diarrhea 2/2 Colitis; Resolved  - leukocytosis, resolved  - CT A/P w/ PO contrast on 5/6 shows colitis  - WBC was 11.2 on 5/6  - Otherwise afebrile  - PO hydration in rehab, otherwise IVF hydration as tolerated  - Florastor,, metamucil    #B/L Pleural Effusions; Leukocytosis  - seen on CT A/P on 5/6/21 at [facility]  - Aspiration? possible risk from her rib fx  - currently no fever, SOB.    #HTN  - amlodipine 5mg po daily    #COPD  - albuterol prn  - symbicort bid  - discharge records list prednisone 10mg daily    #H/O MI s/p Stents  - ASA 81 qd  - Lipitor 40mg po qhs  - Ranexa 1000mg po in AM and 500mg po in PM    #hypothyroidism  - levothyroxine 75mcg po qd    #Pre-Diabetes - A1c 6.3 on 5/2  - will not continue do FS and ISS here in rehab as patient's sugars have been well controlled while in acute hospital at [facility].  -diet and exercise      #Sleep  - melatonin PRN    #Skin  - Skin on admission:  Coccygeal area nonblanchable erythema, right between gluteal cleft. Left hip w/ aquacel dressing over surgical site, eccymhosiss throughout  - Pressure injury/Skin: OOB to Chair, PT/OT   - Barrier cream w/ allvyne foam to offload pressure, bid and prn if soiled/removed    #Pain Mgmt   - Tylenol 975mg po q8h standing    #GI/Bowel Mgmt   - Continent w/ RESOLVED diarrhea  - no bowel meds for now  - Protonix for gi ppx    #/Bladder Mgmt   - Continent, PVR per routine    #FEN   - Diet - Regular + Thins  [CCHO]    #Precautions / PROPHYLAXIS:   - Falls, Cardiac, Left posterior hip precautions  - ortho: Weight bearing status: WBAT LLE  - Lungs: Aspiration, Incentive Spirometer   - DVT: Lovenox        MEDICAL PROGNOSIS: GOOD                                   REHAB POTENTIAL: GOOD  ESTIMATED DISPOSITION: HOME                             ELOS: 10-14 Days   EXPECTED THERAPY:     P.T. 2hr/day       O.T. 1hr/day      S.L.P. 0hr/day      EXP FREQUENCY: 5 days per 7 day period

## 2021-05-11 NOTE — PROGRESS NOTE ADULT - SUBJECTIVE AND OBJECTIVE BOX
SUBJECTIVE/24 hour events:  Patient is a 85yFemale s/p trip and fall sustaining a left femoral neck fracture now pod#9 of left hip arthoplasty and acute non-displaced L 5-8 rib fractures . Hyponatremia improving on sodium tabs now 134, diarrhea resolving with the addition of immodium and increasing metacmucil dose. Patient with no acute events overnight, no pain issues, voiding spontaneously. Patient being followed by PM&R and continues to recommend acute rehab placement, once bed is available patient is medically stable for transfer.       Vital Signs Last 24 Hrs  T(C): 36.4 (11 May 2021 04:01), Max: 36.8 (10 May 2021 21:36)  T(F): 97.6 (11 May 2021 04:01), Max: 98.3 (10 May 2021 21:36)  HR: 70 (11 May 2021 04:01) (70 - 81)  BP: 149/64 (11 May 2021 04:01) (143/71 - 149/64)  BP(mean): --  RR: 18 (11 May 2021 04:01) (18 - 20)  SpO2: 92% (11 May 2021 04:01) (92% - 98%)  Drug Dosing Weight  Height (cm): 170.2 (01 May 2021 19:40)  Weight (kg): 61.7 (01 May 2021 21:53)  BMI (kg/m2): 21.3 (01 May 2021 21:53)  BSA (m2): 1.72 (01 May 2021 21:53)  I&O's Detail    09 May 2021 07:01  -  10 May 2021 07:00  --------------------------------------------------------  IN:    Oral Fluid: 600 mL  Total IN: 600 mL    OUT:    Voided (mL): 600 mL  Total OUT: 600 mL    Total NET: 0 mL      10 May 2021 07:01  -  11 May 2021 04:49  --------------------------------------------------------  IN:  Total IN: 0 mL    OUT:    Voided (mL): 1400 mL  Total OUT: 1400 mL    Total NET: -1400 mL        Allergies    No Known Allergies    Intolerances    Bactrim (Vomiting)                            10.8   10.84 )-----------( 302      ( 10 May 2021 05:50 )             30.7   05-10    134<L>  |  101  |  7.0<L>  ----------------------------<  113<H>  3.7   |  26.0  |  0.45<L>    Ca    8.7      10 May 2021 05:50  Phos  3.2     05-10  Mg     1.8     05-10        ROS:    PHYSICAL EXAM:  Constitutional: resting comfortably   Respiratory: no respiratory distress, no dyspnea, off supplemental o2, PIC score 7-8  Cardiovascular: NSR on telemetry   Gastrointestinal: abdomen softly distended, no guarding, TTP to LLQ and LUQ, no peritonitis  Genitourinary: not voiding spontaneously yet  Extremities: LLE NVI, compartments soft, warm to touch , cap refill<2 seconds  Neurological: A&OX3  Skin: warm, dry and no rashes             MEDICATIONS  (STANDING):  acetaminophen   Tablet .. 975 milliGRAM(s) Oral every 8 hours  amLODIPine   Tablet 5 milliGRAM(s) Oral daily  aspirin  chewable 81 milliGRAM(s) Oral daily  atorvastatin 40 milliGRAM(s) Oral at bedtime  budesonide 160 MICROgram(s)/formoterol 4.5 MICROgram(s) Inhaler 2 Puff(s) Inhalation two times a day  dextrose 40% Gel 15 Gram(s) Oral once  dextrose 50% Injectable 25 Gram(s) IV Push once  dextrose 50% Injectable 12.5 Gram(s) IV Push once  dextrose 50% Injectable 25 Gram(s) IV Push once  enoxaparin Injectable 40 milliGRAM(s) SubCutaneous every 24 hours  glucagon  Injectable 1 milliGRAM(s) IntraMuscular once  insulin lispro (ADMELOG) corrective regimen sliding scale   SubCutaneous Before meals and at bedtime  isosorbide   mononitrate ER Tablet (IMDUR) 60 milliGRAM(s) Oral daily  levothyroxine 75 MICROGram(s) Oral daily  lidocaine   Patch 1 Patch Transdermal daily  loperamide 2 milliGRAM(s) Oral daily  melatonin 5 milliGRAM(s) Oral at bedtime  pantoprazole    Tablet 40 milliGRAM(s) Oral before breakfast  psyllium Powder 1 Packet(s) Oral two times a day  ranolazine 1000 milliGRAM(s) Oral <User Schedule>  ranolazine 500 milliGRAM(s) Oral <User Schedule>    MEDICATIONS  (PRN):  ALBUTerol    90 MICROgram(s) HFA Inhaler 2 Puff(s) Inhalation every 6 hours PRN Shortness of Breath and/or Wheezing  ibuprofen  Tablet. 400 milliGRAM(s) Oral every 8 hours PRN Mild Pain (1 - 3)  ondansetron Injectable 4 milliGRAM(s) IV Push every 6 hours PRN Nausea and/or Vomiting      RADIOLOGY STUDIES:    CULTURES:

## 2021-05-11 NOTE — H&P ADULT - NSHPLABSRESULTS_GEN_ALL_CORE
RECENT LABS/IMAGING                        11.3   9.24  )-----------( 265      ( 08 May 2021 06:55 )             30.9     05-08    131<L>  |  102  |  6.0<L>  ----------------------------<  87  4.3   |  20.0<L>  |  0.34<L>    Ca    7.6<L>      08 May 2021 06:55  Phos  2.5     05-08  Mg     2.0     05-08    < from: CT Abdomen and Pelvis w/ Oral Cont (05.06.21 @ 13:00) >  INTERPRETATION:  CLINICAL INFORMATION: Abdominal pain and distention.    < from: CT Abdomen and Pelvis w/ Oral Cont (05.06.21 @ 13:00) >    IMPRESSION:  Small bilateral pleural effusions with bibasilar atelectasis, right greater than left. Small volume of ascites. Diffuse subcutaneous edema. Since the heart is normal in size, these findings may be secondary to fluid overload.    Colitis involving the descending colon.    MODE MCKEON MD; Attending Radiologist  This document has been electronically signed. May  6 2021  1:22PM      < from: Xray Hip w/ Pelvis 2 or 3 Views, Left (05.01.21 @ 20:49) >    IMPRESSION: Fracture through the left femoral neck.    ZULY GUAJARDO MD; Attending Radiologist  This document has been electronically signed. May  2 2021 12:45PM
    133<L>  |  101  |  3.0<L>  ----------------------------<  99  3.6   |  22.0  |  0.31<L>  05-06    126<L>  |  95<L>  |  6.0<L>  ----------------------------<  109<H>  3.2<L>   |  23.0  |  0.34<L>  05-05    126<L>  |  95<L>  |  9.0  ----------------------------<  120<H>  3.4<L>   |  23.0  |  0.36<L>    Ca    7.6<L>      07 May 2021 05:52  Ca    6.8<L>      06 May 2021 07:00  Ca    6.9<L>      05 May 2021 18:07  Phos  1.9       Mg     2.0     07                      Urinalysis Basic - ( 06 May 2021 09:37 )    Color: Yellow / Appearance: Clear / S.005 / pH: x  Gluc: x / Ketone: Negative  / Bili: Negative / Urobili: Negative mg/dL   Blood: x / Protein: Negative mg/dL / Nitrite: Negative   Leuk Esterase: Negative / RBC: 0-2 /HPF / WBC 0-2   Sq Epi: x / Non Sq Epi: Occasional / Bacteria: Negative      ABG - ( 06 May 2021 06:18 )  pH, Arterial: 7.44  pH, Blood: x     /  pCO2: 36    /  pO2: 81    / HCO3: 25    / Base Excess: 0.6   /  SaO2: 97                                      11.8   10.28 )-----------( 244      ( 07 May 2021 05:52 )             32.4                         10.4   11.20 )-----------( 194      ( 06 May 2021 07:00 )             28.6                         10.1   9.42  )-----------( 154      ( 05 May 2021 06:03 )             28.9     CAPILLARY BLOOD GLUCOSE      POCT Blood Glucose.: 115 mg/dL (07 May 2021 21:16)  POCT Blood Glucose.: 98 mg/dL (07 May 2021 17:01)  POCT Blood Glucose.: 123 mg/dL (07 May 2021 11:25)  POCT Blood Glucose.: 98 mg/dL (07 May 2021 07:47)    < from: CT Abdomen and Pelvis w/ Oral Cont (21 @ 13:00) >  INTERPRETATION:  CLINICAL INFORMATION: Abdominal pain and distention.    < from: CT Abdomen and Pelvis w/ Oral Cont (21 @ 13:00) >    IMPRESSION:  Small bilateral pleural effusions with bibasilar atelectasis, right greater than left. Small volume of ascites. Diffuse subcutaneous edema. Since the heart is normal in size, these findings may be secondary to fluid overload.    Colitis involving the descending colon.    MODE MCKEON MD; Attending Radiologist  This document has been electronically signed. May  6 2021  1:22PM      < from: Xray Hip w/ Pelvis 2 or 3 Views, Left (21 @ 20:49) >    IMPRESSION: Fracture through the left femoral neck.    ZULY GUAJARDO MD; Attending Radiologist  This document has been electronically signed. May  2 2021 12:45PM    < end of copied text >
RECENT LABS/IMAGING                          10.8   10.84 )-----------( 302      ( 10 May 2021 05:50 )             30.7     05-10    134<L>  |  101  |  7.0<L>  ----------------------------<  113<H>  3.7   |  26.0  |  0.45<L>    Ca    8.7      10 May 2021 05:50  Phos  3.2     05-10  Mg     1.8     05-10                                  10.8   10.84 )-----------( 302      ( 10 May 2021 05:50 )             30.7     05-10    134<L>  |  101  |  7.0<L>  ----------------------------<  113<H>  3.7   |  26.0  |  0.45<L>    Ca    8.7      10 May 2021 05:50  Phos  3.2     05-10  Mg     1.8     05-10                  < from: CT Abdomen and Pelvis w/ Oral Cont (05.06.21 @ 13:00) >  INTERPRETATION:  CLINICAL INFORMATION: Abdominal pain and distention.    < from: CT Abdomen and Pelvis w/ Oral Cont (05.06.21 @ 13:00) >    IMPRESSION:  Small bilateral pleural effusions with bibasilar atelectasis, right greater than left. Small volume of ascites. Diffuse subcutaneous edema. Since the heart is normal in size, these findings may be secondary to fluid overload.    Colitis involving the descending colon.    MODE MCKEON MD; Attending Radiologist  This document has been electronically signed. May  6 2021  1:22PM      < from: Xray Hip w/ Pelvis 2 or 3 Views, Left (05.01.21 @ 20:49) >    IMPRESSION: Fracture through the left femoral neck.    ZULY GUAJARDO MD; Attending Radiologist  This document has been electronically signed. May  2 2021 12:45PM

## 2021-05-11 NOTE — PROGRESS NOTE ADULT - NUTRITIONAL ASSESSMENT
This patient has been assessed with a concern for Malnutrition and has been determined to have a diagnosis/diagnoses of Severe protein-calorie malnutrition.    This patient is being managed with:   Diet Consistent Carbohydrate Renal/No Snacks-  Entered: May  6 2021  6:55AM    The following pending diet order is being considered for treatment of Severe protein-calorie malnutrition:  Diet Consistent Carbohydrate w/Evening Snack-  Supplement Feeding Modality:  Oral  Glucerna Shake Cans or Servings Per Day:  1       Frequency:  Three Times a day  Entered: May  6 2021  9:37AM  
This patient has been assessed with a concern for Malnutrition and has been determined to have a diagnosis/diagnoses of Severe protein-calorie malnutrition.    This patient is being managed with:   Diet Consistent Carbohydrate Renal/No Snacks-  Entered: May  6 2021  6:55AM    The following pending diet order is being considered for treatment of Severe protein-calorie malnutrition:  Diet Consistent Carbohydrate w/Evening Snack-  Entered: May  9 2021 10:07AM    Diet Consistent Carbohydrate w/Evening Snack-  Supplement Feeding Modality:  Oral  Glucerna Shake Cans or Servings Per Day:  1       Frequency:  Three Times a day  Entered: May  6 2021  9:37AM  
This patient has been assessed with a concern for Malnutrition and has been determined to have a diagnosis/diagnoses of Severe protein-calorie malnutrition.    This patient is being managed with:   Diet Consistent Carbohydrate Renal/No Snacks-  Entered: May  6 2021  6:55AM    The following pending diet order is being considered for treatment of Severe protein-calorie malnutrition:  Diet Consistent Carbohydrate w/Evening Snack-  Entered: May  9 2021 10:07AM    Diet Consistent Carbohydrate w/Evening Snack-  Supplement Feeding Modality:  Oral  Glucerna Shake Cans or Servings Per Day:  1       Frequency:  Three Times a day  Entered: May  6 2021  9:37AM  
This patient has been assessed with a concern for Malnutrition and has been determined to have a diagnosis/diagnoses of Severe protein-calorie malnutrition.    This patient is being managed with:   Diet Consistent Carbohydrate Renal/No Snacks-  Entered: May  6 2021  6:55AM    The following pending diet order is being considered for treatment of Severe protein-calorie malnutrition:  Diet Consistent Carbohydrate w/Evening Snack-  Supplement Feeding Modality:  Oral  Glucerna Shake Cans or Servings Per Day:  1       Frequency:  Three Times a day  Entered: May  6 2021  9:37AM  
This patient has been assessed with a concern for Malnutrition and has been determined to have a diagnosis/diagnoses of Severe protein-calorie malnutrition.    This patient is being managed with:   Diet Consistent Carbohydrate Renal/No Snacks-  Entered: May  6 2021  6:55AM    The following pending diet order is being considered for treatment of Severe protein-calorie malnutrition:  Diet Consistent Carbohydrate w/Evening Snack-  Supplement Feeding Modality:  Oral  Glucerna Shake Cans or Servings Per Day:  1       Frequency:  Three Times a day  Entered: May  6 2021  9:37AM

## 2021-05-11 NOTE — H&P ADULT - NSHPSOCIALHISTORY_GEN_ALL_CORE
Smoking - None  EtOH - None  Drugs - None    FUNCTIONAL HISTORY  Lives with her  on first floor in multifamily home.  No steps to enter. Her daughter lives upstairs.  Independent with all mobility prior to admit. has cane and RW, does not use.  and daughter home and able to assist. Left handed. Drives. Bathroom has bathtub with doors.    5/7:    Bed Mobility  Bed Mobility Training Sit-to-Supine: moderate assist (50% patient effort);  1 person assist  Bed Mobility Training Supine-to-Sit: moderate assist (50% patient effort);  1 person assist  Bed Mobility Training Limitations: decreased strength;  decreased ROM;  decreased ability to use legs for bridging/pushing    Bed-Chair Transfer Training  Transfer Training Chair-to-Bed Transfer: moderate assist (50% patient effort);  verbal cues;  nonverbal cues (demo/gestures);  rolling walker;  weight-bearing as tolerated   1 person + 1 person to manage equipment  Bed-to-Chair Transfer Training Transfer Safety Analysis: decreased balance;  decreased strength;  decreased ROM    Sit-Stand Transfer Training  Transfer Training Sit-to-Stand Transfer: moderate assist (50% patient effort);  verbal cues;  nonverbal cues (demo/gestures);  weight-bearing as tolerated   rolling walker;  1 person + 1 person to manage equipment  Transfer Training Stand-to-Sit Transfer: moderate assist (50% patient effort);  nonverbal cues (demo/gestures);  verbal cues;  weight-bearing as tolerated   rolling walker;  1 person + 1 person to manage equipment  Sit-to-Stand Transfer Training Transfer Safety Analysis: decreased balance;  decreased strength;  decreased ROM;  rolling walker    Gait Training  Gait Training: moderate assist (50% patient effort);  nonverbal cues (demo/gestures);  verbal cues;  weight-bearing as tolerated   rolling walker;  10 feet;  1 person + 1 person to manage equipment  Gait Analysis: 3-point gait   crouch;  decreased arm swing;  decreased hip/knee flexion;  decreased trunk rotation;  decreased step length;  decreased ROM;  decreased strength;  10 feet
EtOH - None  Drugs - None    FUNCTIONAL HISTORY  Lives with her  on first floor in multifamily home.  No steps to enter. Her daughter lives upstairs.  Independent with all mobility prior to admit. has cane and RW, does not use.  and daughter home and able to assist. Left handed. Drives. Bathroom has bathtub with doors.    FUNCTIONAL PROGRESS  5/10  Bed Mobility  Bed Mobility Training Sit-to-Supine: contact guard;  1 person assist  Bed Mobility Training Supine-to-Sit: moderate assist (50% patient effort);  1 person assist  Bed Mobility Training Limitations: decreased strength;  decreased ROM    Sit-Stand Transfer Training  Transfer Training Sit-to-Stand Transfer: minimum assist (75% patient effort);  1 person assist;  weight-bearing as tolerated   rolling walker  Transfer Training Stand-to-Sit Transfer: contact guard;  weight-bearing as tolerated   rolling walker  Sit-to-Stand Transfer Training Transfer Safety Analysis: decreased balance;  decreased strength;  decreased ROM;  rolling walker    Gait Training  Gait Training: contact guard;  1 person assist;  weight-bearing as tolerated   rolling walker;  25 feet  Gait Analysis: 3-point gait   crouch;  decreased frederic;  decreased hip/knee flexion;  decreased velocity of limb motion;  decreased step length;  decreased strength;  decreased ROM;  25 feet;  rolling walker
Smoking - None  EtOH - None  Drugs - None    FUNCTIONAL HISTORY  Lives with her  on first floor in multifamily home.  No steps to enter. Her daughter lives upstairs.  Independent with all mobility prior to admit. has cane and RW, does not use.  and daughter home and able to assist. Left handed. Drives. Bathroom has bathtub with doors.    5/7:    Bed Mobility  Bed Mobility Training Sit-to-Supine: moderate assist (50% patient effort);  1 person assist  Bed Mobility Training Supine-to-Sit: moderate assist (50% patient effort);  1 person assist  Bed Mobility Training Limitations: decreased strength;  decreased ROM;  decreased ability to use legs for bridging/pushing    Bed-Chair Transfer Training  Transfer Training Chair-to-Bed Transfer: moderate assist (50% patient effort);  verbal cues;  nonverbal cues (demo/gestures);  rolling walker;  weight-bearing as tolerated   1 person + 1 person to manage equipment  Bed-to-Chair Transfer Training Transfer Safety Analysis: decreased balance;  decreased strength;  decreased ROM    Sit-Stand Transfer Training  Transfer Training Sit-to-Stand Transfer: moderate assist (50% patient effort);  verbal cues;  nonverbal cues (demo/gestures);  weight-bearing as tolerated   rolling walker;  1 person + 1 person to manage equipment  Transfer Training Stand-to-Sit Transfer: moderate assist (50% patient effort);  nonverbal cues (demo/gestures);  verbal cues;  weight-bearing as tolerated   rolling walker;  1 person + 1 person to manage equipment  Sit-to-Stand Transfer Training Transfer Safety Analysis: decreased balance;  decreased strength;  decreased ROM;  rolling walker    Gait Training  Gait Training: moderate assist (50% patient effort);  nonverbal cues (demo/gestures);  verbal cues;  weight-bearing as tolerated   rolling walker;  10 feet;  1 person + 1 person to manage equipment  Gait Analysis: 3-point gait   crouch;  decreased arm swing;  decreased hip/knee flexion;  decreased trunk rotation;  decreased step length;  decreased ROM;  decreased strength;  10 feet

## 2021-05-11 NOTE — H&P ADULT - NSHPPHYSICALEXAM_GEN_ALL_CORE
Gen - NAD, Comfortable  HEENT - NCAT, EOMI, MMM  Neck - Supple, No limited ROM  Pulm - CTAB, No wheeze, No rhonchi, No crackles  Cardiovascular - RRR, S1S2, No murmurs  Abdomen - Soft, NT/ND, +BS  Extremities - No C/C/E, No calf tenderness  Neuro-     Cognitive - AAOx3     Communication - Fluent, No dysarthria     Attention: Intact      Judgement: Good evidence of judgement     Memory: Recall 3 objects immediate and 3 min later         Cranial Nerves - CN 2-12 intact     Motor -                     LEFT    UE - ShAB 3/5, EF 4/5, EE 4/5,  4/5                    RIGHT UE - ShAB 4/5, EF 4/5, EE 4/5,  4/5                    LEFT    LE - HF 1/5, KE 3/5, DF 5/5                     RIGHT LE - HF 4/5, KE 3/5, DF 5/5      Sensory - Intact to LT     Reflexes - DTR Intact, No primitive reflexive     Coordination - FTN intact     Tone - normal  Psychiatric - Mood stable, Affect WNL  Skin:  all skin intact    Wounds: None Present Gen - NAD, Comfortable  HEENT - NCAT, EOMI, MMM  Neck - Supple, No limited ROM  Pulm - CTAB, No wheeze, No rhonchi, No crackles  Chest - tenderness to palpation on left lateral chest wall.  Cardiovascular - RRR, S1S2, No murmurs  Abdomen - Soft, NT/ND, +BS  Extremities - No C/C/E, No calf tenderness  Neuro-     Cognitive - AAOx3     Communication - Fluent, No dysarthria     Attention: Intact      Judgement: Good evidence of judgement     Memory: Recall 3 objects immediate and 3 min later         Cranial Nerves - CN 2-12 intact     Motor -                     LEFT    UE - ShAB **3/5 (limited due to h/o rotator cuff tear), EF 5/5, EE 5/5,  5/5                    RIGHT UE - ShAB 5/5, EF 5/5, EE 5/5,  5/5                    LEFT    LE - HF 1/5 **PAIN LIMITING, KE 4/5, DF 5/5, PF 5/5                    RIGHT LE - HF 5/5, KE 5/5, DF 5/5, PF 5/5      Sensory - Intact to LT except for bilateral finger tips and hallux of left foot.     Reflexes - DTR Intact, No primitive reflexes     Tone - normal     Special test - negative tinel's, phalen, nor wrist compression test b/l  Psychiatric - Mood stable, Affect WNL  Skin:  Coccygeal area nonblanchable erythema, right between gluteal cleft. Left hip w/ aquacel dressing over surgical site, eccymhosiss throughout Gen - NAD, Comfortable  HEENT - NCAT, EOMI, MMM  Neck - Supple, No limited ROM  Pulm - CTAB, No wheeze, No rhonchi, No crackles  Chest - tenderness to palpation on left lateral chest wall.  Cardiovascular - RRR, S1S2, No murmurs  Abdomen - Soft, NT/ND, +BS  Extremities - No C/C/E, No calf tenderness  Neuro-     Cognitive - AAOx3     Communication - Fluent, No dysarthria     Attention: Intact      Judgement: Good evidence of judgement     Memory: Recall 3 objects immediate and 3 min later         Cranial Nerves - CN 2-12 intact     Motor -                     LEFT    UE - ShAB **3/5 (limited due to h/o rotator cuff tear), EF 5/5, EE 5/5,  5/5                    RIGHT UE - ShAB 5/5, EF 5/5, EE 5/5,  5/5                    LEFT    LE - HF 1/5 **PAIN LIMITING, KE 4/5, DF 5/5, PF 5/5                    RIGHT LE - HF 5/5, KE 5/5, DF 5/5, PF 5/5      Sensory - Intact to LT except for bilateral finger tips and hallux of left foot.     Reflexes - DTR Intact, No primitive reflexes     Tone - normal     Special test - negative tinel's, phalen, nor wrist compression test b/l  Psychiatric - Mood stable, Affect WNL  Skin:  Coccygeal area nonblanchable erythema, right between gluteal cleft. Left hip w/ aquacel dressing over surgical site, ecchymosis throughout Gen - NAD, Comfortable  HEENT - NCAT, EOMI, MMM  Neck - Supple, No limited ROM  Pulm - CTAB, No wheeze, No rhonchi, No crackles  Chest - tenderness to palpation on left lateral chest wall.  Cardiovascular - RRR, S1S2, No murmurs  Abdomen - Soft, NT/ND, +BS  Extremities - No C/C/E, No calf tenderness  Neuro-     Cognitive - AAOx3     Communication - Fluent, No dysarthria     Attention: Intact      Judgement: Good evidence of judgement     Memory: Recall 3 objects immediate and 3 min later         Cranial Nerves - CN 2-12 intact     Motor -                     LEFT    UE - ShAB **3/5 (limited due to h/o rotator cuff tear), EF 5/5, EE 5/5,  5/5                    RIGHT UE - ShAB 5/5, EF 5/5, EE 5/5,  5/5                    LEFT    LE - HF 1/5 **PAIN LIMITING, KE 4/5, DF 5/5, PF 5/5                    RIGHT LE - HF 5/5, KE 5/5, DF 5/5, PF 5/5      Sensory - Intact to LT except for bilateral finger tips and hallux of left foot.     Reflexes - DTR Intact, No primitive reflexes     Tone - normal     Special test - negative tinel's, phalen, nor wrist compression test b/l  Psychiatric - Mood stable, Affect WNL  Skin:  Pressure ulcer Coccygeal area nonblanchable erythema, right between gluteal cleft. Left hip w/ aquacel dressing over surgical site, ecchymosis throughout

## 2021-05-11 NOTE — H&P ADULT - HISTORY OF PRESENT ILLNESS
PRESLEY BECK is a 86yo F w PMH of HTN, COPD, and MI (11y/a) s/p stents x 2 which were replaced 3y/a on ASA only who presents after mechanical fall from standing complaining of left leg pain. Patient was stepping inside her home when she caught her foot on the door frame, She landed on her left side and hit left forehead, but denies LOC. Denies headache, lightheadedness, HA, dizziness, chest pain, or SOB. Had large forehead swelling on left which has since improved. No nausea or vomiting. Imaging studies revealed acute left femoral neck fracture. Patient was admitted to the trauma service & orthopedics consulted. Taken to the OR on 5/2 for left hip hemiarthroplasty. Patient tolerated procedure well. She worked with PT, OT and PMR who recommended acute rehab upon discharge.  Hospital course was complicated with Hyponatremia on fluid restriction and salt tabs, Leukocytosis, pleural effusions, Diarrhea 2/2 Colitis, started on immodium and metamucil. CT A/P on 5/6 also shows bibasilar atelectasis.    PRESLEY BECK is a 84yo F w PMH of HTN, COPD, and MI (11y/a) s/p stents x 2 which were replaced 3y/a on ASA only who presents after mechanical fall from standing complaining of left leg pain. Patient was stepping inside her home when she caught her foot on the door frame, She landed on her left side and hit left forehead, but denies LOC. Denies headache, lightheadedness, HA, dizziness, chest pain, or SOB. Had large forehead swelling on left which has since improved. No nausea or vomiting. Imaging studies revealed acute left femoral neck fracture aand Acute nondisplaced lateral left fifth through eighth rib fractures.. Patient was admitted to the trauma service & orthopedics consulted. Taken to the OR on 5/2 for left hip hemiarthroplasty. Patient tolerated procedure well. She worked with PT, OT and PMR who recommended acute rehab upon discharge.  Hospital course was complicated with Hyponatremia on fluid restriction and salt tabs, Leukocytosis, pleural effusions, Diarrhea 2/2 Colitis, started on immodium and metamucil. CT A/P on 5/6 also shows bibasilar atelectasis.

## 2021-05-11 NOTE — H&P ADULT - NSHPREVIEWOFSYSTEMS_GEN_ALL_CORE
REVIEW OF SYSTEMS  Constitutional: No fever, No Chills, No fatigue  HEENT: No eye pain, No visual disturbances, No difficulty hearing  Pulm: No cough,  No shortness of breath  Cardio: No chest pain, No palpitations  GI:  No abdominal pain, No nausea, No vomiting, No diarrhea, No constipation  : No dysuria, No frequency, No hematuria  Neuro: No headaches, No memory loss, No loss of strength, No numbness, No tremors  Skin: No itching, No rashes, No lesions   Endo: No temperature intolerance  MSK: No joint pain, No joint swelling, No muscle pain, No Neck or back pain  Psych:  No depression, No anxiety REVIEW OF SYSTEMS  Constitutional: No fever, No Chills, No fatigue  HEENT: No eye pain, No visual disturbances, No difficulty hearing  Pulm: No cough,  No shortness of breath  Cardio: No chest pain, No palpitations  GI:  No abdominal pain, No nausea, No vomiting, No diarrhea FOR LAST 3-4 days, No constipation  : No dysuria, No frequency, No hematuria  Neuro: No headaches, No memory loss, No loss of strength, +numbness of left great toe and b/l finger tips, No tremors  Skin: No itching, No rashes, No lesions   Endo: No temperature intolerance  MSK: +joint pain, +joint swelling, +muscle pain, No Neck or back pain  Psych:  No depression, No anxiety

## 2021-05-11 NOTE — PROGRESS NOTE ADULT - NSICDXPILOT_GEN_ALL_CORE
[place]
[employer] [place] [employer] [place]
[employer] [place]
[place]

## 2021-05-11 NOTE — PROGRESS NOTE ADULT - PROVIDER SPECIALTY LIST ADULT
Orthopedics
Trauma Surgery
Trauma Surgery
Rehab Medicine
Rehab Medicine
Trauma Surgery
Trauma Surgery
Cardiology
Orthopedics
Rehab Medicine
Trauma Surgery
Trauma Surgery
Orthopedics
Trauma Surgery

## 2021-05-11 NOTE — PROGRESS NOTE ADULT - SUBJECTIVE AND OBJECTIVE BOX
Patient OOB and eating breakfast.   Notes a bit of pain in the chest is worse than the hip.  Educated about fluid restriction.  Planned for AR today.     REVIEW OF SYSTEMS  Constitutional - No fever,  No fatigue  HEENT - No vertigo, No neck pain  Neurological - No headaches, No memory loss, +loss of strength, No numbness, No tremors  Musculoskeletal - +joint pain, No joint swelling, +muscle pain  Psychiatric - No depression, No anxiety    FUNCTIONAL PROGRESS  5/11  Bed Mobility  Bed Mobility Training Sit-to-Supine: contact guard  Bed Mobility Training Supine-to-Sit: minimum assist (75% patient effort)  Bed Mobility Training Limitations: decreased strength;  decreased ROM    Sit-Stand Transfer Training  Transfer Training Sit-to-Stand Transfer: minimum assist (75% patient effort);  weight-bearing as tolerated   L LE   rolling walker  Transfer Training Stand-to-Sit Transfer: minimum assist (75% patient effort);  weight-bearing as tolerated   L LE   rolling walker  Sit-to-Stand Transfer Training Transfer Safety Analysis: decreased strength;  decreased ROM;  rolling walker    Gait Training  Gait Training: contact guard;  weight-bearing as tolerated   L LE   1 person assist;  rolling walker;  25 feet  Gait Analysis: 3-point gait   crouch;  decreased hip/knee flexion;  decreased velocity of limb motion;  decreased step length;  decreased ROM;  decreased strength;  25 feet;  rolling walker        VITALS  T(C): 36.8 (05-11-21 @ 11:36), Max: 36.8 (05-10-21 @ 21:36)  HR: 73 (05-11-21 @ 11:36) (70 - 90)  BP: 132/72 (05-11-21 @ 11:36) (132/72 - 167/72)  RR: 18 (05-11-21 @ 11:36) (18 - 20)  SpO2: 92% (05-11-21 @ 11:36) (92% - 97%)  Wt(kg): --    MEDICATIONS   acetaminophen   Tablet .. 975 milliGRAM(s) every 8 hours  ALBUTerol    90 MICROgram(s) HFA Inhaler 2 Puff(s) every 6 hours PRN  amLODIPine   Tablet 5 milliGRAM(s) daily  aspirin  chewable 81 milliGRAM(s) daily  atorvastatin 40 milliGRAM(s) at bedtime  budesonide 160 MICROgram(s)/formoterol 4.5 MICROgram(s) Inhaler 2 Puff(s) two times a day  dextrose 40% Gel 15 Gram(s) once  dextrose 50% Injectable 25 Gram(s) once  dextrose 50% Injectable 12.5 Gram(s) once  dextrose 50% Injectable 25 Gram(s) once  enoxaparin Injectable 40 milliGRAM(s) every 24 hours  glucagon  Injectable 1 milliGRAM(s) once  ibuprofen  Tablet. 400 milliGRAM(s) every 8 hours PRN  insulin lispro (ADMELOG) corrective regimen sliding scale   Before meals and at bedtime  isosorbide   mononitrate ER Tablet (IMDUR) 60 milliGRAM(s) daily  levothyroxine 75 MICROGram(s) daily  lidocaine   Patch 1 Patch daily  loperamide 2 milliGRAM(s) daily  melatonin 5 milliGRAM(s) at bedtime  ondansetron Injectable 4 milliGRAM(s) every 6 hours PRN  pantoprazole    Tablet 40 milliGRAM(s) before breakfast  psyllium Powder 1 Packet(s) two times a day  ranolazine 1000 milliGRAM(s) <User Schedule>  ranolazine 500 milliGRAM(s) <User Schedule>      RECENT LABS/IMAGING                          10.8   10.84 )-----------( 302      ( 10 May 2021 05:50 )             30.7     05-10    134<L>  |  101  |  7.0<L>  ----------------------------<  113<H>  3.7   |  26.0  |  0.45<L>    Ca    8.7      10 May 2021 05:50  Phos  3.2     05-10  Mg     1.8     05-10                    CT ABD/PEL 5/6 - Small bilateral pleural effusions with bibasilar atelectasis, right greater than left. Small volume of ascites. Diffuse subcutaneous edema. Since the heart is normal in size, these findings may be secondary to fluid overload. Colitis involving the descending colon.    RIGHT HIP XR 5/2 - Prosthetic Hip replacement in proper anatomical alignment..    ----------------------------------------------------------------------------------------  PHYSICAL EXAM  Constitutional - NAD, Comfortable  Extremities - Mild BLE swelling  Neurologic Exam -                    Cognitive - Awake, Alert, AAO to self, place, date, year, situation     Motor -                     LEFT    UE - ShAB 3/5, EF 4/5, EE 4/5,  4/5                    RIGHT UE - ShAB 4/5, EF 4/5, EE 4/5,  4/5                    LEFT    LE - HF 1/5, KE 3/5, DF 5/5                     RIGHT LE - HF 4/5, KE 3/5, DF 5/5      Sensory - Intact to LT  Psychiatric - Mood calm  ----------------------------------------------------------------------------------------  ASSESSMENT/PLAN  85yFemale with functional deficits s/p fall sustaining trauma  Left femoral neck fracture s/p KASSANDRA - Posterior precautions, WBAT  Left rib fractures & Pulm - Proventil, Symbicort, Duoneb  CAD - ASA, Lipitor  HTN - Norvasc, Imdur  Pain - Tylenol, Ibuprofen, Lidoderm  DVT PPX - SCDs, Lovenox  Rehab - Plan for AR today.    Continue bedside therapy as well as OOB throughout the day with mobilization throughout the day with staff to maintain cardiopulmonary function and prevention of secondary complications related to debility.     Discussed with rehab clinical team.

## 2021-05-11 NOTE — DISCHARGE NOTE NURSING/CASE MANAGEMENT/SOCIAL WORK - PATIENT PORTAL LINK FT
You can access the FollowMyHealth Patient Portal offered by [facility] by registering at the following website: [email or web address]. By joining [facility]’s FollowMyHealth portal, you will also be able to view your health information using other applications (apps) compatible with our system.

## 2021-05-11 NOTE — PROGRESS NOTE ADULT - ATTENDING COMMENTS
Doing well. Continue supportive management. No complaints of pain. Disposition and transfer once bed available. [facility]

## 2021-05-11 NOTE — H&P ADULT - ATTENDING COMMENTS
Patient seen and examined at bedside. I have reviewed the resident's note and agree with findings and plan and edited as needed.   85 year old female admitted to rehab, after recent left femoral neck fracture needing hemiarthroplasty. Patient tolerated procedure. WBAT. On Lovenox for DVT prophylaxis.   Post op course with diarrhea- ? colitis, hyponatremia.    Begin full rehab program  Rehab discussed with daughter at bedside

## 2021-05-11 NOTE — PROGRESS NOTE ADULT - REASON FOR ADMISSION
Left Hip Fracture

## 2021-05-12 LAB
ALBUMIN SERPL ELPH-MCNC: 2.3 G/DL — LOW (ref 3.3–5)
ALP SERPL-CCNC: 91 U/L — SIGNIFICANT CHANGE UP (ref 40–120)
ALT FLD-CCNC: 54 U/L — HIGH (ref 10–45)
ANION GAP SERPL CALC-SCNC: 7 MMOL/L — SIGNIFICANT CHANGE UP (ref 5–17)
APPEARANCE UR: ABNORMAL
AST SERPL-CCNC: 28 U/L — SIGNIFICANT CHANGE UP (ref 10–40)
BACTERIA # UR AUTO: ABNORMAL /HPF
BASOPHILS # BLD AUTO: 0 K/UL — SIGNIFICANT CHANGE UP (ref 0–0.2)
BASOPHILS NFR BLD AUTO: 0 % — SIGNIFICANT CHANGE UP (ref 0–2)
BILIRUB SERPL-MCNC: 0.7 MG/DL — SIGNIFICANT CHANGE UP (ref 0.2–1.2)
BILIRUB UR-MCNC: NEGATIVE — SIGNIFICANT CHANGE UP
BUN SERPL-MCNC: 10 MG/DL — SIGNIFICANT CHANGE UP (ref 7–23)
CALCIUM SERPL-MCNC: 8.9 MG/DL — SIGNIFICANT CHANGE UP (ref 8.4–10.5)
CHLORIDE SERPL-SCNC: 102 MMOL/L — SIGNIFICANT CHANGE UP (ref 96–108)
CO2 SERPL-SCNC: 29 MMOL/L — SIGNIFICANT CHANGE UP (ref 22–31)
COLOR SPEC: YELLOW — SIGNIFICANT CHANGE UP
CREAT SERPL-MCNC: 0.61 MG/DL — SIGNIFICANT CHANGE UP (ref 0.5–1.3)
DIFF PNL FLD: ABNORMAL
EOSINOPHIL # BLD AUTO: 0.58 K/UL — HIGH (ref 0–0.5)
EOSINOPHIL NFR BLD AUTO: 4 % — SIGNIFICANT CHANGE UP (ref 0–6)
EPI CELLS # UR: SIGNIFICANT CHANGE UP
GLUCOSE SERPL-MCNC: 119 MG/DL — HIGH (ref 70–99)
GLUCOSE UR QL: NEGATIVE — SIGNIFICANT CHANGE UP
HCT VFR BLD CALC: 32.7 % — LOW (ref 34.5–45)
HGB BLD-MCNC: 11.2 G/DL — LOW (ref 11.5–15.5)
KETONES UR-MCNC: ABNORMAL
LEUKOCYTE ESTERASE UR-ACNC: ABNORMAL
LYMPHOCYTES # BLD AUTO: 15 % — SIGNIFICANT CHANGE UP (ref 13–44)
LYMPHOCYTES # BLD AUTO: 2.16 K/UL — SIGNIFICANT CHANGE UP (ref 1–3.3)
MCHC RBC-ENTMCNC: 31 PG — SIGNIFICANT CHANGE UP (ref 27–34)
MCHC RBC-ENTMCNC: 34.3 GM/DL — SIGNIFICANT CHANGE UP (ref 32–36)
MCV RBC AUTO: 90.6 FL — SIGNIFICANT CHANGE UP (ref 80–100)
MONOCYTES # BLD AUTO: 0.43 K/UL — SIGNIFICANT CHANGE UP (ref 0–0.9)
MONOCYTES NFR BLD AUTO: 3 % — SIGNIFICANT CHANGE UP (ref 2–14)
NEUTROPHILS # BLD AUTO: 10.5 K/UL — HIGH (ref 1.8–7.4)
NEUTROPHILS NFR BLD AUTO: 73 % — SIGNIFICANT CHANGE UP (ref 43–77)
NITRITE UR-MCNC: NEGATIVE — SIGNIFICANT CHANGE UP
PH UR: 7 — SIGNIFICANT CHANGE UP (ref 5–8)
PLATELET # BLD AUTO: 321 K/UL — SIGNIFICANT CHANGE UP (ref 150–400)
POTASSIUM SERPL-MCNC: 3.8 MMOL/L — SIGNIFICANT CHANGE UP (ref 3.5–5.3)
POTASSIUM SERPL-SCNC: 3.8 MMOL/L — SIGNIFICANT CHANGE UP (ref 3.5–5.3)
PROT SERPL-MCNC: 5.5 G/DL — LOW (ref 6–8.3)
PROT UR-MCNC: 30 MG/DL
RBC # BLD: 3.61 M/UL — LOW (ref 3.8–5.2)
RBC # FLD: 14.9 % — HIGH (ref 10.3–14.5)
RBC CASTS # UR COMP ASSIST: ABNORMAL /HPF (ref 0–4)
SARS-COV-2 RNA SPEC QL NAA+PROBE: SIGNIFICANT CHANGE UP
SODIUM SERPL-SCNC: 138 MMOL/L — SIGNIFICANT CHANGE UP (ref 135–145)
SP GR SPEC: 1.01 — SIGNIFICANT CHANGE UP (ref 1.01–1.02)
UROBILINOGEN FLD QL: NEGATIVE — SIGNIFICANT CHANGE UP
WBC # BLD: 14.38 K/UL — HIGH (ref 3.8–10.5)
WBC # FLD AUTO: 14.38 K/UL — HIGH (ref 3.8–10.5)
WBC UR QL: >50 /HPF (ref 0–5)

## 2021-05-12 PROCEDURE — 99223 1ST HOSP IP/OBS HIGH 75: CPT

## 2021-05-12 PROCEDURE — 99232 SBSQ HOSP IP/OBS MODERATE 35: CPT

## 2021-05-12 RX ADMIN — Medication 1 PACKET(S): at 11:22

## 2021-05-12 RX ADMIN — Medication 1 SPRAY(S): at 18:07

## 2021-05-12 RX ADMIN — Medication 975 MILLIGRAM(S): at 22:00

## 2021-05-12 RX ADMIN — Medication 81 MILLIGRAM(S): at 11:22

## 2021-05-12 RX ADMIN — AMLODIPINE BESYLATE 5 MILLIGRAM(S): 2.5 TABLET ORAL at 06:22

## 2021-05-12 RX ADMIN — LIDOCAINE 1 PATCH: 4 CREAM TOPICAL at 19:14

## 2021-05-12 RX ADMIN — ATORVASTATIN CALCIUM 40 MILLIGRAM(S): 80 TABLET, FILM COATED ORAL at 21:15

## 2021-05-12 RX ADMIN — RANOLAZINE 500 MILLIGRAM(S): 500 TABLET, FILM COATED, EXTENDED RELEASE ORAL at 18:08

## 2021-05-12 RX ADMIN — Medication 975 MILLIGRAM(S): at 06:22

## 2021-05-12 RX ADMIN — ISOSORBIDE MONONITRATE 60 MILLIGRAM(S): 60 TABLET, EXTENDED RELEASE ORAL at 11:22

## 2021-05-12 RX ADMIN — RANOLAZINE 1000 MILLIGRAM(S): 500 TABLET, FILM COATED, EXTENDED RELEASE ORAL at 06:21

## 2021-05-12 RX ADMIN — Medication 250 MILLIGRAM(S): at 06:21

## 2021-05-12 RX ADMIN — Medication 6 MILLIGRAM(S): at 21:15

## 2021-05-12 RX ADMIN — Medication 975 MILLIGRAM(S): at 15:30

## 2021-05-12 RX ADMIN — LIDOCAINE 1 PATCH: 4 CREAM TOPICAL at 11:22

## 2021-05-12 RX ADMIN — Medication 1 SPRAY(S): at 06:23

## 2021-05-12 RX ADMIN — Medication 75 MICROGRAM(S): at 06:21

## 2021-05-12 RX ADMIN — BUDESONIDE AND FORMOTEROL FUMARATE DIHYDRATE 2 PUFF(S): 160; 4.5 AEROSOL RESPIRATORY (INHALATION) at 08:49

## 2021-05-12 RX ADMIN — Medication 975 MILLIGRAM(S): at 14:58

## 2021-05-12 RX ADMIN — Medication 250 MILLIGRAM(S): at 18:08

## 2021-05-12 RX ADMIN — Medication 975 MILLIGRAM(S): at 06:45

## 2021-05-12 RX ADMIN — ENOXAPARIN SODIUM 40 MILLIGRAM(S): 100 INJECTION SUBCUTANEOUS at 06:22

## 2021-05-12 RX ADMIN — PANTOPRAZOLE SODIUM 40 MILLIGRAM(S): 20 TABLET, DELAYED RELEASE ORAL at 06:21

## 2021-05-12 RX ADMIN — Medication 975 MILLIGRAM(S): at 21:15

## 2021-05-12 RX ADMIN — BUDESONIDE AND FORMOTEROL FUMARATE DIHYDRATE 2 PUFF(S): 160; 4.5 AEROSOL RESPIRATORY (INHALATION) at 21:18

## 2021-05-12 NOTE — PROGRESS NOTE ADULT - ASSESSMENT
84yo F w PMH of HTN, COPD, and MI (11y/a) s/p stents x 2 which were replaced 3y/a on ASA only who presents to [facility] on 5/1/21 after mechanical fall - displaced femoral neck fracture s/p left hip hemiarthroplasty on 5/2. Hospital course was complicated with Hyponatremia on fluid restriction and salt tabs, Leukocytosis, pleural effusions, Diarrhea 2/2 Colitis. Patient now admitted for a multidisciplinary rehab program. 05-07-21 @ 22:53    -------------    Rehab Management/MEDICAL MANAGEMENT     #Displaced LEFT femoral neck fracture s/p left hip hemiarthroplasty on 5/2   start Comprehensive Rehab Program of PT/OT  - 3 hours a day, 5 days a week  - WBAT LLE  - May shower after post-op day #5 (after 5/7)  - The dressing removed/changed on day #9 (5/11)   - - Posterior Hip Precautions for 6 weeks per Ortho (5/2-6/13)  - Lovenox for DVT PPx for 4 weeks (5/2-5/30)    Left Rib fracture  - CT on 5/2 shows Acute nondisplaced lateral left fifth through eighth rib fractures.  - Incentive Spirometry  - Encouraged slow, deep breaths  - Pain control as below.- Lidoderm patch to painful area on lateral left chest wall     Dysuria overnight- abnormal UA - Urine c/s ordered       #Hyponatremia: in setting of diarrhea   - 5/12 Na 138    #B/L Pleural Effusions:- seen on CT A/P on 5/6/21 at [facility]  -  #HTN:- amlodipine 5mg po daily    #COPD:- albuterol prn, - symbicort bid    #H/O MI s/p Stents  - ASA 81 qd, - Lipitor 40mg po qhs  - Ranexa 1000mg po in AM and 500mg po in PM    #hypothyroidism  - levothyroxine 75mcg po qd    #Pre-Diabetes - A1c 6.3 on 5/2  -  #Sleep:- melatonin PRN    #Skin  - Skin on admission:  Coccygeal area nonblanchable erythema, right between gluteal cleft. Left hip w/ aquacel dressing over surgical site, eccymhosiss throughout  - Pressure injury/Skin: OOB to Chair, PT/OT   - Barrier cream w/ allvyne foam to offload pressure, bid and prn if soiled/removed    #Pain Mgmt :- Tylenol 975mg po q8h standing    #GI/Bowel Mgmt :no bowel meds for now, in setting of diarrhea  - Protonix for gi ppx    #/Bladder Mgmt : toilet schedule prn     #FEN :- Diet - Regular + Thins  [CCHO]    #Precautions / PROPHYLAXIS:   - Falls, Cardiac, Left posterior hip precautions  - ortho: Weight bearing status: WBAT LLE  - Lungs: Aspiration, Incentive Spirometer   - DVT prophylaxis Lovenox

## 2021-05-12 NOTE — CDI QUERY NOTE - NSCDIOTHERTXTBX_GEN_ALL_CORE_HH
In some progress notes it was written patient was well-nourished but at the same time with a diagnosis of malnutrition, can you please clarify this conflicting information?    A.	Severe protein calorie malnutrition (present on admission)  B.	No malnutrition patient is well-nourished  C.	Other, please specify  D.	Not clinically significant       Chart Documentations:      5/6/21 Dietitian Nutrition Risk Notification:  Upon Nutritional Assessment by the Registered Dietitian Your Patient was Determined to Meet Criteria/Has Evidence of the Following Diagnosis:	Severe protein-calorie malnutrition  Other Risk Factors	Not applicable  Etiology-Basis	Acute illness or injury  Malnutrition Evaluation as Demonstrated by (Adults > 20 years of age)	Inadequate energy intake...  Loss of subcutaneous fat...  Loss of muscle...  Inadequate Energy Intake	</= 50% for >/= 5 days  Body Fat (loss of subcutaneous fat)	Orbital...  Triceps...  Orbital Depletion is	moderate  Triceps Depletion is	moderate  Muscle Mass (Loss of Muscle)	Temples...  Clavicles...  Shoulders...  Temples Depletion is	moderate  Clavicles Depletion is	moderate  Shoulders Depletion is	moderate      5/4/21 Progress Note Adult-Cardiology Attending:   PHYSICAL EXAM:  General: Appears well developed, well nourished alert and cooperative.      5/2/21 Consult Note Adult-Cardiology Attending:  PHYSICAL EXAM:  Constitutional: Appears well developed, well nourished; alert and co-operative      H&P Adult   Physical Exam: Constitutional: Well-developed well nourished elderly female in no acute distress

## 2021-05-12 NOTE — CHART NOTE - NSCHARTNOTEFT_GEN_A_CORE
POST OP CHECK NOTE:    Post Operative Check    Patient is post op from a Hemiarthroplasty of left hip and is doing well. Patient reports mild pain at incision site that is well controlled with pain regimen and ice packs. Patient denies any nausea, vomiting, fevers, or chills at this time.    Vitals    T(C): 36.1 (05-02-21 @ 19:05), Max: 36.7 (05-02-21 @ 11:18)  HR: 52 (05-02-21 @ 20:33) (49 - 65)  BP: 113/39 (05-02-21 @ 20:33) (106/66 - 155/55)  RR: 14 (05-02-21 @ 20:33) (12 - 21)  SpO2: 98% (05-02-21 @ 20:33) (94% - 100%)  Wt(kg): --      05-02 @ 07:01  -  05-02 @ 22:54  --------------------------------------------------------  IN:    Lactated Ringers: 200 mL  Total IN: 200 mL    OUT:    Estimated Blood Loss (mL): 200 mL    Indwelling Catheter - Urethral (mL): 100 mL  Total OUT: 300 mL    Total NET: -100 mL          Labs                        13.3   10.26 )-----------( 184      ( 02 May 2021 07:21 )             38.9       CBC Full  -  ( 02 May 2021 07:21 )  WBC Count : 10.26 K/uL  Hemoglobin : 13.3 g/dL  Hematocrit : 38.9 %  Platelet Count - Automated : 184 K/uL  Mean Cell Volume : 90.0 fl  Mean Cell Hemoglobin : 30.8 pg  Mean Cell Hemoglobin Concentration : 34.2 gm/dL  Auto Neutrophil # : 7.88 K/uL  Auto Lymphocyte # : 1.18 K/uL  Auto Monocyte # : 0.96 K/uL  Auto Eosinophil # : 0.15 K/uL  Auto Basophil # : 0.04 K/uL  Auto Neutrophil % : 76.7 %  Auto Lymphocyte % : 11.5 %  Auto Monocyte % : 9.4 %  Auto Eosinophil % : 1.5 %  Auto Basophil % : 0.4 %      Physical Exam  General: NAD AAOx3   Cards: RRR S1S2  Resp: CTAB  Abdomen: soft, nontender, nondistended  Ext: Left hip with dressing c/d/i, calf is soft and nontender  Pulse: DP/PT +2 palpable bilaterally    Assessment:  Patient is a 85y old Female POD 0 s/p L hip hemiarthroplasty and is progressing well    Plan:  - f/u PT/OT recommendations  - continue with Lov/ASA  - pain control  - home meds resumed    - dressing changes by orthopedic surgery
Patient of [facility] Cardiology. Trauma service made aware.
Please note upon review of chart the following diagnosis exists for this patient:    A.	Severe protein calorie malnutrition (present on admission)    Chart Documentations:    5/6/21 Dietitian Nutrition Risk Notification:  Upon Nutritional Assessment by the Registered Dietitian Your Patient was Determined to Meet Criteria/Has Evidence of the Following Diagnosis:	Severe protein-calorie malnutrition  Other Risk Factors	Not applicable  Etiology-Basis	Acute illness or injury  Malnutrition Evaluation as Demonstrated by (Adults > 20 years of age)	Inadequate energy intake...  Loss of subcutaneous fat...  Loss of muscle...  Inadequate Energy Intake	</= 50% for >/= 5 days  Body Fat (loss of subcutaneous fat)	Orbital...  Triceps...  Orbital Depletion is	moderate  Triceps Depletion is	moderate  Muscle Mass (Loss of Muscle)	Temples...  Clavicles...  Shoulders...  Temples Depletion is	moderate  Clavicles Depletion is	moderate  Shoulders Depletion is	moderate
Rn called to state that patient was difficult to arouse but after sternal rub woke up and was able to answer questions appropriately and that telemetry tech informed she had 7 beats of vtach and now back in NSR. I went to bedside, patient resting was able to arouse her by called her name, she opened her eyes and answered all my questions, A&Ox3, GCS15. When I asked if she was sleepy she stated" Yes I am" , to note patient did received 50mg of tramadol at 5pm and had been getting 300mg of gabapentin ATC, I  discontinued all narcotics including the gabapentin. ABG, EKG, cbc, bmp ordered and pending. On previous BMP performed late morning showed hyponatremia to 131, previous day 130, 1 liter bolus of ns ordered, uop in teixeira ice tea colored. Will continue to monitor
Source: Patient [ ]  Family [ ]   other [x ]    Current Diet: Diet, Consistent Carbohydrate Renal/No Snacks (05-06-21 @ 06:55)    PO intake: tolerating    Current Weight:   (5/1) 136 lbs  -Obtain daily wts, continue to monitor. L hip nonpitting edema noted.     Pertinent Medications: MEDICATIONS  (STANDING):  acetaminophen   Tablet .. 975 milliGRAM(s) Oral every 8 hours  amLODIPine   Tablet 5 milliGRAM(s) Oral daily  aspirin  chewable 81 milliGRAM(s) Oral daily  atorvastatin 40 milliGRAM(s) Oral at bedtime  budesonide 160 MICROgram(s)/formoterol 4.5 MICROgram(s) Inhaler 2 Puff(s) Inhalation two times a day  dextrose 40% Gel 15 Gram(s) Oral once  dextrose 50% Injectable 25 Gram(s) IV Push once  dextrose 50% Injectable 12.5 Gram(s) IV Push once  dextrose 50% Injectable 25 Gram(s) IV Push once  enoxaparin Injectable 40 milliGRAM(s) SubCutaneous every 24 hours  glucagon  Injectable 1 milliGRAM(s) IntraMuscular once  insulin lispro (ADMELOG) corrective regimen sliding scale   SubCutaneous Before meals and at bedtime  isosorbide   mononitrate ER Tablet (IMDUR) 60 milliGRAM(s) Oral daily  levothyroxine 75 MICROGram(s) Oral daily  lidocaine   Patch 1 Patch Transdermal daily  loperamide 2 milliGRAM(s) Oral daily  melatonin 5 milliGRAM(s) Oral at bedtime  pantoprazole    Tablet 40 milliGRAM(s) Oral before breakfast  psyllium Powder 1 Packet(s) Oral two times a day  ranolazine 1000 milliGRAM(s) Oral <User Schedule>  ranolazine 500 milliGRAM(s) Oral <User Schedule>    MEDICATIONS  (PRN):  ALBUTerol    90 MICROgram(s) HFA Inhaler 2 Puff(s) Inhalation every 12 hours PRN Shortness of Breath and/or Wheezing  albuterol/ipratropium for Nebulization 3 milliLiter(s) Nebulizer every 6 hours PRN Shortness of Breath and/or Wheezing  ibuprofen  Tablet. 400 milliGRAM(s) Oral every 8 hours PRN Mild Pain (1 - 3)  ondansetron Injectable 4 milliGRAM(s) IV Push every 6 hours PRN Nausea and/or Vomiting    Pertinent Labs: CBC Full  -  ( 09 May 2021 06:23 )  WBC Count : 9.91 K/uL  RBC Count : 3.90 M/uL  Hemoglobin : 11.7 g/dL  Hematocrit : 32.9 %  Platelet Count - Automated : 292 K/uL  Mean Cell Volume : 84.4 fl  Mean Cell Hemoglobin : 30.0 pg  Mean Cell Hemoglobin Concentration : 35.6 gm/dL  Auto Neutrophil # : 6.46 K/uL  Auto Lymphocyte # : 1.24 K/uL  Auto Monocyte # : 1.15 K/uL  Auto Eosinophil # : 0.24 K/uL  Auto Basophil # : 0.03 K/uL  Auto Neutrophil % : 65.2 %  Auto Lymphocyte % : 12.5 %  Auto Monocyte % : 11.6 %  Auto Eosinophil % : 2.4 %  Auto Basophil % : 0.3 %  05-09 Na132 mmol/L<L> Glu 108 mg/dL<H> K+ 3.7 mmol/L Cr  0.32 mg/dL<L> BUN 6.0 mg/dL<L> Phos 3.1 mg/dL Alb n/a   PAB n/a       Skin: surgical incision     Nutrition focused physical exam previously conducted - found signs of malnutrition [ ]absent [ x]present    Subcutaneous fat loss: [x ] Orbital fat pads region, [ ]Buccal fat region, [ x]Triceps region,  [ ]Ribs region    Muscle wasting: [x ]Temples region, [x ]Clavicle region, [ x]Shoulder region, [ ]Scapula region, [ ]Interosseous region,  [ ]thigh region, [ ]Calf region    Estimated Needs:   [ x] no change since previous assessment  [ ] recalculated:     Current Nutrition Diagnosis: Pt remains at high nutrition risk and meets criteria for severe, acute malnutrition related to inability to meet sufficient protein-energy in setting of advanced age, left femoral neck fx s/p left hip arthroplasty, poor appetite, and altered GI function 2/2 persistent diarrhea as evidenced by meeting <50% nutrient needs >5 days and moderate muscle/fat loss. Pt is tolerating diet. Continues with diarrhea- started on immodium and increased metamucil. RD to remain available.     Recommendations:   1) d/c renal restrictions from diet order  2) Continue immodium and metamucil prn  3) Encourage po intake and HBV protein  4) Monitor wts and labs    Monitoring and Evaluation:   [ x] PO intake [x ] Tolerance to diet prescription [X] Weights  [X] Follow up per protocol [X] Labs:
This physician spoke with the Daughter on the phone and answered all her questions.
Tertiary Trauma Survey (TTS)    Date of TTS: 05-05-21 @ 13:00                             Admit Date: 05-01-21 @ 22:15      Trauma Activation:      Subjective / 24 hour events: Patient seen and evaluated at bedside, no acute distress, complains of abdominal pain.    Vital Signs Last 24 Hrs  T(C): 36.7 (05 May 2021 11:59), Max: 37 (04 May 2021 15:20)  T(F): 98.1 (05 May 2021 11:59), Max: 98.6 (04 May 2021 15:20)  HR: 68 (05 May 2021 11:59) (68 - 74)  BP: 135/64 (05 May 2021 11:59) (126/54 - 137/80)  BP(mean): --  RR: 18 (05 May 2021 11:59) (18 - 18)  SpO2: 96% (05 May 2021 11:59) (92% - 96%)    Physical Exam:    Neuro: [ x] non focal neurological exam [ ] Focal Neurological deficits noted to be:     HEENT: [ x] Normo-cephalic/atraumatic  [ ] abnormalities noted to be:    Pulm/Chest:  [x ] CTA b/l  [x ] chest wall non tender  [ ] abnormalities noted to be:    Cardiac: [x ] S1S2, sinus rhythm  [ ] abnormalities noted to be:     GI / Abdomen: [ ] Soft, non-tender, non-distended [x ] abnormalities noted to be: tender to palpation, no rebound, guarding or rigidity. Mild distention. Tenderness is distractible.    Musculoskeletal / Extremities: [x ]normal active ROM  [x ]  abnormalities noted to be: tenderness on left hip    Integumentary: [ x] Skin intact [x ] Warm [ ] Dry [ ]abnormalities noted to be:    Vascular: [x ] 2+ palpable distal pulses  [ ] FELIPE:       [ ] abnormalities noted to be:      List Injuries Identified to Date:  Left hip fracture, repaired by Ortho on 5/2/21    Consults (Date):  [  ] Neurosurgery   [ x ] Orthopedics  [  ] Plastics  [  ] Urology  [  ] PM&R  [  ] Social Work    RADIOLOGICAL FINDINGS REVIEW:  Left knee and shoulder x-ray normal  Pelvis Films: R hip fracture

## 2021-05-12 NOTE — DIETITIAN INITIAL EVALUATION ADULT. - PERTINENT MEDS FT
ASA, Lovenox, Norvasc, Lipitor, IMDUR, Levothyroxine, Protonix, Metamucil, Melatonin, Florastar,   Recommend Initiate Multivitamin,

## 2021-05-12 NOTE — CHART NOTE - NSCHARTNOTESELECT_GEN_ALL_CORE
Event Note
Cardiology/Off Service Note
Chart Note
Event Note
Nutrition Services

## 2021-05-12 NOTE — CONSULT NOTE ADULT - ASSESSMENT
84yo F w PMH of HTN, COPD, and MI (11y/a) s/p stents x 2 which were replaced 3y/a on ASA only who presents to [facility] on 5/1/21 after mechanical fall from standing complaining of left leg pain, found to have displaced femoral neck fracture s/p left hip hemiarthroplasty on 5/2. Hospital course was complicated with Hyponatremia on fluid restriction and salt tabs, Leukocytosis, pleural effusions, Diarrhea 2/2 Colitis. Patient now admitted for a multidisciplinary rehab program- PT/OT/DVT PPX, PAIN MEDS    -leukocytosis- r/o uti, send culture    - Lovenox for DVT PPx for 4 weeks (5/2-5/30)    -Acute nondisplaced lateral left fifth through eighth rib fractures.  - Incentive Spirometry    -Hyponatremia  - monitor CMP/BMPs    -Diarrhea 2/2 Colitis; Resolved  - CT A/P w/ PO contrast on 5/6 shows colitis  - WBC was 11.2 on 5/6  - Florastor,, metamucil    #HTN  - amlodipine     #COPD  - albuterol prn  - symbicort bid    #H/O MI s/p Stents  - ASA 81 qd  - Lipitor 40mg po qhs  - Ranexa     #hypothyroidism  - levothyroxine     #Pre-Diabetes - A1c 6.3 on 5/2  -diet and exercise      #Sleep  - melatonin PRN    DVT ppx: Lovenox    will follow  d/w dr. gibson

## 2021-05-12 NOTE — CONSULT NOTE ADULT - SUBJECTIVE AND OBJECTIVE BOX
84yo F w PMH of HTN, COPD, and MI (11y/a) s/p stents x 2 which were replaced 3y/a on ASA only who presents after mechanical fall from standing complaining of left leg pain. Patient was stepping inside her home when she caught her foot on the door frame, She landed on her left side and hit left forehead, but denies LOC. Denies headache, lightheadedness, HA, dizziness, chest pain, or SOB. Had large forehead swelling on left which has since improved. No nausea or vomiting. Imaging studies revealed acute left femoral neck fracture aand Acute nondisplaced lateral left fifth through eighth rib fractures.. Patient was admitted to the trauma service & orthopedics consulted. Taken to the OR on  for left hip hemiarthroplasty. Patient tolerated procedure well. She worked with PT, OT and PMR who recommended acute rehab upon discharge.  Hospital course was complicated with Hyponatremia on fluid restriction and salt tabs, Leukocytosis, pleural effusions, Diarrhea 2/2 Colitis, started on immodium and metamucil. CT A/P on  also shows bibasilar atelectasis.     seen at the bedside, c/o pain in the left chest and ribs 7/10, constant, aching, pain in the left hip is also 7/10, burning type, intermittent aggravated by movements, no n/v, no sob.     Review of Systems: per hpi, all other negative           Allergies:  	penicillin: Drug, Nausea, Diarrhea       Intolerances:  	Bactrim: Drug, Vomiting    Home Medications:   * Patient Currently Takes Medications as of 08-May-2021 12:15 documented in Structured Notes  · 	psyllium 3.4 g/7 g oral powder for reconstitution: 1 packet(s) orally 2 times a day  · 	lidocaine 5% topical film: Apply topically to affected area once a day  · 	loperamide 2 mg oral capsule: 1 cap(s) orally once a day  · 	ibuprofen 400 mg oral tablet: 1 tab(s) orally every 8 hours, As needed, Mild Pain (1 - 3)  · 	saccharomyces boulardii lyo 250 mg oral capsule: 1 cap(s) orally 2 times a day  · 	budesonide-formoterol 160 mcg-4.5 mcg/inh inhalation aerosol: 2 puff(s) inhaled 2 times a day  · 	ipratropium-albuterol 0.5 mg-2.5 mg/3 mLinhalation solution: 3 milliliter(s) inhaled every 6 hours, As needed, Shortness of Breath and/or Wheezing  · 	melatonin 5 mg oral tablet: 1 tab(s) orally once a day (at bedtime)  · 	enoxaparin: 40 milligram(s) subcutaneous once a day  · 	acetaminophen 325 mg oral tablet: 3 tab(s) orally every 8 hours  · 	albuterol 90 mcg/inh inhalation aerosol: 2 puff(s) inhaled every 4 hours, As needed, Shortness of Breath  · 	fluticasone 50 mcg/inh nasal spray: 1 spray(s) nasal 2 times a day  · 	predniSONE 10 mg oral tablet: 1 tab(s) orally once a day  · 	amLODIPine 5 mg oral tablet: 1 tab(s) orally once a day  · 	pantoprazole 40 mg oral delayed release tablet:  orally   · 	isosorbide mononitrate 30 mg oral tablet, extended release:  orally 2 times a day  · 	Advair Diskus 250 mcg-50 mcg inhalation powder: 1 puff(s) inhaled 2 times a day  · 	Ranexa 500 mg oral tablet, extended release: 1 tab(s) orally 2 times a day  · 	Synthroid 75 mcg (0.075 mg) oral tablet: 1 tab(s) orally once a day  · 	aspirin 81 mg oral tablet: 1 tab(s) orally once a day  · 	atorvastatin 40 mg oral tablet: 1 tab(s) orally once a day    Patient History:    Past Medical, Past Surgical, and Family History:  PAST MEDICAL HISTORY:  Diabetes mellitus Patient denies T2DM    Gastroenteritis     GERD (gastroesophageal reflux disease)     Hiatal hernia     High cholesterol     HTN (hypertension)     Hypothyroid     LBBB (left bundle branch block)     MI (myocardial infarction)     Myocardial infarction     PUD (peptic ulcer disease)     Stented coronary artery x2    Vocal cord polyps.     PAST SURGICAL HISTORY:  S/P appendectomy     S/P  section     S/P cholecystectomy     S/P tubal ligation.     FAMILY HISTORY:  mom  age 86, MI  DAD  age 98, MI     Social History:  EtOH - None  Drugs - None  smoking - none     Heart Failure:  Does this patient have a history of or has been diagnosed with heart failure? yes.         Physical Exam:     Vital Signs Last 24 Hrs  T(C): 36.6 (12 May 2021 08:45), Max: 36.9 (11 May 2021 20:00)  T(F): 97.8 (12 May 2021 08:45), Max: 98.5 (11 May 2021 20:00)  HR: 85 (12 May 2021 08:45) (73 - 85)  BP: 163/68 (12 May 2021 08:45) (131/54 - 163/68)  BP(mean): --  RR: 16 (12 May 2021 08:45) (16 - 18)  SpO2: 100% (12 May 2021 08:45) (92% - 100%)    GENERAL- NAD  EAR/NOSE/MOUTH/THROAT - no pharyngeal exudates, no oral leisions,  MMM  EYES- ASHLY, conjunctiva and Sclera clear  NECK- supple  RESPIRATORY-  clear to auscultation bilaterally, non laboured breathing  CARDIOVASCULAR - SIS2, RRR  GI - soft NT BS present  EXTREMITIES- + LE edema B/L  NEUROLOGY- no gross focal deficits  SKIN- no rashes, warm to touch  PSYCHIATRY- AAO X 3  MUSCULOSKELETAL- ROM RESTRICTED to LLE                            11.2   14.38 )-----------( 321      ( 12 May 2021 05:30 )             32.7       05-12    138  |  102  |  10  ----------------------------<  119<H>  3.8   |  29  |  0.61    Ca    8.9      12 May 2021 05:30    TPro  5.5<L>  /  Alb  2.3<L>  /  TBili  0.7  /  DBili  x   /  AST  28  /  ALT  54<H>  /  AlkPhos  91  05-12              Urinalysis Basic - ( 12 May 2021 08:45 )    Color: Yellow / Appearance: Slightly Turbid / S.010 / pH: x  Gluc: x / Ketone: Moderate  / Bili: Negative / Urobili: Negative   Blood: x / Protein: 30 mg/dL / Nitrite: Negative   Leuk Esterase: Moderate / RBC: 26-50 /HPF / WBC >50 /HPF   Sq Epi: x / Non Sq Epi: Neg.-Few / Bacteria: Moderate /HPF      CAPILLARY BLOOD GLUCOSE      POCT Blood Glucose.: 124 mg/dL (11 May 2021 17:08)        Labs reviewed:     CXR personally reviewed: Clear lung fields bilaterally  < from: Xray Chest 1 View AP/PA. (05.06.21 @ 06:33) >    IMPRESSION:  No consolidation or effusion, recommend correlation for possible interstitial lower lobe infiltrates    < end of copied text >      ECG reviewed and interpreted: < from: 12 Lead ECG (21 @ 15:42) >    Ventricular Rate 71 BPM    Atrial Rate 71 BPM    P-R Interval 180 ms    QRS Duration 164 ms    Q-T Interval 498 ms    QTC Calculation(Bazett) 541 ms    P Axis 74 degrees    R Axis 13 degrees    T Axis 103 degrees    Diagnosis Line Sinus rhythm  Left bundle branch block    < end of copied text >        MEDICATIONS  (STANDING):  acetaminophen   Tablet .. 975 milliGRAM(s) Oral every 8 hours  amLODIPine   Tablet 5 milliGRAM(s) Oral daily  aspirin  chewable 81 milliGRAM(s) Oral daily  atorvastatin 40 milliGRAM(s) Oral at bedtime  budesonide 160 MICROgram(s)/formoterol 4.5 MICROgram(s) Inhaler 2 Puff(s) Inhalation two times a day  enoxaparin Injectable 40 milliGRAM(s) SubCutaneous every 24 hours  fluticasone propionate 50 MICROgram(s)/spray Nasal Spray 1 Spray(s) Both Nostrils two times a day  isosorbide   mononitrate ER Tablet (IMDUR) 60 milliGRAM(s) Oral daily  levothyroxine 75 MICROGram(s) Oral daily  lidocaine   Patch 1 Patch Transdermal daily  melatonin 6 milliGRAM(s) Oral at bedtime  pantoprazole    Tablet 40 milliGRAM(s) Oral before breakfast  psyllium Powder 1 Packet(s) Oral daily  ranolazine 500 milliGRAM(s) Oral <User Schedule>  ranolazine 1000 milliGRAM(s) Oral <User Schedule>  saccharomyces boulardii 250 milliGRAM(s) Oral two times a day    MEDICATIONS  (PRN):  ALBUTerol    90 MICROgram(s) HFA Inhaler 2 Puff(s) Inhalation every 12 hours PRN Shortness of Breath and/or Wheezing

## 2021-05-12 NOTE — DIETITIAN INITIAL EVALUATION ADULT. - ADD RECOMMEND
1) Monitor Weights, Intake, Tolerance, Skin & Labwork   2) Recommend Change Diet to DASH-TLC Diet 3) Nutrition Education Provided on DASH-TLC Diet 4) Multivitamin Daily 5) Continue Nutrition Plan of Care

## 2021-05-12 NOTE — DIETITIAN INITIAL EVALUATION ADULT. - OTHER INFO
Initial Nutrition Assessment   85yr Old Female   Dx: Left Hip Fx   Diet - Consistent Carbohydrate Renal Diet w/ Thin Liquids   (Recommend Change Diet to DASH-TLC Diet)  Denies Food Allergy/Intolerance  Tolerates Diet Well - No Chewing/Swallowing Complications (Per Patient)  Consumed 100% of 1st Meal (as Per Documentation)  Surgical Incision on Left Hip & Stage 1 Pressure Ulcer on Gluteal Crease (as Per Nursing Flow Sheets)  No Edema Noted (as Per Nursing Flow Sheets)  No Recent Nausea/Vomiting/Diarrhea/Constipation (as Per Patient)

## 2021-05-12 NOTE — DIETITIAN INITIAL EVALUATION ADULT. - ORAL INTAKE PTA/DIET HISTORY
Patient Does Not Follow Diet @Home, CMD & Doesn't Take Vitamin/Supplements @Home     on Consistent Carbohydrate Renal Diet w/ Thin Liquids  (Pt Declines Consistent Carbohydrate  - No Diabetic Meds & Renal Restriction - No HD)  Recommend Change Diet to DASH-TLC Diet  Nutrition Education Provided on DASH-TLC Diet Patient Does Not Follow Diet @Home & Doesn't Take Vitamin/Supplements @Home     on Consistent Carbohydrate Renal Diet w/ Thin Liquids  (Pt Declines Consistent Carbohydrate  - No Diabetic Meds & Renal Restriction - No HD)  Recommend Change Diet to DASH-TLC Diet  Nutrition Education Provided on DASH-TLC Diet

## 2021-05-12 NOTE — DIETITIAN INITIAL EVALUATION ADULT. - PERTINENT LABORATORY DATA
5/12   Na-138  K-3.8  BUN-10  Creat-0.61  Gluc-119H  Hemoglobin A1c - 6.3H(on 5/2)  POCT (over Last 3 Days) - Ranging from 113-187

## 2021-05-13 LAB
ALBUMIN SERPL ELPH-MCNC: 2.3 G/DL — LOW (ref 3.3–5)
ALP SERPL-CCNC: 89 U/L — SIGNIFICANT CHANGE UP (ref 40–120)
ALT FLD-CCNC: 49 U/L — HIGH (ref 10–45)
ANION GAP SERPL CALC-SCNC: 6 MMOL/L — SIGNIFICANT CHANGE UP (ref 5–17)
AST SERPL-CCNC: 26 U/L — SIGNIFICANT CHANGE UP (ref 10–40)
BASOPHILS # BLD AUTO: 0.05 K/UL — SIGNIFICANT CHANGE UP (ref 0–0.2)
BASOPHILS NFR BLD AUTO: 0.4 % — SIGNIFICANT CHANGE UP (ref 0–2)
BILIRUB SERPL-MCNC: 0.5 MG/DL — SIGNIFICANT CHANGE UP (ref 0.2–1.2)
BUN SERPL-MCNC: 12 MG/DL — SIGNIFICANT CHANGE UP (ref 7–23)
CALCIUM SERPL-MCNC: 9.4 MG/DL — SIGNIFICANT CHANGE UP (ref 8.4–10.5)
CHLORIDE SERPL-SCNC: 102 MMOL/L — SIGNIFICANT CHANGE UP (ref 96–108)
CO2 SERPL-SCNC: 28 MMOL/L — SIGNIFICANT CHANGE UP (ref 22–31)
CREAT SERPL-MCNC: 0.52 MG/DL — SIGNIFICANT CHANGE UP (ref 0.5–1.3)
EOSINOPHIL # BLD AUTO: 0.42 K/UL — SIGNIFICANT CHANGE UP (ref 0–0.5)
EOSINOPHIL NFR BLD AUTO: 3.5 % — SIGNIFICANT CHANGE UP (ref 0–6)
GLUCOSE SERPL-MCNC: 119 MG/DL — HIGH (ref 70–99)
HCT VFR BLD CALC: 29.5 % — LOW (ref 34.5–45)
HGB BLD-MCNC: 10.3 G/DL — LOW (ref 11.5–15.5)
IMM GRANULOCYTES NFR BLD AUTO: 3.4 % — HIGH (ref 0–1.5)
LYMPHOCYTES # BLD AUTO: 1.36 K/UL — SIGNIFICANT CHANGE UP (ref 1–3.3)
LYMPHOCYTES # BLD AUTO: 11.3 % — LOW (ref 13–44)
MCHC RBC-ENTMCNC: 30.8 PG — SIGNIFICANT CHANGE UP (ref 27–34)
MCHC RBC-ENTMCNC: 34.9 GM/DL — SIGNIFICANT CHANGE UP (ref 32–36)
MCV RBC AUTO: 88.3 FL — SIGNIFICANT CHANGE UP (ref 80–100)
MONOCYTES # BLD AUTO: 0.96 K/UL — HIGH (ref 0–0.9)
MONOCYTES NFR BLD AUTO: 8 % — SIGNIFICANT CHANGE UP (ref 2–14)
NEUTROPHILS # BLD AUTO: 8.87 K/UL — HIGH (ref 1.8–7.4)
NEUTROPHILS NFR BLD AUTO: 73.4 % — SIGNIFICANT CHANGE UP (ref 43–77)
NRBC # BLD: 0 /100 WBCS — SIGNIFICANT CHANGE UP (ref 0–0)
PLATELET # BLD AUTO: 324 K/UL — SIGNIFICANT CHANGE UP (ref 150–400)
POTASSIUM SERPL-MCNC: 4.2 MMOL/L — SIGNIFICANT CHANGE UP (ref 3.5–5.3)
POTASSIUM SERPL-SCNC: 4.2 MMOL/L — SIGNIFICANT CHANGE UP (ref 3.5–5.3)
PROT SERPL-MCNC: 5.5 G/DL — LOW (ref 6–8.3)
RBC # BLD: 3.34 M/UL — LOW (ref 3.8–5.2)
RBC # FLD: 14.8 % — HIGH (ref 10.3–14.5)
SODIUM SERPL-SCNC: 136 MMOL/L — SIGNIFICANT CHANGE UP (ref 135–145)
WBC # BLD: 12.07 K/UL — HIGH (ref 3.8–10.5)
WBC # FLD AUTO: 12.07 K/UL — HIGH (ref 3.8–10.5)

## 2021-05-13 PROCEDURE — 99232 SBSQ HOSP IP/OBS MODERATE 35: CPT

## 2021-05-13 PROCEDURE — 99233 SBSQ HOSP IP/OBS HIGH 50: CPT

## 2021-05-13 RX ORDER — ACETAMINOPHEN 500 MG
650 TABLET ORAL EVERY 6 HOURS
Refills: 0 | Status: DISCONTINUED | OUTPATIENT
Start: 2021-05-13 | End: 2021-05-20

## 2021-05-13 RX ORDER — MAGNESIUM HYDROXIDE 400 MG/1
30 TABLET, CHEWABLE ORAL DAILY
Refills: 0 | Status: DISCONTINUED | OUTPATIENT
Start: 2021-05-13 | End: 2021-05-20

## 2021-05-13 RX ORDER — SENNA PLUS 8.6 MG/1
2 TABLET ORAL AT BEDTIME
Refills: 0 | Status: DISCONTINUED | OUTPATIENT
Start: 2021-05-13 | End: 2021-05-20

## 2021-05-13 RX ORDER — PHENAZOPYRIDINE HCL 100 MG
100 TABLET ORAL THREE TIMES A DAY
Refills: 0 | Status: COMPLETED | OUTPATIENT
Start: 2021-05-13 | End: 2021-05-16

## 2021-05-13 RX ORDER — ACETAMINOPHEN 500 MG
975 TABLET ORAL EVERY 8 HOURS
Refills: 0 | Status: DISCONTINUED | OUTPATIENT
Start: 2021-05-13 | End: 2021-05-20

## 2021-05-13 RX ORDER — CIPROFLOXACIN LACTATE 400MG/40ML
250 VIAL (ML) INTRAVENOUS
Refills: 0 | Status: COMPLETED | OUTPATIENT
Start: 2021-05-13 | End: 2021-05-16

## 2021-05-13 RX ADMIN — Medication 250 MILLIGRAM(S): at 17:30

## 2021-05-13 RX ADMIN — LIDOCAINE 1 PATCH: 4 CREAM TOPICAL at 12:36

## 2021-05-13 RX ADMIN — BUDESONIDE AND FORMOTEROL FUMARATE DIHYDRATE 2 PUFF(S): 160; 4.5 AEROSOL RESPIRATORY (INHALATION) at 21:39

## 2021-05-13 RX ADMIN — SENNA PLUS 2 TABLET(S): 8.6 TABLET ORAL at 21:12

## 2021-05-13 RX ADMIN — Medication 975 MILLIGRAM(S): at 04:26

## 2021-05-13 RX ADMIN — Medication 1 PACKET(S): at 12:36

## 2021-05-13 RX ADMIN — ISOSORBIDE MONONITRATE 60 MILLIGRAM(S): 60 TABLET, EXTENDED RELEASE ORAL at 12:36

## 2021-05-13 RX ADMIN — PANTOPRAZOLE SODIUM 40 MILLIGRAM(S): 20 TABLET, DELAYED RELEASE ORAL at 04:27

## 2021-05-13 RX ADMIN — Medication 100 MILLIGRAM(S): at 14:03

## 2021-05-13 RX ADMIN — AMLODIPINE BESYLATE 5 MILLIGRAM(S): 2.5 TABLET ORAL at 04:25

## 2021-05-13 RX ADMIN — Medication 75 MICROGRAM(S): at 04:26

## 2021-05-13 RX ADMIN — RANOLAZINE 1000 MILLIGRAM(S): 500 TABLET, FILM COATED, EXTENDED RELEASE ORAL at 04:25

## 2021-05-13 RX ADMIN — Medication 100 MILLIGRAM(S): at 21:12

## 2021-05-13 RX ADMIN — Medication 1 TABLET(S): at 12:36

## 2021-05-13 RX ADMIN — Medication 6 MILLIGRAM(S): at 21:12

## 2021-05-13 RX ADMIN — Medication 975 MILLIGRAM(S): at 14:30

## 2021-05-13 RX ADMIN — ATORVASTATIN CALCIUM 40 MILLIGRAM(S): 80 TABLET, FILM COATED ORAL at 21:12

## 2021-05-13 RX ADMIN — LIDOCAINE 1 PATCH: 4 CREAM TOPICAL at 19:19

## 2021-05-13 RX ADMIN — BUDESONIDE AND FORMOTEROL FUMARATE DIHYDRATE 2 PUFF(S): 160; 4.5 AEROSOL RESPIRATORY (INHALATION) at 09:19

## 2021-05-13 RX ADMIN — ENOXAPARIN SODIUM 40 MILLIGRAM(S): 100 INJECTION SUBCUTANEOUS at 04:26

## 2021-05-13 RX ADMIN — Medication 250 MILLIGRAM(S): at 04:25

## 2021-05-13 RX ADMIN — Medication 1 SPRAY(S): at 04:26

## 2021-05-13 RX ADMIN — Medication 81 MILLIGRAM(S): at 12:36

## 2021-05-13 RX ADMIN — Medication 975 MILLIGRAM(S): at 13:59

## 2021-05-13 RX ADMIN — RANOLAZINE 500 MILLIGRAM(S): 500 TABLET, FILM COATED, EXTENDED RELEASE ORAL at 17:30

## 2021-05-13 NOTE — PROGRESS NOTE ADULT - ASSESSMENT
86yo F w PMH of HTN, COPD, and MI (11y/a) s/p stents x 2 which were replaced 3y/a on ASA only who presents to [facility] on 5/1/21 after mechanical fall from standing complaining of left leg pain, found to have displaced femoral neck fracture s/p left hip hemiarthroplasty on 5/2. Hospital course was complicated with Hyponatremia on fluid restriction and salt tabs, Leukocytosis, pleural effusions, Diarrhea 2/2 Colitis. Patient now admitted for a multidisciplinary rehab program- PT/OT/DVT PPX, PAIN MEDS    - dysuria/ leukocytosis- r/o uti, send culture, will treat with cipro pending culture, encourage po hydration    - Lovenox for DVT PPx for 4 weeks (5/2-5/30)    -Acute nondisplaced lateral left fifth through eighth rib fractures.  - Incentive Spirometry    -Hyponatremia- resolved  - monitor CMP/BMPs    -Diarrhea 2/2 Colitis; Resolved  - CT A/P w/ PO contrast on 5/6 shows colitis  - WBC was 11.2 on 5/6  - Florastor,  metamucil    #HTN  - amlodipine     #COPD  - albuterol prn  - Symbicort bid    #H/O MI s/p Stents  - ASA 81 qd  - Lipitor 40mg po qhs  - Ranexa     #hypothyroidism  - levothyroxine     #Pre-Diabetes - A1c 6.3 on 5/2  -diet and exercise      #Sleep  - melatonin PRN    DVT ppx: Lovenox    will follow  d/w dr. gibson

## 2021-05-13 NOTE — CHART NOTE - NSCHARTNOTEFT_GEN_A_CORE
REHABILITATION DIAGNOSIS/IMPAIRMENT GROUP CODE:  Left hip fracture    COMORBIDITIES/COMPLICATING CONDITIONS IMPACTING REHABILITATION:  HEALTH ISSUES - PROBLEM Dx:  hemiarthroplasty       PAST MEDICAL & SURGICAL HISTORY:  HTN (hypertension)  High cholesterol  Stented coronary artery x2  MI (myocardial infarction)  Hypothyroid  GERD (gastroesophageal reflux disease)  hiatal hernia  PUD (peptic ulcer disease)  Gastroenteritis  Vocal cord polyps  LBBB (left bundle branch block)  S/P appendectomy  S/P tubal ligation  S/P cholecystectomy  S/P  section        Based upon consideration of the patient's impairments, functional status, complicating conditions and any other contributing factors and after information garnered from the assessments of all therapy disciplines involved in treating the patient and other pertinent clinicians:    INTERDISCIPLINARY REHABILITATION INTERVENTIONS:    [x   ] Transfer Training  [x   ] Bed Mobility  [ x  ] Therapeutic Exercise  [ x  ] Balance/Coordination Exercises  [ x  ] Locomotion retraining  [ x  ] Stairs  [ x  ] Functional Transfer Training  [ x  ] Bowel/Bladder program  [ x  ] Pain Management  [ x  ] Skin/Wound Care  [   ] Visual/Perceptual Training  [  ] Therapeutic Recreation Activities  [  x ] Neuromuscular Re-education  [x   ] Activities of Daily Living  [   ] Speech Exercise  [   ] Swallowing Exercises  [   ] Vital Stim  [   ] Dietary Supplements  [   ] Calorie Count  [   ] Cognitive Exercises  [   ] Cognitive/Linguistic Treatment  [   ] Behavior Program  [   ] Neuropsych Therapy  [   ] Patient/Family Counseling  [ x  ] Family Training  [   ] Community Re-entry  [   ] Orthotic Evaluation  [   ] Prosthetic Eval/Training    MEDICAL PROGNOSIS:  fair    REHAB POTENTIAL:  fair    EXPECTED DAILY THERAPY:         PT:2 hrs/day        OT:1 hr/day        ST:na       S&O:na     EXPECTED INTENSITY OF PROGRAM:  3 hrs/day     EXPECTED FREQUENCY OF PROGRAM:  5 days/week     ESTIMATED LOS:  ***    ESTIMATED DISPOSITION:  7-10 days     INTERDISCIPLINARY FUNCTIONAL OUTCOMES/ GOALS:         Gait/Mobility:modified independent        Transfers:modified independent        ADLs:modified independent        Functional Transfers:modified independent        Medication Management:modified independent        Communication:na       Cognitive:na       Dysphagia:na       Bladder:modified independent        Bowel:modified independent

## 2021-05-13 NOTE — PROGRESS NOTE ADULT - ASSESSMENT
86yo F w PMH of HTN, COPD, and MI (11y/a) s/p stents x 2 which were replaced 3y/a on ASA only who presents to [facility] on 5/1/21 after mechanical fall - displaced femoral neck fracture s/p left hip hemiarthroplasty on 5/2. Hospital course was complicated with Hyponatremia on fluid restriction and salt tabs, Leukocytosis, pleural effusions, Diarrhea 2/2 Colitis. Patient now admitted for a multidisciplinary rehab program. 05-07-21 @ 22:53    -------------    Rehab Management/MEDICAL MANAGEMENT     #Displaced LEFT femoral neck fracture s/p left hip hemiarthroplasty on 5/2   Continue Comprehensive Rehab Program of PT/OT  - 3 hours a day, 5 days a week  - WBAT LLE  - May shower after post-op day #5 (after 5/7)  Change dressing    - Posterior Hip Precautions for 6 weeks per Ortho (5/2-6/13)  - Lovenox for DVT PPx for 4 weeks (5/2-5/30)    Left Rib fracture  - CT on 5/2 shows Acute nondisplaced lateral left fifth through eighth rib fractures.  - Incentive Spirometry, - Encouraged slow, deep breaths  - Pain control as below.- Lidoderm patch to painful area on lateral left chest wall     Dysuria overnight- abnormal UA - Urine c/s ordered     Hyponatremia: in setting of diarrhea :- 5/12 Na 138    #B/L Pleural Effusions:- seen on CT A/P on 5/6/21 at [facility]  -  #HTN:- amlodipine 5mg po daily    #COPD:- albuterol prn, - symbicort bid    #H/O MI s/p Stents  - ASA 81 qd, - Lipitor 40mg po qhs  - Ranexa 1000mg po in AM and 500mg po in PM    #hypothyroidism  - levothyroxine 75mcg po qd    #Pre-Diabetes - A1c 6.3 on 5/2  -  #Sleep:- melatonin PRN    #Skin  - Skin on admission:  Coccygeal area nonblanchable erythema, right between gluteal cleft. Left hip w/ aquacel dressing over surgical site, eccymhosiss throughout  - Pressure injury/Skin: OOB to Chair, PT/OT   - Barrier cream w/ allvyne foam to offload pressure, bid and prn if soiled/removed    #Pain Mgmt :- Tylenol 975mg po q8h standing    #GI/Bowel Mgmt :Start MOM as takes that at home   - Protonix for gi ppx    #/Bladder Mgmt : toilet schedule prn     #FEN :- Diet - Regular + Thins  [CCHO]    #Precautions / PROPHYLAXIS:   - Falls, Cardiac, Left posterior hip precautions  - ortho: Weight bearing status: WBAT LLE  - Lungs: Aspiration, Incentive Spirometer   - DVT prophylaxis Lovenox    Disp : Team conference today: Goals of modified independent TDD 5/20 with home care    86yo F w PMH of HTN, COPD, and MI (11y/a) s/p stents x 2 which were replaced 3y/a on ASA only who presents to [facility] on 5/1/21 after mechanical fall - displaced femoral neck fracture s/p left hip hemiarthroplasty on 5/2. Hospital course was complicated with Hyponatremia on fluid restriction and salt tabs, Leukocytosis, pleural effusions, Diarrhea 2/2 Colitis. Patient now admitted for a multidisciplinary rehab program. 05-07-21 @ 22:53    -------------    Rehab Management/MEDICAL MANAGEMENT     #Displaced LEFT femoral neck fracture s/p left hip hemiarthroplasty on 5/2   Continue Comprehensive Rehab Program of PT/OT  - 3 hours a day, 5 days a week  - WBAT LLE  - May shower after post-op day #5 (after 5/7)  Change dressing    - Posterior Hip Precautions for 6 weeks per Ortho (5/2-6/13)  - Lovenox for DVT PPx for 4 weeks (5/2-5/30)    Left Rib fracture  - CT on 5/2 shows Acute nondisplaced lateral left fifth through eighth rib fractures.  - Incentive Spirometry, - Encouraged slow, deep breaths  - Pain control as below.- Lidoderm patch to painful area on lateral left chest wall     Dysuria overnight- abnormal UA - Urine c/s ordered . Start pyridium. Await cultures prior to abx,     Hyponatremia: in setting of diarrhea :- 5/12 Na 138    #B/L Pleural Effusions:- seen on CT A/P on 5/6/21 at [facility]  -  #HTN:- amlodipine 5mg po daily    #COPD:- albuterol prn, - symbicort bid    #H/O MI s/p Stents  - ASA 81 qd, - Lipitor 40mg po qhs  - Ranexa 1000mg po in AM and 500mg po in PM    #hypothyroidism  - levothyroxine 75mcg po qd    #Pre-Diabetes - A1c 6.3 on 5/2  -  #Sleep:- melatonin PRN    #Skin  - Skin on admission:  Coccygeal area nonblanchable erythema, right between gluteal cleft. Left hip w/ aquacel dressing over surgical site, eccymhosiss throughout  - Pressure injury/Skin: OOB to Chair, PT/OT   - Barrier cream w/ allvyne foam to offload pressure, bid and prn if soiled/removed    #Pain Mgmt :- Tylenol 975mg po q8h standing    #GI/Bowel Mgmt :Start MOM as takes that at home   - Protonix for gi ppx    #/Bladder Mgmt : toilet schedule prn     #FEN :- Diet - Regular + Thins  [CCHO]    #Precautions / PROPHYLAXIS:   - Falls, Cardiac, Left posterior hip precautions  - ortho: Weight bearing status: WBAT LLE  - Lungs: Aspiration, Incentive Spirometer   - DVT prophylaxis Lovenox    Disp : Team conference today: Goals of modified independent TDD 5/20 with home care

## 2021-05-14 LAB
CULTURE RESULTS: SIGNIFICANT CHANGE UP
SPECIMEN SOURCE: SIGNIFICANT CHANGE UP

## 2021-05-14 PROCEDURE — 99233 SBSQ HOSP IP/OBS HIGH 50: CPT

## 2021-05-14 PROCEDURE — 99232 SBSQ HOSP IP/OBS MODERATE 35: CPT | Mod: GC

## 2021-05-14 RX ADMIN — Medication 650 MILLIGRAM(S): at 23:25

## 2021-05-14 RX ADMIN — RANOLAZINE 1000 MILLIGRAM(S): 500 TABLET, FILM COATED, EXTENDED RELEASE ORAL at 06:21

## 2021-05-14 RX ADMIN — RANOLAZINE 500 MILLIGRAM(S): 500 TABLET, FILM COATED, EXTENDED RELEASE ORAL at 17:17

## 2021-05-14 RX ADMIN — Medication 81 MILLIGRAM(S): at 12:00

## 2021-05-14 RX ADMIN — ATORVASTATIN CALCIUM 40 MILLIGRAM(S): 80 TABLET, FILM COATED ORAL at 21:38

## 2021-05-14 RX ADMIN — LIDOCAINE 1 PATCH: 4 CREAM TOPICAL at 19:37

## 2021-05-14 RX ADMIN — Medication 250 MILLIGRAM(S): at 06:19

## 2021-05-14 RX ADMIN — Medication 1 SPRAY(S): at 17:17

## 2021-05-14 RX ADMIN — Medication 975 MILLIGRAM(S): at 11:05

## 2021-05-14 RX ADMIN — Medication 250 MILLIGRAM(S): at 17:17

## 2021-05-14 RX ADMIN — Medication 6 MILLIGRAM(S): at 21:38

## 2021-05-14 RX ADMIN — Medication 75 MICROGRAM(S): at 06:19

## 2021-05-14 RX ADMIN — LIDOCAINE 1 PATCH: 4 CREAM TOPICAL at 23:25

## 2021-05-14 RX ADMIN — Medication 975 MILLIGRAM(S): at 12:01

## 2021-05-14 RX ADMIN — AMLODIPINE BESYLATE 5 MILLIGRAM(S): 2.5 TABLET ORAL at 06:21

## 2021-05-14 RX ADMIN — BUDESONIDE AND FORMOTEROL FUMARATE DIHYDRATE 2 PUFF(S): 160; 4.5 AEROSOL RESPIRATORY (INHALATION) at 20:50

## 2021-05-14 RX ADMIN — Medication 1 TABLET(S): at 12:01

## 2021-05-14 RX ADMIN — Medication 100 MILLIGRAM(S): at 06:19

## 2021-05-14 RX ADMIN — ENOXAPARIN SODIUM 40 MILLIGRAM(S): 100 INJECTION SUBCUTANEOUS at 06:20

## 2021-05-14 RX ADMIN — LIDOCAINE 1 PATCH: 4 CREAM TOPICAL at 11:59

## 2021-05-14 RX ADMIN — ISOSORBIDE MONONITRATE 60 MILLIGRAM(S): 60 TABLET, EXTENDED RELEASE ORAL at 12:01

## 2021-05-14 RX ADMIN — Medication 100 MILLIGRAM(S): at 21:38

## 2021-05-14 RX ADMIN — Medication 1 SPRAY(S): at 06:20

## 2021-05-14 RX ADMIN — PANTOPRAZOLE SODIUM 40 MILLIGRAM(S): 20 TABLET, DELAYED RELEASE ORAL at 06:19

## 2021-05-14 RX ADMIN — BUDESONIDE AND FORMOTEROL FUMARATE DIHYDRATE 2 PUFF(S): 160; 4.5 AEROSOL RESPIRATORY (INHALATION) at 10:55

## 2021-05-14 RX ADMIN — Medication 100 MILLIGRAM(S): at 13:16

## 2021-05-14 RX ADMIN — Medication 650 MILLIGRAM(S): at 21:38

## 2021-05-14 RX ADMIN — Medication 1 PACKET(S): at 11:59

## 2021-05-14 NOTE — PROGRESS NOTE ADULT - ASSESSMENT
84yo F w PMH of HTN, COPD, and MI (11y/a) s/p stents x 2 which were replaced 3y/a on ASA only who presents to [facility] on 5/1/21 after mechanical fall - displaced femoral neck fracture s/p left hip hemiarthroplasty on 5/2. Hospital course was complicated with Hyponatremia on fluid restriction and salt tabs, Leukocytosis, pleural effusions, Diarrhea 2/2 Colitis. Patient now admitted for a multidisciplinary rehab program. 05-07-21 @ 22:53    -------------    Rehab Management/MEDICAL MANAGEMENT     #Displaced LEFT femoral neck fracture s/p left hip hemiarthroplasty on 5/2  Continue Comprehensive Rehab Program of PT/OT  - 3 hours a day, 5 days a week  - WBAT LLE  - May shower after post-op day #5 (after 5/7)  - Changed dressing today  - Posterior Hip Precautions for 6 weeks per Ortho (5/2-6/13)  - Lovenox for DVT PPx for 4 weeks (5/2-5/30)    Left Rib fracture  - CT on 5/2 shows Acute nondisplaced lateral left fifth through eighth rib fractures.  - Incentive Spirometry, - Encouraged slow, deep breaths  - Pain control as below.- Lidoderm patch to painful area on lateral left chest wall     Dysuria overnight- abnormal UA - Urine c/s ordered . c/w pyridium. cultures growing contaminants. treat w/ abx if symptomatic     Hyponatremia: in setting of diarrhea :- 5/12 Na 138    #B/L Pleural Effusions:- seen on CT A/P on 5/6/21 at [facility]    #HTN:- amlodipine 5mg po daily    #COPD:- albuterol prn, - symbicort bid    #H/O MI s/p Stents  - ASA 81 qd, - Lipitor 40mg po qhs  - Ranexa 1000mg po in AM and 500mg po in PM    #hypothyroidism  - levothyroxine 75mcg po qd    #Pre-Diabetes - A1c 6.3 on 5/2  -  #Sleep:- melatonin PRN    #Skin  - Skin on admission: Coccygeal area nonblanchable erythema, right between gluteal cleft. Left hip w/ aquacel dressing over surgical site, eccymhosiss throughout  - Pressure injury/Skin: OOB to Chair, PT/OT   - Barrier cream w/ allvyne foam to offload pressure, bid and prn if soiled/removed    #Pain Mgmt :- Tylenol 975mg po q8h standing    #GI/Bowel Mgmt :Start MOM as takes that at home   - Protonix for gi ppx    #/Bladder Mgmt : toilet schedule prn     #FEN :- Diet - Regular + Thins  [CCHO]    #Precautions / PROPHYLAXIS:   - Falls, Cardiac, Left posterior hip precautions  - ortho: Weight bearing status: WBAT LLE  - Lungs: Aspiration, Incentive Spirometer   - DVT prophylaxis Lovenox    Disp : Team conference today: Goals of modified independent TDD 5/20 with home care

## 2021-05-14 NOTE — PROGRESS NOTE ADULT - ASSESSMENT
86yo F w PMH of HTN, COPD, and MI (11y/a) s/p stents x 2 which were replaced 3y/a on ASA only who presents to [facility] on 5/1/21 after mechanical fall from standing complaining of left leg pain, found to have displaced femoral neck fracture s/p left hip hemiarthroplasty on 5/2. Hospital course was complicated with Hyponatremia on fluid restriction and salt tabs, Leukocytosis, pleural effusions, Diarrhea 2/2 Colitis. Patient now admitted for a multidisciplinary rehab program- PT/OT/DVT PPX, PAIN MEDS    - dysuria/ leukocytosis- uti, c/w cipro,  encourage po hydration    - Lovenox for DVT PPx for 4 weeks (5/2-5/30)    -Acute nondisplaced lateral left fifth through eighth rib fractures.  - Incentive Spirometry    -Hyponatremia- resolved  - monitor CMP/BMPs    -Diarrhea 2/2 Colitis; Resolved  - CT A/P w/ PO contrast on 5/6 shows colitis  - WBC was 11.2 on 5/6  - Florastor,  metamucil    #HTN  - amlodipine     #COPD  - albuterol prn  - Symbicort bid    #H/O MI s/p Stents  - ASA 81 qd  - Lipitor 40mg po qhs  - Ranexa     #hypothyroidism  - levothyroxine     #Pre-Diabetes - A1c 6.3 on 5/2  -diet and exercise      #Sleep  - melatonin PRN    DVT ppx: Lovenox    will follow  d/w dr. gibson

## 2021-05-15 PROCEDURE — 99232 SBSQ HOSP IP/OBS MODERATE 35: CPT

## 2021-05-15 RX ORDER — SODIUM CHLORIDE 0.65 %
1 AEROSOL, SPRAY (ML) NASAL THREE TIMES A DAY
Refills: 0 | Status: DISCONTINUED | OUTPATIENT
Start: 2021-05-15 | End: 2021-05-20

## 2021-05-15 RX ORDER — BENZOCAINE AND MENTHOL 5; 1 G/100ML; G/100ML
1 LIQUID ORAL THREE TIMES A DAY
Refills: 0 | Status: DISCONTINUED | OUTPATIENT
Start: 2021-05-15 | End: 2021-05-20

## 2021-05-15 RX ADMIN — Medication 100 MILLIGRAM(S): at 21:29

## 2021-05-15 RX ADMIN — Medication 1 SPRAY(S): at 17:12

## 2021-05-15 RX ADMIN — RANOLAZINE 500 MILLIGRAM(S): 500 TABLET, FILM COATED, EXTENDED RELEASE ORAL at 17:13

## 2021-05-15 RX ADMIN — Medication 81 MILLIGRAM(S): at 12:01

## 2021-05-15 RX ADMIN — Medication 6 MILLIGRAM(S): at 21:33

## 2021-05-15 RX ADMIN — ENOXAPARIN SODIUM 40 MILLIGRAM(S): 100 INJECTION SUBCUTANEOUS at 05:41

## 2021-05-15 RX ADMIN — Medication 975 MILLIGRAM(S): at 21:28

## 2021-05-15 RX ADMIN — LIDOCAINE 1 PATCH: 4 CREAM TOPICAL at 12:01

## 2021-05-15 RX ADMIN — AMLODIPINE BESYLATE 5 MILLIGRAM(S): 2.5 TABLET ORAL at 05:41

## 2021-05-15 RX ADMIN — Medication 250 MILLIGRAM(S): at 05:41

## 2021-05-15 RX ADMIN — Medication 100 MILLIGRAM(S): at 05:41

## 2021-05-15 RX ADMIN — Medication 1 SPRAY(S): at 05:41

## 2021-05-15 RX ADMIN — PANTOPRAZOLE SODIUM 40 MILLIGRAM(S): 20 TABLET, DELAYED RELEASE ORAL at 05:41

## 2021-05-15 RX ADMIN — ALBUTEROL 2 PUFF(S): 90 AEROSOL, METERED ORAL at 09:24

## 2021-05-15 RX ADMIN — ATORVASTATIN CALCIUM 40 MILLIGRAM(S): 80 TABLET, FILM COATED ORAL at 21:28

## 2021-05-15 RX ADMIN — BUDESONIDE AND FORMOTEROL FUMARATE DIHYDRATE 2 PUFF(S): 160; 4.5 AEROSOL RESPIRATORY (INHALATION) at 08:58

## 2021-05-15 RX ADMIN — BUDESONIDE AND FORMOTEROL FUMARATE DIHYDRATE 2 PUFF(S): 160; 4.5 AEROSOL RESPIRATORY (INHALATION) at 21:21

## 2021-05-15 RX ADMIN — ISOSORBIDE MONONITRATE 60 MILLIGRAM(S): 60 TABLET, EXTENDED RELEASE ORAL at 12:01

## 2021-05-15 RX ADMIN — ALBUTEROL 2 PUFF(S): 90 AEROSOL, METERED ORAL at 21:20

## 2021-05-15 RX ADMIN — Medication 650 MILLIGRAM(S): at 09:00

## 2021-05-15 RX ADMIN — LIDOCAINE 1 PATCH: 4 CREAM TOPICAL at 21:40

## 2021-05-15 RX ADMIN — RANOLAZINE 1000 MILLIGRAM(S): 500 TABLET, FILM COATED, EXTENDED RELEASE ORAL at 05:41

## 2021-05-15 RX ADMIN — Medication 75 MICROGRAM(S): at 05:41

## 2021-05-15 RX ADMIN — Medication 1 PACKET(S): at 12:01

## 2021-05-15 RX ADMIN — Medication 650 MILLIGRAM(S): at 08:01

## 2021-05-15 RX ADMIN — Medication 250 MILLIGRAM(S): at 17:13

## 2021-05-15 RX ADMIN — Medication 975 MILLIGRAM(S): at 22:30

## 2021-05-15 RX ADMIN — Medication 100 MILLIGRAM(S): at 14:57

## 2021-05-15 RX ADMIN — SENNA PLUS 2 TABLET(S): 8.6 TABLET ORAL at 21:27

## 2021-05-15 RX ADMIN — LIDOCAINE 1 PATCH: 4 CREAM TOPICAL at 21:39

## 2021-05-15 RX ADMIN — Medication 1 TABLET(S): at 12:01

## 2021-05-16 PROCEDURE — 99232 SBSQ HOSP IP/OBS MODERATE 35: CPT

## 2021-05-16 RX ADMIN — ALBUTEROL 2 PUFF(S): 90 AEROSOL, METERED ORAL at 08:33

## 2021-05-16 RX ADMIN — Medication 75 MICROGRAM(S): at 06:16

## 2021-05-16 RX ADMIN — Medication 975 MILLIGRAM(S): at 06:18

## 2021-05-16 RX ADMIN — Medication 1 TABLET(S): at 12:10

## 2021-05-16 RX ADMIN — Medication 1 PACKET(S): at 12:10

## 2021-05-16 RX ADMIN — PANTOPRAZOLE SODIUM 40 MILLIGRAM(S): 20 TABLET, DELAYED RELEASE ORAL at 06:16

## 2021-05-16 RX ADMIN — ENOXAPARIN SODIUM 40 MILLIGRAM(S): 100 INJECTION SUBCUTANEOUS at 06:17

## 2021-05-16 RX ADMIN — BUDESONIDE AND FORMOTEROL FUMARATE DIHYDRATE 2 PUFF(S): 160; 4.5 AEROSOL RESPIRATORY (INHALATION) at 21:22

## 2021-05-16 RX ADMIN — LIDOCAINE 1 PATCH: 4 CREAM TOPICAL at 19:33

## 2021-05-16 RX ADMIN — ATORVASTATIN CALCIUM 40 MILLIGRAM(S): 80 TABLET, FILM COATED ORAL at 21:27

## 2021-05-16 RX ADMIN — RANOLAZINE 500 MILLIGRAM(S): 500 TABLET, FILM COATED, EXTENDED RELEASE ORAL at 17:18

## 2021-05-16 RX ADMIN — SENNA PLUS 2 TABLET(S): 8.6 TABLET ORAL at 21:27

## 2021-05-16 RX ADMIN — LIDOCAINE 1 PATCH: 4 CREAM TOPICAL at 12:11

## 2021-05-16 RX ADMIN — Medication 1 SPRAY(S): at 17:18

## 2021-05-16 RX ADMIN — ISOSORBIDE MONONITRATE 60 MILLIGRAM(S): 60 TABLET, EXTENDED RELEASE ORAL at 12:10

## 2021-05-16 RX ADMIN — Medication 6 MILLIGRAM(S): at 21:27

## 2021-05-16 RX ADMIN — Medication 250 MILLIGRAM(S): at 06:17

## 2021-05-16 RX ADMIN — Medication 81 MILLIGRAM(S): at 12:10

## 2021-05-16 RX ADMIN — BUDESONIDE AND FORMOTEROL FUMARATE DIHYDRATE 2 PUFF(S): 160; 4.5 AEROSOL RESPIRATORY (INHALATION) at 08:33

## 2021-05-16 RX ADMIN — Medication 650 MILLIGRAM(S): at 22:37

## 2021-05-16 RX ADMIN — Medication 250 MILLIGRAM(S): at 17:18

## 2021-05-16 RX ADMIN — Medication 650 MILLIGRAM(S): at 21:27

## 2021-05-16 RX ADMIN — Medication 1 SPRAY(S): at 06:15

## 2021-05-16 RX ADMIN — RANOLAZINE 1000 MILLIGRAM(S): 500 TABLET, FILM COATED, EXTENDED RELEASE ORAL at 06:16

## 2021-05-16 RX ADMIN — Medication 100 MILLIGRAM(S): at 06:17

## 2021-05-16 RX ADMIN — AMLODIPINE BESYLATE 5 MILLIGRAM(S): 2.5 TABLET ORAL at 06:16

## 2021-05-16 RX ADMIN — MAGNESIUM HYDROXIDE 30 MILLILITER(S): 400 TABLET, CHEWABLE ORAL at 12:09

## 2021-05-16 RX ADMIN — Medication 975 MILLIGRAM(S): at 07:18

## 2021-05-17 ENCOUNTER — TRANSCRIPTION ENCOUNTER (OUTPATIENT)
Age: 86
End: 2021-05-17

## 2021-05-17 LAB
ALBUMIN SERPL ELPH-MCNC: 2.6 G/DL — LOW (ref 3.3–5)
ALP SERPL-CCNC: 102 U/L — SIGNIFICANT CHANGE UP (ref 40–120)
ALT FLD-CCNC: 33 U/L — SIGNIFICANT CHANGE UP (ref 10–45)
ANION GAP SERPL CALC-SCNC: 4 MMOL/L — LOW (ref 5–17)
AST SERPL-CCNC: 19 U/L — SIGNIFICANT CHANGE UP (ref 10–40)
BASOPHILS # BLD AUTO: 0.05 K/UL — SIGNIFICANT CHANGE UP (ref 0–0.2)
BASOPHILS NFR BLD AUTO: 0.6 % — SIGNIFICANT CHANGE UP (ref 0–2)
BILIRUB SERPL-MCNC: 0.4 MG/DL — SIGNIFICANT CHANGE UP (ref 0.2–1.2)
BUN SERPL-MCNC: 11 MG/DL — SIGNIFICANT CHANGE UP (ref 7–23)
CALCIUM SERPL-MCNC: 9.4 MG/DL — SIGNIFICANT CHANGE UP (ref 8.4–10.5)
CHLORIDE SERPL-SCNC: 101 MMOL/L — SIGNIFICANT CHANGE UP (ref 96–108)
CO2 SERPL-SCNC: 30 MMOL/L — SIGNIFICANT CHANGE UP (ref 22–31)
CREAT SERPL-MCNC: 0.56 MG/DL — SIGNIFICANT CHANGE UP (ref 0.5–1.3)
EOSINOPHIL # BLD AUTO: 0.35 K/UL — SIGNIFICANT CHANGE UP (ref 0–0.5)
EOSINOPHIL NFR BLD AUTO: 3.9 % — SIGNIFICANT CHANGE UP (ref 0–6)
GLUCOSE SERPL-MCNC: 116 MG/DL — HIGH (ref 70–99)
HCT VFR BLD CALC: 29.4 % — LOW (ref 34.5–45)
HGB BLD-MCNC: 10 G/DL — LOW (ref 11.5–15.5)
IMM GRANULOCYTES NFR BLD AUTO: 1.4 % — SIGNIFICANT CHANGE UP (ref 0–1.5)
LYMPHOCYTES # BLD AUTO: 1.27 K/UL — SIGNIFICANT CHANGE UP (ref 1–3.3)
LYMPHOCYTES # BLD AUTO: 14 % — SIGNIFICANT CHANGE UP (ref 13–44)
MCHC RBC-ENTMCNC: 31 PG — SIGNIFICANT CHANGE UP (ref 27–34)
MCHC RBC-ENTMCNC: 34 GM/DL — SIGNIFICANT CHANGE UP (ref 32–36)
MCV RBC AUTO: 91 FL — SIGNIFICANT CHANGE UP (ref 80–100)
MONOCYTES # BLD AUTO: 1.04 K/UL — HIGH (ref 0–0.9)
MONOCYTES NFR BLD AUTO: 11.5 % — SIGNIFICANT CHANGE UP (ref 2–14)
NEUTROPHILS # BLD AUTO: 6.2 K/UL — SIGNIFICANT CHANGE UP (ref 1.8–7.4)
NEUTROPHILS NFR BLD AUTO: 68.6 % — SIGNIFICANT CHANGE UP (ref 43–77)
NRBC # BLD: 0 /100 WBCS — SIGNIFICANT CHANGE UP (ref 0–0)
PLATELET # BLD AUTO: 494 K/UL — HIGH (ref 150–400)
POTASSIUM SERPL-MCNC: 4.2 MMOL/L — SIGNIFICANT CHANGE UP (ref 3.5–5.3)
POTASSIUM SERPL-SCNC: 4.2 MMOL/L — SIGNIFICANT CHANGE UP (ref 3.5–5.3)
PROT SERPL-MCNC: 5.9 G/DL — LOW (ref 6–8.3)
RBC # BLD: 3.23 M/UL — LOW (ref 3.8–5.2)
RBC # FLD: 15.3 % — HIGH (ref 10.3–14.5)
SODIUM SERPL-SCNC: 135 MMOL/L — SIGNIFICANT CHANGE UP (ref 135–145)
WBC # BLD: 9.04 K/UL — SIGNIFICANT CHANGE UP (ref 3.8–10.5)
WBC # FLD AUTO: 9.04 K/UL — SIGNIFICANT CHANGE UP (ref 3.8–10.5)

## 2021-05-17 PROCEDURE — 99233 SBSQ HOSP IP/OBS HIGH 50: CPT

## 2021-05-17 PROCEDURE — 99232 SBSQ HOSP IP/OBS MODERATE 35: CPT | Mod: GC

## 2021-05-17 RX ADMIN — Medication 975 MILLIGRAM(S): at 21:15

## 2021-05-17 RX ADMIN — Medication 1 TABLET(S): at 12:32

## 2021-05-17 RX ADMIN — Medication 975 MILLIGRAM(S): at 22:20

## 2021-05-17 RX ADMIN — Medication 975 MILLIGRAM(S): at 08:48

## 2021-05-17 RX ADMIN — RANOLAZINE 1000 MILLIGRAM(S): 500 TABLET, FILM COATED, EXTENDED RELEASE ORAL at 05:11

## 2021-05-17 RX ADMIN — Medication 250 MILLIGRAM(S): at 17:51

## 2021-05-17 RX ADMIN — Medication 6 MILLIGRAM(S): at 21:14

## 2021-05-17 RX ADMIN — Medication 975 MILLIGRAM(S): at 08:10

## 2021-05-17 RX ADMIN — LIDOCAINE 1 PATCH: 4 CREAM TOPICAL at 23:26

## 2021-05-17 RX ADMIN — RANOLAZINE 500 MILLIGRAM(S): 500 TABLET, FILM COATED, EXTENDED RELEASE ORAL at 17:52

## 2021-05-17 RX ADMIN — Medication 250 MILLIGRAM(S): at 05:11

## 2021-05-17 RX ADMIN — AMLODIPINE BESYLATE 5 MILLIGRAM(S): 2.5 TABLET ORAL at 05:11

## 2021-05-17 RX ADMIN — LIDOCAINE 1 PATCH: 4 CREAM TOPICAL at 12:33

## 2021-05-17 RX ADMIN — Medication 81 MILLIGRAM(S): at 12:32

## 2021-05-17 RX ADMIN — BUDESONIDE AND FORMOTEROL FUMARATE DIHYDRATE 2 PUFF(S): 160; 4.5 AEROSOL RESPIRATORY (INHALATION) at 20:49

## 2021-05-17 RX ADMIN — Medication 1 SPRAY(S): at 05:11

## 2021-05-17 RX ADMIN — ISOSORBIDE MONONITRATE 60 MILLIGRAM(S): 60 TABLET, EXTENDED RELEASE ORAL at 12:33

## 2021-05-17 RX ADMIN — Medication 75 MICROGRAM(S): at 05:11

## 2021-05-17 RX ADMIN — BUDESONIDE AND FORMOTEROL FUMARATE DIHYDRATE 2 PUFF(S): 160; 4.5 AEROSOL RESPIRATORY (INHALATION) at 08:13

## 2021-05-17 RX ADMIN — LIDOCAINE 1 PATCH: 4 CREAM TOPICAL at 19:37

## 2021-05-17 RX ADMIN — Medication 1 SPRAY(S): at 17:51

## 2021-05-17 RX ADMIN — ENOXAPARIN SODIUM 40 MILLIGRAM(S): 100 INJECTION SUBCUTANEOUS at 06:45

## 2021-05-17 RX ADMIN — PANTOPRAZOLE SODIUM 40 MILLIGRAM(S): 20 TABLET, DELAYED RELEASE ORAL at 05:11

## 2021-05-17 RX ADMIN — LIDOCAINE 1 PATCH: 4 CREAM TOPICAL at 01:41

## 2021-05-17 RX ADMIN — ATORVASTATIN CALCIUM 40 MILLIGRAM(S): 80 TABLET, FILM COATED ORAL at 21:14

## 2021-05-17 RX ADMIN — SENNA PLUS 2 TABLET(S): 8.6 TABLET ORAL at 21:14

## 2021-05-17 NOTE — DISCHARGE NOTE PROVIDER - CARE PROVIDER_API CALL
Marquez Moore)  Orthopaedic Surgery  [address], [address], [address]  [place] [address]  Phone: [phone]  Fax: [phone]  Follow Up Time: 2 weeks   Marquez Moore)  Orthopaedic Surgery  [address], [facility] [address]  [place] [address]  Phone: [phone]  Fax: [phone]  Follow Up Time: 1 week    Marquez Gonzales  Phone: (   )    -  Fax: (   )    -  Follow Up Time: 2 weeks    Primary Care, Physician  Phone: (   )    -  Fax: (   )    -  Follow Up Time: 1 week

## 2021-05-17 NOTE — DISCHARGE NOTE PROVIDER - NSDCFUADDINST_GEN_ALL_CORE_FT
You had displaced femoral neck fracture s/p left hip hemiarthroplasty on 5/2. You tolerated procedure well and was at [facility] for acute rehab. You completed course of rehab with PT and OT and will transition to continue rehab at home. Your wound has healed and your dressing has changed. You may shower. You will continue your posterior hip precautions for 6 weeks total from your surgery (until 6/13). You will also use lovenox to prevent blood clots for 4 weeks until 5/30.  You had displaced femoral neck fracture s/p left hip hemiarthroplasty on 5/2. You tolerated procedure well and was at [facility] for acute rehab. You completed course of rehab with PT and OT and will transition to continue rehab at home. Your wound has healed and your dressing has changed. You may shower. You will continue your posterior hip precautions for 6 weeks total from your surgery (until 6/13). You will also use lovenox to prevent blood clots for 4 weeks until 5/30.     You were started on cipro for UTI . Please follow up with PCP if symptoms do not improve with abx  You had displaced femoral neck fracture s/p left hip hemiarthroplasty on 5/2. You tolerated procedure well and was at [facility] for acute rehab. You completed course of rehab with PT and OT and will transition to continue rehab at home. Your wound has healed and your dressing has changed. You may shower. You will continue your posterior hip precautions for 6 weeks total from your surgery (until 6/13). You will also use lovenox to prevent blood clots for 4 weeks until 5/30.     You were started on cipro for UTI . Please follow up with PCP if symptoms do not improve with abx    Your hip dressing was changed on day of discharge. Please follow up with ortho in 1 week

## 2021-05-17 NOTE — DISCHARGE NOTE PROVIDER - HOSPITAL COURSE
HPI:  PRESLEY BECK is a 86yo F w PMH of HTN, COPD, and MI (11y/a) s/p stents x 2 which were replaced 3y/a on ASA only who presents after mechanical fall from standing complaining of left leg pain. Patient was stepping inside her home when she caught her foot on the door frame, She landed on her left side and hit left forehead, but denies LOC. Denies headache, lightheadedness, HA, dizziness, chest pain, or SOB. Had large forehead swelling on left which has since improved. No nausea or vomiting. Imaging studies revealed acute left femoral neck fracture aand Acute nondisplaced lateral left fifth through eighth rib fractures.. Patient was admitted to the trauma service & orthopedics consulted. Taken to the OR on 5/2 for left hip hemiarthroplasty. Patient tolerated procedure well. She worked with PT, OT and PMR who recommended acute rehab upon discharge.  Hospital course was complicated with Hyponatremia on fluid restriction and salt tabs, Leukocytosis, pleural effusions, Diarrhea 2/2 Colitis, started on immodium and metamucil. CT A/P on 5/6 also shows bibasilar atelectasis.    (11 May 2021 11:16)      Patient was evaluated by PM&R and therapy for gait/ADL impairments and recommended acute rehabilitation. Patient was medically optimized for discharge to [facility] on ___. Admitted with gait instability, ADL, and functional impairments.     Rehab course significant for ___.    All other medical co-morbidities were stable. Patient tolerated course of inpatient PT/OT/SLP rehab with significant improvements and met rehab goals prior to discharge. Patient was medically cleared on ___ for discharge to ___. HPI:  PRESLEY BECK is a 86yo F w PMH of HTN, COPD, and MI (11y/a) s/p stents x 2 which were replaced 3y/a on ASA only who presents after mechanical fall from standing complaining of left leg pain. Patient was stepping inside her home when she caught her foot on the door frame, She landed on her left side and hit left forehead, but denies LOC. Denies headache, lightheadedness, HA, dizziness, chest pain, or SOB. Had large forehead swelling on left which has since improved. No nausea or vomiting. Imaging studies revealed acute left femoral neck fracture aand Acute nondisplaced lateral left fifth through eighth rib fractures.. Patient was admitted to the trauma service & orthopedics consulted. Taken to the OR on 5/2 for left hip hemiarthroplasty. Patient tolerated procedure well. She worked with PT, OT and PMR who recommended acute rehab upon discharge.  Hospital course was complicated with Hyponatremia on fluid restriction and salt tabs, Leukocytosis, pleural effusions, Diarrhea 2/2 Colitis, started on immodium and metamucil. CT A/P on 5/6 also shows bibasilar atelectasis.    (11 May 2021 11:16)      Patient was evaluated by PM&R and therapy for gait/ADL impairments and recommended acute rehabilitation. Patient was medically optimized for discharge to [facility] on 5/11/21. Admitted with gait instability, ADL, and functional impairments.     Rehab course significant for dysuria resolved w/ pyridium and 3 day course of cipro. She had BLE LE swelling and is treated w/ ACE wrap with some improvement. Also had constipation but was resolved with bowel regimen    All other medical co-morbidities were stable. Patient tolerated course of inpatient PT/OT/SLP rehab with significant improvements and met rehab goals prior to discharge. Patient was medically cleared on 5/20/21 for discharge to home w/ home PT and OT. HPI:  PRESLEY BECK is a 84yo F w PMH of HTN, COPD, and MI (11y/a) s/p stents x 2 which were replaced 3y/a on ASA only who presents after mechanical fall from standing complaining of left leg pain. Patient was stepping inside her home when she caught her foot on the door frame, She landed on her left side and hit left forehead, but denies LOC. Denies headache, lightheadedness, HA, dizziness, chest pain, or SOB. Had large forehead swelling on left which has since improved. No nausea or vomiting. Imaging studies revealed acute left femoral neck fracture aand Acute nondisplaced lateral left fifth through eighth rib fractures.. Patient was admitted to the trauma service & orthopedics consulted. Taken to the OR on 5/2 for left hip hemiarthroplasty. Patient tolerated procedure well. She worked with PT, OT and PMR who recommended acute rehab upon discharge.  Hospital course was complicated with Hyponatremia on fluid restriction and salt tabs, Leukocytosis, pleural effusions, Diarrhea 2/2 Colitis, started on immodium and metamucil. CT A/P on 5/6 also shows bibasilar atelectasis.    (11 May 2021 11:16)      Patient was evaluated by PM&R and therapy for gait/ADL impairments and recommended acute rehabilitation. Patient was medically optimized for discharge to [facility] on 5/11/21. Admitted with gait instability, ADL, and functional impairments.     Rehab course significant for dysuria treated w/ pyridium and 3 day course of cipro.  Urine was growing GNR. She had BLE LE swelling and is treated w/ ACE wrap with some improvement. No DVT seen on LE venous dopplers. Also had constipation but was resolved with bowel regimen.     All other medical co-morbidities were stable. Patient tolerated course of inpatient PT/OT/SLP rehab with significant improvements and met rehab goals prior to discharge. Patient was medically cleared on 5/20/21 for discharge to home w/ home PT and OT. HPI:  PRESLEY BECK is a 84yo F w PMH of HTN, COPD, and MI (11y/a) s/p stents x 2 which were replaced 3y/a on ASA only who presents after mechanical fall from standing complaining of left leg pain. Patient was stepping inside her home when she caught her foot on the door frame, She landed on her left side and hit left forehead, but denies LOC. Denies headache, lightheadedness, HA, dizziness, chest pain, or SOB. Had large forehead swelling on left which has since improved. No nausea or vomiting. Imaging studies revealed acute left femoral neck fracture aand Acute nondisplaced lateral left fifth through eighth rib fractures.. Patient was admitted to the trauma service & orthopedics consulted. Taken to the OR on 5/2 for left hip hemiarthroplasty. Patient tolerated procedure well. She worked with PT, OT and PMR who recommended acute rehab upon discharge.  Hospital course was complicated with Hyponatremia on fluid restriction and salt tabs, Leukocytosis, pleural effusions, Diarrhea 2/2 Colitis, started on immodium and metamucil. CT A/P on 5/6 also shows bibasilar atelectasis.    (11 May 2021 11:16)      Patient was evaluated by PM&R and therapy for gait/ADL impairments and recommended acute rehabilitation. Patient was medically optimized for discharge to [facility] on 5/11/21. Admitted with gait instability, ADL, and functional impairments.     Rehab course significant for dysuria treated w/ pyridium and 3 day course of cipro.  Urine was growing GNR and patient was started on cipro for UTI  She had BLE LE swelling and is treated w/ ACE wrap with some improvement. No DVT seen on LE venous dopplers. Also had constipation but was resolved with bowel regimen.     All other medical co-morbidities were stable. Patient tolerated course of inpatient PT/OT/SLP rehab with significant improvements and met rehab goals prior to discharge. Patient was medically cleared on 5/20/21 for discharge to home w/ home PT and OT.

## 2021-05-17 NOTE — PROGRESS NOTE ADULT - ASSESSMENT
86yo F w PMH of HTN, COPD, and MI (11y/a) s/p stents x 2 which were replaced 3y/a on ASA only who presents to [facility] on 5/1/21 after mechanical fall - displaced femoral neck fracture s/p left hip hemiarthroplasty on 5/2. Hospital course was complicated with Hyponatremia on fluid restriction and salt tabs, Leukocytosis, pleural effusions, Diarrhea 2/2 Colitis. Patient now admitted for a multidisciplinary rehab program. 05-07-21 @ 22:53    -------------    Rehab Management/MEDICAL MANAGEMENT     #Displaced LEFT femoral neck fracture s/p left hip hemiarthroplasty on 5/2  Continue Comprehensive Rehab Program of PT/OT  - 3 hours a day, 5 days a week  - WBAT LLE  - May shower after post-op day #5 (after 5/7)  - Changed dressing 5/14  - Posterior Hip Precautions for 6 weeks per Ortho (5/2-6/13)  - Lovenox for DVT PPx for 4 weeks (5/2-5/30)    Left Rib fracture  - CT on 5/2 shows Acute nondisplaced lateral left fifth through eighth rib fractures.  - Incentive Spirometry, - Encouraged slow, deep breaths  - Pain control as below.- Lidoderm patch to painful area on lateral left chest wall     Dysuria overnight- abnormal UA - Urine c/s ordered . cultures growing contaminants. treat w/ abx if symptomatic. Pyridium stopped    Hyponatremia: in setting of diarrhea :- 5/12 Na 138    #B/L Pleural Effusions:- seen on CT A/P on 5/6/21 at [facility]    #HTN:- amlodipine 5mg po daily    #COPD:- albuterol prn, - symbicort bid    #H/O MI s/p Stents  - ASA 81 qd, - Lipitor 40mg po qhs  - Ranexa 1000mg po in AM and 500mg po in PM    #hypothyroidism  - levothyroxine 75mcg po qd    #Pre-Diabetes - A1c 6.3 on 5/2  -  #Sleep:- melatonin PRN    #Skin  - Skin on admission: Coccygeal area nonblanchable erythema, right between gluteal cleft. Left hip w/ aquacel dressing over surgical site, eccymhosiss throughout  - Pressure injury/Skin: OOB to Chair, PT/OT   - Barrier cream w/ allvyne foam to offload pressure, bid and prn if soiled/removed    #Pain Mgmt :- Tylenol 975mg po q8h standing    #GI/Bowel Mgmt :Start MOM as takes that at home   - Protonix for gi ppx    #/Bladder Mgmt : toilet schedule prn     #FEN :- Diet - Regular + Thins  [[facility]]    #Precautions / PROPHYLAXIS:   - Falls, Cardiac, Left posterior hip precautions  - ortho: Weight bearing status: WBAT LLE  - Lungs: Aspiration, Incentive Spirometer   - DVT prophylaxis Lovenox    Disp : Team conference today: Goals of modified independent TDD 5/20 with home care

## 2021-05-17 NOTE — DISCHARGE NOTE PROVIDER - NSDCMRMEDTOKEN_GEN_ALL_CORE_FT
acetaminophen 325 mg oral tablet: 3 tab(s) orally every 8 hours  Advair Diskus 250 mcg-50 mcg inhalation powder: 1 puff(s) inhaled 2 times a day  albuterol 90 mcg/inh inhalation aerosol: 2 puff(s) inhaled every 4 hours, As needed, Shortness of Breath  amLODIPine 5 mg oral tablet: 1 tab(s) orally once a day  aspirin 81 mg oral tablet: 1 tab(s) orally once a day  atorvastatin 40 mg oral tablet: 1 tab(s) orally once a day  budesonide-formoterol 160 mcg-4.5 mcg/inh inhalation aerosol: 2 puff(s) inhaled 2 times a day  enoxaparin: 40 milligram(s) subcutaneous once a day  fluticasone 50 mcg/inh nasal spray: 1 spray(s) nasal 2 times a day  ibuprofen 400 mg oral tablet: 1 tab(s) orally every 8 hours, As needed, Mild Pain (1 - 3)  ipratropium-albuterol 0.5 mg-2.5 mg/3 mLinhalation solution: 3 milliliter(s) inhaled every 6 hours, As needed, Shortness of Breath and/or Wheezing  isosorbide mononitrate 30 mg oral tablet, extended release:  orally 2 times a day  lidocaine 5% topical film: Apply topically to affected area once a day  loperamide 2 mg oral capsule: 1 cap(s) orally once a day  melatonin 5 mg oral tablet: 1 tab(s) orally once a day (at bedtime)  pantoprazole 40 mg oral delayed release tablet:  orally   predniSONE 10 mg oral tablet: 1 tab(s) orally once a day  psyllium 3.4 g/7 g oral powder for reconstitution: 1 packet(s) orally 2 times a day  Ranexa 500 mg oral tablet, extended release: 1 tab(s) orally 2 times a day  saccharomyces boulardii lyo 250 mg oral capsule: 1 cap(s) orally 2 times a day  Synthroid 75 mcg (0.075 mg) oral tablet: 1 tab(s) orally once a day   acetaminophen 325 mg oral tablet: 2 tab(s) orally every 6 hours, As needed, Moderate Pain (4 - 6)  albuterol 90 mcg/inh inhalation aerosol: 2 puff(s) inhaled every 12 hours, As needed, Shortness of Breath and/or Wheezing  amLODIPine 5 mg oral tablet: 1 tab(s) orally once a day  aspirin 81 mg oral tablet, chewable: 1 tab(s) orally once a day  atorvastatin 40 mg oral tablet: 1 tab(s) orally once a day (at bedtime)  budesonide-formoterol 160 mcg-4.5 mcg/inh inhalation aerosol: 2 puff(s) inhaled 2 times a day  fluticasone 50 mcg/inh nasal spray: 1 spray(s) nasal 2 times a day  isosorbide mononitrate 60 mg oral tablet, extended release: 1 tab(s) orally once a day  levothyroxine 75 mcg (0.075 mg) oral tablet: 1 tab(s) orally once a day  Lovenox 40 mg/0.4 mL injectable solution: 40 milligram(s) subcutaneously once a day. Start 5/21/21. Last date of treatment is 5/30/21.  magnesium hydroxide 8% oral suspension: 30 milliliter(s) orally once a day, As needed, Constipation  ranolazine 500 mg oral tablet, extended release: 2 tab(s) orally once a day in the MORNING  1 tab(s) orally once a day in the EVENING  senna oral tablet: 2 tab(s) orally once a day (at bedtime)  sodium chloride 0.65% nasal spray: 1 spray(s) in each nostril 2 times a day, As Needed -for dry nasal passages    acetaminophen 325 mg oral tablet: 2 tab(s) orally every 6 hours, As needed, Moderate Pain (4 - 6)  albuterol 90 mcg/inh inhalation aerosol: 2 puff(s) inhaled every 12 hours, As needed, Shortness of Breath and/or Wheezing  amLODIPine 5 mg oral tablet: 1 tab(s) orally once a day  aspirin 81 mg oral tablet, chewable: 1 tab(s) orally once a day  atorvastatin 40 mg oral tablet: 1 tab(s) orally once a day (at bedtime)  budesonide-formoterol 160 mcg-4.5 mcg/inh inhalation aerosol: 2 puff(s) inhaled 2 times a day  ciprofloxacin 250 mg oral tablet: 1 tab(s) orally every 12 hours  fluticasone 50 mcg/inh nasal spray: 1 spray(s) nasal 2 times a day  isosorbide mononitrate 60 mg oral tablet, extended release: 1 tab(s) orally once a day  levothyroxine 75 mcg (0.075 mg) oral tablet: 1 tab(s) orally once a day  Lidoderm 5% topical film: Apply topically to affected area once a day   Lovenox 40 mg/0.4 mL injectable solution: 40 milligram(s) subcutaneously once a day. Start 5/21/21. Last date of treatment is 5/30/21.  magnesium hydroxide 8% oral suspension: 30 milliliter(s) orally once a day, As needed, Constipation  phenazopyridine 100 mg oral tablet: 1 tab(s) orally 3 times a day  ranolazine 500 mg oral tablet, extended release: 2 tab(s) orally once a day in the MORNING  1 tab(s) orally once a day in the EVENING  saccharomyces boulardii lyo 250 mg oral capsule: 1 cap(s) orally 2 times a day  senna oral tablet: 2 tab(s) orally once a day (at bedtime)  sodium chloride 0.65% nasal spray: 1 spray(s) in each nostril 2 times a day, As Needed -for dry nasal passages

## 2021-05-17 NOTE — DISCHARGE NOTE PROVIDER - PROVIDER TOKENS
PROVIDER:[TOKEN:[[id number]],FOLLOWUP:[2 weeks]] PROVIDER:[TOKEN:[[id number]],FOLLOWUP:[1 week]],FREE:[LAST:[[facility]],FIRST:[[facility]],PHONE:[(   )    -],FAX:[(   )    -],FOLLOWUP:[2 weeks]],FREE:[LAST:[Primary Care],FIRST:[Physician],PHONE:[(   )    -],FAX:[(   )    -],FOLLOWUP:[1 week]]

## 2021-05-17 NOTE — DISCHARGE NOTE PROVIDER - CARE PROVIDERS DIRECT ADDRESSES
,[email or web address],DirectAddress_Unknown,DirectAddress_Unknown

## 2021-05-17 NOTE — PROGRESS NOTE ADULT - ASSESSMENT
84yo F w PMH of HTN, COPD, and MI (11y/a) s/p stents x 2 which were replaced 3y/a on ASA only who presents to [facility] on 5/1/21 after mechanical fall from standing complaining of left leg pain, found to have displaced femoral neck fracture s/p left hip hemiarthroplasty on 5/2. Hospital course was complicated with Hyponatremia on fluid restriction and salt tabs, Leukocytosis, pleural effusions, Diarrhea 2/2 Colitis. Patient now admitted for a multidisciplinary rehab program- PT/OT/DVT PPX, PAIN MEDS    - dysuria/ leukocytosis- resolved,  ?uti, treated with  cipro x 3 days,  encourage po hydration    - Lovenox for DVT PPx for 4 weeks (5/2-5/30)    -Acute nondisplaced lateral left fifth through eighth rib fractures.  - Incentive Spirometry    -Hyponatremia- resolved  - monitor CMP/BMPs    -Diarrhea 2/2 Colitis; Resolved  - CT A/P w/ PO contrast on 5/6 shows colitis  - WBC was 11.2 on 5/6  - Florastor,  metamucil    #HTN  - amlodipine     #COPD  - albuterol prn  - Symbicort bid    #H/O MI s/p Stents  - ASA 81 qd  - Lipitor 40mg po qhs  - Ranexa     #hypothyroidism  - levothyroxine     #Pre-Diabetes - A1c 6.3 on 5/2  -diet and exercise      #Sleep  - melatonin PRN    DVT ppx: Lovenox    will follow  d/w dr. gibson

## 2021-05-17 NOTE — DISCHARGE NOTE PROVIDER - NSDCCPCAREPLAN_GEN_ALL_CORE_FT
PRINCIPAL DISCHARGE DIAGNOSIS  Diagnosis: Fracture of femoral neck, left  Assessment and Plan of Treatment: You had displaced femoral neck fracture s/p left hip hemiarthroplasty on 5/2. You tolerated procedure well and was at [facility] for acute rehab. You completed course of rehab with PT and OT and will transition to continue rehab at home. Your wound has healed and your dressing has changed. You may shower. You will continue your posterior hip precautions for 6 weeks total from your surgery (until 6/13). You will also use lovenox to prevent blood clots for 4 weeks until 5/30.       PRINCIPAL DISCHARGE DIAGNOSIS  Diagnosis: Fracture of femoral neck, left  Assessment and Plan of Treatment: You had displaced femoral neck fracture s/p left hip hemiarthroplasty on 5/2. You tolerated procedure well and was at [facility] for acute rehab. You completed course of rehab with PT and OT and will transition to continue rehab at home. Your wound has healed and your dressing has changed. You may shower. You will continue your posterior hip precautions for 6 weeks total from your surgery (until 6/13). You will also use lovenox to prevent blood clots for 4 weeks until 5/30.      SECONDARY DISCHARGE DIAGNOSES  Diagnosis: E-coli UTI  Assessment and Plan of Treatment: You have been started on cipro for UTI for 1 week along with pyridium for 3 days and are on a probiotic while on antibiotics

## 2021-05-18 PROCEDURE — 99233 SBSQ HOSP IP/OBS HIGH 50: CPT

## 2021-05-18 PROCEDURE — 93971 EXTREMITY STUDY: CPT | Mod: 26,LT

## 2021-05-18 PROCEDURE — 99232 SBSQ HOSP IP/OBS MODERATE 35: CPT | Mod: GC

## 2021-05-18 RX ADMIN — Medication 250 MILLIGRAM(S): at 05:46

## 2021-05-18 RX ADMIN — Medication 975 MILLIGRAM(S): at 13:30

## 2021-05-18 RX ADMIN — RANOLAZINE 500 MILLIGRAM(S): 500 TABLET, FILM COATED, EXTENDED RELEASE ORAL at 17:14

## 2021-05-18 RX ADMIN — Medication 1 TABLET(S): at 12:22

## 2021-05-18 RX ADMIN — Medication 75 MICROGRAM(S): at 05:46

## 2021-05-18 RX ADMIN — ENOXAPARIN SODIUM 40 MILLIGRAM(S): 100 INJECTION SUBCUTANEOUS at 05:46

## 2021-05-18 RX ADMIN — PANTOPRAZOLE SODIUM 40 MILLIGRAM(S): 20 TABLET, DELAYED RELEASE ORAL at 05:46

## 2021-05-18 RX ADMIN — Medication 650 MILLIGRAM(S): at 04:30

## 2021-05-18 RX ADMIN — Medication 6 MILLIGRAM(S): at 21:10

## 2021-05-18 RX ADMIN — AMLODIPINE BESYLATE 5 MILLIGRAM(S): 2.5 TABLET ORAL at 05:45

## 2021-05-18 RX ADMIN — Medication 250 MILLIGRAM(S): at 17:14

## 2021-05-18 RX ADMIN — ATORVASTATIN CALCIUM 40 MILLIGRAM(S): 80 TABLET, FILM COATED ORAL at 21:09

## 2021-05-18 RX ADMIN — Medication 1 SPRAY(S): at 05:45

## 2021-05-18 RX ADMIN — RANOLAZINE 1000 MILLIGRAM(S): 500 TABLET, FILM COATED, EXTENDED RELEASE ORAL at 05:46

## 2021-05-18 RX ADMIN — Medication 975 MILLIGRAM(S): at 12:23

## 2021-05-18 RX ADMIN — ISOSORBIDE MONONITRATE 60 MILLIGRAM(S): 60 TABLET, EXTENDED RELEASE ORAL at 12:22

## 2021-05-18 RX ADMIN — Medication 1 SPRAY(S): at 17:14

## 2021-05-18 RX ADMIN — Medication 1 PACKET(S): at 12:22

## 2021-05-18 RX ADMIN — Medication 975 MILLIGRAM(S): at 21:10

## 2021-05-18 RX ADMIN — LIDOCAINE 1 PATCH: 4 CREAM TOPICAL at 19:24

## 2021-05-18 RX ADMIN — Medication 81 MILLIGRAM(S): at 12:22

## 2021-05-18 RX ADMIN — Medication 650 MILLIGRAM(S): at 03:55

## 2021-05-18 RX ADMIN — SENNA PLUS 2 TABLET(S): 8.6 TABLET ORAL at 21:09

## 2021-05-18 RX ADMIN — LIDOCAINE 1 PATCH: 4 CREAM TOPICAL at 12:22

## 2021-05-18 RX ADMIN — Medication 975 MILLIGRAM(S): at 22:00

## 2021-05-18 NOTE — PROGRESS NOTE ADULT - ASSESSMENT
86yo F w PMH of HTN, COPD, and MI (11y/a) s/p stents x 2 which were replaced 3y/a on ASA only who presents to [facility] on 5/1/21 after mechanical fall from standing complaining of left leg pain, found to have displaced femoral neck fracture s/p left hip hemiarthroplasty on 5/2. Hospital course was complicated with Hyponatremia on fluid restriction and salt tabs, Leukocytosis, pleural effusions, Diarrhea 2/2 Colitis. Patient now admitted for a multidisciplinary rehab program- PT/OT/DVT PPX, PAIN MEDS    left knee pa in- doppler r/o dvt, pain meds    -Acute nondisplaced lateral left fifth through eighth rib fractures.  - Incentive Spirometry    -Hyponatremia- resolved    #HTN  - amlodipine     #COPD  - albuterol prn  - Symbicort bid    #H/O MI s/p Stents  - ASA 81 qd  - Lipitor 40mg po qhs  - Ranexa   - imdur    #hypothyroidism  - levothyroxine     #Pre-Diabetes - A1c 6.3 on 5/2  -diet and exercise      #Sleep  - melatonin PRN    - Lovenox for DVT PPx for 4 weeks (5/2-5/30)    will follow  d/w dr. gibson

## 2021-05-18 NOTE — CHART NOTE - NSCHARTNOTEFT_GEN_A_CORE
Nutrition Follow Up Note  Hospital Course   (Per Electronic Medical Record)    Source:  Patient [X]  Medical Record [X]      Diet:   DASH-TLC Diet w/ Thin Liquids  Tolerates Diet Consistency Well  No Chewing/Swallowing Difficulties  No Recent Nausea, Vomiting, Diarrhea & Some Recent Constipation (as Per Patient)  Education Provided on Need for Increased Fluids/Fiber  Consumes % of Meals (as Per Documentation)  Declines Nutrition Supplementation  Obtained Food Preferences from Patient    Enteral/Parenteral Nutrition: Not Applicable    Current Weight: 118.6lb on 5/18  Obtain Weights Daily  Weights Currently Stable @This Time     Pertinent Medications: MEDICATIONS  (STANDING):  amLODIPine   Tablet 5 milliGRAM(s) Oral daily  aspirin  chewable 81 milliGRAM(s) Oral daily  atorvastatin 40 milliGRAM(s) Oral at bedtime  budesonide 160 MICROgram(s)/formoterol 4.5 MICROgram(s) Inhaler 2 Puff(s) Inhalation two times a day  enoxaparin Injectable 40 milliGRAM(s) SubCutaneous every 24 hours  fluticasone propionate 50 MICROgram(s)/spray Nasal Spray 1 Spray(s) Both Nostrils two times a day  isosorbide   mononitrate ER Tablet (IMDUR) 60 milliGRAM(s) Oral daily  levothyroxine 75 MICROGram(s) Oral daily  lidocaine   Patch 1 Patch Transdermal daily  melatonin 6 milliGRAM(s) Oral at bedtime  multivitamin 1 Tablet(s) Oral daily  pantoprazole    Tablet 40 milliGRAM(s) Oral before breakfast  psyllium Powder 1 Packet(s) Oral daily  ranolazine 500 milliGRAM(s) Oral <User Schedule>  ranolazine 1000 milliGRAM(s) Oral <User Schedule>  saccharomyces boulardii 250 milliGRAM(s) Oral two times a day  senna 2 Tablet(s) Oral at bedtime    MEDICATIONS  (PRN):  acetaminophen   Tablet .. 975 milliGRAM(s) Oral every 8 hours PRN Severe Pain (7 - 10)  acetaminophen   Tablet .. 650 milliGRAM(s) Oral every 6 hours PRN Moderate Pain (4 - 6)  ALBUTerol    90 MICROgram(s) HFA Inhaler 2 Puff(s) Inhalation every 12 hours PRN Shortness of Breath and/or Wheezing  benzocaine 15 mG/menthol 3.6 mG (Sugar-Free) Lozenge 1 Lozenge Oral three times a day PRN Sore Throat  magnesium hydroxide Suspension 30 milliLiter(s) Oral daily PRN Constipation  sodium chloride 0.65% Nasal 1 Spray(s) Both Nostrils three times a day PRN Nasal Congestion    Pertinent Labs:  05-17 Na135 mmol/L Glu 116 mg/dL<H> K+ 4.2 mmol/L Cr  0.56 mg/dL BUN 11 mg/dL 05-17 Alb 2.6 g/dL<L>    Skin: Stage 1 Pressure Ulcer on Gluteal Crease    Edema: None Noted     Last Bowel Movement: on 5/16    Estimated Needs:   [X] No Change Since Previous Assessment    Previous Nutrition Diagnosis:   Increased Nutrient Needs - Calories & Protein    Nutrition Diagnosis is [X] Ongoing - Declines Nutrition Supplementation; Continues on Multivitamin    New Nutrition Diagnosis: [X] Not Applicable    Interventions:   1. Education Provided on Need for Increased Fluids/Fiber   2. Recommend Continue Nutrition Plan of Care     Monitoring & Evaluation:   [X] Weights   [X] PO Intake   [X] Skin Integrity   [X] Follow Up (Per Protocol)  [X] Tolerance to Diet Prescription   [X] Other: Labs     Registered Dietitian/Nutritionist Remains Available.  Justice Vigil RDN    Pager # [phone]  Phone# [phone]

## 2021-05-18 NOTE — PROGRESS NOTE ADULT - ATTENDING COMMENTS
Patient seen at bedside with resident Dr. Abhi Giron  States that she feels well  Denies any pain   Left hip incision - dressing changed. Incision with prineo dressing- clean . Total hip precautions reinforced  Continue rehab program    2. Dysuria improved. Urine culture - with contaminants. Was started on cipro for 3 days on 5/13    3. Medical status and rehab progress, TDD discussed with  and daughter on 5/13
Patient seen at bedside with resident Dr. Abhi Grion  Patient states that she feels well  No further dysuria- Completed pyridium and cipro    2. Denies any new hip pain .   Labs stable   Continue rehab program  TDD 5/20
Patient seen this am with resident Dr. Abhi Giron  This am patient reports left posterior knee pain that limited her ambulation.   Popliteal region tenderness on palpation.  In view of recent surgery and LE swelling- check Doppler    2. She also reports recurrence of bladder discomfort. Will reorder urine culture as prior contaminated specimen. . She states typically at home she takes abx for 7-10 days.     3. Continue rehab program

## 2021-05-18 NOTE — PROGRESS NOTE ADULT - ASSESSMENT
84yo F w PMH of HTN, COPD, and MI (11y/a) s/p stents x 2 which were replaced 3y/a on ASA only who presents to [facility] on 5/1/21 after mechanical fall - displaced femoral neck fracture s/p left hip hemiarthroplasty on 5/2. Hospital course was complicated with Hyponatremia on fluid restriction and salt tabs, Leukocytosis, pleural effusions, Diarrhea 2/2 Colitis. Patient now admitted for a multidisciplinary rehab program. 05-07-21 @ 22:53    -------------    Rehab Management/MEDICAL MANAGEMENT     #Displaced LEFT femoral neck fracture s/p left hip hemiarthroplasty on 5/2  Continue Comprehensive Rehab Program of PT/OT  - 3 hours a day, 5 days a week  - WBAT LLE  - May shower after post-op day #5 (after 5/7)  - Changed dressing 5/14  - Posterior Hip Precautions for 6 weeks per Ortho (5/2-6/13)  - Lovenox for DVT PPx for 4 weeks (5/2-5/30)  - now w/ left knee pain and c/o increased LLE swelling - will get LLE dvenous doppler r/o DVT    Left Rib fracture  - CT on 5/2 shows Acute nondisplaced lateral left fifth through eighth rib fractures.  - Incentive Spirometry, - Encouraged slow, deep breaths  - Pain control as below.- Lidoderm patch to painful area on lateral left chest wall     Dysuria overnight- abnormal UA - Urine c/s ordered . cultures growing contaminants.   Now returned s/p 3 day course cipro. will order repeat UCX    Hyponatremia: in setting of diarrhea :- 5/12 Na 138    #B/L Pleural Effusions:- seen on CT A/P on 5/6/21 at [facility]    #HTN:- amlodipine 5mg po daily    #COPD:- albuterol prn, - symbicort bid    #H/O MI s/p Stents  - ASA 81 qd, - Lipitor 40mg po qhs  - Ranexa 1000mg po in AM and 500mg po in PM    #hypothyroidism  - levothyroxine 75mcg po qd    #Pre-Diabetes - A1c 6.3 on 5/2  -  #Sleep:- melatonin PRN    #Skin  - Skin on admission: Coccygeal area nonblanchable erythema, right between gluteal cleft. Left hip w/ aquacel dressing over surgical site, eccymhosiss throughout  - Pressure injury/Skin: OOB to Chair, PT/OT   - Barrier cream w/ allvyne foam to offload pressure, bid and prn if soiled/removed    #Pain Mgmt :- Tylenol 975mg po q8h standing    #GI/Bowel Mgmt :Start MOM as takes that at home   - Protonix for gi ppx    #/Bladder Mgmt : toilet schedule prn     #FEN :- Diet - Regular + Thins  [CCHO]    #Precautions / PROPHYLAXIS:   - Falls, Cardiac, Left posterior hip precautions  - ortho: Weight bearing status: WBAT LLE  - Lungs: Aspiration, Incentive Spirometer   - DVT prophylaxis Lovenox    Disp : Team conference today: Goals of modified independent TDD 5/20 with home care    86yo F w PMH of HTN, COPD, and MI (11y/a) s/p stents x 2 which were replaced 3y/a on ASA only who presents to [facility] on 5/1/21 after mechanical fall - displaced femoral neck fracture s/p left hip hemiarthroplasty on 5/2. Hospital course was complicated with Hyponatremia on fluid restriction and salt tabs, Leukocytosis, pleural effusions, Diarrhea 2/2 Colitis. Patient now admitted for a multidisciplinary rehab program. 05-07-21 @ 22:53    -------------    Rehab Management/MEDICAL MANAGEMENT     #Displaced LEFT femoral neck fracture s/p left hip hemiarthroplasty on 5/2  Continue Comprehensive Rehab Program of PT/OT  - 3 hours a day, 5 days a week  - WBAT LLE  - May shower after post-op day #5 (after 5/7)  - Changed dressing 5/14  - Posterior Hip Precautions for 6 weeks per Ortho (5/2-6/13)  - Lovenox for DVT PPx for 4 weeks (5/2-5/30)  - now w/ left knee pain and c/o increased LLE swelling - will get LLE dvenous doppler r/o DVT    Left Rib fracture  - CT on 5/2 shows Acute nondisplaced lateral left fifth through eighth rib fractures.  - Incentive Spirometry, - Encouraged slow, deep breaths  - Pain control as below.- Lidoderm patch to painful area on lateral left chest wall     Dysuria overnight- abnormal UA - Urine c/s ordered . cultures growing contaminants.   Now returned s/p 3 day course cipro. will order repeat UCX    Hyponatremia: in setting of diarrhea :- 5/12 Na 138    #B/L Pleural Effusions:- seen on CT A/P on 5/6/21 at [facility]    #HTN:- amlodipine 5mg po daily    #COPD:- albuterol prn, - symbicort bid    #H/O MI s/p Stents  - ASA 81 qd, - Lipitor 40mg po qhs  - Ranexa 1000mg po in AM and 500mg po in PM    #hypothyroidism  - levothyroxine 75mcg po qd    #Pre-Diabetes - A1c 6.3 on 5/2  -  #Sleep:- melatonin PRN    #Skin  - Skin on admission: Coccygeal area nonblanchable erythema, right between gluteal cleft. Left hip w/ aquacel dressing over surgical site, eccymhosiss throughout  - Pressure injury/Skin: OOB to Chair, PT/OT   - Barrier cream w/ allvyne foam to offload pressure, bid and prn if soiled/removed    #Pain Mgmt :- Tylenol 975mg po q8h standing    #GI/Bowel Mgmt :Take MOM at home - can give if constipated  - Protonix for gi ppx    #/Bladder Mgmt : toilet schedule prn     #FEN :- Diet - Regular + Thins  [CCHO]    #Precautions / PROPHYLAXIS:   - Falls, Cardiac, Left posterior hip precautions  - ortho: Weight bearing status: WBAT LLE  - Lungs: Aspiration, Incentive Spirometer   - DVT prophylaxis Lovenox    Disp : Team conference today: Goals of modified independent TDD 5/20 with home care    86yo F w PMH of HTN, COPD, and MI (11y/a) s/p stents x 2 which were replaced 3y/a on ASA only who presents to [facility] on 5/1/21 after mechanical fall - displaced femoral neck fracture s/p left hip hemiarthroplasty on 5/2. Hospital course was complicated with Hyponatremia on fluid restriction and salt tabs, Leukocytosis, pleural effusions, Diarrhea 2/2 Colitis. Patient now admitted for a multidisciplinary rehab program. 05-07-21 @ 22:53    -------------    Rehab Management/MEDICAL MANAGEMENT     #Displaced LEFT femoral neck fracture s/p left hip hemiarthroplasty on 5/2  Continue Comprehensive Rehab Program of PT/OT  - 3 hours a day, 5 days a week  - WBAT LLE  - May shower after post-op day #5 (after 5/7)  - Changed dressing 5/14  - Posterior Hip Precautions for 6 weeks per Ortho (5/2-6/13)  - Lovenox for DVT PPx for 4 weeks (5/2-5/30)  - now w/ left knee pain and c/o increased LLE swelling - will get LLE venous doppler r/o DVT    Left Rib fracture  - CT on 5/2 shows Acute nondisplaced lateral left fifth through eighth rib fractures.  - Incentive Spirometry, - Encouraged slow, deep breaths  - Pain control as below.- Lidoderm patch to painful area on lateral left chest wall     Dysuria overnight- abnormal UA - Urine c/s ordered . cultures growing contaminants.   Now returned s/p 3 day course cipro. will order repeat UCX    Hyponatremia: in setting of diarrhea :- 5/12 Na 138    #B/L Pleural Effusions:- seen on CT A/P on 5/6/21 at [facility]    #HTN:- amlodipine 5mg po daily    #COPD:- albuterol prn, - symbicort bid    #H/O MI s/p Stents  - ASA 81 qd, - Lipitor 40mg po qhs  - Ranexa 1000mg po in AM and 500mg po in PM    #hypothyroidism  - levothyroxine 75mcg po qd    #Pre-Diabetes - A1c 6.3 on 5/2  -  #Sleep:- melatonin PRN    #Skin  - Skin on admission: Coccygeal area nonblanchable erythema, right between gluteal cleft. Left hip w/ aquacel dressing over surgical site, eccymhosiss throughout  - Pressure injury/Skin: OOB to Chair, PT/OT   - Barrier cream w/ allvyne foam to offload pressure, bid and prn if soiled/removed    #Pain Mgmt :- Tylenol 975mg po q8h standing    #GI/Bowel Mgmt :Take MOM at home - can give if constipated  - Protonix for gi ppx    #/Bladder Mgmt : toilet schedule prn     #FEN :- Diet - Regular + Thins  [CCHO]    #Precautions / PROPHYLAXIS:   - Falls, Cardiac, Left posterior hip precautions  - ortho: Weight bearing status: WBAT LLE  - Lungs: Aspiration, Incentive Spirometer   - DVT prophylaxis Lovenox    Disp : Team conference today: Goals of modified independent TDD 5/20 with home care

## 2021-05-19 ENCOUNTER — TRANSCRIPTION ENCOUNTER (OUTPATIENT)
Age: 86
End: 2021-05-19

## 2021-05-19 PROCEDURE — 99232 SBSQ HOSP IP/OBS MODERATE 35: CPT

## 2021-05-19 PROCEDURE — 99233 SBSQ HOSP IP/OBS HIGH 50: CPT

## 2021-05-19 RX ORDER — LEVOTHYROXINE SODIUM 125 MCG
1 TABLET ORAL
Qty: 0 | Refills: 0 | DISCHARGE

## 2021-05-19 RX ORDER — RANOLAZINE 500 MG/1
1 TABLET, FILM COATED, EXTENDED RELEASE ORAL
Qty: 0 | Refills: 0 | DISCHARGE

## 2021-05-19 RX ORDER — ALBUTEROL 90 UG/1
2 AEROSOL, METERED ORAL
Qty: 1 | Refills: 0
Start: 2021-05-19 | End: 2021-06-17

## 2021-05-19 RX ORDER — ASPIRIN/CALCIUM CARB/MAGNESIUM 324 MG
1 TABLET ORAL
Qty: 30 | Refills: 0
Start: 2021-05-19 | End: 2021-06-17

## 2021-05-19 RX ORDER — ASPIRIN/CALCIUM CARB/MAGNESIUM 324 MG
1 TABLET ORAL
Qty: 0 | Refills: 0 | DISCHARGE

## 2021-05-19 RX ORDER — LEVOTHYROXINE SODIUM 125 MCG
1 TABLET ORAL
Qty: 30 | Refills: 0
Start: 2021-05-19 | End: 2021-06-17

## 2021-05-19 RX ORDER — MAGNESIUM HYDROXIDE 400 MG/1
30 TABLET, CHEWABLE ORAL
Qty: 900 | Refills: 0
Start: 2021-05-19 | End: 2021-06-17

## 2021-05-19 RX ORDER — ENOXAPARIN SODIUM 100 MG/ML
40 INJECTION SUBCUTANEOUS
Qty: 40 | Refills: 0
Start: 2021-05-19 | End: 2021-05-28

## 2021-05-19 RX ORDER — ISOSORBIDE MONONITRATE 60 MG/1
1 TABLET, EXTENDED RELEASE ORAL
Qty: 30 | Refills: 0
Start: 2021-05-19 | End: 2021-06-17

## 2021-05-19 RX ORDER — SODIUM CHLORIDE 0.65 %
1 AEROSOL, SPRAY (ML) NASAL
Qty: 1 | Refills: 0
Start: 2021-05-19

## 2021-05-19 RX ORDER — FLUTICASONE PROPIONATE 50 MCG
1 SPRAY, SUSPENSION NASAL
Qty: 1 | Refills: 0
Start: 2021-05-19 | End: 2021-06-17

## 2021-05-19 RX ORDER — ATORVASTATIN CALCIUM 80 MG/1
1 TABLET, FILM COATED ORAL
Qty: 0 | Refills: 0 | DISCHARGE

## 2021-05-19 RX ORDER — ACETAMINOPHEN 500 MG
2 TABLET ORAL
Qty: 0 | Refills: 0 | DISCHARGE
Start: 2021-05-19

## 2021-05-19 RX ORDER — AMLODIPINE BESYLATE 2.5 MG/1
1 TABLET ORAL
Qty: 30 | Refills: 0
Start: 2021-05-19 | End: 2021-06-17

## 2021-05-19 RX ORDER — ATORVASTATIN CALCIUM 80 MG/1
1 TABLET, FILM COATED ORAL
Qty: 30 | Refills: 0
Start: 2021-05-19 | End: 2021-06-17

## 2021-05-19 RX ORDER — RANOLAZINE 500 MG/1
2 TABLET, FILM COATED, EXTENDED RELEASE ORAL
Qty: 60 | Refills: 0
Start: 2021-05-19 | End: 2021-06-17

## 2021-05-19 RX ORDER — AMLODIPINE BESYLATE 2.5 MG/1
1 TABLET ORAL
Qty: 0 | Refills: 0 | DISCHARGE

## 2021-05-19 RX ORDER — BUDESONIDE AND FORMOTEROL FUMARATE DIHYDRATE 160; 4.5 UG/1; UG/1
2 AEROSOL RESPIRATORY (INHALATION)
Qty: 1 | Refills: 0
Start: 2021-05-19 | End: 2021-06-17

## 2021-05-19 RX ORDER — SENNA PLUS 8.6 MG/1
2 TABLET ORAL
Qty: 60 | Refills: 0
Start: 2021-05-19 | End: 2021-06-17

## 2021-05-19 RX ORDER — CIPROFLOXACIN LACTATE 400MG/40ML
250 VIAL (ML) INTRAVENOUS EVERY 12 HOURS
Refills: 0 | Status: DISCONTINUED | OUTPATIENT
Start: 2021-05-19 | End: 2021-05-20

## 2021-05-19 RX ADMIN — ATORVASTATIN CALCIUM 40 MILLIGRAM(S): 80 TABLET, FILM COATED ORAL at 22:48

## 2021-05-19 RX ADMIN — RANOLAZINE 1000 MILLIGRAM(S): 500 TABLET, FILM COATED, EXTENDED RELEASE ORAL at 06:22

## 2021-05-19 RX ADMIN — LIDOCAINE 1 PATCH: 4 CREAM TOPICAL at 00:27

## 2021-05-19 RX ADMIN — AMLODIPINE BESYLATE 5 MILLIGRAM(S): 2.5 TABLET ORAL at 06:21

## 2021-05-19 RX ADMIN — BUDESONIDE AND FORMOTEROL FUMARATE DIHYDRATE 2 PUFF(S): 160; 4.5 AEROSOL RESPIRATORY (INHALATION) at 21:41

## 2021-05-19 RX ADMIN — Medication 975 MILLIGRAM(S): at 22:49

## 2021-05-19 RX ADMIN — Medication 1 TABLET(S): at 11:35

## 2021-05-19 RX ADMIN — Medication 1 SPRAY(S): at 17:26

## 2021-05-19 RX ADMIN — BUDESONIDE AND FORMOTEROL FUMARATE DIHYDRATE 2 PUFF(S): 160; 4.5 AEROSOL RESPIRATORY (INHALATION) at 08:00

## 2021-05-19 RX ADMIN — LIDOCAINE 1 PATCH: 4 CREAM TOPICAL at 11:35

## 2021-05-19 RX ADMIN — Medication 250 MILLIGRAM(S): at 06:22

## 2021-05-19 RX ADMIN — Medication 975 MILLIGRAM(S): at 23:49

## 2021-05-19 RX ADMIN — Medication 1 PACKET(S): at 11:35

## 2021-05-19 RX ADMIN — Medication 650 MILLIGRAM(S): at 11:41

## 2021-05-19 RX ADMIN — PANTOPRAZOLE SODIUM 40 MILLIGRAM(S): 20 TABLET, DELAYED RELEASE ORAL at 06:21

## 2021-05-19 RX ADMIN — RANOLAZINE 500 MILLIGRAM(S): 500 TABLET, FILM COATED, EXTENDED RELEASE ORAL at 17:26

## 2021-05-19 RX ADMIN — LIDOCAINE 1 PATCH: 4 CREAM TOPICAL at 19:29

## 2021-05-19 RX ADMIN — Medication 650 MILLIGRAM(S): at 12:22

## 2021-05-19 RX ADMIN — Medication 75 MICROGRAM(S): at 06:21

## 2021-05-19 RX ADMIN — LIDOCAINE 1 PATCH: 4 CREAM TOPICAL at 23:08

## 2021-05-19 RX ADMIN — ENOXAPARIN SODIUM 40 MILLIGRAM(S): 100 INJECTION SUBCUTANEOUS at 06:22

## 2021-05-19 RX ADMIN — ISOSORBIDE MONONITRATE 60 MILLIGRAM(S): 60 TABLET, EXTENDED RELEASE ORAL at 11:35

## 2021-05-19 RX ADMIN — Medication 1 SPRAY(S): at 06:22

## 2021-05-19 RX ADMIN — Medication 81 MILLIGRAM(S): at 11:35

## 2021-05-19 RX ADMIN — SENNA PLUS 2 TABLET(S): 8.6 TABLET ORAL at 22:49

## 2021-05-19 RX ADMIN — Medication 6 MILLIGRAM(S): at 22:49

## 2021-05-19 NOTE — PROGRESS NOTE ADULT - ASSESSMENT
84yo F w PMH of HTN, COPD, and MI (11y/a) s/p stents x 2 which were replaced 3y/a on ASA only who presents to [facility] on 5/1/21 after mechanical fall from standing complaining of left leg pain, found to have displaced femoral neck fracture s/p left hip hemiarthroplasty on 5/2. Hospital course was complicated with Hyponatremia on fluid restriction and salt tabs, Leukocytosis, pleural effusions, Diarrhea 2/2 Colitis. Patient now admitted for a multidisciplinary rehab program- PT/OT/DVT PPX, PAIN MEDS    left knee pain- doppler Lower Ext Ltd, Left (05.18.21 @ 18:22) >  No evidence of left lower extremity deep venous thrombosis.  -c/w pain meds    -Acute nondisplaced lateral left fifth through eighth rib fractures.  - Incentive Spirometry  - pain meds    -Hyponatremia- resolved    #HTN  - amlodipine     #COPD  - albuterol prn  - Symbicort bid    #H/O MI s/p Stents  - ASA 81 qd  - Lipitor 40mg po qhs  - Ranexa   - Imdur    #hypothyroidism  - levothyroxine     #Pre-Diabetes - A1c 6.3 on 5/2  -diet and exercise      #Sleep  - melatonin PRN    - Lovenox for DVT PPx for 4 weeks (5/2-5/30)    will follow  d/w dr. gibson

## 2021-05-19 NOTE — DISCHARGE NOTE NURSING/CASE MANAGEMENT/SOCIAL WORK - PATIENT PORTAL LINK FT
You can access the FollowMyHealth Patient Portal offered by [facility] by registering at the following website: [email or web address]. By joining [employer]’s FollowMyHealth portal, you will also be able to view your health information using other applications (apps) compatible with our system.
[id number]

## 2021-05-19 NOTE — PROGRESS NOTE ADULT - ASSESSMENT
86yo F w PMH of HTN, COPD, and MI (11y/a) s/p stents x 2 which were replaced 3y/a on ASA only who presents to [facility] on 5/1/21 after mechanical fall - displaced femoral neck fracture s/p left hip hemiarthroplasty on 5/2. Hospital course was complicated with Hyponatremia on fluid restriction and salt tabs, Leukocytosis, pleural effusions, Diarrhea 2/2 Colitis. Patient now admitted for a multidisciplinary rehab program. 05-07-21 @ 22:53    -------------    Rehab Management/MEDICAL MANAGEMENT     #Displaced LEFT femoral neck fracture s/p left hip hemiarthroplasty on 5/2  Continue Comprehensive Rehab Program of PT/OT  - 3 hours a day, 5 days a week  - WBAT LLE  -- Posterior Hip Precautions for 6 weeks per Ortho (5/2-6/13)  - Lovenox for DVT PPx for 4 weeks (5/2-5/30)  -  Left Rib fracture  - CT on 5/2 shows Acute nondisplaced lateral left fifth through eighth rib fractures.  - Incentive Spirometry, - Encouraged slow, deep breaths  - Pain control as below.- Lidoderm patch to painful area on lateral left chest wall     Dysuria overnight- abnormal UA - Urine c/s ordered . cultures growing contaminants.   Pending urine culture results    Hyponatremia: in setting of diarrhea : now improved     #B/L Pleural Effusions:- seen on CT A/P on 5/6/21 at [facility]- [facility] as outpatients    #HTN:- amlodipine 5mg po daily    #COPD:- albuterol prn, - symbicort bid    #H/O MI s/p Stents  - ASA 81 qd, - Lipitor 40mg po qhs  - Ranexa 1000mg po in AM and 500mg po in PM    #hypothyroidism  - levothyroxine 75mcg po qd    #Pre-Diabetes - A1c 6.3 on 5/2  -  #Sleep:- melatonin PRN    #Skin  - Skin on admission: Coccygeal area nonblanchable erythema, right between gluteal cleft. Left hip w/ aquacel dressing over surgical site, eccymhosiss throughout  - Pressure injury/Skin: OOB to Chair, PT/OT   - Barrier cream w/ allvyne foam to offload pressure, bid and prn if soiled/removed    #Pain Mgmt :- Tylenol prn    #GI/Bowel Mgmt :,  Protonix for gi ppx, MOM prn . Metamucil, senna     #/Bladder Mgmt : toilet schedule prn     #FEN :- Diet - Regular + Thins  [CCHO]    #Precautions / PROPHYLAXIS:   - Falls, Cardiac, Left posterior hip precautions  - ortho: Weight bearing status: WBAT LLE  -- DVT prophylaxis Lovenox    Disp :Goals of modified independent   TDD in am  with home care

## 2021-05-20 VITALS — OXYGEN SATURATION: 93 %

## 2021-05-20 PROCEDURE — 99233 SBSQ HOSP IP/OBS HIGH 50: CPT

## 2021-05-20 PROCEDURE — 99238 HOSP IP/OBS DSCHRG MGMT 30/<: CPT

## 2021-05-20 RX ORDER — PHENAZOPYRIDINE HCL 100 MG
100 TABLET ORAL THREE TIMES A DAY
Refills: 0 | Status: DISCONTINUED | OUTPATIENT
Start: 2021-05-20 | End: 2021-05-20

## 2021-05-20 RX ORDER — MAGNESIUM HYDROXIDE 400 MG/1
30 TABLET, CHEWABLE ORAL
Qty: 900 | Refills: 0
Start: 2021-05-20 | End: 2021-06-18

## 2021-05-20 RX ORDER — ATORVASTATIN CALCIUM 80 MG/1
1 TABLET, FILM COATED ORAL
Qty: 30 | Refills: 0
Start: 2021-05-20 | End: 2021-06-18

## 2021-05-20 RX ORDER — PHENAZOPYRIDINE HCL 100 MG
1 TABLET ORAL
Qty: 9 | Refills: 0
Start: 2021-05-20 | End: 2021-05-22

## 2021-05-20 RX ORDER — AMLODIPINE BESYLATE 2.5 MG/1
1 TABLET ORAL
Qty: 30 | Refills: 0
Start: 2021-05-20 | End: 2021-06-18

## 2021-05-20 RX ORDER — ISOSORBIDE MONONITRATE 60 MG/1
1 TABLET, EXTENDED RELEASE ORAL
Qty: 30 | Refills: 0
Start: 2021-05-20 | End: 2021-06-18

## 2021-05-20 RX ORDER — SENNA PLUS 8.6 MG/1
2 TABLET ORAL
Qty: 60 | Refills: 0
Start: 2021-05-20 | End: 2021-06-18

## 2021-05-20 RX ORDER — SACCHAROMYCES BOULARDII 250 MG
1 POWDER IN PACKET (EA) ORAL
Qty: 14 | Refills: 0
Start: 2021-05-20 | End: 2021-05-26

## 2021-05-20 RX ORDER — BUDESONIDE AND FORMOTEROL FUMARATE DIHYDRATE 160; 4.5 UG/1; UG/1
2 AEROSOL RESPIRATORY (INHALATION)
Qty: 1 | Refills: 0
Start: 2021-05-20 | End: 2021-06-18

## 2021-05-20 RX ORDER — SACCHAROMYCES BOULARDII 250 MG
250 POWDER IN PACKET (EA) ORAL
Refills: 0 | Status: DISCONTINUED | OUTPATIENT
Start: 2021-05-20 | End: 2021-05-20

## 2021-05-20 RX ORDER — LEVOTHYROXINE SODIUM 125 MCG
1 TABLET ORAL
Qty: 30 | Refills: 0
Start: 2021-05-20 | End: 2021-06-18

## 2021-05-20 RX ORDER — CIPROFLOXACIN LACTATE 400MG/40ML
1 VIAL (ML) INTRAVENOUS
Qty: 14 | Refills: 0
Start: 2021-05-20 | End: 2021-05-26

## 2021-05-20 RX ORDER — RANOLAZINE 500 MG/1
2 TABLET, FILM COATED, EXTENDED RELEASE ORAL
Qty: 60 | Refills: 0
Start: 2021-05-20 | End: 2021-06-18

## 2021-05-20 RX ORDER — ENOXAPARIN SODIUM 100 MG/ML
40 INJECTION SUBCUTANEOUS
Qty: 40 | Refills: 0
Start: 2021-05-20 | End: 2021-05-29

## 2021-05-20 RX ORDER — ALBUTEROL 90 UG/1
2 AEROSOL, METERED ORAL
Qty: 1 | Refills: 0
Start: 2021-05-20 | End: 2021-06-18

## 2021-05-20 RX ORDER — LIDOCAINE 4 G/100G
1 CREAM TOPICAL
Qty: 30 | Refills: 0
Start: 2021-05-20 | End: 2021-06-18

## 2021-05-20 RX ORDER — FLUTICASONE PROPIONATE 50 MCG
1 SPRAY, SUSPENSION NASAL
Qty: 1 | Refills: 0
Start: 2021-05-20 | End: 2021-06-18

## 2021-05-20 RX ADMIN — RANOLAZINE 1000 MILLIGRAM(S): 500 TABLET, FILM COATED, EXTENDED RELEASE ORAL at 05:41

## 2021-05-20 RX ADMIN — AMLODIPINE BESYLATE 5 MILLIGRAM(S): 2.5 TABLET ORAL at 05:41

## 2021-05-20 RX ADMIN — Medication 650 MILLIGRAM(S): at 05:42

## 2021-05-20 RX ADMIN — BUDESONIDE AND FORMOTEROL FUMARATE DIHYDRATE 2 PUFF(S): 160; 4.5 AEROSOL RESPIRATORY (INHALATION) at 08:57

## 2021-05-20 RX ADMIN — Medication 250 MILLIGRAM(S): at 05:43

## 2021-05-20 RX ADMIN — PANTOPRAZOLE SODIUM 40 MILLIGRAM(S): 20 TABLET, DELAYED RELEASE ORAL at 05:42

## 2021-05-20 RX ADMIN — ENOXAPARIN SODIUM 40 MILLIGRAM(S): 100 INJECTION SUBCUTANEOUS at 05:41

## 2021-05-20 RX ADMIN — Medication 1 SPRAY(S): at 05:41

## 2021-05-20 RX ADMIN — Medication 75 MICROGRAM(S): at 05:41

## 2021-05-20 RX ADMIN — Medication 650 MILLIGRAM(S): at 06:38

## 2021-05-20 NOTE — PROGRESS NOTE ADULT - NSICDXPILOT_GEN_ALL_CORE
[place]

## 2021-05-20 NOTE — PROGRESS NOTE ADULT - PROVIDER SPECIALTY LIST ADULT
Hospitalist
Rehab Medicine
Physiatry
Physiatry
Rehab Medicine
Hospitalist
Physiatry
Physiatry
Hospitalist
Physiatry
Hospitalist

## 2021-05-20 NOTE — PROGRESS NOTE ADULT - ASSESSMENT
86yo F w PMH of HTN, COPD, and MI (11y/a) s/p stents x 2 which were replaced 3y/a on ASA only who presents to [facility] on 5/1/21 after mechanical fall from standing complaining of left leg pain, found to have displaced femoral neck fracture s/p left hip hemiarthroplasty on 5/2. Hospital course was complicated with Hyponatremia on fluid restriction and salt tabs, Leukocytosis, pleural effusions, Diarrhea 2/2 Colitis. Patient now admitted for a multidisciplinary rehab program- PT/OT/DVT PPX, PAIN MEDS    uti- cipro, encourage po hydration    left knee pain- doppler Lower Ext Ltd, Left (05.18.21 @ 18:22) >  No evidence of left lower extremity deep venous thrombosis.  -c/w pain meds    -Acute nondisplaced lateral left fifth through eighth rib fractures.  - Incentive Spirometry  - pain meds    -Hyponatremia- resolved    #HTN  - amlodipine     #COPD  - albuterol prn  - Symbicort bid    #H/O MI s/p Stents  - ASA 81 qd  - Lipitor 40mg po qhs  - Ranexa   - Imdur    #hypothyroidism  - levothyroxine     #Pre-Diabetes - A1c 6.3 on 5/2  -diet and exercise      #Sleep  - melatonin PRN    - Lovenox for DVT PPx for 4 weeks (5/2-5/30)    will follow  d/w dr. gibson

## 2021-05-20 NOTE — PROGRESS NOTE ADULT - REASON FOR ADMISSION
Left hip fracture

## 2021-05-20 NOTE — PROGRESS NOTE ADULT - SUBJECTIVE AND OBJECTIVE BOX
86yo F w PMH of HTN, COPD, and MI (11y/a) s/p stents x 2 which were replaced 3y/a on ASA only who presents after mechanical fall displaced femoral neck fracture s/p left hip hemiarthroplasty on 5/2    seen at the bedside, c/o pain in the back around the left shoulder blade, pain has improved now, feels better, noted to have uti, cipro ordered.  no n/v, no sob.    Vital Signs Last 24 Hrs  T(C): 36.7 (20 May 2021 08:07), Max: 36.8 (19 May 2021 22:47)  T(F): 98.1 (20 May 2021 08:07), Max: 98.3 (19 May 2021 22:47)  HR: 72 (20 May 2021 08:07) (60 - 72)  BP: 128/70 (20 May 2021 08:07) (113/60 - 137/71)  BP(mean): --  RR: 16 (20 May 2021 08:07) (16 - 17)  SpO2: 94% (20 May 2021 08:07) (93% - 94%)      GENERAL- NAD  EAR/NOSE/MOUTH/THROAT - no pharyngeal exudates, no oral lesions  MMM  EYES- ASHLY, conjunctiva and Sclera clear  NECK- supple  RESPIRATORY-  clear to auscultation bilaterally, non laboured breathing  CARDIOVASCULAR - SIS2, RRR  GI - soft NT BS present  EXTREMITIES- + LE edema B/L  NEUROLOGY- no gross focal deficits  SKIN- no rashes, warm to touch  PSYCHIATRY- AAO X 3  MUSCULOSKELETAL- ROM RESTRICTED to LLE    MEDICATIONS  (STANDING):  amLODIPine   Tablet 5 milliGRAM(s) Oral daily  aspirin  chewable 81 milliGRAM(s) Oral daily  atorvastatin 40 milliGRAM(s) Oral at bedtime  budesonide 160 MICROgram(s)/formoterol 4.5 MICROgram(s) Inhaler 2 Puff(s) Inhalation two times a day  ciprofloxacin     Tablet 250 milliGRAM(s) Oral every 12 hours  enoxaparin Injectable 40 milliGRAM(s) SubCutaneous every 24 hours  fluticasone propionate 50 MICROgram(s)/spray Nasal Spray 1 Spray(s) Both Nostrils two times a day  isosorbide   mononitrate ER Tablet (IMDUR) 60 milliGRAM(s) Oral daily  levothyroxine 75 MICROGram(s) Oral daily  lidocaine   Patch 1 Patch Transdermal daily  melatonin 6 milliGRAM(s) Oral at bedtime  multivitamin 1 Tablet(s) Oral daily  pantoprazole    Tablet 40 milliGRAM(s) Oral before breakfast  phenazopyridine 100 milliGRAM(s) Oral three times a day  psyllium Powder 1 Packet(s) Oral daily  ranolazine 1000 milliGRAM(s) Oral <User Schedule>  ranolazine 500 milliGRAM(s) Oral <User Schedule>  saccharomyces boulardii 250 milliGRAM(s) Oral two times a day  senna 2 Tablet(s) Oral at bedtime    MEDICATIONS  (PRN):  acetaminophen   Tablet .. 975 milliGRAM(s) Oral every 8 hours PRN Severe Pain (7 - 10)  acetaminophen   Tablet .. 650 milliGRAM(s) Oral every 6 hours PRN Moderate Pain (4 - 6)  ALBUTerol    90 MICROgram(s) HFA Inhaler 2 Puff(s) Inhalation every 12 hours PRN Shortness of Breath and/or Wheezing  benzocaine 15 mG/menthol 3.6 mG (Sugar-Free) Lozenge 1 Lozenge Oral three times a day PRN Sore Throat  magnesium hydroxide Suspension 30 milliLiter(s) Oral daily PRN Constipation  sodium chloride 0.65% Nasal 1 Spray(s) Both Nostrils three times a day PRN Nasal Congestion  
HPI:  PRESLEY BECK is a 86yo F w PMH of HTN, COPD, and MI (11y/a) s/p stents x 2 which were replaced 3y/a on ASA only who presents after mechanical fall from standing complaining of left leg pain. Patient was stepping inside her home when she caught her foot on the door frame, She landed on her left side and hit left forehead, but denies LOC. Denies headache, lightheadedness, HA, dizziness, chest pain, or SOB. Had large forehead swelling on left which has since improved. No nausea or vomiting. Imaging studies revealed acute left femoral neck fracture aand Acute nondisplaced lateral left fifth through eighth rib fractures.. Patient was admitted to the trauma service & orthopedics consulted. Taken to the OR on 5/2 for left hip hemiarthroplasty. Patient tolerated procedure well. She worked with PT, OT and PMR who recommended acute rehab upon discharge.  Hospital course was complicated with Hyponatremia on fluid restriction and salt tabs, Leukocytosis, pleural effusions, Diarrhea 2/2 Colitis, started on immodium and metamucil. CT A/P on 5/6 also shows bibasilar atelectasis.    (11 May 2021 11:16)      INTERVAL HPI/OVERNIGHT EVENTS:  Chart Reviewed and patient seen at bedside.  Patient's bladder discomfort improved.  No other complaints or issues.  No dysuria, no hematuria.  +BM    ROS:  Denies fevers, chills, chest pain, shortness of breath, abdominal pain, headaches, nausea/vomiting    Vital Signs Last 24 Hrs  T(C): 36.6 (14 May 2021 08:33), Max: 36.7 (14 May 2021 06:33)  T(F): 97.8 (14 May 2021 08:33), Max: 98.1 (14 May 2021 06:33)  HR: 67 (14 May 2021 08:33) (65 - 76)  BP: 138/60 (14 May 2021 08:33) (134/78 - 145/66)  BP(mean): --  RR: 16 (14 May 2021 08:33) (16 - 16)  SpO2: 95% (14 May 2021 08:33) (95% - 98%)    Physical Exam:  Constitutional - NAD, Comfortable  Chest - CTAB  Cardiovascular - S1S2  Abdomen - BS+, Soft, NTND  Extremities - Bilateral LE edema - HAS ACE WRAPS TODAY    No calf tenderness ,  Motor 4/5   Left hip incision : well healing and dressing chagne  Psychiatric - Mood stable, Affect WNL    Function:  Transfers: CS/CG  Toilet transfers: Min assist  Gait: 130' with RW CGA  Stairs: CS with 2 rails     LABS:  05-13    136  |  102  |  12  ----------------------------<  119<H>  4.2   |  28  |  0.52  05-12    138  |  102  |  10  ----------------------------<  119<H>  3.8   |  29  |  0.61    Ca    9.4      13 May 2021 05:30  Ca    8.9      12 May 2021 05:30    TPro  5.5<L>  /  Alb  2.3<L>  /  TBili  0.5  /  DBili  x   /  AST  26  /  ALT  49<H>  /  AlkPhos  89  05-13  TPro  5.5<L>  /  Alb  2.3<L>  /  TBili  0.7  /  DBili  x   /  AST  28  /  ALT  54<H>  /  AlkPhos  91  05-12                                              10.3   12.07 )-----------( 324      ( 13 May 2021 05:30 )             29.5                         11.2   14.38 )-----------( 321      ( 12 May 2021 05:30 )             32.7     CAPILLARY BLOOD GLUCOSE          MEDICATIONS:  MEDICATIONS  (STANDING):  amLODIPine   Tablet 5 milliGRAM(s) Oral daily  aspirin  chewable 81 milliGRAM(s) Oral daily  atorvastatin 40 milliGRAM(s) Oral at bedtime  budesonide 160 MICROgram(s)/formoterol 4.5 MICROgram(s) Inhaler 2 Puff(s) Inhalation two times a day  ciprofloxacin     Tablet 250 milliGRAM(s) Oral two times a day  enoxaparin Injectable 40 milliGRAM(s) SubCutaneous every 24 hours  fluticasone propionate 50 MICROgram(s)/spray Nasal Spray 1 Spray(s) Both Nostrils two times a day  isosorbide   mononitrate ER Tablet (IMDUR) 60 milliGRAM(s) Oral daily  levothyroxine 75 MICROGram(s) Oral daily  lidocaine   Patch 1 Patch Transdermal daily  melatonin 6 milliGRAM(s) Oral at bedtime  multivitamin 1 Tablet(s) Oral daily  pantoprazole    Tablet 40 milliGRAM(s) Oral before breakfast  phenazopyridine 100 milliGRAM(s) Oral three times a day  psyllium Powder 1 Packet(s) Oral daily  ranolazine 500 milliGRAM(s) Oral <User Schedule>  ranolazine 1000 milliGRAM(s) Oral <User Schedule>  saccharomyces boulardii 250 milliGRAM(s) Oral two times a day  senna 2 Tablet(s) Oral at bedtime    MEDICATIONS  (PRN):  acetaminophen   Tablet .. 975 milliGRAM(s) Oral every 8 hours PRN Severe Pain (7 - 10)  acetaminophen   Tablet .. 650 milliGRAM(s) Oral every 6 hours PRN Moderate Pain (4 - 6)  ALBUTerol    90 MICROgram(s) HFA Inhaler 2 Puff(s) Inhalation every 12 hours PRN Shortness of Breath and/or Wheezing  magnesium hydroxide Suspension 30 milliLiter(s) Oral daily PRN Constipation        
Patient is a 85y old  Female who presents with a chief complaint of Left hip fracture (12 May 2021 10:00)      HPI:  PRESLEY BECK is a 84yo F w PMH of HTN, COPD, and MI (11y/a) s/p stents x 2 which were replaced 3y/a on ASA only who presents after mechanical fall from standing complaining of left leg pain. Patient was stepping inside her home when she caught her foot on the door frame, She landed on her left side and hit left forehead, but denies LOC. Denies headache, lightheadedness, HA, dizziness, chest pain, or SOB. Had large forehead swelling on left which has since improved. No nausea or vomiting. Imaging studies revealed acute left femoral neck fracture aand Acute nondisplaced lateral left fifth through eighth rib fractures.. Patient was admitted to the trauma service & orthopedics consulted. Taken to the OR on  for left hip hemiarthroplasty. Patient tolerated procedure well. She worked with PT, OT and PMR who recommended acute rehab upon discharge.  Hospital course was complicated with Hyponatremia on fluid restriction and salt tabs, Leukocytosis, pleural effusions, Diarrhea 2/2 Colitis, started on immodium and metamucil. CT A/P on  also shows bibasilar atelectasis.    (11 May 2021 11:16)      TODAY'S SUBJECTIVE & REVIEW OF SYMPTOMS  Patient seen at bedside.   Sore throat improved  Continues to have some dysuria at night    ROS: as above  Denies CP/palpitations/abdominal pain or nausea  + BM     PHYSICAL EXAM    Vital Signs Last 24 Hrs  T(C): 36.3 (13 May 2021 08:40), Max: 36.7 (12 May 2021 20:44)  T(F): 97.3 (13 May 2021 08:40), Max: 98.1 (12 May 2021 20:44)  HR: 62 (13 May 2021 08:40) (62 - 69)  BP: 144/65 (13 May 2021 08:40) (130/59 - 144/65)  BP(mean): --  RR: 16 (13 May 2021 08:40) (16 - 16)  SpO2: 94% (13 May 2021 08:40) (94% - 94%)    Constitutional - NAD, Comfortable  Chest - CTAB  Cardiovascular - S1S2  Abdomen - BS+, Soft, NTND  Extremities - Bilateral LE edema - HAS ACE WRAPS TODAY    No calf tenderness ,  Motor 4/5   Left hip incision :  Psychiatric - Mood stable, Affect WNL    Function:  Transfers: CS/CG  Toilet transfers: Min assist  Gait: 130' with RW CGA  Stairs: CS with 2 rails       MEDICATIONS  (STANDING):  acetaminophen   Tablet .. 975 milliGRAM(s) Oral every 8 hours  amLODIPine   Tablet 5 milliGRAM(s) Oral daily  aspirin  chewable 81 milliGRAM(s) Oral daily  atorvastatin 40 milliGRAM(s) Oral at bedtime  budesonide 160 MICROgram(s)/formoterol 4.5 MICROgram(s) Inhaler 2 Puff(s) Inhalation two times a day  ciprofloxacin     Tablet 250 milliGRAM(s) Oral two times a day  enoxaparin Injectable 40 milliGRAM(s) SubCutaneous every 24 hours  fluticasone propionate 50 MICROgram(s)/spray Nasal Spray 1 Spray(s) Both Nostrils two times a day  isosorbide   mononitrate ER Tablet (IMDUR) 60 milliGRAM(s) Oral daily  levothyroxine 75 MICROGram(s) Oral daily  lidocaine   Patch 1 Patch Transdermal daily  melatonin 6 milliGRAM(s) Oral at bedtime  multivitamin 1 Tablet(s) Oral daily  pantoprazole    Tablet 40 milliGRAM(s) Oral before breakfast  phenazopyridine 100 milliGRAM(s) Oral three times a day  psyllium Powder 1 Packet(s) Oral daily  ranolazine 500 milliGRAM(s) Oral <User Schedule>  ranolazine 1000 milliGRAM(s) Oral <User Schedule>  saccharomyces boulardii 250 milliGRAM(s) Oral two times a day    MEDICATIONS  (PRN):  ALBUTerol    90 MICROgram(s) HFA Inhaler 2 Puff(s) Inhalation every 12 hours PRN Shortness of Breath and/or Wheezing            RECENT LABS/IMAGING                          10.3   12.07 )-----------( 324      ( 13 May 2021 05:30 )             29.5                                 11.2   14.38 )-----------( 321      ( 12 May 2021 05:30 )             32.7     05-12    138  |  102  |  10  ----------------------------<  119<H>  3.8   |  29  |  0.61    Ca    8.9      12 May 2021 05:30    TPro  5.5<L>  /  Alb  2.3<L>  /  TBili  0.7  /  DBili  x   /  AST  28  /  ALT  54<H>  /  AlkPhos  91  05-12      Urinalysis Basic - ( 12 May 2021 08:45 )    Color: Yellow / Appearance: Slightly Turbid / S.010 / pH: x  Gluc: x / Ketone: Moderate  / Bili: Negative / Urobili: Negative   Blood: x / Protein: 30 mg/dL / Nitrite: Negative   Leuk Esterase: Moderate / RBC: 26-50 /HPF / WBC >50 /HPF   Sq Epi: x / Non Sq Epi: Neg.-Few / Bacteria: Moderate /HPF          
Cc: L femoral fx    HPI: Patient with no new medical issues today.  Pain controlled, no chest pain, no N/V, no Fevers/Chills. No other new ROS  Has been tolerating rehabilitation program.    MEDICATIONS  (STANDING):  amLODIPine   Tablet 5 milliGRAM(s) Oral daily  aspirin  chewable 81 milliGRAM(s) Oral daily  atorvastatin 40 milliGRAM(s) Oral at bedtime  budesonide 160 MICROgram(s)/formoterol 4.5 MICROgram(s) Inhaler 2 Puff(s) Inhalation two times a day  enoxaparin Injectable 40 milliGRAM(s) SubCutaneous every 24 hours  fluticasone propionate 50 MICROgram(s)/spray Nasal Spray 1 Spray(s) Both Nostrils two times a day  isosorbide   mononitrate ER Tablet (IMDUR) 60 milliGRAM(s) Oral daily  levothyroxine 75 MICROGram(s) Oral daily  lidocaine   Patch 1 Patch Transdermal daily  melatonin 6 milliGRAM(s) Oral at bedtime  multivitamin 1 Tablet(s) Oral daily  pantoprazole    Tablet 40 milliGRAM(s) Oral before breakfast  psyllium Powder 1 Packet(s) Oral daily  ranolazine 500 milliGRAM(s) Oral <User Schedule>  ranolazine 1000 milliGRAM(s) Oral <User Schedule>  saccharomyces boulardii 250 milliGRAM(s) Oral two times a day  senna 2 Tablet(s) Oral at bedtime    MEDICATIONS  (PRN):  acetaminophen   Tablet .. 975 milliGRAM(s) Oral every 8 hours PRN Severe Pain (7 - 10)  acetaminophen   Tablet .. 650 milliGRAM(s) Oral every 6 hours PRN Moderate Pain (4 - 6)  ALBUTerol    90 MICROgram(s) HFA Inhaler 2 Puff(s) Inhalation every 12 hours PRN Shortness of Breath and/or Wheezing  benzocaine 15 mG/menthol 3.6 mG (Sugar-Free) Lozenge 1 Lozenge Oral three times a day PRN Sore Throat  magnesium hydroxide Suspension 30 milliLiter(s) Oral daily PRN Constipation  sodium chloride 0.65% Nasal 1 Spray(s) Both Nostrils three times a day PRN Nasal Congestion    Vital Signs Last 24 Hrs  T(C): 36.6 (16 May 2021 06:11), Max: 36.6 (16 May 2021 06:11)  T(F): 97.9 (16 May 2021 06:11), Max: 97.9 (16 May 2021 06:11)  HR: 64 (16 May 2021 06:11) (64 - 76)  BP: 138/60 (16 May 2021 06:11) (130/68 - 138/60)  BP(mean): --  RR: 16 (16 May 2021 06:11) (16 - 17)  SpO2: 93% (16 May 2021 06:11) (92% - 95%)    In NAD  HEENT- EOMI  Heart- Well Perfused  Lungs- No resp distress, no use of accessory resp muscles  Abd- + BS, NT  Ext- No calf pain  Neuro- Exam unchanged  Psych- Affect wnl    Imp: Patient with diagnosis of L femoral fx admitted for comprehensive acute rehabilitation.    Plan:  - Continue therapies  - DVT prophylaxis - Lovenox  - Cipro for UTI per hospitalist.  - Skin- Turn q2h, check skin daily  - Continue current medications; patient medically stable.   - Patient is stable to continue current rehabilitation program.   
Cc: L femoral fx    HPI: Patient with no new medical issues today.  Pain controlled, no chest pain, no N/V, no Fevers/Chills. No other new ROS  Has been tolerating rehabilitation program.    acetaminophen   Tablet .. 975 milliGRAM(s) Oral every 8 hours PRN  acetaminophen   Tablet .. 650 milliGRAM(s) Oral every 6 hours PRN  ALBUTerol    90 MICROgram(s) HFA Inhaler 2 Puff(s) Inhalation every 12 hours PRN  amLODIPine   Tablet 5 milliGRAM(s) Oral daily  aspirin  chewable 81 milliGRAM(s) Oral daily  atorvastatin 40 milliGRAM(s) Oral at bedtime  budesonide 160 MICROgram(s)/formoterol 4.5 MICROgram(s) Inhaler 2 Puff(s) Inhalation two times a day  ciprofloxacin     Tablet 250 milliGRAM(s) Oral two times a day  enoxaparin Injectable 40 milliGRAM(s) SubCutaneous every 24 hours  fluticasone propionate 50 MICROgram(s)/spray Nasal Spray 1 Spray(s) Both Nostrils two times a day  isosorbide   mononitrate ER Tablet (IMDUR) 60 milliGRAM(s) Oral daily  levothyroxine 75 MICROGram(s) Oral daily  lidocaine   Patch 1 Patch Transdermal daily  magnesium hydroxide Suspension 30 milliLiter(s) Oral daily PRN  melatonin 6 milliGRAM(s) Oral at bedtime  multivitamin 1 Tablet(s) Oral daily  pantoprazole    Tablet 40 milliGRAM(s) Oral before breakfast  phenazopyridine 100 milliGRAM(s) Oral three times a day  psyllium Powder 1 Packet(s) Oral daily  ranolazine 500 milliGRAM(s) Oral <User Schedule>  ranolazine 1000 milliGRAM(s) Oral <User Schedule>  saccharomyces boulardii 250 milliGRAM(s) Oral two times a day  senna 2 Tablet(s) Oral at bedtime    T(C): 36.7 (05-14-21 @ 20:28), Max: 36.7 (05-14-21 @ 20:28)  HR: 62 (05-15-21 @ 05:19) (62 - 69)  BP: 137/72 (05-15-21 @ 05:19) (137/72 - 146/75)  RR: 16 (05-14-21 @ 20:28) (16 - 16)  SpO2: 95% (05-14-21 @ 20:50) (95% - 95%)    In NAD  HEENT- EOMI  Heart- Well Perfused  Lungs- No resp distress, no use of accessory resp muscles  Abd- + BS, NT  Ext- No calf pain  Neuro- Exam unchanged  Psych- Affect wnl    Imp: Patient with diagnosis of L femoral fx admitted for comprehensive acute rehabilitation.    Plan:  - Continue therapies  - DVT prophylaxis - Lovenox  - Cipro for UTI per hospitalist.  - Skin- Turn q2h, check skin daily  - Continue current medications; patient medically stable.   - Patient is stable to continue current rehabilitation program.   
84yo F w PMH of HTN, COPD, and MI (11y/a) s/p stents x 2 which were replaced 3y/a on ASA only who presents after mechanical fall displaced femoral neck fracture s/p left hip hemiarthroplasty on 5/2    seen at the bedside, c/o pain and stiffness in the left side ribs and left LE, 5-6/10 this am, rib pain is mainly on inspiration relieved with meds,  no n/v, no sob.    Vital Signs Last 24 Hrs  T(C): 36.8 (19 May 2021 08:00), Max: 36.8 (19 May 2021 08:00)  T(F): 98.2 (19 May 2021 08:00), Max: 98.2 (19 May 2021 08:00)  HR: 69 (19 May 2021 08:01) (65 - 76)  BP: 113/60 (19 May 2021 11:40) (113/60 - 147/63)  BP(mean): --  RR: 15 (19 May 2021 08:00) (15 - 15)  SpO2: 92% (19 May 2021 08:01) (92% - 95%)      GENERAL- NAD  EAR/NOSE/MOUTH/THROAT - no pharyngeal exudates, no oral lesions  MMM  EYES- ASHLY, conjunctiva and Sclera clear  NECK- supple  RESPIRATORY-  clear to auscultation bilaterally, non laboured breathing  CARDIOVASCULAR - SIS2, RRR  GI - soft NT BS present  EXTREMITIES- + LE edema B/L  NEUROLOGY- no gross focal deficits  SKIN- no rashes, warm to touch  PSYCHIATRY- AAO X 3  MUSCULOSKELETAL- ROM RESTRICTED to LLE      MEDICATIONS  (STANDING):  amLODIPine   Tablet 5 milliGRAM(s) Oral daily  aspirin  chewable 81 milliGRAM(s) Oral daily  atorvastatin 40 milliGRAM(s) Oral at bedtime  budesonide 160 MICROgram(s)/formoterol 4.5 MICROgram(s) Inhaler 2 Puff(s) Inhalation two times a day  enoxaparin Injectable 40 milliGRAM(s) SubCutaneous every 24 hours  fluticasone propionate 50 MICROgram(s)/spray Nasal Spray 1 Spray(s) Both Nostrils two times a day  isosorbide   mononitrate ER Tablet (IMDUR) 60 milliGRAM(s) Oral daily  levothyroxine 75 MICROGram(s) Oral daily  lidocaine   Patch 1 Patch Transdermal daily  melatonin 6 milliGRAM(s) Oral at bedtime  multivitamin 1 Tablet(s) Oral daily  pantoprazole    Tablet 40 milliGRAM(s) Oral before breakfast  psyllium Powder 1 Packet(s) Oral daily  ranolazine 500 milliGRAM(s) Oral <User Schedule>  ranolazine 1000 milliGRAM(s) Oral <User Schedule>  senna 2 Tablet(s) Oral at bedtime    MEDICATIONS  (PRN):  acetaminophen   Tablet .. 975 milliGRAM(s) Oral every 8 hours PRN Severe Pain (7 - 10)  acetaminophen   Tablet .. 650 milliGRAM(s) Oral every 6 hours PRN Moderate Pain (4 - 6)  ALBUTerol    90 MICROgram(s) HFA Inhaler 2 Puff(s) Inhalation every 12 hours PRN Shortness of Breath and/or Wheezing  benzocaine 15 mG/menthol 3.6 mG (Sugar-Free) Lozenge 1 Lozenge Oral three times a day PRN Sore Throat  magnesium hydroxide Suspension 30 milliLiter(s) Oral daily PRN Constipation  sodium chloride 0.65% Nasal 1 Spray(s) Both Nostrils three times a day PRN Nasal Congestion  
84yo F w PMH of HTN, COPD, and MI (11y/a) s/p stents x 2 which were replaced 3y/a on ASA only who presents after mechanical fall displaced femoral neck fracture s/p left hip hemiarthroplasty on 5/2    seen at the bedside, c/o pain and stiffness in the left side ribs and left LE, 6-7/10,  and improvement in lower abdomen pain before urinating, no n/v, no sob.    Vital Signs Last 24 Hrs  T(C): 36.6 (17 May 2021 07:47), Max: 36.7 (16 May 2021 20:35)  T(F): 97.9 (17 May 2021 07:47), Max: 98.1 (16 May 2021 20:35)  HR: 65 (17 May 2021 08:15) (60 - 75)  BP: 131/75 (17 May 2021 07:47) (131/75 - 133/78)  BP(mean): --  RR: 16 (17 May 2021 07:47) (16 - 16)  SpO2: 93% (17 May 2021 08:15) (93% - 95%)    GENERAL- NAD  EAR/NOSE/MOUTH/THROAT - no pharyngeal exudates, no oral lesions  MMM  EYES- ASHLY, conjunctiva and Sclera clear  NECK- supple  RESPIRATORY-  clear to auscultation bilaterally, non laboured breathing  CARDIOVASCULAR - SIS2, RRR  GI - soft NT BS present  EXTREMITIES- + LE edema B/L  NEUROLOGY- no gross focal deficits  SKIN- no rashes, warm to touch  PSYCHIATRY- AAO X 3  MUSCULOSKELETAL- ROM RESTRICTED to LLE                10.0                 135  | 30   | 11           9.04  >-----------< 494     ------------------------< 116                   29.4                 4.2  | 101  | 0.56                                         Ca 9.4   Mg x     Ph x            MEDICATIONS  (STANDING):  amLODIPine   Tablet 5 milliGRAM(s) Oral daily  aspirin  chewable 81 milliGRAM(s) Oral daily  atorvastatin 40 milliGRAM(s) Oral at bedtime  budesonide 160 MICROgram(s)/formoterol 4.5 MICROgram(s) Inhaler 2 Puff(s) Inhalation two times a day  enoxaparin Injectable 40 milliGRAM(s) SubCutaneous every 24 hours  fluticasone propionate 50 MICROgram(s)/spray Nasal Spray 1 Spray(s) Both Nostrils two times a day  isosorbide   mononitrate ER Tablet (IMDUR) 60 milliGRAM(s) Oral daily  levothyroxine 75 MICROGram(s) Oral daily  lidocaine   Patch 1 Patch Transdermal daily  melatonin 6 milliGRAM(s) Oral at bedtime  multivitamin 1 Tablet(s) Oral daily  pantoprazole    Tablet 40 milliGRAM(s) Oral before breakfast  psyllium Powder 1 Packet(s) Oral daily  ranolazine 500 milliGRAM(s) Oral <User Schedule>  ranolazine 1000 milliGRAM(s) Oral <User Schedule>  saccharomyces boulardii 250 milliGRAM(s) Oral two times a day  senna 2 Tablet(s) Oral at bedtime    MEDICATIONS  (PRN):  acetaminophen   Tablet .. 975 milliGRAM(s) Oral every 8 hours PRN Severe Pain (7 - 10)  acetaminophen   Tablet .. 650 milliGRAM(s) Oral every 6 hours PRN Moderate Pain (4 - 6)  ALBUTerol    90 MICROgram(s) HFA Inhaler 2 Puff(s) Inhalation every 12 hours PRN Shortness of Breath and/or Wheezing  benzocaine 15 mG/menthol 3.6 mG (Sugar-Free) Lozenge 1 Lozenge Oral three times a day PRN Sore Throat  magnesium hydroxide Suspension 30 milliLiter(s) Oral daily PRN Constipation  sodium chloride 0.65% Nasal 1 Spray(s) Both Nostrils three times a day PRN Nasal Congestion  
84yo F w PMH of HTN, COPD, and MI (11y/a) s/p stents x 2 which were replaced 3y/a on ASA only who presents after mechanical fall displaced femoral neck fracture s/p left hip hemiarthroplasty on 5/2    seen at the bedside, c/o pain and stiffness in the left side ribs and left LE, 6-7/10,  and pain in the left knee x 1 day, no n/v, no sob.    Vital Signs Last 24 Hrs  T(C): 36.4 (18 May 2021 08:33), Max: 36.4 (17 May 2021 20:19)  T(F): 97.6 (18 May 2021 08:33), Max: 97.6 (17 May 2021 20:19)  HR: 76 (18 May 2021 08:33) (63 - 76)  BP: 145/72 (18 May 2021 08:33) (135/61 - 148/55)  BP(mean): --  RR: 16 (18 May 2021 08:33) (15 - 16)  SpO2: 95% (18 May 2021 08:33) (94% - 97%)      GENERAL- NAD  EAR/NOSE/MOUTH/THROAT - no pharyngeal exudates, no oral lesions  MMM  EYES- ASHLY, conjunctiva and Sclera clear  NECK- supple  RESPIRATORY-  clear to auscultation bilaterally, non laboured breathing  CARDIOVASCULAR - SIS2, RRR  GI - soft NT BS present  EXTREMITIES- + LE edema B/L  NEUROLOGY- no gross focal deficits  SKIN- no rashes, warm to touch  PSYCHIATRY- AAO X 3  MUSCULOSKELETAL- ROM RESTRICTED to LLE                10.0                 135  | 30   | 11           9.04  >-----------< 494     ------------------------< 116                   29.4                 4.2  | 101  | 0.56                                         Ca 9.4   Mg x     Ph x                MEDICATIONS  (STANDING):  amLODIPine   Tablet 5 milliGRAM(s) Oral daily  aspirin  chewable 81 milliGRAM(s) Oral daily  atorvastatin 40 milliGRAM(s) Oral at bedtime  budesonide 160 MICROgram(s)/formoterol 4.5 MICROgram(s) Inhaler 2 Puff(s) Inhalation two times a day  enoxaparin Injectable 40 milliGRAM(s) SubCutaneous every 24 hours  fluticasone propionate 50 MICROgram(s)/spray Nasal Spray 1 Spray(s) Both Nostrils two times a day  isosorbide   mononitrate ER Tablet (IMDUR) 60 milliGRAM(s) Oral daily  levothyroxine 75 MICROGram(s) Oral daily  lidocaine   Patch 1 Patch Transdermal daily  melatonin 6 milliGRAM(s) Oral at bedtime  multivitamin 1 Tablet(s) Oral daily  pantoprazole    Tablet 40 milliGRAM(s) Oral before breakfast  psyllium Powder 1 Packet(s) Oral daily  ranolazine 500 milliGRAM(s) Oral <User Schedule>  ranolazine 1000 milliGRAM(s) Oral <User Schedule>  saccharomyces boulardii 250 milliGRAM(s) Oral two times a day  senna 2 Tablet(s) Oral at bedtime    MEDICATIONS  (PRN):  acetaminophen   Tablet .. 975 milliGRAM(s) Oral every 8 hours PRN Severe Pain (7 - 10)  acetaminophen   Tablet .. 650 milliGRAM(s) Oral every 6 hours PRN Moderate Pain (4 - 6)  ALBUTerol    90 MICROgram(s) HFA Inhaler 2 Puff(s) Inhalation every 12 hours PRN Shortness of Breath and/or Wheezing  benzocaine 15 mG/menthol 3.6 mG (Sugar-Free) Lozenge 1 Lozenge Oral three times a day PRN Sore Throat  magnesium hydroxide Suspension 30 milliLiter(s) Oral daily PRN Constipation  sodium chloride 0.65% Nasal 1 Spray(s) Both Nostrils three times a day PRN Nasal Congestion    
86yo F w PMH of HTN, COPD, and MI (11y/a) s/p stents x 2 which were replaced 3y/a on ASA only who presents after mechanical fall displaced femoral neck fracture s/p left hip hemiarthroplasty on 5/2    seen at the bedside, c/o pain and stiffness in the left LE, and improvement in lower abdomen pain before urinating, no n/v, no sob.    Vital Signs Last 24 Hrs  T(C): 36.6 (14 May 2021 08:33), Max: 36.7 (14 May 2021 06:33)  T(F): 97.8 (14 May 2021 08:33), Max: 98.1 (14 May 2021 06:33)  HR: 67 (14 May 2021 08:33) (65 - 76)  BP: 138/60 (14 May 2021 08:33) (134/78 - 145/66)  BP(mean): --  RR: 16 (14 May 2021 08:33) (16 - 16)  SpO2: 95% (14 May 2021 08:33) (95% - 98%)    GENERAL- NAD  EAR/NOSE/MOUTH/THROAT - no pharyngeal exudates, no oral lesions  MMM  EYES- ASHLY, conjunctiva and Sclera clear  NECK- supple  RESPIRATORY-  clear to auscultation bilaterally, non laboured breathing  CARDIOVASCULAR - SIS2, RRR  GI - soft NT BS present  EXTREMITIES- + LE edema B/L  NEUROLOGY- no gross focal deficits  SKIN- no rashes, warm to touch  PSYCHIATRY- AAO X 3  MUSCULOSKELETAL- ROM RESTRICTED to LLE                     10.3                 136  | 28   | 12           12.07 >-----------< 324     ------------------------< 119                   29.5                 4.2  | 102  | 0.52                                         Ca 9.4   Mg x     Ph x      Culture Results:   >=3 organisms. Probable collection contamination. (05.12.21 @ 08:43)        MEDICATIONS  (STANDING):  amLODIPine   Tablet 5 milliGRAM(s) Oral daily  aspirin  chewable 81 milliGRAM(s) Oral daily  atorvastatin 40 milliGRAM(s) Oral at bedtime  budesonide 160 MICROgram(s)/formoterol 4.5 MICROgram(s) Inhaler 2 Puff(s) Inhalation two times a day  ciprofloxacin     Tablet 250 milliGRAM(s) Oral two times a day  enoxaparin Injectable 40 milliGRAM(s) SubCutaneous every 24 hours  fluticasone propionate 50 MICROgram(s)/spray Nasal Spray 1 Spray(s) Both Nostrils two times a day  isosorbide   mononitrate ER Tablet (IMDUR) 60 milliGRAM(s) Oral daily  levothyroxine 75 MICROGram(s) Oral daily  lidocaine   Patch 1 Patch Transdermal daily  melatonin 6 milliGRAM(s) Oral at bedtime  multivitamin 1 Tablet(s) Oral daily  pantoprazole    Tablet 40 milliGRAM(s) Oral before breakfast  phenazopyridine 100 milliGRAM(s) Oral three times a day  psyllium Powder 1 Packet(s) Oral daily  ranolazine 500 milliGRAM(s) Oral <User Schedule>  ranolazine 1000 milliGRAM(s) Oral <User Schedule>  saccharomyces boulardii 250 milliGRAM(s) Oral two times a day  senna 2 Tablet(s) Oral at bedtime    MEDICATIONS  (PRN):  acetaminophen   Tablet .. 975 milliGRAM(s) Oral every 8 hours PRN Severe Pain (7 - 10)  acetaminophen   Tablet .. 650 milliGRAM(s) Oral every 6 hours PRN Moderate Pain (4 - 6)  ALBUTerol    90 MICROgram(s) HFA Inhaler 2 Puff(s) Inhalation every 12 hours PRN Shortness of Breath and/or Wheezing  magnesium hydroxide Suspension 30 milliLiter(s) Oral daily PRN Constipation  
HPI:  PRESLEY BECK is a 86yo F w PMH of HTN, COPD, and MI (11y/a) s/p stents x 2 which were replaced 3y/a on ASA only who presents after mechanical fall from standing complaining of left leg pain. Patient was stepping inside her home when she caught her foot on the door frame, She landed on her left side and hit left forehead, but denies LOC. Denies headache, lightheadedness, HA, dizziness, chest pain, or SOB. Had large forehead swelling on left which has since improved. No nausea or vomiting. Imaging studies revealed acute left femoral neck fracture aand Acute nondisplaced lateral left fifth through eighth rib fractures.. Patient was admitted to the trauma service & orthopedics consulted. Taken to the OR on 5/2 for left hip hemiarthroplasty. Patient tolerated procedure well. She worked with PT, OT and PMR who recommended acute rehab upon discharge.  Hospital course was complicated with Hyponatremia on fluid restriction and salt tabs, Leukocytosis, pleural effusions, Diarrhea 2/2 Colitis, started on immodium and metamucil. CT A/P on 5/6 also shows bibasilar atelectasis.    (11 May 2021 11:16)      INTERVAL HPI/OVERNIGHT EVENTS:  Chart Reviewed and patient seen at bedside.  Patient reports return of bladder discomfort and feeling of needing to void despite having scant urine.  Also C/O Left Posterior and medial knee pain and increased LLE swellng.  +BM    ROS:  Denies fevers, chills, chest pain, shortness of breath, abdominal pain, headaches, nausea/vomiting    Vital Signs Last 24 Hrs  T(C): 36.4 (18 May 2021 08:33), Max: 36.4 (17 May 2021 20:19)  T(F): 97.6 (18 May 2021 08:33), Max: 97.6 (17 May 2021 20:19)  HR: 76 (18 May 2021 08:33) (63 - 76)  BP: 145/72 (18 May 2021 08:33) (135/61 - 148/55)  BP(mean): --  RR: 16 (18 May 2021 08:33) (15 - 16)  SpO2: 95% (18 May 2021 08:33) (94% - 97%)    Physical Exam:  Constitutional - NAD, Comfortable  Chest - CTAB  Cardiovascular - S1S2  Abdomen - BS+, Soft, NTND  Extremities - Bilateral LE edema - w/ ACE WRAP   No calf tenderness ,  Left Knee TTP popliteal crease and posteriomedial area.  Motor 4/5   Left hip incision : well healing and dressing changed  Psychiatric - Mood stable, Affect WNL    Function:  Transfers: CS/CG  Toilet transfers: Min assist  Gait: 130' with RW CGA  Stairs: CS with 2 rails       LABS:  05-17    135  |  101  |  11  ----------------------------<  116<H>  4.2   |  30  |  0.56    Ca    9.4      17 May 2021 05:00    TPro  5.9<L>  /  Alb  2.6<L>  /  TBili  0.4  /  DBili  x   /  AST  19  /  ALT  33  /  AlkPhos  102  05-17                                              10.0   9.04  )-----------( 494      ( 17 May 2021 05:00 )             29.4     CAPILLARY BLOOD GLUCOSE          MEDICATIONS:  MEDICATIONS  (STANDING):  amLODIPine   Tablet 5 milliGRAM(s) Oral daily  aspirin  chewable 81 milliGRAM(s) Oral daily  atorvastatin 40 milliGRAM(s) Oral at bedtime  budesonide 160 MICROgram(s)/formoterol 4.5 MICROgram(s) Inhaler 2 Puff(s) Inhalation two times a day  enoxaparin Injectable 40 milliGRAM(s) SubCutaneous every 24 hours  fluticasone propionate 50 MICROgram(s)/spray Nasal Spray 1 Spray(s) Both Nostrils two times a day  isosorbide   mononitrate ER Tablet (IMDUR) 60 milliGRAM(s) Oral daily  levothyroxine 75 MICROGram(s) Oral daily  lidocaine   Patch 1 Patch Transdermal daily  melatonin 6 milliGRAM(s) Oral at bedtime  multivitamin 1 Tablet(s) Oral daily  pantoprazole    Tablet 40 milliGRAM(s) Oral before breakfast  psyllium Powder 1 Packet(s) Oral daily  ranolazine 500 milliGRAM(s) Oral <User Schedule>  ranolazine 1000 milliGRAM(s) Oral <User Schedule>  saccharomyces boulardii 250 milliGRAM(s) Oral two times a day  senna 2 Tablet(s) Oral at bedtime    MEDICATIONS  (PRN):  acetaminophen   Tablet .. 975 milliGRAM(s) Oral every 8 hours PRN Severe Pain (7 - 10)  acetaminophen   Tablet .. 650 milliGRAM(s) Oral every 6 hours PRN Moderate Pain (4 - 6)  ALBUTerol    90 MICROgram(s) HFA Inhaler 2 Puff(s) Inhalation every 12 hours PRN Shortness of Breath and/or Wheezing  benzocaine 15 mG/menthol 3.6 mG (Sugar-Free) Lozenge 1 Lozenge Oral three times a day PRN Sore Throat  magnesium hydroxide Suspension 30 milliLiter(s) Oral daily PRN Constipation  sodium chloride 0.65% Nasal 1 Spray(s) Both Nostrils three times a day PRN Nasal Congestion        
HPI:  PRESLEY BECK is a 86yo F w PMH of HTN, COPD, and MI (11y/a) s/p stents x 2 which were replaced 3y/a on ASA only who presents after mechanical fall from standing complaining of left leg pain. Patient was stepping inside her home when she caught her foot on the door frame, She landed on her left side and hit left forehead, but denies LOC. Denies headache, lightheadedness, HA, dizziness, chest pain, or SOB. Had large forehead swelling on left which has since improved. No nausea or vomiting. Imaging studies revealed acute left femoral neck fracture aand Acute nondisplaced lateral left fifth through eighth rib fractures.. Patient was admitted to the trauma service & orthopedics consulted. Taken to the OR on 5/2 for left hip hemiarthroplasty. Patient tolerated procedure well. She worked with PT, OT and PMR who recommended acute rehab upon discharge.  Hospital course was complicated with Hyponatremia on fluid restriction and salt tabs, Leukocytosis, pleural effusions, Diarrhea 2/2 Colitis, started on immodium and metamucil. CT A/P on 5/6 also shows bibasilar atelectasis.    (11 May 2021 11:16)      INTERVAL HPI/OVERNIGHT EVENTS:  Chart Reviewed and patient seen at bedside.  Reports some b/l LE swelling despite ACE wrap.  +BM  Also with left head bruising.    ROS:  Denies fevers, chills, chest pain, shortness of breath, abdominal pain, headaches, nausea/vomiting    Vital Signs Last 24 Hrs  T(C): 36.6 (17 May 2021 07:47), Max: 36.7 (16 May 2021 20:35)  T(F): 97.9 (17 May 2021 07:47), Max: 98.1 (16 May 2021 20:35)  HR: 65 (17 May 2021 08:15) (60 - 75)  BP: 131/75 (17 May 2021 07:47) (131/75 - 133/78)  BP(mean): --  RR: 16 (17 May 2021 07:47) (16 - 16)  SpO2: 93% (17 May 2021 08:15) (93% - 95%)    Physical Exam:  Constitutional - NAD, Comfortable  Chest - CTAB  Cardiovascular - S1S2  Abdomen - BS+, Soft, NTND  Extremities - Bilateral LE edema - w/ ACE WRAP   No calf tenderness ,  Motor 4/5   Left hip incision : well healing and dressing changed  Psychiatric - Mood stable, Affect WNL    Function:  Transfers: CS/CG  Toilet transfers: Min assist  Gait: 130' with RW CGA  Stairs: CS with 2 rails       LABS:  05-17    135  |  101  |  11  ----------------------------<  116<H>  4.2   |  30  |  0.56    Ca    9.4      17 May 2021 05:00    TPro  5.9<L>  /  Alb  2.6<L>  /  TBili  0.4  /  DBili  x   /  AST  19  /  ALT  33  /  AlkPhos  102  05-17                                              10.0   9.04  )-----------( 494      ( 17 May 2021 05:00 )             29.4     CAPILLARY BLOOD GLUCOSE          MEDICATIONS:  MEDICATIONS  (STANDING):  amLODIPine   Tablet 5 milliGRAM(s) Oral daily  aspirin  chewable 81 milliGRAM(s) Oral daily  atorvastatin 40 milliGRAM(s) Oral at bedtime  budesonide 160 MICROgram(s)/formoterol 4.5 MICROgram(s) Inhaler 2 Puff(s) Inhalation two times a day  enoxaparin Injectable 40 milliGRAM(s) SubCutaneous every 24 hours  fluticasone propionate 50 MICROgram(s)/spray Nasal Spray 1 Spray(s) Both Nostrils two times a day  isosorbide   mononitrate ER Tablet (IMDUR) 60 milliGRAM(s) Oral daily  levothyroxine 75 MICROGram(s) Oral daily  lidocaine   Patch 1 Patch Transdermal daily  melatonin 6 milliGRAM(s) Oral at bedtime  multivitamin 1 Tablet(s) Oral daily  pantoprazole    Tablet 40 milliGRAM(s) Oral before breakfast  psyllium Powder 1 Packet(s) Oral daily  ranolazine 500 milliGRAM(s) Oral <User Schedule>  ranolazine 1000 milliGRAM(s) Oral <User Schedule>  saccharomyces boulardii 250 milliGRAM(s) Oral two times a day  senna 2 Tablet(s) Oral at bedtime    MEDICATIONS  (PRN):  acetaminophen   Tablet .. 975 milliGRAM(s) Oral every 8 hours PRN Severe Pain (7 - 10)  acetaminophen   Tablet .. 650 milliGRAM(s) Oral every 6 hours PRN Moderate Pain (4 - 6)  ALBUTerol    90 MICROgram(s) HFA Inhaler 2 Puff(s) Inhalation every 12 hours PRN Shortness of Breath and/or Wheezing  benzocaine 15 mG/menthol 3.6 mG (Sugar-Free) Lozenge 1 Lozenge Oral three times a day PRN Sore Throat  magnesium hydroxide Suspension 30 milliLiter(s) Oral daily PRN Constipation  sodium chloride 0.65% Nasal 1 Spray(s) Both Nostrils three times a day PRN Nasal Congestion        
Patient is a 85y old  Female who presents with a chief complaint of Left hip fracture (12 May 2021 10:00)      HPI:  PRESLEY BECK is a 86yo F w PMH of HTN, COPD, and MI (11y/a) s/p stents x 2 which were replaced 3y/a on ASA only who presents after mechanical fall from standing complaining of left leg pain. Patient was stepping inside her home when she caught her foot on the door frame, She landed on her left side and hit left forehead, but denies LOC. Denies headache, lightheadedness, HA, dizziness, chest pain, or SOB. Had large forehead swelling on left which has since improved. No nausea or vomiting. Imaging studies revealed acute left femoral neck fracture aand Acute nondisplaced lateral left fifth through eighth rib fractures.. Patient was admitted to the trauma service & orthopedics consulted. Taken to the OR on  for left hip hemiarthroplasty. Patient tolerated procedure well. She worked with PT, OT and PMR who recommended acute rehab upon discharge.  Hospital course was complicated with Hyponatremia on fluid restriction and salt tabs, Leukocytosis, pleural effusions, Diarrhea 2/2 Colitis, started on immodium and metamucil. CT A/P on  also shows bibasilar atelectasis.    (11 May 2021 11:16)      TODAY'S SUBJECTIVE & REVIEW OF SYMPTOMS  Patient seen at bedside.   Overnight with dysuria. Denies any this am- does get UTI and is usually treated with Cipro   Reports sore throat   Denies cough    ROS: as above  Denies CP/palpitations/abdominal pain or nausea    PHYSICAL EXAM    Vital Signs Last 24 Hrs  T(C): 36.6 (12 May 2021 08:45), Max: 36.9 (11 May 2021 20:00)  T(F): 97.8 (12 May 2021 08:45), Max: 98.5 (11 May 2021 20:00)  HR: 68 (12 May 2021 12:00) (68 - 85)  BP: 121/65 (12 May 2021 12:00) (121/65 - 163/68)  BP(mean): --  RR: 16 (12 May 2021 08:45) (16 - 17)  SpO2: 100% (12 May 2021 08:50) (93% - 100%)    Constitutional - NAD, Comfortable  HEENT: Throat - no obvious erythema  Chest - CTAB  Cardiovascular - RRR  Abdomen - BS+, Soft, NTND  Extremities - Bilateral LE edema    No calf tenderness ,  Motor 4/5   Left hip incision :  Psychiatric - Mood stable, Affect WNL      MEDICATIONS  (STANDING):  acetaminophen   Tablet .. 975 milliGRAM(s) Oral every 8 hours  amLODIPine   Tablet 5 milliGRAM(s) Oral daily  aspirin  chewable 81 milliGRAM(s) Oral daily  atorvastatin 40 milliGRAM(s) Oral at bedtime  budesonide 160 MICROgram(s)/formoterol 4.5 MICROgram(s) Inhaler 2 Puff(s) Inhalation two times a day  enoxaparin Injectable 40 milliGRAM(s) SubCutaneous every 24 hours  fluticasone propionate 50 MICROgram(s)/spray Nasal Spray 1 Spray(s) Both Nostrils two times a day  isosorbide   mononitrate ER Tablet (IMDUR) 60 milliGRAM(s) Oral daily  levothyroxine 75 MICROGram(s) Oral daily  lidocaine   Patch 1 Patch Transdermal daily  melatonin 6 milliGRAM(s) Oral at bedtime  pantoprazole    Tablet 40 milliGRAM(s) Oral before breakfast  psyllium Powder 1 Packet(s) Oral daily  ranolazine 500 milliGRAM(s) Oral <User Schedule>  ranolazine 1000 milliGRAM(s) Oral <User Schedule>  saccharomyces boulardii 250 milliGRAM(s) Oral two times a day    MEDICATIONS  (PRN):  ALBUTerol    90 MICROgram(s) HFA Inhaler 2 Puff(s) Inhalation every 12 hours PRN Shortness of Breath and/or Wheezing      RECENT LABS/IMAGING                        11.2   14.38 )-----------( 321      ( 12 May 2021 05:30 )             32.7     05    138  |  102  |  10  ----------------------------<  119<H>  3.8   |  29  |  0.61    Ca    8.9      12 May 2021 05:30    TPro  5.5<L>  /  Alb  2.3<L>  /  TBili  0.7  /  DBili  x   /  AST  28  /  ALT  54<H>  /  AlkPhos  91  12      Urinalysis Basic - ( 12 May 2021 08:45 )    Color: Yellow / Appearance: Slightly Turbid / S.010 / pH: x  Gluc: x / Ketone: Moderate  / Bili: Negative / Urobili: Negative   Blood: x / Protein: 30 mg/dL / Nitrite: Negative   Leuk Esterase: Moderate / RBC: 26-50 /HPF / WBC >50 /HPF   Sq Epi: x / Non Sq Epi: Neg.-Few / Bacteria: Moderate /HPF          
HPI:  PRESLEY BECK is a 84yo F w PMH of HTN, COPD, and MI (11y/a) s/p stents x 2 which were replaced 3y/a on ASA only who presents after mechanical fall from standing complaining of left leg pain. Patient was stepping inside her home when she caught her foot on the door frame, She landed on her left side and hit left forehead, but denies LOC. Denies headache, lightheadedness, HA, dizziness, chest pain, or SOB. Had large forehead swelling on left which has since improved. No nausea or vomiting. Imaging studies revealed acute left femoral neck fracture aand Acute nondisplaced lateral left fifth through eighth rib fractures.. Patient was admitted to the trauma service & orthopedics consulted. Taken to the OR on 5/2 for left hip hemiarthroplasty. Patient tolerated procedure well. She worked with PT, OT and PMR who recommended acute rehab upon discharge.  Hospital course was complicated with Hyponatremia on fluid restriction and salt tabs, Leukocytosis, pleural effusions, Diarrhea 2/2 Colitis, started on immodium and metamucil. CT A/P on 5/6 also shows bibasilar atelectasis.    (11 May 2021 11:16)      INTERVAL HPI/OVERNIGHT EVENTS:  Chart Reviewed and patient seen at bedside.  Was started on cipro for UTI along with pyridium and florastar        Vital Signs Last 24 Hrs  T(C): 36.7 (20 May 2021 08:07), Max: 36.8 (19 May 2021 22:47)  T(F): 98.1 (20 May 2021 08:07), Max: 98.3 (19 May 2021 22:47)  HR: 68 (20 May 2021 08:45) (60 - 72)  BP: 128/70 (20 May 2021 08:07) (113/60 - 137/71)  BP(mean): --  RR: 16 (20 May 2021 08:07) (16 - 17)  SpO2: 93% (20 May 2021 08:45) (93% - 94%)        Physical Exam:  Constitutional - NAD, Comfortable  Chest - CTAB  Cardiovascular - S1S2  Abdomen - BS+, Soft, NTND  Extremities - Bilateral LE edema - improved    No calf tenderness ,  Left hip incision : dressing + , no drainage  Psychiatric - Mood stable, Affect WNL    Function:  Supervision     MEDICATIONS  (STANDING):  amLODIPine   Tablet 5 milliGRAM(s) Oral daily  aspirin  chewable 81 milliGRAM(s) Oral daily  atorvastatin 40 milliGRAM(s) Oral at bedtime  budesonide 160 MICROgram(s)/formoterol 4.5 MICROgram(s) Inhaler 2 Puff(s) Inhalation two times a day  ciprofloxacin     Tablet 250 milliGRAM(s) Oral every 12 hours  enoxaparin Injectable 40 milliGRAM(s) SubCutaneous every 24 hours  fluticasone propionate 50 MICROgram(s)/spray Nasal Spray 1 Spray(s) Both Nostrils two times a day  isosorbide   mononitrate ER Tablet (IMDUR) 60 milliGRAM(s) Oral daily  levothyroxine 75 MICROGram(s) Oral daily  lidocaine   Patch 1 Patch Transdermal daily  melatonin 6 milliGRAM(s) Oral at bedtime  multivitamin 1 Tablet(s) Oral daily  pantoprazole    Tablet 40 milliGRAM(s) Oral before breakfast  phenazopyridine 100 milliGRAM(s) Oral three times a day  psyllium Powder 1 Packet(s) Oral daily  ranolazine 1000 milliGRAM(s) Oral <User Schedule>  ranolazine 500 milliGRAM(s) Oral <User Schedule>  saccharomyces boulardii 250 milliGRAM(s) Oral two times a day  senna 2 Tablet(s) Oral at bedtime    MEDICATIONS  (PRN):  acetaminophen   Tablet .. 975 milliGRAM(s) Oral every 8 hours PRN Severe Pain (7 - 10)  acetaminophen   Tablet .. 650 milliGRAM(s) Oral every 6 hours PRN Moderate Pain (4 - 6)  ALBUTerol    90 MICROgram(s) HFA Inhaler 2 Puff(s) Inhalation every 12 hours PRN Shortness of Breath and/or Wheezing  benzocaine 15 mG/menthol 3.6 mG (Sugar-Free) Lozenge 1 Lozenge Oral three times a day PRN Sore Throat  magnesium hydroxide Suspension 30 milliLiter(s) Oral daily PRN Constipation  sodium chloride 0.65% Nasal 1 Spray(s) Both Nostrils three times a day PRN Nasal Congestion                    LABS:  05-17    135  |  101  |  11  ----------------------------<  116<H>  4.2   |  30  |  0.56    Ca    9.4      17 May 2021 05:00    TPro  5.9<L>  /  Alb  2.6<L>  /  TBili  0.4  /  DBili  x   /  AST  19  /  ALT  33  /  AlkPhos  102  05-17                                        10.0   9.04  )-----------( 494      ( 17 May 2021 05:00 )             29.4         < from: US Duplex Venous Lower Ext Ltd, Left (05.18.21 @ 18:22) >  EXAM:  US DPLX LWR EXT VEINS LTD LT      PROCEDURE DATE:  05/18/2021        INTERPRETATION:  CLINICAL INFORMATION: Swelling and pain    COMPARISON: 4/27/2011    TECHNIQUE: Duplex sonography of the LEFT LOWER extremity veins with color and spectral Doppler, with and without compression.    FINDINGS:    There is normal compressibility of the left common femoral, femoral and popliteal veins.  The contralateral common femoral vein is patent.  Doppler examination shows normal spontaneous and phasic flow.    Limited evaluation of the calf veins is unremarkable.    IMPRESSION:  No evidence of left lower extremity deep venous thrombosis.      < end of copied text >      Culture - Urine (05.18.21 @ 16:30)    Specimen Source: .Urine Clean Catch (Midstream)    Culture Results:   >100,000 CFU/ml Escherichia coli      
HPI:  PRESLEY BECK is a 86yo F w PMH of HTN, COPD, and MI (11y/a) s/p stents x 2 which were replaced 3y/a on ASA only who presents after mechanical fall from standing complaining of left leg pain. Patient was stepping inside her home when she caught her foot on the door frame, She landed on her left side and hit left forehead, but denies LOC. Denies headache, lightheadedness, HA, dizziness, chest pain, or SOB. Had large forehead swelling on left which has since improved. No nausea or vomiting. Imaging studies revealed acute left femoral neck fracture aand Acute nondisplaced lateral left fifth through eighth rib fractures.. Patient was admitted to the trauma service & orthopedics consulted. Taken to the OR on 5/2 for left hip hemiarthroplasty. Patient tolerated procedure well. She worked with PT, OT and PMR who recommended acute rehab upon discharge.  Hospital course was complicated with Hyponatremia on fluid restriction and salt tabs, Leukocytosis, pleural effusions, Diarrhea 2/2 Colitis, started on immodium and metamucil. CT A/P on 5/6 also shows bibasilar atelectasis.    (11 May 2021 11:16)      INTERVAL HPI/OVERNIGHT EVENTS:  Chart Reviewed and patient seen at bedside.  No new issues overnight  Left posterior knee discomfort improved  Has some dysuria again     ROS:  Denies fevers, chills, chest pain, shortness of breath, abdominal pain, headaches, nausea/vomiting    Vital Signs Last 24 Hrs  T(C): 36.8 (19 May 2021 08:00), Max: 36.8 (19 May 2021 08:00)  T(F): 98.2 (19 May 2021 08:00), Max: 98.2 (19 May 2021 08:00)  HR: 69 (19 May 2021 08:01) (65 - 76)  BP: 113/60 (19 May 2021 11:40) (113/60 - 147/63)  BP(mean): --  RR: 15 (19 May 2021 08:00) (15 - 15)  SpO2: 92% (19 May 2021 08:01) (92% - 95%)          Physical Exam:  Constitutional - NAD, Comfortable  Chest - CTAB  Cardiovascular - S1S2  Abdomen - BS+, Soft, NTND  Extremities - Bilateral LE edema - improved    No calf tenderness ,  Left hip incision : dressing + , no drainage  Psychiatric - Mood stable, Affect WNL    Function:  Transfers: CS/CG  Toilet transfers: Min assist  Gait: RW CGA  Stairs: CS with 2 rails     MEDICATIONS  (STANDING):  amLODIPine   Tablet 5 milliGRAM(s) Oral daily  aspirin  chewable 81 milliGRAM(s) Oral daily  atorvastatin 40 milliGRAM(s) Oral at bedtime  budesonide 160 MICROgram(s)/formoterol 4.5 MICROgram(s) Inhaler 2 Puff(s) Inhalation two times a day  enoxaparin Injectable 40 milliGRAM(s) SubCutaneous every 24 hours  fluticasone propionate 50 MICROgram(s)/spray Nasal Spray 1 Spray(s) Both Nostrils two times a day  isosorbide   mononitrate ER Tablet (IMDUR) 60 milliGRAM(s) Oral daily  levothyroxine 75 MICROGram(s) Oral daily  lidocaine   Patch 1 Patch Transdermal daily  melatonin 6 milliGRAM(s) Oral at bedtime  multivitamin 1 Tablet(s) Oral daily  pantoprazole    Tablet 40 milliGRAM(s) Oral before breakfast  psyllium Powder 1 Packet(s) Oral daily  ranolazine 500 milliGRAM(s) Oral <User Schedule>  ranolazine 1000 milliGRAM(s) Oral <User Schedule>  saccharomyces boulardii 250 milliGRAM(s) Oral two times a day  senna 2 Tablet(s) Oral at bedtime    MEDICATIONS  (PRN):  acetaminophen   Tablet .. 975 milliGRAM(s) Oral every 8 hours PRN Severe Pain (7 - 10)  acetaminophen   Tablet .. 650 milliGRAM(s) Oral every 6 hours PRN Moderate Pain (4 - 6)  ALBUTerol    90 MICROgram(s) HFA Inhaler 2 Puff(s) Inhalation every 12 hours PRN Shortness of Breath and/or Wheezing  benzocaine 15 mG/menthol 3.6 mG (Sugar-Free) Lozenge 1 Lozenge Oral three times a day PRN Sore Throat  magnesium hydroxide Suspension 30 milliLiter(s) Oral daily PRN Constipation  sodium chloride 0.65% Nasal 1 Spray(s) Both Nostrils three times a day PRN Nasal Congestion    LABS:  05-17    135  |  101  |  11  ----------------------------<  116<H>  4.2   |  30  |  0.56    Ca    9.4      17 May 2021 05:00    TPro  5.9<L>  /  Alb  2.6<L>  /  TBili  0.4  /  DBili  x   /  AST  19  /  ALT  33  /  AlkPhos  102  05-17                                        10.0   9.04  )-----------( 494      ( 17 May 2021 05:00 )             29.4         < from: US Duplex Venous Lower Ext Ltd, Left (05.18.21 @ 18:22) >  EXAM:  US DPLX LWR EXT VEINS LTD LT      PROCEDURE DATE:  05/18/2021        INTERPRETATION:  CLINICAL INFORMATION: Swelling and pain    COMPARISON: 4/27/2011    TECHNIQUE: Duplex sonography of the LEFT LOWER extremity veins with color and spectral Doppler, with and without compression.    FINDINGS:    There is normal compressibility of the left common femoral, femoral and popliteal veins.  The contralateral common femoral vein is patent.  Doppler examination shows normal spontaneous and phasic flow.    Limited evaluation of the calf veins is unremarkable.    IMPRESSION:  No evidence of left lower extremity deep venous thrombosis.      < end of copied text >        
84yo F w PMH of HTN, COPD, and MI (11y/a) s/p stents x 2 which were replaced 3y/a on ASA only who presents after mechanical fall displaced femoral neck fracture s/p left hip hemiarthroplasty on     seen at the bedside, c/o pain before urinating, in the groin area x 1-2 days, burning type,  no n/v, no sob.    Vital Signs Last 24 Hrs  T(C): 36.3 (13 May 2021 08:40), Max: 36.7 (12 May 2021 20:44)  T(F): 97.3 (13 May 2021 08:40), Max: 98.1 (12 May 2021 20:44)  HR: 62 (13 May 2021 08:40) (62 - 69)  BP: 144/65 (13 May 2021 08:40) (121/65 - 144/65)  BP(mean): --  RR: 16 (13 May 2021 08:40) (16 - 16)  SpO2: 94% (13 May 2021 08:40) (94% - 94%)      GENERAL- NAD  EAR/NOSE/MOUTH/THROAT - no pharyngeal exudates, no oral lesions  MMM  EYES- ASHLY, conjunctiva and Sclera clear  NECK- supple  RESPIRATORY-  clear to auscultation bilaterally, non laboured breathing  CARDIOVASCULAR - SIS2, RRR  GI - soft NT BS present  EXTREMITIES- + LE edema B/L  NEUROLOGY- no gross focal deficits  SKIN- no rashes, warm to touch  PSYCHIATRY- AAO X 3  MUSCULOSKELETAL- ROM RESTRICTED to LLE                       10.3                 136  | 28   | 12           12.07 >-----------< 324     ------------------------< 119                   29.5                 4.2  | 102  | 0.52                                         Ca 9.4   Mg x     Ph x      Urinalysis Basic - ( 12 May 2021 08:45 )    Color: Yellow / Appearance: Slightly Turbid / S.010 / pH: x  Gluc: x / Ketone: Moderate  / Bili: Negative / Urobili: Negative   Blood: x / Protein: 30 mg/dL / Nitrite: Negative   Leuk Esterase: Moderate / RBC: 26-50 /HPF / WBC >50 /HPF   Sq Epi: x / Non Sq Epi: Neg.-Few / Bacteria: Moderate /HPF        MEDICATIONS  (STANDING):  acetaminophen   Tablet .. 975 milliGRAM(s) Oral every 8 hours  amLODIPine   Tablet 5 milliGRAM(s) Oral daily  aspirin  chewable 81 milliGRAM(s) Oral daily  atorvastatin 40 milliGRAM(s) Oral at bedtime  budesonide 160 MICROgram(s)/formoterol 4.5 MICROgram(s) Inhaler 2 Puff(s) Inhalation two times a day  enoxaparin Injectable 40 milliGRAM(s) SubCutaneous every 24 hours  fluticasone propionate 50 MICROgram(s)/spray Nasal Spray 1 Spray(s) Both Nostrils two times a day  isosorbide   mononitrate ER Tablet (IMDUR) 60 milliGRAM(s) Oral daily  levothyroxine 75 MICROGram(s) Oral daily  lidocaine   Patch 1 Patch Transdermal daily  melatonin 6 milliGRAM(s) Oral at bedtime  multivitamin 1 Tablet(s) Oral daily  pantoprazole    Tablet 40 milliGRAM(s) Oral before breakfast  phenazopyridine 100 milliGRAM(s) Oral three times a day  psyllium Powder 1 Packet(s) Oral daily  ranolazine 500 milliGRAM(s) Oral <User Schedule>  ranolazine 1000 milliGRAM(s) Oral <User Schedule>  saccharomyces boulardii 250 milliGRAM(s) Oral two times a day    MEDICATIONS  (PRN):  ALBUTerol    90 MICROgram(s) HFA Inhaler 2 Puff(s) Inhalation every 12 hours PRN Shortness of Breath and/or Wheezing

## 2021-05-20 NOTE — PROGRESS NOTE ADULT - ASSESSMENT
86yo F w PMH of HTN, COPD, and MI (11y/a) s/p stents x 2 which were replaced 3y/a on ASA only who presents to [facility] on 5/1/21 after mechanical fall - displaced femoral neck fracture s/p left hip hemiarthroplasty on 5/2. Hospital course was complicated with Hyponatremia on fluid restriction and salt tabs, Leukocytosis, pleural effusions, Diarrhea 2/2 Colitis. Patient now admitted for a multidisciplinary rehab program. 05-07-21 @ 22:53    -------------    Rehab Management/MEDICAL MANAGEMENT     #Displaced LEFT femoral neck fracture s/p left hip hemiarthroplasty on 5/2  Continue Comprehensive Rehab Program of PT/OT  - 3 hours a day, 5 days a week  - WBAT LLE  -- Posterior Hip Precautions for 6 weeks per Ortho (5/2-6/13)  - Lovenox for DVT PPx for 4 weeks (5/2-5/30)- to be administered by family    E coli UTI - started on cipro for 7 days   -  Left Rib fracture: Pain control as below.- Lidoderm patch to painful area on lateral left chest wall     Hyponatremia: in setting of diarrhea : now improved     #B/L Pleural Effusions:- seen on CT A/P on 5/6/21 at [facility]- fu as outpatients    #HTN:- amlodipine 5mg po daily    #COPD:- albuterol prn, - symbicort bid    #H/O MI s/p Stents  - ASA 81 qd, - Lipitor 40mg po qhs  - Ranexa 1000mg po in AM and 500mg po in PM    #hypothyroidism  - levothyroxine 75mcg po qd    #Pre-Diabetes - A1c 6.3 on 5/2  -  #Sleep:- melatonin PRN    #Pain Mgmt :- Tylenol prn, lidoderm patch     #GI/Bowel Mgmt :,  Protonix for gi ppx, MOM prn . Metamucil, senna     #/Bladder Mgmt : toilet schedule prn     #FEN :- Diet - Regular + Thins  [CCHO]    Disp :home today stable for discharge  All meds reviewed and fu discussed   Left hip incision dressing changed - Incision clean

## 2021-06-04 ENCOUNTER — APPOINTMENT (OUTPATIENT)
Dept: ORTHOPEDIC SURGERY | Facility: CLINIC | Age: 86
End: 2021-06-04
Payer: MEDICARE

## 2021-06-04 VITALS
DIASTOLIC BLOOD PRESSURE: 63 MMHG | HEART RATE: 76 BPM | WEIGHT: 122 LBS | SYSTOLIC BLOOD PRESSURE: 130 MMHG | HEIGHT: 62 IN | BODY MASS INDEX: 22.45 KG/M2

## 2021-06-04 PROCEDURE — 73502 X-RAY EXAM HIP UNI 2-3 VIEWS: CPT

## 2021-06-04 PROCEDURE — 99024 POSTOP FOLLOW-UP VISIT: CPT

## 2021-06-04 NOTE — END OF VISIT
[FreeTextEntry3] : I, Rajiv Bai, acted solely as a scribe for Dr. Marquez Moore on this date 06/04/2021.

## 2021-06-04 NOTE — HISTORY OF PRESENT ILLNESS
[Procedure: ___] : status post [unfilled] [Clean/Dry/Intact] : clean, dry and intact [Healed] : healed [Swelling] : swollen [Neuro Intact] : an unremarkable neurological exam [Negative Vania's] : maneuvers demonstrated a negative Vania's sign [Xray (Date:___)] : [unfilled] Xray -  [Doing Well] : is doing well [No Sign of Infection] : is showing no signs of infection [Adequate Pain Control] : has adequate pain control [3] : the patient reports pain that is 3/10 in severity [Chills] : no chills [Constipation] : no constipation [Diarrhea] : no diarrhea [Dysuria] : no dysuria [Fever] : no fever [Nausea] : no nausea [Vomiting] : no vomiting [Erythema] : not erythematous [Discharge] : absent of discharge [Dehiscence] : not dehisced [de-identified] : Uncemented left hip hemiarthroplasty. DOS: 05/05/2021.  [de-identified] : Pt is 4 weeks post-op. She finished home PT. She is having more pain in the thigh than in the hip. She ambulates with a walker. She reports a lot of swelling. She is taking Aspirin for DVTP.  [de-identified] : Left hip exam shows healed incision with no sign of infection  [de-identified] : 3V xray of the left hip done in the office today and reviewed by Dr. Marquez Moore demonstrates s/p implants in good positioning with no evidence of wear, loosening, or subsidence. [de-identified] : Pt should continue taking Aspirin BID for DVTP. Patient will continue to adhere to the posterior hip precautions. We prescribed outpatient home PT. She should continue to do low impact exercises. Pt understands the importance of prophylaxis for invasive dental procedures. F/u with us in 3 weeks.

## 2021-06-16 PROCEDURE — 97112 NEUROMUSCULAR REEDUCATION: CPT

## 2021-06-16 PROCEDURE — 81001 URINALYSIS AUTO W/SCOPE: CPT

## 2021-06-16 PROCEDURE — 87077 CULTURE AEROBIC IDENTIFY: CPT

## 2021-06-16 PROCEDURE — 97116 GAIT TRAINING THERAPY: CPT

## 2021-06-16 PROCEDURE — 94640 AIRWAY INHALATION TREATMENT: CPT

## 2021-06-16 PROCEDURE — 97167 OT EVAL HIGH COMPLEX 60 MIN: CPT

## 2021-06-16 PROCEDURE — 97535 SELF CARE MNGMENT TRAINING: CPT

## 2021-06-16 PROCEDURE — 93971 EXTREMITY STUDY: CPT

## 2021-06-16 PROCEDURE — 36415 COLL VENOUS BLD VENIPUNCTURE: CPT

## 2021-06-16 PROCEDURE — 85025 COMPLETE CBC W/AUTO DIFF WBC: CPT

## 2021-06-16 PROCEDURE — 87086 URINE CULTURE/COLONY COUNT: CPT

## 2021-06-16 PROCEDURE — 87186 SC STD MICRODIL/AGAR DIL: CPT

## 2021-06-16 PROCEDURE — 97163 PT EVAL HIGH COMPLEX 45 MIN: CPT

## 2021-06-16 PROCEDURE — 80053 COMPREHEN METABOLIC PANEL: CPT

## 2021-06-16 PROCEDURE — U0003: CPT

## 2021-06-16 PROCEDURE — U0005: CPT

## 2021-06-16 PROCEDURE — 97530 THERAPEUTIC ACTIVITIES: CPT

## 2021-06-16 PROCEDURE — 97110 THERAPEUTIC EXERCISES: CPT

## 2021-06-16 PROCEDURE — 82962 GLUCOSE BLOOD TEST: CPT

## 2021-06-30 ENCOUNTER — APPOINTMENT (OUTPATIENT)
Dept: ORTHOPEDIC SURGERY | Facility: CLINIC | Age: 86
End: 2021-06-30
Payer: MEDICARE

## 2021-06-30 VITALS
HEIGHT: 64 IN | DIASTOLIC BLOOD PRESSURE: 57 MMHG | WEIGHT: 120 LBS | BODY MASS INDEX: 20.49 KG/M2 | HEART RATE: 66 BPM | SYSTOLIC BLOOD PRESSURE: 126 MMHG

## 2021-06-30 DIAGNOSIS — Z96.642 AFTERCARE FOLLOWING JOINT REPLACEMENT SURGERY: ICD-10-CM

## 2021-06-30 DIAGNOSIS — Z96.642 PRESENCE OF LEFT ARTIFICIAL HIP JOINT: ICD-10-CM

## 2021-06-30 DIAGNOSIS — Z47.1 AFTERCARE FOLLOWING JOINT REPLACEMENT SURGERY: ICD-10-CM

## 2021-06-30 PROCEDURE — 99024 POSTOP FOLLOW-UP VISIT: CPT

## 2021-06-30 PROCEDURE — 73502 X-RAY EXAM HIP UNI 2-3 VIEWS: CPT

## 2021-06-30 NOTE — HISTORY OF PRESENT ILLNESS
[Doing Well] : is doing well [Procedure: ___] : status post [unfilled] [2] : the patient reports pain that is 2/10 in severity [Clean/Dry/Intact] : clean, dry and intact [Healed] : healed [Swelling] : swollen [Neuro Intact] : an unremarkable neurological exam [Vascular Intact] : ~T peripheral vascular exam normal [Xray (Date:___)] : [unfilled] Xray -  [No Sign of Infection] : is showing no signs of infection [Adequate Pain Control] : has adequate pain control [Chills] : no chills [Constipation] : no constipation [Diarrhea] : no diarrhea [Dysuria] : no dysuria [Fever] : no fever [Nausea] : no nausea [Vomiting] : no vomiting [Erythema] : not erythematous [Discharge] : absent of discharge [Dehiscence] : not dehisced [de-identified] : Pt is 8 weeks post-op. She c/o LE swelling. She also c/o lateral hip pain. She has no groin pain and no pain with walking. She is no longer in PT, but notes that she exercises and walks at home. She ambulates with a cane. She had an episode of syncope about 2 weeks ago where she was unresponsive and required to go the ER. She sees Dr. Agustin for her shoulder pain.  [de-identified] : status post 5/5/2021. Uncemented left hip hemiarthroplasty. DOS: 05/05/2021. \par  [de-identified] : Left hip exam shows healed incision with no sign of infection. [de-identified] : 3V xray of the left hip done in the office today and reviewed by Dr. Marquez Moore demonstrates s/p hemiarthroplasty in good positioning with no evidence of wear, loosening, or subsidence.  [de-identified] : In regards to the LE swelling, we encourage her to wear compression stockings. She is seeing the cardiologist in the near future and recommend that she discuss the LE swelling with the cardiologist. Pt states that she sometimes does not feel stable with the cane, so we discussed ambulating with a walker. She should continue to do low impact exercises. Patient will continue to adhere to the posterior hip precautions. Pt understands the importance of prophylaxis for invasive dental procedures. F/u with us a year from surgery.

## 2021-06-30 NOTE — END OF VISIT
[FreeTextEntry3] : I, Rajiv Bai, acted solely as a scribe for Dr. Marquez Moore on this date 06/30/2021.

## 2021-07-09 NOTE — PROGRESS NOTE ADULT - SUBJECTIVE AND OBJECTIVE BOX
Physical Therapy     Referred by: Camilla Alcocer MD; Medical Diagnosis (from order):    Diagnosis Information      Diagnosis    724.3 (ICD-9-CM) - M54.32 (ICD-10-CM) - Sciatica of left side                Daily Treatment Note    Visit:  3     SUBJECTIVE                                                                                                             He states he feels a little sore, maybe from the standing pelvic tilts and trying to figure out that movement. He had one instance of cramping down the leg when sleeping. No tingling or numbness down the leg yet. Pain is more in the muscles next to the spine.     OBJECTIVE                                                                                                                        TREATMENT                                                                                                                  Therapeutic Exercise:  Name: Jon Henderson    Injury: Sciatica of left side     Precautions:     Today's Exercises:   - bridges 3x10 reps    Consider for next session:  - hooklying clamshells  2x10 reps orange theraband   - clamshells 2x10 reps  - hip flexor stretch 3x30 second hold     Deferred:  - quadricep setting on the right lower extremity with overpressure into extension 10x5\" holds   - short arc quadricep x10 reps   - seated sciatic nerve glides left side x10 reps          Manual Therapy:  Soft tissue mobilization to the lumbar paraspinals   Right anterior innominate rotation -assessed and no need for muscle energy technique   Traction to bilateral lower extremity 2-3x30 second holds - deferred      Neuromuscular Re-Education:  Neuromuscular Re-education to improve quality of motion , improve posture and postural awareness, improve proprioception, improve balance and improve coordination or requires tactile cueing for facilitation/inhibition:    Performed today:  - supine marches x10 reps bilaterally   - bent knee fall out peach band 2x5 reps  Pelvis & hip films reviewed. Implants are in appropriate position. No fracture or dislocation noted. Patient is WBAT of the surgical extremity.  bilaterally   - heel slide x10 bilateral   - seated pelvic tilts with neutral posture following  - sitting transversus abdominus press down with ring x20 reps -- cues not to flex when he crunches  - seated marches x10 reps -- more difficult on the right  - foam roll core series x10 reps   - standing pelvic tilts x5 reps  - standing postural correction actively -- active left forward pelvic rotation x10 reps   - patient teach back by demonstration with mirror feedback to obtain neutral in all planes   - standing transversus abdominus bracing following postural corrections   - pallof press 15# x15 reps bilateral   - stir the pot 15# x15 bilateral   - wood chops 15# x15 reps bilateral   - mini squats 2x10 reps -- cues for neutral spine  - farmer carry (postural stability) 10# x5 laps in gym     Consider next session:  - full squat  - squat lift   - squat lift and carry  - bird dogs   - hovering plank quadruped  - fire hydrants     Deferred:  - pelvic tilts x10 reps  - neutral pelvis posture in hooklying education/performance   - transversus abdominus bracing palpation, education, bracing  - neutral sitting posture - tactile cues required to reduce lumbar lordosis, lean shoulders over hips, slight pinch in scapulae   - seated transversus abdominus brace- cues for small movement to not over tighten and stress the paraspinals   - standing rhythmic stabilization 1x30 seconds    Skilled input: verbal instruction/cues    Writer verbally educated and received verbal consent for hand placement, positioning of patient, and techniques to be performed today from patient for hand placement and palpation for techniques and clothing adjustments for techniques as described above and how they are pertinent to the patient's plan of care.    Home Exercise Program: Access Code: [email or web address]  Date: 07/02/2021  Supine Piriformis Stretch with Foot on Ground - 1-3 x daily - 7 x weekly - 3 sets - 30 second hold  Seated Slump Nerve Glide - 1-3 x  daily - 7 x weekly - 1-3 sets - 5-10 reps  Supine Hamstring Stretch with Strap - 1-3 x daily - 7 x weekly - 3 sets - 30 second hold  Long Sitting Calf Stretch with Strap - 1-3 x daily - 7 x weekly - 3 sets - 30 sec hold  Supine Single Knee to Chest Stretch - 1-3 x daily - 7 x weekly - 3 sets - 30 sec hold    Access Code: [email or web address]  Date: 07/06/2021  Supine Knee Extension Strengthening - 1 x daily - 7 x weekly - 3 sets - 10 reps  Long Sitting Quad Set with Towel Roll Under Heel - 1 x daily - 7 x weekly - 10 reps - 10 second hold  Standing Pelvic Tilt - 1 x daily - 7 x weekly - 3 sets - 10 reps  Standing Transverse Abdominis Contraction - 1 x daily - 7 x weekly - 10 reps - 10 second hold          Un-attended Electrical Stimulation (72691/): Interferential Current  Location: lumbar  Position: sitting  Pulse Rate:  Hz  Intensity: patient tolerance  In conjunction with: moist hot pack  Duration: 10 minutes  Reaction: no adverse reaction to treatment      ASSESSMENT                                                                                                             Soft tissue mobilization to lumbar parapsinals to address the muscle pain and tightness there, with muscle relaxation noted following. He demonstrated carryover with transversus abdominus bracing and therefore progressed to heel slides and resisted bent knee fall out with great form. Mild carryover for standing posture, requiring re-education and tactile cueing to place hips in neutral in sagittal and transverse planes.  He tends to lean posteriorly with this shoulders/torso even if his pelvis is aligned. Challenged this neutral posture with standing resisted upper extremity activities with good tolerance, most difficulty with remaining stable when the transverse plane was challenged. Ended with heat and stim for muscle pain management.         PLAN                                                                                                                            Suggestions for next session as indicated: Progress per plan of care       Electrically signed by: Stephany Goodman, PT, DPT 7/9/2021    Therapy procedure time and total treatment time can be found documented on the Time Entry flowsheet

## 2021-07-29 NOTE — ED STATDOCS - CHPI ED QUALITY
Foot Care Worksheet  PCP: Joce Mojica MD  Last visit: 06 / 10 / 2021    Nail description:  Thick , Yellow , Crumbly , Marked limitation of ambulation     Pain resulting from thickened and dystrophy of nail plate No    Nails involved  Right   1  (T5-T9)  Left     5  (TA-T4)    Q7 1 Class A Finding - Non traumatic amputation of foot No    Q8 2 Class B Findings - Absent DP pulse No, Absent PT pulse No, Advanced tropic changes (3 required) Yes    Decrease hair growth Yes, Nail changes/thickening Yes, Pigmented changes/discoloration Yes, Skin texture (thin, shiny) No, Skin color (rubor/redness) No    Q9 1 Class B and 2 Class C Findings  Claudication No, Temperature change Yes, Paresthesia Yes, Burning No, Edema Yes ALTERATION IN BREATHING

## 2021-08-18 ENCOUNTER — APPOINTMENT (OUTPATIENT)
Dept: PULMONOLOGY | Facility: CLINIC | Age: 86
End: 2021-08-18
Payer: MEDICARE

## 2021-08-18 VITALS — DIASTOLIC BLOOD PRESSURE: 60 MMHG | SYSTOLIC BLOOD PRESSURE: 120 MMHG

## 2021-08-18 VITALS — HEART RATE: 71 BPM | OXYGEN SATURATION: 94 %

## 2021-08-18 DIAGNOSIS — J44.1 CHRONIC OBSTRUCTIVE PULMONARY DISEASE WITH (ACUTE) EXACERBATION: ICD-10-CM

## 2021-08-18 DIAGNOSIS — Z87.898 PERSONAL HISTORY OF OTHER SPECIFIED CONDITIONS: ICD-10-CM

## 2021-08-18 PROCEDURE — 99214 OFFICE O/P EST MOD 30 MIN: CPT

## 2021-08-18 RX ORDER — METOPROLOL TARTRATE 25 MG/1
25 TABLET, FILM COATED ORAL
Qty: 180 | Refills: 0 | Status: DISCONTINUED | COMMUNITY
End: 2021-08-18

## 2021-08-18 NOTE — PHYSICAL EXAM
[No Acute Distress] : no acute distress [Normal Oropharynx] : normal oropharynx [Normal Appearance] : normal appearance [No Neck Mass] : no neck mass [Normal Rate/Rhythm] : normal rate/rhythm [Normal S1, S2] : normal s1, s2 [No Murmurs] : no murmurs [No Resp Distress] : no resp distress [No Acc Muscle Use] : no acc muscle use [Normal Rhythm and Effort] : normal rhythm and effort [Clear to Auscultation Bilaterally] : clear to auscultation bilaterally [Tenderness: ___] : tenderness: [unfilled] [Benign] : benign [Normal Gait] : normal gait [No Clubbing] : no clubbing [No Cyanosis] : no cyanosis [No Edema] : no edema [FROM] : FROM [Normal Color/ Pigmentation] : normal color/ pigmentation [No Focal Deficits] : no focal deficits [Oriented x3] : oriented x3 [Normal Affect] : normal affect

## 2021-08-18 NOTE — REVIEW OF SYSTEMS
[Cough] : cough [SOB on Exertion] : sob on exertion [GERD] : gerd [Negative] : Endocrine [TextBox_30] : Per HPI

## 2021-10-07 ENCOUNTER — EMERGENCY (EMERGENCY)
Facility: HOSPITAL | Age: 86
LOS: 1 days | Discharge: HOME | End: 2021-10-07
Attending: EMERGENCY MEDICINE
Payer: MEDICARE

## 2021-10-07 VITALS
DIASTOLIC BLOOD PRESSURE: 66 MMHG | RESPIRATION RATE: 20 BRPM | TEMPERATURE: 98 F | SYSTOLIC BLOOD PRESSURE: 130 MMHG | HEART RATE: 62 BPM | OXYGEN SATURATION: 95 %

## 2021-10-07 VITALS
DIASTOLIC BLOOD PRESSURE: 53 MMHG | RESPIRATION RATE: 20 BRPM | HEART RATE: 61 BPM | OXYGEN SATURATION: 97 % | TEMPERATURE: 98 F | SYSTOLIC BLOOD PRESSURE: 112 MMHG | HEIGHT: 64 IN

## 2021-10-07 DIAGNOSIS — R29.6 REPEATED FALLS: ICD-10-CM

## 2021-10-07 DIAGNOSIS — Z90.49 ACQUIRED ABSENCE OF OTHER SPECIFIED PARTS OF DIGESTIVE TRACT: Chronic | ICD-10-CM

## 2021-10-07 DIAGNOSIS — Z98.891 HISTORY OF UTERINE SCAR FROM PREVIOUS SURGERY: Chronic | ICD-10-CM

## 2021-10-07 DIAGNOSIS — Z98.51 TUBAL LIGATION STATUS: Chronic | ICD-10-CM

## 2021-10-07 LAB
ALBUMIN SERPL ELPH-MCNC: 3.4 G/DL — SIGNIFICANT CHANGE UP (ref 3.3–5.2)
ALP SERPL-CCNC: 53 U/L — SIGNIFICANT CHANGE UP (ref 40–120)
ALT FLD-CCNC: 18 U/L — SIGNIFICANT CHANGE UP
ANION GAP SERPL CALC-SCNC: 11 MMOL/L — SIGNIFICANT CHANGE UP (ref 5–17)
APPEARANCE UR: CLEAR — SIGNIFICANT CHANGE UP
AST SERPL-CCNC: 19 U/L — SIGNIFICANT CHANGE UP
BACTERIA # UR AUTO: NEGATIVE — SIGNIFICANT CHANGE UP
BASOPHILS # BLD AUTO: 0.02 K/UL — SIGNIFICANT CHANGE UP (ref 0–0.2)
BASOPHILS NFR BLD AUTO: 0.3 % — SIGNIFICANT CHANGE UP (ref 0–2)
BILIRUB SERPL-MCNC: 0.3 MG/DL — LOW (ref 0.4–2)
BILIRUB UR-MCNC: NEGATIVE — SIGNIFICANT CHANGE UP
BUN SERPL-MCNC: 14.3 MG/DL — SIGNIFICANT CHANGE UP (ref 8–20)
CALCIUM SERPL-MCNC: 9.1 MG/DL — SIGNIFICANT CHANGE UP (ref 8.6–10.2)
CHLORIDE SERPL-SCNC: 100 MMOL/L — SIGNIFICANT CHANGE UP (ref 98–107)
CO2 SERPL-SCNC: 24 MMOL/L — SIGNIFICANT CHANGE UP (ref 22–29)
COLOR SPEC: YELLOW — SIGNIFICANT CHANGE UP
CREAT SERPL-MCNC: 0.54 MG/DL — SIGNIFICANT CHANGE UP (ref 0.5–1.3)
DIFF PNL FLD: NEGATIVE — SIGNIFICANT CHANGE UP
EOSINOPHIL # BLD AUTO: 0.14 K/UL — SIGNIFICANT CHANGE UP (ref 0–0.5)
EOSINOPHIL NFR BLD AUTO: 2 % — SIGNIFICANT CHANGE UP (ref 0–6)
EPI CELLS # UR: NEGATIVE — SIGNIFICANT CHANGE UP
GLUCOSE SERPL-MCNC: 152 MG/DL — HIGH (ref 70–99)
GLUCOSE UR QL: NEGATIVE MG/DL — SIGNIFICANT CHANGE UP
HCT VFR BLD CALC: 34.7 % — SIGNIFICANT CHANGE UP (ref 34.5–45)
HGB BLD-MCNC: 11.8 G/DL — SIGNIFICANT CHANGE UP (ref 11.5–15.5)
IMM GRANULOCYTES NFR BLD AUTO: 0.7 % — SIGNIFICANT CHANGE UP (ref 0–1.5)
KETONES UR-MCNC: NEGATIVE — SIGNIFICANT CHANGE UP
LEUKOCYTE ESTERASE UR-ACNC: ABNORMAL
LYMPHOCYTES # BLD AUTO: 1 K/UL — SIGNIFICANT CHANGE UP (ref 1–3.3)
LYMPHOCYTES # BLD AUTO: 14 % — SIGNIFICANT CHANGE UP (ref 13–44)
MAGNESIUM SERPL-MCNC: 2.1 MG/DL — SIGNIFICANT CHANGE UP (ref 1.6–2.6)
MCHC RBC-ENTMCNC: 31.2 PG — SIGNIFICANT CHANGE UP (ref 27–34)
MCHC RBC-ENTMCNC: 34 GM/DL — SIGNIFICANT CHANGE UP (ref 32–36)
MCV RBC AUTO: 91.8 FL — SIGNIFICANT CHANGE UP (ref 80–100)
MONOCYTES # BLD AUTO: 0.58 K/UL — SIGNIFICANT CHANGE UP (ref 0–0.9)
MONOCYTES NFR BLD AUTO: 8.1 % — SIGNIFICANT CHANGE UP (ref 2–14)
NEUTROPHILS # BLD AUTO: 5.37 K/UL — SIGNIFICANT CHANGE UP (ref 1.8–7.4)
NEUTROPHILS NFR BLD AUTO: 74.9 % — SIGNIFICANT CHANGE UP (ref 43–77)
NITRITE UR-MCNC: NEGATIVE — SIGNIFICANT CHANGE UP
PH UR: 6.5 — SIGNIFICANT CHANGE UP (ref 5–8)
PLATELET # BLD AUTO: 162 K/UL — SIGNIFICANT CHANGE UP (ref 150–400)
POTASSIUM SERPL-MCNC: 4.7 MMOL/L — SIGNIFICANT CHANGE UP (ref 3.5–5.3)
POTASSIUM SERPL-SCNC: 4.7 MMOL/L — SIGNIFICANT CHANGE UP (ref 3.5–5.3)
PROT SERPL-MCNC: 5.5 G/DL — LOW (ref 6.6–8.7)
PROT UR-MCNC: 15 MG/DL
RBC # BLD: 3.78 M/UL — LOW (ref 3.8–5.2)
RBC # FLD: 13.6 % — SIGNIFICANT CHANGE UP (ref 10.3–14.5)
RBC CASTS # UR COMP ASSIST: SIGNIFICANT CHANGE UP /HPF (ref 0–4)
SARS-COV-2 RNA SPEC QL NAA+PROBE: SIGNIFICANT CHANGE UP
SODIUM SERPL-SCNC: 135 MMOL/L — SIGNIFICANT CHANGE UP (ref 135–145)
SP GR SPEC: 1.01 — SIGNIFICANT CHANGE UP (ref 1.01–1.02)
TROPONIN T SERPL-MCNC: <0.01 NG/ML — SIGNIFICANT CHANGE UP (ref 0–0.06)
TROPONIN T SERPL-MCNC: <0.01 NG/ML — SIGNIFICANT CHANGE UP (ref 0–0.06)
TSH SERPL-MCNC: 2.01 UIU/ML — SIGNIFICANT CHANGE UP (ref 0.27–4.2)
UROBILINOGEN FLD QL: NEGATIVE MG/DL — SIGNIFICANT CHANGE UP
WBC # BLD: 7.16 K/UL — SIGNIFICANT CHANGE UP (ref 3.8–10.5)
WBC # FLD AUTO: 7.16 K/UL — SIGNIFICANT CHANGE UP (ref 3.8–10.5)
WBC UR QL: SIGNIFICANT CHANGE UP

## 2021-10-07 PROCEDURE — 70450 CT HEAD/BRAIN W/O DYE: CPT | Mod: 26,MG

## 2021-10-07 PROCEDURE — 12001 RPR S/N/AX/GEN/TRNK 2.5CM/<: CPT

## 2021-10-07 PROCEDURE — G1004: CPT

## 2021-10-07 PROCEDURE — 81001 URINALYSIS AUTO W/SCOPE: CPT

## 2021-10-07 PROCEDURE — 84484 ASSAY OF TROPONIN QUANT: CPT

## 2021-10-07 PROCEDURE — 72170 X-RAY EXAM OF PELVIS: CPT

## 2021-10-07 PROCEDURE — 82962 GLUCOSE BLOOD TEST: CPT

## 2021-10-07 PROCEDURE — 72125 CT NECK SPINE W/O DYE: CPT | Mod: 26,MG

## 2021-10-07 PROCEDURE — 71250 CT THORAX DX C-: CPT | Mod: MA

## 2021-10-07 PROCEDURE — 84443 ASSAY THYROID STIM HORMONE: CPT

## 2021-10-07 PROCEDURE — 99283 EMERGENCY DEPT VISIT LOW MDM: CPT | Mod: 25

## 2021-10-07 PROCEDURE — 90715 TDAP VACCINE 7 YRS/> IM: CPT

## 2021-10-07 PROCEDURE — U0005: CPT

## 2021-10-07 PROCEDURE — 72170 X-RAY EXAM OF PELVIS: CPT | Mod: 26

## 2021-10-07 PROCEDURE — 99284 EMERGENCY DEPT VISIT MOD MDM: CPT | Mod: 25

## 2021-10-07 PROCEDURE — 90471 IMMUNIZATION ADMIN: CPT

## 2021-10-07 PROCEDURE — 70450 CT HEAD/BRAIN W/O DYE: CPT | Mod: MG

## 2021-10-07 PROCEDURE — 87086 URINE CULTURE/COLONY COUNT: CPT

## 2021-10-07 PROCEDURE — 80053 COMPREHEN METABOLIC PANEL: CPT

## 2021-10-07 PROCEDURE — 87186 SC STD MICRODIL/AGAR DIL: CPT

## 2021-10-07 PROCEDURE — 85025 COMPLETE CBC W/AUTO DIFF WBC: CPT

## 2021-10-07 PROCEDURE — 83735 ASSAY OF MAGNESIUM: CPT

## 2021-10-07 PROCEDURE — 72125 CT NECK SPINE W/O DYE: CPT | Mod: MG

## 2021-10-07 PROCEDURE — 36415 COLL VENOUS BLD VENIPUNCTURE: CPT

## 2021-10-07 PROCEDURE — U0003: CPT

## 2021-10-07 PROCEDURE — 71250 CT THORAX DX C-: CPT | Mod: 26,MA

## 2021-10-07 RX ORDER — TETANUS TOXOID, REDUCED DIPHTHERIA TOXOID AND ACELLULAR PERTUSSIS VACCINE, ADSORBED 5; 2.5; 8; 8; 2.5 [IU]/.5ML; [IU]/.5ML; UG/.5ML; UG/.5ML; UG/.5ML
0.5 SUSPENSION INTRAMUSCULAR ONCE
Refills: 0 | Status: COMPLETED | OUTPATIENT
Start: 2021-10-07 | End: 2021-10-07

## 2021-10-07 RX ADMIN — TETANUS TOXOID, REDUCED DIPHTHERIA TOXOID AND ACELLULAR PERTUSSIS VACCINE, ADSORBED 0.5 MILLILITER(S): 5; 2.5; 8; 8; 2.5 SUSPENSION INTRAMUSCULAR at 10:53

## 2021-10-07 NOTE — PHYSICAL THERAPY INITIAL EVALUATION ADULT - ADDITIONAL COMMENTS
owns and uses a SAC vs RW depending on the day, no stairs to enter home, does not need to negotiate stairs within home

## 2021-10-07 NOTE — ED ADULT NURSE NOTE - SUICIDE SCREENING QUESTION 1
ID Progress Note-- covering Dr Huff    Subjective:  Toshia DARNELL Cera is a 70 year old female  With H/O sepsis ,sacral and LE wounds  Abx completed    ROS: No new complaints    Objective: T 97.7  120/70     Gen: NAD Well nourished. Hydrated. Up in chair  Psych: Pleasant. Cooperative. Normal affect and mood.  HEENT: NC/AT. Pupils equal. Conjunctivae unremarkable. External ears/nose without abnormalities. Moist oral mucosa.  Neck: Supple, no LAD.  CV: RRR. No murmurs. No JVD.  Resp: CTA bilaterally. Good effort. Non labored respirations. No wheezing/rhonchi/rales appreciated.,  Brie: soft. Non-distended. Non-tender. No masses appreciated. No organomegaly.   Ext: No edema noted. Not cyanotic.   Skin: no rashes noted.  See Detailed wound care in the chart. Rt post thigh w/ slough in wds.       Recent Labs   Lab 07/09/21 0448 07/05/21  0456   WBC 5.9 5.8   RBC 3.16* 3.04*   HGB 8.0* 7.8*   HCT 25.5* 24.6*   MCV 80.7 80.9   MCH 25.3* 25.7*   MCHC 31.4* 31.7*    320       Recent Labs   Lab 07/09/21 0448 07/05/21  0456   CALCIUM 8.6 8.7   BUN 19 24*   ALBUMIN  --  2.1*   AST  --  10   CO2 23 25   CREATININE 0.86 0.77   ANIONGAP 11 10       No results found          Micro:   Gram Stain (no units)   Date Value   01/13/2021 Rare Polymorphonuclear cells.   01/13/2021 Moderate Epithelial cells.   01/13/2021 No organisms seen.         CULTURE (no units)   Date Value   06/14/2011     PSEUDOMONAS AERUGINOSA  METHICILLIN/OXACILLIN RESISTANT STAPH AUREUS VANCOMYCIN SENSITIVE  GRAM NEGATIVE RODS MIXED NON LACTOSE FERMENTERS CORRECTED ON   06/17 AT 1238:   PREVIOUSLY REPORTED AS SERRATIA MARCESCENS  This is a mixed culture of more than three potential pathogens.    Bacteria may   not relate to infection and may represent colonization or   contamination.    Please contact microbiology department if further workup is   warranted.  Performed at: [facility], [id number] [facility] [address],   [place]  [address]         Imaging: XR CHEST PA AND LATERAL  Narrative: XR CHEST PA AND LATERAL    HISTORY: Right-sided chest pain.    COMPARATIVE STUDY: 3/7/2013    FINDINGS: The cardiac silhouette appears within normal limits in size and  stable. The pulmonary vasculature is normal. The lungs are clear. Mild  degenerative changes seen involving the thoracic spine endplates. No  pneumothorax or pleural fluid is seen.  Impression: IMPRESSION: Stable heart and lungs.          Assessment/Plan:     70 year old female With sepsis resolved  Sacral/lower extremity wounds--consider debridement  Pseudomonas Infection  Coag neg staph bacteremia likely contaminant  Chronic Lymphedema  LE Cellulitis  Morbid obesity         Completed Vancomycin and Cefepime    Continue wound care per Dr Payne   D/W Patient            Ehsan Ricketts MD  7/10/2021               No

## 2021-10-07 NOTE — ED PROVIDER NOTE - ATTENDING CONTRIBUTION TO CARE
I, Petra Guerrero, have personally seen and examined this patient. I have fully participated in the care of this patient. I have reviewed all pertinent clinical information, including history, physical exam, plan and the Resident's note and agree except as noted below.     85yo F with CAD, PPM, HLD, HTN, asthma, frequent falls presenting after 2 falls, supposed to use cane was not using it last night when she tripped, and also not using it today when she fell, states she stood up and went to walk and fell. denies any dizziness/CP/SOB/HA before, fell backward and hit head, unsure if any LOC. not on A/C. no focal weakness. complaint of rt sided rib cage pain after fall. unknown last tdap.     Gen: NAD, AOx3  Head: NC, old scrape to forehead and nasal bridge, new lac to rt posterior occiput  HEENT: EOMI, oral mucosa moist, normal conjunctiva, neck supple  Lung: CTAB, no respiratory distress  CV: rrr, no murmur, Normal perfusion  Abd: soft, NTND  MSK: No edema, no visible deformities  Neuro: No focal neurologic deficits, CN II-XII intact, 5/5 global strength, sensation intact, no dysmetria  Skin: No rash   Psych: normal affect     ekg- paced with prolong QTc, patient unsure of PPM Dr. Gonzales is cardiologist. patient denying any cardiac sx prior but due to frequent nature of falls will check labs, ct head/neck/chest for rib fx. interrogate device. check UA. reasses for dispo decision

## 2021-10-07 NOTE — ED ADULT NURSE NOTE - OBJECTIVE STATEMENT
pt a&ox3, vss, biba s/p syncopal event. pt attempted to stand up felt dizzy and passed out. pt admits to hitting head and + LOC. pt is also complaining of R sided rib pain. pt denies cp, ha, sob, n/v/d. pt has [employer] pacemaker in place. pt placed on cardiac monitor/. respirations even and unlabored. POC discussed w/ patient. will continue to monitor.

## 2021-10-07 NOTE — ED PROVIDER NOTE - CLINICAL SUMMARY MEDICAL DECISION MAKING FREE TEXT BOX
86y F w/ hx CAD, HTN, COPD, presenting for syncope and head injury.  Pt AAOx3, not on anticoagulation.  Will obtain CT head/cspine/chest, EKG, labs, UA.  Update tetanus, repair scalp lac. 86y F w/ hx CAD, HTN, COPD, presenting for possible syncope and head injury.  Pt AAOx3, not on anticoagulation.  Will obtain CT head/cspine/chest, EKG, labs, UA.  Update tetanus, repair scalp lac.

## 2021-10-07 NOTE — ED PROVIDER NOTE - PROGRESS NOTE DETAILS
Joaquim: Diagnostic results reviewed.  Spoke with pt's daughter at bedside, who is concerned that pt has fallen 3 times over the past week.  PT consult ordered.  Will interrogate [employer] pacemaker. March: [employer] PPM interrogated -- no events recorded. Joaquim: Pt cleared by PT.  Spoke with daughter Belkys, who is comfortable with plan for discharge.

## 2021-10-07 NOTE — ED PROVIDER NOTE - PATIENT PORTAL LINK FT
You can access the FollowMyHealth Patient Portal offered by [facility] by registering at the following website: [email or web address]. By joining [employer]’s FollowMyHealth portal, you will also be able to view your health information using other applications (apps) compatible with our system.

## 2021-10-07 NOTE — ED PROVIDER NOTE - OBJECTIVE STATEMENT
86y F w/ hx CAD s/p stent, s/p PPM, HTN, hypothyroid, COPD, L hip fracture, presenting for syncope.  Pt states that she fell last night, and then again this morning.  States that she was trying to get up from a chair when she felt dizzy described as feeling lightheaded and then fell, hitting her head.  Thinks she may have briefly lost consciousness.  Was not using her cane when she fell.  Denies chest pain, SOB, palpitations prior to fall.  Now complains of pain to back of her head and R sided chest wall pain.  No blood thinners.  Last tetanus unknown.    Cardiologist: Janet 86y F w/ hx CAD s/p stent, s/p PPM, HTN, hypothyroid, COPD, L hip fracture, presenting for possible syncopal episode.  Pt states that she fell last night, and then again this morning.  States that she was trying to get up from a chair when she felt dizzy described as feeling lightheaded and then fell, hitting her head.  Thinks she may have briefly lost consciousness.  Was not using her cane when she fell.  Denies chest pain, SOB, palpitations prior to fall.  Now complains of pain to back of her head and R sided chest wall pain.  No blood thinners.  Last tetanus unknown.    Cardiologist: Janet

## 2021-10-07 NOTE — ED ADULT TRIAGE NOTE - CHIEF COMPLAINT QUOTE
pt a+ox3, BIBA from home s/p syncopal episode. pt states she went to stand up, felt dizzy and passed out. hx of pacemaker. denies chest pain or SOB .

## 2021-10-07 NOTE — ED PROVIDER NOTE - PHYSICAL EXAMINATION
Constitutional: Awake, alert, in no acute distress  Eyes: no scleral icterus  HENT: normocephalic, +3 cm laceration to R parietal scalp, +scabbed wounds to forehead from recent skin biopsy; moist oral mucosa  Neck: supple, non-tender  CV: RRR, no murmur, +tenderness to R lateral chest wall  Pulm: non-labored respirations, CTAB  Abdomen: soft, non-tender, non-distended  Extremities: pelvis stable, no edema, no deformity  Skin: no rash, no jaundice  Neuro: AAOx3, CNs II-XII intact, no facial asymmetry, 5/5 strength and sensation in all extremities, no finger-to-nose or heel-to-shin dysmetria Constitutional: Awake, alert, in no acute distress  Eyes: no scleral icterus  HENT: normocephalic, +2.5 cm laceration to R parietal scalp, +scabbed wounds to forehead from recent skin biopsy; moist oral mucosa  Neck: supple, non-tender  CV: RRR, no murmur, +tenderness to R lateral chest wall  Pulm: non-labored respirations, CTAB  Abdomen: soft, non-tender, non-distended  Extremities: pelvis stable, no edema, no deformity  Skin: no rash, no jaundice  Neuro: AAOx3, CNs II-XII intact, no facial asymmetry, 5/5 strength and sensation in all extremities, no finger-to-nose or heel-to-shin dysmetria

## 2021-10-07 NOTE — ED PROVIDER NOTE - NSFOLLOWUPINSTRUCTIONS_ED_ALL_ED_FT
- Return to the emergency in 7-10 days for staple removal, or follow-up with your primary care doctor.  - Stay hydrated, and take your time when getting up from lying down.  - Make sure to use your cane.  - Return to the emergency room for any dizziness, passing out, chest pain, shortness of breath, severe headache, confusion or other issues.    ======================    Head Injury    AMBULATORY CARE:    A head injury can include your scalp, face, skull, or brain and range from mild to severe. Effects can appear immediately after the injury or develop later. The effects may last a short time or be permanent. Healthcare providers may want to check your recovery over time. Treatment may change as you recover or develop new health problems from the head injury.    Common signs and symptoms:   •An open scalp or skin wound, swelling, or bruising    •Mild to moderate headache    •Dizziness or loss of balance    •Nausea or vomiting    •Ringing in the ears or neck pain    •Confusion, especially right after the injury    •Change in mood, such as feeling restless or irritable    •Trouble thinking, remembering, or concentrating    •Drowsiness or decreased amount of energy    •Trouble sleeping    Call your local emergency number (911 in the [place]), or have someone else call if:   •You cannot be woken.    •You have a seizure.    •You stop responding to others or you faint.    •You have blurry or double vision.    •Your speech becomes slurred or confused.    •You have arm or leg weakness, loss of feeling, or new problems with coordination.    •Your pupils are larger than usual, or one pupil is a different size than the other.    •You have blood or clear fluid coming out of your ears or nose.    Seek care immediately if:   •You have repeated or forceful vomiting.    •You feel confused.    •Your headache gets worse or becomes severe.    •You or someone caring for you notices that you are harder to wake than usual.    Call your doctor if:   •Your symptoms last longer than 6 weeks after the injury.    •You have questions or concerns about your condition or care.    Medicines:   •Acetaminophen decreases pain and fever. It is available without a doctor's order. Ask how much to take and how often to take it. Follow directions. Read the labels of all other medicines you are using to see if they also contain acetaminophen, or ask your doctor or pharmacist. Acetaminophen can cause liver damage if not taken correctly. Do not use more than 4 grams (4,000 milligrams) total of acetaminophen in one day.     •Take your medicine as directed. Contact your healthcare provider if you think your medicine is not helping or if you have side effects. Tell him or her if you are allergic to any medicine. Keep a list of the medicines, vitamins, and herbs you take. Include the amounts, and when and why you take them. Bring the list or the pill bottles to follow-up visits. Carry your medicine list with you in case of an emergency.    Self-care:   •Rest or do quiet activities. Limit your time watching TV, using the computer, or doing tasks that require a lot of thinking. Slowly return to your normal activities as directed. Do not play sports or do activities that may cause you to get hit in the head. Ask your healthcare provider when you can return to sports.    •Apply ice on your head for 15 to 20 minutes every hour or as directed. Use an ice pack, or put crushed ice in a plastic bag. Cover it with a towel before you apply it to your skin. Ice helps prevent tissue damage and decreases swelling and pain.    •Have someone stay with you for 24 hours , or as directed. This person can monitor you for problems and call for help if needed. When you are awake, the person should ask you a few questions every few hours to see if you are thinking clearly. An example is to ask your name or address.    Prevent another head injury:   •Wear a helmet that fits properly. Do this when you play sports, or ride a bike, scooter, or skateboard. Helmets help decrease your risk for a serious head injury. Talk to your healthcare provider about other ways you can protect yourself if you play sports.    •Wear your seatbelt every time you are in a car. This helps lower your risk for a head injury if you are in a car accident.    Follow up with your doctor as directed: Write down your questions so you remember to ask them during your visits.

## 2021-10-07 NOTE — PROVIDER CONTACT NOTE (OTHER) - ASSESSMENT
PT ordered. chart reviewed and noted. pt received in ED, semifowler position in stretcher, agreeable to PT. pt at baseline function, will not follow. pt left as received, 0/10 pain throughout session, NAD, Dr. Guerrero aware of function, all lines intact

## 2021-10-18 ENCOUNTER — APPOINTMENT (OUTPATIENT)
Dept: OBGYN | Facility: CLINIC | Age: 86
End: 2021-10-18
Payer: MEDICARE

## 2021-10-18 VITALS
WEIGHT: 113 LBS | DIASTOLIC BLOOD PRESSURE: 80 MMHG | HEIGHT: 64 IN | BODY MASS INDEX: 19.29 KG/M2 | SYSTOLIC BLOOD PRESSURE: 128 MMHG

## 2021-10-18 DIAGNOSIS — M81.0 AGE-RELATED OSTEOPOROSIS W/OUT CURRENT PATHOLOGICAL FRACTURE: ICD-10-CM

## 2021-10-18 DIAGNOSIS — Z00.00 ENCOUNTER FOR GENERAL ADULT MEDICAL EXAMINATION W/OUT ABNORMAL FINDINGS: ICD-10-CM

## 2021-10-18 PROCEDURE — 99213 OFFICE O/P EST LOW 20 MIN: CPT

## 2021-10-18 NOTE — REASON FOR VISIT
[Follow-Up] : a follow-up evaluation of [FreeTextEntry2] : osteoporosis. The patient discontinued alendronate.

## 2022-01-21 ENCOUNTER — APPOINTMENT (OUTPATIENT)
Dept: PULMONOLOGY | Facility: CLINIC | Age: 87
End: 2022-01-21
Payer: MEDICARE

## 2022-01-21 VITALS
HEIGHT: 64 IN | RESPIRATION RATE: 16 BRPM | HEART RATE: 70 BPM | DIASTOLIC BLOOD PRESSURE: 80 MMHG | BODY MASS INDEX: 18.78 KG/M2 | OXYGEN SATURATION: 93 % | WEIGHT: 110 LBS | SYSTOLIC BLOOD PRESSURE: 140 MMHG

## 2022-01-21 PROCEDURE — 99214 OFFICE O/P EST MOD 30 MIN: CPT

## 2022-01-21 RX ORDER — ALBUTEROL SULFATE 2.5 MG/3ML
(2.5 MG/3ML) SOLUTION RESPIRATORY (INHALATION)
Qty: 1 | Refills: 3 | Status: ACTIVE | COMMUNITY
Start: 2022-01-21 | End: 1900-01-01

## 2022-01-21 RX ORDER — NITROGLYCERIN 0.4 MG/1
0.4 TABLET SUBLINGUAL
Refills: 0 | Status: ACTIVE | COMMUNITY

## 2022-01-21 RX ORDER — PHENAZOPYRIDINE HYDROCHLORIDE 100 MG/1
100 TABLET ORAL
Refills: 0 | Status: ACTIVE | COMMUNITY

## 2022-01-21 RX ORDER — AZITHROMYCIN 250 MG/1
250 TABLET, FILM COATED ORAL
Qty: 6 | Refills: 0 | Status: ACTIVE | COMMUNITY
Start: 2022-01-21 | End: 1900-01-01

## 2022-01-21 RX ORDER — FLUTICASONE PROPIONATE AND SALMETEROL 250; 50 UG/1; UG/1
250-50 POWDER RESPIRATORY (INHALATION)
Qty: 3 | Refills: 3 | Status: ACTIVE | COMMUNITY
Start: 2022-01-21 | End: 1900-01-01

## 2022-01-21 RX ORDER — ALENDRONATE SODIUM 70 MG/1
70 TABLET ORAL
Qty: 12 | Refills: 1 | Status: DISCONTINUED | COMMUNITY
Start: 2021-04-15 | End: 2022-01-21

## 2022-01-21 NOTE — ASSESSMENT
[FreeTextEntry1] : 87 yo lady with mild COPD , patient of Dr Romeo\par Has had bilateral effusions noted in the past\par s/p PM, s/p repair hip fracture\par \par Regular regimen is advair and albuterol\par Patient notes incr congestion and some sputum over last week or so\par \par Imp 87 yo lady with COPD on Advair and albuterol PRN\par Mild intercurrent bronchitis\par She requests antibiotic which I think is not unreasonable\par \par Azithro\par RTC 4 months

## 2022-01-21 NOTE — HISTORY OF PRESENT ILLNESS
[TextBox_4] : 87 yo lady with mild COPD , patient of Dr Romeo\par Has had bilateral effusions noted in the past\par s/p PM, s/p repair hip fracture\par \par Regular regimen is advair and albuterol\par Patient notes incr congestion and some sputum over last week or so

## 2022-01-24 ENCOUNTER — RX CHANGE (OUTPATIENT)
Age: 87
End: 2022-01-24

## 2022-01-24 RX ORDER — ALBUTEROL SULFATE 2.5 MG/3ML
(2.5 MG/3ML) SOLUTION RESPIRATORY (INHALATION)
Qty: 1 | Refills: 3 | Status: ACTIVE | COMMUNITY
Start: 2022-01-21 | End: 1900-01-01

## 2022-01-24 RX ORDER — ALBUTEROL SULFATE 2.5 MG/3ML
(2.5 MG/3ML) SOLUTION RESPIRATORY (INHALATION)
Qty: 6 | Refills: 1 | Status: ACTIVE | COMMUNITY
Start: 2020-01-08 | End: 1900-01-01

## 2022-02-16 ENCOUNTER — APPOINTMENT (OUTPATIENT)
Dept: PULMONOLOGY | Facility: CLINIC | Age: 87
End: 2022-02-16

## 2022-02-25 NOTE — PHYSICAL EXAM
[No Acute Distress] : no acute distress [Normal Oropharynx] : normal oropharynx [Normal Appearance] : normal appearance [No Neck Mass] : no neck mass [Normal Rate/Rhythm] : normal rate/rhythm [Normal S1, S2] : normal s1, s2 [No Murmurs] : no murmurs [No Resp Distress] : no resp distress [No Acc Muscle Use] : no acc muscle use [Normal Rhythm and Effort] : normal rhythm and effort stated [Clear to Auscultation Bilaterally] : clear to auscultation bilaterally [Tenderness: ___] : tenderness: [unfilled] [Benign] : benign [Normal Gait] : normal gait [No Clubbing] : no clubbing [No Cyanosis] : no cyanosis [No Edema] : no edema [FROM] : FROM [Normal Color/ Pigmentation] : normal color/ pigmentation [No Focal Deficits] : no focal deficits [Oriented x3] : oriented x3 [Normal Affect] : normal affect [TextBox_68] : Bronchial breath sounds LLL

## 2022-02-28 ENCOUNTER — EMERGENCY (EMERGENCY)
Facility: HOSPITAL | Age: 87
LOS: 1 days | Discharge: HOME | End: 2022-02-28
Attending: EMERGENCY MEDICINE
Payer: MEDICARE

## 2022-02-28 VITALS
DIASTOLIC BLOOD PRESSURE: 56 MMHG | TEMPERATURE: 98 F | OXYGEN SATURATION: 96 % | HEART RATE: 60 BPM | HEIGHT: 64 IN | WEIGHT: 100.97 LBS | SYSTOLIC BLOOD PRESSURE: 112 MMHG | RESPIRATION RATE: 18 BRPM

## 2022-02-28 DIAGNOSIS — Z90.49 ACQUIRED ABSENCE OF OTHER SPECIFIED PARTS OF DIGESTIVE TRACT: Chronic | ICD-10-CM

## 2022-02-28 DIAGNOSIS — Z98.51 TUBAL LIGATION STATUS: Chronic | ICD-10-CM

## 2022-02-28 DIAGNOSIS — Z98.891 HISTORY OF UTERINE SCAR FROM PREVIOUS SURGERY: Chronic | ICD-10-CM

## 2022-02-28 LAB
ALBUMIN SERPL ELPH-MCNC: 3.5 G/DL — SIGNIFICANT CHANGE UP (ref 3.3–5.2)
ALP SERPL-CCNC: 69 U/L — SIGNIFICANT CHANGE UP (ref 40–120)
ALT FLD-CCNC: 23 U/L — SIGNIFICANT CHANGE UP
ANION GAP SERPL CALC-SCNC: 9 MMOL/L — SIGNIFICANT CHANGE UP (ref 5–17)
AST SERPL-CCNC: 22 U/L — SIGNIFICANT CHANGE UP
BASOPHILS # BLD AUTO: 0.03 K/UL — SIGNIFICANT CHANGE UP (ref 0–0.2)
BASOPHILS NFR BLD AUTO: 0.3 % — SIGNIFICANT CHANGE UP (ref 0–2)
BILIRUB SERPL-MCNC: 0.3 MG/DL — LOW (ref 0.4–2)
BUN SERPL-MCNC: 11.5 MG/DL — SIGNIFICANT CHANGE UP (ref 8–20)
CALCIUM SERPL-MCNC: 9.3 MG/DL — SIGNIFICANT CHANGE UP (ref 8.6–10.2)
CHLORIDE SERPL-SCNC: 98 MMOL/L — SIGNIFICANT CHANGE UP (ref 98–107)
CK SERPL-CCNC: 47 U/L — SIGNIFICANT CHANGE UP (ref 25–170)
CO2 SERPL-SCNC: 25 MMOL/L — SIGNIFICANT CHANGE UP (ref 22–29)
CREAT SERPL-MCNC: 0.57 MG/DL — SIGNIFICANT CHANGE UP (ref 0.5–1.3)
EOSINOPHIL # BLD AUTO: 0.14 K/UL — SIGNIFICANT CHANGE UP (ref 0–0.5)
EOSINOPHIL NFR BLD AUTO: 1.5 % — SIGNIFICANT CHANGE UP (ref 0–6)
FLUAV AG NPH QL: SIGNIFICANT CHANGE UP
FLUBV AG NPH QL: SIGNIFICANT CHANGE UP
GLUCOSE SERPL-MCNC: 144 MG/DL — HIGH (ref 70–99)
HCT VFR BLD CALC: 39.4 % — SIGNIFICANT CHANGE UP (ref 34.5–45)
HGB BLD-MCNC: 13.2 G/DL — SIGNIFICANT CHANGE UP (ref 11.5–15.5)
IMM GRANULOCYTES NFR BLD AUTO: 0.6 % — SIGNIFICANT CHANGE UP (ref 0–1.5)
LIDOCAIN IGE QN: 8 U/L — LOW (ref 22–51)
LYMPHOCYTES # BLD AUTO: 0.86 K/UL — LOW (ref 1–3.3)
LYMPHOCYTES # BLD AUTO: 9.1 % — LOW (ref 13–44)
MCHC RBC-ENTMCNC: 31.1 PG — SIGNIFICANT CHANGE UP (ref 27–34)
MCHC RBC-ENTMCNC: 33.5 GM/DL — SIGNIFICANT CHANGE UP (ref 32–36)
MCV RBC AUTO: 92.7 FL — SIGNIFICANT CHANGE UP (ref 80–100)
MONOCYTES # BLD AUTO: 0.66 K/UL — SIGNIFICANT CHANGE UP (ref 0–0.9)
MONOCYTES NFR BLD AUTO: 7 % — SIGNIFICANT CHANGE UP (ref 2–14)
NEUTROPHILS # BLD AUTO: 7.69 K/UL — HIGH (ref 1.8–7.4)
NEUTROPHILS NFR BLD AUTO: 81.5 % — HIGH (ref 43–77)
PLATELET # BLD AUTO: 193 K/UL — SIGNIFICANT CHANGE UP (ref 150–400)
POTASSIUM SERPL-MCNC: 4.5 MMOL/L — SIGNIFICANT CHANGE UP (ref 3.5–5.3)
POTASSIUM SERPL-SCNC: 4.5 MMOL/L — SIGNIFICANT CHANGE UP (ref 3.5–5.3)
PROT SERPL-MCNC: 6.4 G/DL — LOW (ref 6.6–8.7)
RBC # BLD: 4.25 M/UL — SIGNIFICANT CHANGE UP (ref 3.8–5.2)
RBC # FLD: 13.1 % — SIGNIFICANT CHANGE UP (ref 10.3–14.5)
RSV RNA NPH QL NAA+NON-PROBE: SIGNIFICANT CHANGE UP
SARS-COV-2 RNA SPEC QL NAA+PROBE: DETECTED
SODIUM SERPL-SCNC: 132 MMOL/L — LOW (ref 135–145)
WBC # BLD: 9.44 K/UL — SIGNIFICANT CHANGE UP (ref 3.8–10.5)
WBC # FLD AUTO: 9.44 K/UL — SIGNIFICANT CHANGE UP (ref 3.8–10.5)

## 2022-02-28 PROCEDURE — 85025 COMPLETE CBC W/AUTO DIFF WBC: CPT

## 2022-02-28 PROCEDURE — 80053 COMPREHEN METABOLIC PANEL: CPT

## 2022-02-28 PROCEDURE — 83690 ASSAY OF LIPASE: CPT

## 2022-02-28 PROCEDURE — 70450 CT HEAD/BRAIN W/O DYE: CPT | Mod: MB

## 2022-02-28 PROCEDURE — 99284 EMERGENCY DEPT VISIT MOD MDM: CPT | Mod: FS,CS

## 2022-02-28 PROCEDURE — 93005 ELECTROCARDIOGRAM TRACING: CPT

## 2022-02-28 PROCEDURE — 36415 COLL VENOUS BLD VENIPUNCTURE: CPT

## 2022-02-28 PROCEDURE — 87637 SARSCOV2&INF A&B&RSV AMP PRB: CPT

## 2022-02-28 PROCEDURE — 99285 EMERGENCY DEPT VISIT HI MDM: CPT | Mod: 25

## 2022-02-28 PROCEDURE — 70450 CT HEAD/BRAIN W/O DYE: CPT | Mod: 26,MB

## 2022-02-28 PROCEDURE — 82550 ASSAY OF CK (CPK): CPT

## 2022-02-28 PROCEDURE — 93010 ELECTROCARDIOGRAM REPORT: CPT

## 2022-02-28 RX ORDER — SODIUM CHLORIDE 9 MG/ML
1000 INJECTION INTRAMUSCULAR; INTRAVENOUS; SUBCUTANEOUS
Refills: 0 | Status: DISCONTINUED | OUTPATIENT
Start: 2022-02-28 | End: 2022-03-04

## 2022-02-28 RX ADMIN — SODIUM CHLORIDE 125 MILLILITER(S): 9 INJECTION INTRAMUSCULAR; INTRAVENOUS; SUBCUTANEOUS at 05:29

## 2022-02-28 NOTE — ED PROVIDER NOTE - OBJECTIVE STATEMENT
pt is a 87 y/o female with a pmhx of DM, LBBB, MI, HTN, HLD, hypothyroid, orthostatic hypotension, presenting to the ed for evaluation. pt states that she took milk of magnesia before bed because she felt she was a "little constipated". pt states the medication kicked in over night. pt states she went to get up from bed felt dizzy, and started shaking. pt states her mouth is very dry. pt with no head injuries or trauma. pt denies urinary or fecla incontinence spoke to patients daughter who lives with patient and states that patient had episode of orthostatic hypotension when she became dizzy tonight, pt with similar episodes before in the past. pt cardiologist is dr lee, follows closely. pt denies cp sob headache neck pain visual changes abd pain nausea vomiting numbness or loss of sensation pt is a 87 y/o female with a pmhx of DM, LBBB, MI, HTN, HLD, hypothyroid, orthostatic hypotension, presenting to the ed for evaluation. pt states that she took milk of magnesia before bed because she felt she was a "little constipated". pt states the medication kicked in over night. pt states she went to get up from bed felt dizzy, and started shaking. pt states her mouth is very dry. pt with no head injuries or trauma. pt denies urinary or fecal incontinence spoke to patients daughter who lives with patient and states that patient had episode of orthostatic hypotension when she became dizzy tonight, pt with similar episodes before in the past. pt cardiologist is dr lee, follows closely. pt denies cp sob headache neck pain visual changes abd pain nausea vomiting numbness or loss of sensation

## 2022-02-28 NOTE — ED ADULT NURSE NOTE - NSIMPLEMENTINTERV_GEN_ALL_ED
Implemented All Fall with Harm Risk Interventions:  [place] to call system. Call bell, personal items and telephone within reach. Instruct patient to call for assistance. Room bathroom lighting operational. Non-slip footwear when patient is off stretcher. Physically safe environment: no spills, clutter or unnecessary equipment. Stretcher in lowest position, wheels locked, appropriate side rails in place. Provide visual cue, wrist band, yellow gown, etc. Monitor gait and stability. Monitor for mental status changes and reorient to person, place, and time. Review medications for side effects contributing to fall risk. Reinforce activity limits and safety measures with patient and family. Provide visual clues: red socks.

## 2022-02-28 NOTE — ED ADULT TRIAGE NOTE - CHIEF COMPLAINT QUOTE
BIBEMS from home reporting soft stools since 3am. Patient took Milk of Magnesia before bed because she was "a little constipated" and "it usually kicks in when she wakes up in the morning but today it started to work early." Patient arrives after having active BM in the ambulance. Reports continence of bowel and bladder just that she couldn't make it to the bathroom at home. Denies abdominal pain, CP/SOB.

## 2022-02-28 NOTE — ED PROVIDER NOTE - ATTENDING CONTRIBUTION TO CARE
Luigi: I performed a face to face bedside interview with patient regarding history of present illness, review of symptoms and past medical history. I completed an independent physical exam.  I have discussed patient's plan of care with advanced care provider.   I agree with note as stated above including HISTORY OF PRESENT ILLNESS, HIV, PAST MEDICAL/SURGICAL/FAMILY/SOCIAL HISTORY, ALLERGIES AND HOME MEDICATIONS, REVIEW OF SYSTEMS, PHYSICAL EXAM, MEDICAL DECISION MAKING and any PROGRESS NOTES during the time I functioned as the attending physician for this patient  unless otherwise noted. My brief assessment is as follows: 86F presenting s/p episode of dizziness. Woke up feeling shaky, got out of bed and felt dizzy. Mouth is dry. Daughter reports that patient has had orthostatic hypotension in past. Patient currently asymptomatic and in usual state of health. Denies CP, SOB, nausea, vomiting. Benign exam. Plan for ct head, lab work, IV fluids, reassess.

## 2022-02-28 NOTE — ED PROVIDER NOTE - PHYSICAL EXAMINATION
Const: Awake, alert and oriented. In no acute distress. Well appearing.  HEENT: NC/AT. Moist mucous membranes.  Eyes: No scleral icterus. EOMI.  Neck:. Soft and supple. Full ROM without pain.  Cardiac: +S1/S2. No murmurs. Peripheral pulses 2+ and symmetric. No LE edema.  Resp: Speaking in full sentences. No evidence of respiratory distress. No wheezes, rales or rhonchi.  Abd: Soft, non-tender, non-distended. Normal bowel sounds in all 4 quadrants. No guarding or rebound.  Back: Spine midline and non-tender. No CVAT.  Skin: No rashes, abrasions or lacerations.  Lymph: No cervical lymphadenopathy.  Neuro: Awake, alert & oriented x 3. CN II-XII intact finger to nose intact neurovasculary intact muscle strength fair gait without ataxia

## 2022-02-28 NOTE — ED ADULT NURSE NOTE - OBJECTIVE STATEMENT
pt a&ox3 states she was laying in bed sleeping and began to have the shakes, pt states this has happened in the past and it just passes. pt states she has had loose stools because she took laxative before going to sleeping. pt a&ox3 states she was laying in bed sleeping and began to have the shakes, pt states this has happened in the past and it just passes. pt states she has had loose stools because she took laxative before going to sleeping. pt placed on CM BBB spo2 WNL pt currently resting in stretcher. stretcher placed in lowest position safety maintained

## 2022-02-28 NOTE — ED PROVIDER NOTE - PATIENT PORTAL LINK FT
You can access the FollowMyHealth Patient Portal offered by [facility] by registering at the following website: [email or web address]. By joining [employer]’s FollowMyHealth portal, you will also be able to view your health information using other applications (apps) compatible with our system.

## 2022-02-28 NOTE — ED PROVIDER NOTE - CLINICAL SUMMARY MEDICAL DECISION MAKING FREE TEXT BOX
obtain ct head, lab work fluids reassess 86F presenting s/p episode of dizziness. Woke up feeling shaky, got out of bed and felt dizzy. Mouth is dry. Daughter reports that patient has had orthostatic hypotension in past. Patient currently asymptomatic and in usual state of health. Denies CP, SOB, nausea, vomiting. Benign exam. Plan for ct head, lab work, IV fluids, reassess.

## 2022-02-28 NOTE — ED ADULT NURSE NOTE - NS PRO PASSIVE SMOKE EXP
Unknown Saucerization Excision Additional Text (Leave Blank If You Do Not Want): The margin was drawn around the clinically apparent lesion.  Incisions were then made along these lines, in a tangential fashion, to the appropriate tissue plane and the lesion was extirpated.

## 2022-04-28 ENCOUNTER — APPOINTMENT (OUTPATIENT)
Dept: OBGYN | Facility: CLINIC | Age: 87
End: 2022-04-28

## 2022-05-23 ENCOUNTER — APPOINTMENT (OUTPATIENT)
Dept: PULMONOLOGY | Facility: CLINIC | Age: 87
End: 2022-05-23

## 2022-05-27 ENCOUNTER — APPOINTMENT (OUTPATIENT)
Dept: NEUROLOGY | Facility: CLINIC | Age: 87
End: 2022-05-27
Payer: MEDICARE

## 2022-05-27 VITALS
HEIGHT: 63 IN | WEIGHT: 104 LBS | BODY MASS INDEX: 18.43 KG/M2 | SYSTOLIC BLOOD PRESSURE: 140 MMHG | DIASTOLIC BLOOD PRESSURE: 70 MMHG

## 2022-05-27 DIAGNOSIS — R25.1 TREMOR, UNSPECIFIED: ICD-10-CM

## 2022-05-27 PROCEDURE — 99204 OFFICE O/P NEW MOD 45 MIN: CPT

## 2022-05-27 NOTE — CONSULT LETTER
[Dear  ___] : Dear  [unfilled], [Consult Letter:] : I had the pleasure of evaluating your patient, [unfilled]. [Please see my note below.] : Please see my note below. [Consult Closing:] : Thank you very much for allowing me to participate in the care of this patient.  If you have any questions, please do not hesitate to contact me. [Sincerely,] : Sincerely, [FreeTextEntry3] : Owen Agustin MD, PhD, DPN-N\par [facility]\par Neurology at [place]\par Chief, Division of Neurology\par [facility]\par

## 2022-05-27 NOTE — PHYSICAL EXAM
[General Appearance - Alert] : alert [General Appearance - In No Acute Distress] : in no acute distress [General Appearance - Well-Appearing] : healthy appearing [Oriented To Time, Place, And Person] : oriented to person, place, and time [Impaired Insight] : insight and judgment were intact [Affect] : the affect was normal [Memory Recent] : recent memory was not impaired [Person] : oriented to person [Place] : oriented to place [Time] : oriented to time [Concentration Intact] : normal concentrating ability [Fluency] : fluency intact [Comprehension] : comprehension intact [Cranial Nerves Optic (II)] : visual acuity intact bilaterally,  visual fields full to confrontation, pupils equal round and reactive to light [Cranial Nerves Oculomotor (III)] : extraocular motion intact [Cranial Nerves Trigeminal (V)] : facial sensation intact symmetrically [Cranial Nerves Facial (VII)] : face symmetrical [Cranial Nerves Vestibulocochlear (VIII)] : hearing was intact bilaterally [Cranial Nerves Glossopharyngeal (IX)] : tongue and palate midline [Cranial Nerves Accessory (XI - Cranial And Spinal)] : head turning and shoulder shrug symmetric [Cranial Nerves Hypoglossal (XII)] : there was no tongue deviation with protrusion [Motor Tone] : muscle tone was normal in all four extremities [Motor Strength] : muscle strength was normal in all four extremities [No Muscle Atrophy] : normal bulk in all four extremities [Paresis Pronator Drift Right-Sided] : no pronator drift on the right [Paresis Pronator Drift Left-Sided] : no pronator drift on the left [Sensation Tactile Decrease] : light touch was intact [Sensation Pain / Temperature Decrease] : pain and temperature was intact [Romberg's Sign] : Romberg's sign was negtive [Balance] : balance was intact [Past-pointing] : there was no past-pointing [Tremor] : no tremor present [Coordination - Dysmetria Impaired Finger-to-Nose Bilateral] : not present [Coordination - Dysmetria Impaired Heel-to-Shin Bilateral] : not present [2+] : Ankle jerk left 2+ [Plantar Reflex Right Only] : normal on the right [Plantar Reflex Left Only] : normal on the left [FreeTextEntry8] : Gait mildly unsteady. [PERRL With Normal Accommodation] : pupils were equal in size, round, reactive to light, with normal accommodation [Extraocular Movements] : extraocular movements were intact [Full Visual Field] : full visual field

## 2022-05-27 NOTE — HISTORY OF PRESENT ILLNESS
[FreeTextEntry1] : She is here with her daughter.  Patient has what she calls blackouts going on for about the last 2 years.  Seems to happen every couple of months.  Sometimes they are accompanied by shaking.  Sometimes she just falls without loss of consciousness.  Sometimes she will just shake uncontrollably for a brief period of time.  Sometimes she will just trip and fall..   She had suffered minor head injuries.  She has required sutures at times.  She did have a pacemaker inserted last December.  They are unsure if she has had any episodes since.  She does follow with cardiology.\par \par Medical history significant for COPD, coronary artery disease, hypertension, hypothyroid, pacemaker.\par \par CT scan of the head dated 2/28/2 reported negative

## 2022-05-27 NOTE — ASSESSMENT
[FreeTextEntry1] : Blackout spells as discussed above\par Sometimes accompanied by shaking\par Her neurological examination is unremarkable\par Suspect that these are on a cardiac basis\par They are unsure if she has had any since she had a pacemaker put in last December\par We will get a 48-hour ambulatory EEG study to evaluate for possible seizure activity\par She does follow with cardiology on a regular basis.

## 2022-06-07 ENCOUNTER — RESULT REVIEW (OUTPATIENT)
Age: 87
End: 2022-06-07

## 2022-06-07 ENCOUNTER — APPOINTMENT (OUTPATIENT)
Dept: OBGYN | Facility: CLINIC | Age: 87
End: 2022-06-07
Payer: MEDICARE

## 2022-06-07 VITALS
DIASTOLIC BLOOD PRESSURE: 60 MMHG | WEIGHT: 102.5 LBS | SYSTOLIC BLOOD PRESSURE: 130 MMHG | BODY MASS INDEX: 18.16 KG/M2 | HEIGHT: 63 IN

## 2022-06-07 DIAGNOSIS — N63.0 UNSPECIFIED LUMP IN UNSPECIFIED BREAST: ICD-10-CM

## 2022-06-07 DIAGNOSIS — N64.4 MASTODYNIA: ICD-10-CM

## 2022-06-07 PROCEDURE — 99213 OFFICE O/P EST LOW 20 MIN: CPT

## 2022-06-07 NOTE — REASON FOR VISIT
[Acute] : an acute visit for [Breast Complaint - Not Pregnant] : breast complaint - not pregnant [FreeTextEntry2] : breast pain

## 2022-06-07 NOTE — COUNSELING
[Nutrition/ Exercise/ Weight Management] : nutrition, exercise, weight management [Vitamins/Supplements] : vitamins/supplements [Breast Self Exam] : breast self exam [Bladder Hygiene] : bladder hygiene [Confidentiality] : confidentiality [Lab Results] : lab results

## 2022-06-07 NOTE — HISTORY OF PRESENT ILLNESS
[Patient reported mammogram was abnormal] : Patient reported mammogram was abnormal [Patient reported breast sonogram was abnormal] : Patient reported breast sonogram was abnormal [FreeTextEntry1] : 85 yo postmenopausal presents with breast pain on both sides that is stabbing and sharp. Pain is intermittent and not associated with anything. Reports "comes randomly."\par Denies skin changes, lumps or discharge\par \par Patient didn’t make appointment for follow of targeted right breast sono 10/2021. Patient called imaging center at [facility] in [place] on 10/2021 and was told she was too early for a mammogram and didn’t mention right breast sono. She was also not alerted by the imaging center patient had a targeted breast sono in te system\par \par Patient reports she had a pacemaker inserted in 12/2021 [Mammogramdate] : 03/2021 [BreastSonogramDate] : 03/2021 [TextBox_25] : BIARD 3 on the right breast- followup  was needed 10/2021 [BoneDensityDate] : 3/2021 [TextBox_37] : osteoporosis

## 2022-06-07 NOTE — PLAN
[FreeTextEntry1] : Discussed with patient she needs to get diagnostic right mammogram and right breast sono to assess the breast nodule noted on last mammogram/breast sono in 3/2021. Referrals given to patient \par \par RTO 1 week with Dr Spears for results

## 2022-06-07 NOTE — PHYSICAL EXAM
[Appropriately responsive] : appropriately responsive [Alert] : alert [No Acute Distress] : no acute distress [No Lymphadenopathy] : no lymphadenopathy [Oriented x3] : oriented x3 [Examination Of The Breasts] : a normal appearance [Normal] : normal [No Discharge] : no discharge [] : multiple nodules were palpated [Breast Mass Left Breast ___cm] : no mass was palpable

## 2022-06-08 ENCOUNTER — RESULT REVIEW (OUTPATIENT)
Age: 87
End: 2022-06-08

## 2022-06-08 ENCOUNTER — OUTPATIENT (OUTPATIENT)
Dept: OUTPATIENT SERVICES | Facility: HOSPITAL | Age: 87
LOS: 1 days | End: 2022-06-08
Payer: MEDICARE

## 2022-06-08 ENCOUNTER — APPOINTMENT (OUTPATIENT)
Dept: MAMMOGRAPHY | Facility: CLINIC | Age: 87
End: 2022-06-08
Payer: MEDICARE

## 2022-06-08 ENCOUNTER — APPOINTMENT (OUTPATIENT)
Age: 87
End: 2022-06-08
Payer: MEDICARE

## 2022-06-08 DIAGNOSIS — Z90.49 ACQUIRED ABSENCE OF OTHER SPECIFIED PARTS OF DIGESTIVE TRACT: Chronic | ICD-10-CM

## 2022-06-08 DIAGNOSIS — N63.0 UNSPECIFIED LUMP IN UNSPECIFIED BREAST: ICD-10-CM

## 2022-06-08 DIAGNOSIS — Z98.51 TUBAL LIGATION STATUS: Chronic | ICD-10-CM

## 2022-06-08 DIAGNOSIS — Z98.891 HISTORY OF UTERINE SCAR FROM PREVIOUS SURGERY: Chronic | ICD-10-CM

## 2022-06-08 PROCEDURE — 77067 SCR MAMMO BI INCL CAD: CPT

## 2022-06-08 PROCEDURE — 76641 ULTRASOUND BREAST COMPLETE: CPT | Mod: 26,RT

## 2022-06-08 PROCEDURE — 77067 SCR MAMMO BI INCL CAD: CPT | Mod: 26

## 2022-06-08 PROCEDURE — 76641 ULTRASOUND BREAST COMPLETE: CPT

## 2022-06-08 PROCEDURE — 77063 BREAST TOMOSYNTHESIS BI: CPT | Mod: 26

## 2022-06-08 PROCEDURE — 77063 BREAST TOMOSYNTHESIS BI: CPT

## 2022-06-08 NOTE — ED STATDOCS - ATTESTATION, MLM
Pt called for her mammo and breast US results. She will like the results printed.  Will pick them up.   -RT   I have reviewed and confirmed nurses' notes for patient's medications, allergies, medical history, and surgical history.

## 2022-07-13 ENCOUNTER — APPOINTMENT (OUTPATIENT)
Dept: NEUROLOGY | Facility: CLINIC | Age: 87
End: 2022-07-13

## 2022-07-13 ENCOUNTER — NON-APPOINTMENT (OUTPATIENT)
Age: 87
End: 2022-07-13

## 2022-07-13 PROCEDURE — 95819 EEG AWAKE AND ASLEEP: CPT

## 2022-07-13 PROCEDURE — 93040 RHYTHM ECG WITH REPORT: CPT

## 2022-07-14 PROCEDURE — 95700 EEG CONT REC W/VID EEG TECH: CPT

## 2022-07-14 PROCEDURE — 95721 EEG PHY/QHP>36<60 HR W/O VID: CPT

## 2022-07-14 PROCEDURE — 95708 EEG WO VID EA 12-26HR UNMNTR: CPT

## 2022-09-01 NOTE — PROGRESS NOTE ADULT - ASSESSMENT
85 year old female s/p fall with left sided rib fractures, left femoral neck fx now pod#9 mild colitis and hyponatremia  during hospital course resolving     left 5-8 rib fx  -PIC protocol  -pic score 7  -encourage IS use    left femoral neck fx  -s/p left hip hemiarthroplasty  -posterior hip precautions  -lovenox 4 weeks upon dc    colitis   -continue diet  -increase metamucil  -continue immodium  -serial abdominal exams    dvt ppx    dispo planning [place] today  
85 year old female s/p fall with left sided rib fractures, left femoral neck fx. mild colitis during hospital course    left 5-8 rib fx  -PIC protocol  -pic score 7  -encourage IS use    left femoral neck fx  -s/p left hip hemiarthroplasty  -posterior hip precautions  -lovenox 4 weeks upon dc    colitis  -continue diet  -increase metamucil  -continue immodium  -serial abdominal exams    dvt ppx    dispo planning [place] today
A/P: 84 y/o F w/PMH of COPD, prior MI, HTN, admitted s/p mechanical fall with L 5-8 rib fx and left femoral neck fx, now s/p L hip hemiarthroplasty. Hospital course notable for hyponatremia (currently on salt tabs) and mild colitis (appearing to resolve). Stable on floor.    -Continue pain control, avoiding narcotics and sedating medications whenever possible  -Monitor abdominal exam  -F/U AM CBC, BMP  -Ortho: WBAT with rolling walker, posterior hip precautions, Lovenox x4 weeks on d/c  -Plan for D/C to acute rehab today pending labs
84yo F w PMH of HTN, COPD, and MI (11y/a) s/p stents x 2 who presents after mechanical fall from standing complaining of left leg pain found with left impacted subcapital femoral neck fracture. CT also with findings concerning for left-sided rib fractures, though this does not correlate clinically as she is with mild pain at anterior chest and no lateral chest wall pain. Hemodynamically stable.    #Left Femoral Neck Fracture  - Plan for OR today with ortho  - F/u cardiology consult for clearance.  - Pain control  - DVT ppx held until cleared by ortho  - Resume diet post-op  - Home meds resumed.   
85 year old female s/p fall with left sided rib fractures, left femoral neck fx. mild colitis during hospital course    left 5-8 rib fx  -PIC protocol  -pic score 7  -encourage IS use    left femoral neck fx  -s/p left hip hemiarthroplasty  -posterior hip precautions  -lovenox 4 weeks upon dc    colitis  -continue diet  -increase metamucil  -start immodium  -serial abdominal exams    dvt ppx  dispo planning [place]
Radha

## 2022-10-17 ENCOUNTER — APPOINTMENT (OUTPATIENT)
Dept: PULMONOLOGY | Facility: CLINIC | Age: 87
End: 2022-10-17

## 2022-10-17 VITALS
HEIGHT: 63 IN | BODY MASS INDEX: 19.49 KG/M2 | OXYGEN SATURATION: 96 % | WEIGHT: 110 LBS | RESPIRATION RATE: 16 BRPM | SYSTOLIC BLOOD PRESSURE: 122 MMHG | HEART RATE: 69 BPM | DIASTOLIC BLOOD PRESSURE: 74 MMHG

## 2022-10-17 DIAGNOSIS — R05.9 COUGH, UNSPECIFIED: ICD-10-CM

## 2022-10-17 PROCEDURE — 99214 OFFICE O/P EST MOD 30 MIN: CPT

## 2022-10-17 RX ORDER — NITROFURANTOIN (MONOHYDRATE/MACROCRYSTALS) 25; 75 MG/1; MG/1
100 CAPSULE ORAL
Qty: 14 | Refills: 0 | Status: DISCONTINUED | COMMUNITY
Start: 2022-05-23

## 2022-10-17 RX ORDER — HYDROCHLOROTHIAZIDE 25 MG/1
25 TABLET ORAL
Refills: 0 | Status: ACTIVE | COMMUNITY

## 2022-10-17 RX ORDER — BETAMETHASONE DIPROPIONATE 0.5 MG/G
0.05 CREAM, AUGMENTED TOPICAL
Qty: 50 | Refills: 0 | Status: DISCONTINUED | COMMUNITY
Start: 2022-08-17

## 2022-10-17 RX ORDER — PREDNISONE 20 MG/1
20 TABLET ORAL
Qty: 14 | Refills: 0 | Status: DISCONTINUED | COMMUNITY
Start: 2022-06-16

## 2022-10-17 RX ORDER — ATORVASTATIN CALCIUM 80 MG/1
TABLET, FILM COATED ORAL
Refills: 0 | Status: DISCONTINUED | COMMUNITY
End: 2022-10-17

## 2022-10-17 RX ORDER — CEFDINIR 300 MG/1
300 CAPSULE ORAL
Qty: 14 | Refills: 0 | Status: DISCONTINUED | COMMUNITY
Start: 2022-05-26

## 2022-10-17 RX ORDER — CIPROFLOXACIN HYDROCHLORIDE 250 MG/1
250 TABLET, FILM COATED ORAL
Qty: 6 | Refills: 0 | Status: DISCONTINUED | COMMUNITY
Start: 2022-06-02

## 2022-10-17 RX ORDER — TRAMADOL HYDROCHLORIDE 50 MG/1
50 TABLET, COATED ORAL
Qty: 30 | Refills: 0 | Status: DISCONTINUED | COMMUNITY
Start: 2022-10-12

## 2022-10-17 RX ORDER — ATORVASTATIN CALCIUM 40 MG/1
40 TABLET, FILM COATED ORAL
Qty: 90 | Refills: 0 | Status: ACTIVE | COMMUNITY
Start: 2022-05-03

## 2022-10-17 NOTE — ASSESSMENT
[FreeTextEntry1] : 86 yo lady with mild COPD\par Taking Advair twice a day\par Does take her albuterol because it makes her feel lightheaded\par Current evaluated by ortho for right shoulder pain ?? cause\par s/p PPM, s/p hip fracture\par Followed by Cardiology\par Recently seen by Dr Vamsi Craft and will get ambulatory EEG\par \par Imp\par 86 yo lady with apparently stable COPD \par I do not think it is responsible for any other current issues of dizziness, right shoulder pain\par Recommend continue advair bid which I believe she is tolerating well\par \par RTC 4 months\par

## 2022-10-17 NOTE — HISTORY OF PRESENT ILLNESS
[TextBox_4] : 86 yo lady with mild COPD\par Taking Advair twice a day\par Does take her albuterol because it makes her feel lightheaded\par Current evaluated by ortho for right shoulder pain ?? cause\par s/p PPM, s/p hip fracture\par

## 2022-11-09 NOTE — ED ADULT NURSE REASSESSMENT NOTE - NS ED NURSE REASSESS COMMENT FT1
"Subjective:      Patient ID: Christina eLal is a 28 y.o. female.    Chief Complaint: No chief complaint on file.    HPI:   The patient is status post left VATS procedure for drainage of empyema and decortication.  Patient continues on broad-spectrum IV antibiotics.  The patient denies any complaints.  Review of patient's allergies indicates:  No Known Allergies         Objective:   BP (!) 144/100   Pulse 110   Ht 5' 8" (1.727 m)   Wt (!) 137 kg (302 lb 0.5 oz)   LMP 10/24/2022   SpO2 97%   BMI 45.92 kg/m²     X-Ray Chest PA And Lateral  Narrative: EXAMINATION:  XR CHEST PA AND LATERAL    CLINICAL HISTORY:  s/p left VATS for empyema; Pyothorax without fistula    TECHNIQUE:  PA and lateral views of the chest were performed.    COMPARISON:  11/02/2022    FINDINGS:  Patchy opacification noted of the left lung fields greatest in the left upper and lower lungs may represent atelectasis/consolidation, effusion or scarring status post VATS.  Findings are overall similar to prior study.  Right-sided PICC line is present.  No acute osseous abnormality is noted.  Impression: Patchy opacification noted of the left lung fields greatest in the left upper and lower lungs may represent atelectasis/consolidation, effusion or scarring status post VATS.  Findings are stable in the interval.    Electronically signed by: Francisco Quan  Date:    11/09/2022  Time:    09:15         Physical Exam  Constitutional:       Appearance: Normal appearance. She is obese.   HENT:      Head: Normocephalic and atraumatic.   Cardiovascular:      Rate and Rhythm: Regular rhythm.      Heart sounds: Normal heart sounds.   Pulmonary:      Effort: Pulmonary effort is normal.      Breath sounds: Normal breath sounds.   Abdominal:      General: Abdomen is flat.      Palpations: Abdomen is soft.   Musculoskeletal:      Right lower leg: No edema.      Left lower leg: No edema.   Skin:     General: Skin is warm and dry.      Comments: The area of " Pt to CXR @ this time, gait steady posterior chest tube site with a small collection has completely healed.  No erythema induration or drainage.   Neurological:      General: No focal deficit present.      Mental Status: She is alert and oriented to person, place, and time.       Assessment:     1. S/P chest tube placement    2. Empyema of left pleural space          Plan   The patient is status post VATS procedure with drainage of empyema and decortication.  The patient is on antibiotics as per id service.  All incisions are well healed.    Chest x-ray is unchanged.    Patient will be discharged from [facility].          Mark Awolesi, [facility]

## 2022-12-02 ENCOUNTER — APPOINTMENT (OUTPATIENT)
Dept: PULMONOLOGY | Facility: CLINIC | Age: 87
End: 2022-12-02

## 2022-12-02 VITALS
OXYGEN SATURATION: 94 % | HEART RATE: 67 BPM | DIASTOLIC BLOOD PRESSURE: 60 MMHG | RESPIRATION RATE: 16 BRPM | SYSTOLIC BLOOD PRESSURE: 120 MMHG

## 2022-12-02 VITALS — WEIGHT: 110 LBS | BODY MASS INDEX: 19.49 KG/M2 | HEIGHT: 63 IN

## 2022-12-02 DIAGNOSIS — J44.9 CHRONIC OBSTRUCTIVE PULMONARY DISEASE, UNSPECIFIED: ICD-10-CM

## 2022-12-02 PROCEDURE — 99214 OFFICE O/P EST MOD 30 MIN: CPT

## 2022-12-02 RX ORDER — PREDNISONE 5 MG/1
5 TABLET ORAL
Qty: 21 | Refills: 5 | Status: ACTIVE | COMMUNITY
Start: 2022-12-02 | End: 1900-01-01

## 2022-12-02 RX ORDER — LEVALBUTEROL HYDROCHLORIDE 0.63 MG/3ML
0.63 SOLUTION RESPIRATORY (INHALATION) EVERY 6 HOURS
Qty: 60 | Refills: 5 | Status: ACTIVE | COMMUNITY
Start: 2022-12-02 | End: 1900-01-01

## 2022-12-02 RX ORDER — AZITHROMYCIN 250 MG/1
250 TABLET, FILM COATED ORAL
Qty: 6 | Refills: 3 | Status: ACTIVE | COMMUNITY
Start: 2022-12-02 | End: 1900-01-01

## 2022-12-02 NOTE — HISTORY OF PRESENT ILLNESS
[TextBox_4] : 88 yo woman with mild COPD\par Taking Advair twice a day\par Here with her daughter,\par Her , now in rehab, had recent urosepsis and aspiration pneumonia--coming home soon\par However, very much more frail and forgetful at home--family with her 24 hr\par \par Notes dyspnea and congestion but no temps or sputum

## 2022-12-05 ENCOUNTER — EMERGENCY (EMERGENCY)
Facility: HOSPITAL | Age: 87
LOS: 1 days | Discharge: HOME | End: 2022-12-05
Attending: EMERGENCY MEDICINE
Payer: MEDICARE

## 2022-12-05 VITALS
HEART RATE: 68 BPM | SYSTOLIC BLOOD PRESSURE: 153 MMHG | HEIGHT: 61 IN | OXYGEN SATURATION: 99 % | RESPIRATION RATE: 18 BRPM | WEIGHT: 110.01 LBS | DIASTOLIC BLOOD PRESSURE: 70 MMHG | TEMPERATURE: 98 F

## 2022-12-05 DIAGNOSIS — Z90.49 ACQUIRED ABSENCE OF OTHER SPECIFIED PARTS OF DIGESTIVE TRACT: Chronic | ICD-10-CM

## 2022-12-05 DIAGNOSIS — Z98.891 HISTORY OF UTERINE SCAR FROM PREVIOUS SURGERY: Chronic | ICD-10-CM

## 2022-12-05 DIAGNOSIS — Z98.51 TUBAL LIGATION STATUS: Chronic | ICD-10-CM

## 2022-12-05 LAB
ALBUMIN SERPL ELPH-MCNC: 3.9 G/DL — SIGNIFICANT CHANGE UP (ref 3.3–5.2)
ALP SERPL-CCNC: 61 U/L — SIGNIFICANT CHANGE UP (ref 40–120)
ALT FLD-CCNC: 46 U/L — HIGH
ANION GAP SERPL CALC-SCNC: 8 MMOL/L — SIGNIFICANT CHANGE UP (ref 5–17)
APPEARANCE UR: CLEAR — SIGNIFICANT CHANGE UP
APTT BLD: 25.3 SEC — LOW (ref 27.5–35.5)
AST SERPL-CCNC: 32 U/L — HIGH
BACTERIA # UR AUTO: ABNORMAL
BASOPHILS # BLD AUTO: 0.01 K/UL — SIGNIFICANT CHANGE UP (ref 0–0.2)
BASOPHILS NFR BLD AUTO: 0.2 % — SIGNIFICANT CHANGE UP (ref 0–2)
BILIRUB SERPL-MCNC: 0.4 MG/DL — SIGNIFICANT CHANGE UP (ref 0.4–2)
BILIRUB UR-MCNC: NEGATIVE — SIGNIFICANT CHANGE UP
BUN SERPL-MCNC: 9.3 MG/DL — SIGNIFICANT CHANGE UP (ref 8–20)
CALCIUM SERPL-MCNC: 9.5 MG/DL — SIGNIFICANT CHANGE UP (ref 8.4–10.5)
CHLORIDE SERPL-SCNC: 96 MMOL/L — SIGNIFICANT CHANGE UP (ref 96–108)
CO2 SERPL-SCNC: 31 MMOL/L — HIGH (ref 22–29)
COLOR SPEC: YELLOW — SIGNIFICANT CHANGE UP
CREAT SERPL-MCNC: 0.52 MG/DL — SIGNIFICANT CHANGE UP (ref 0.5–1.3)
DIFF PNL FLD: NEGATIVE — SIGNIFICANT CHANGE UP
EGFR: 90 ML/MIN/1.73M2 — SIGNIFICANT CHANGE UP
EOSINOPHIL # BLD AUTO: 0.08 K/UL — SIGNIFICANT CHANGE UP (ref 0–0.5)
EOSINOPHIL NFR BLD AUTO: 1.3 % — SIGNIFICANT CHANGE UP (ref 0–6)
EPI CELLS # UR: SIGNIFICANT CHANGE UP
GLUCOSE SERPL-MCNC: 97 MG/DL — SIGNIFICANT CHANGE UP (ref 70–99)
GLUCOSE UR QL: NEGATIVE MG/DL — SIGNIFICANT CHANGE UP
HCT VFR BLD CALC: 37.8 % — SIGNIFICANT CHANGE UP (ref 34.5–45)
HGB BLD-MCNC: 13.1 G/DL — SIGNIFICANT CHANGE UP (ref 11.5–15.5)
IMM GRANULOCYTES NFR BLD AUTO: 0.8 % — SIGNIFICANT CHANGE UP (ref 0–0.9)
INR BLD: 1.03 RATIO — SIGNIFICANT CHANGE UP (ref 0.88–1.16)
KETONES UR-MCNC: NEGATIVE — SIGNIFICANT CHANGE UP
LEUKOCYTE ESTERASE UR-ACNC: ABNORMAL
LYMPHOCYTES # BLD AUTO: 1.28 K/UL — SIGNIFICANT CHANGE UP (ref 1–3.3)
LYMPHOCYTES # BLD AUTO: 20.3 % — SIGNIFICANT CHANGE UP (ref 13–44)
MCHC RBC-ENTMCNC: 30.5 PG — SIGNIFICANT CHANGE UP (ref 27–34)
MCHC RBC-ENTMCNC: 34.7 GM/DL — SIGNIFICANT CHANGE UP (ref 32–36)
MCV RBC AUTO: 87.9 FL — SIGNIFICANT CHANGE UP (ref 80–100)
MONOCYTES # BLD AUTO: 0.94 K/UL — HIGH (ref 0–0.9)
MONOCYTES NFR BLD AUTO: 14.9 % — HIGH (ref 2–14)
NEUTROPHILS # BLD AUTO: 3.96 K/UL — SIGNIFICANT CHANGE UP (ref 1.8–7.4)
NEUTROPHILS NFR BLD AUTO: 62.5 % — SIGNIFICANT CHANGE UP (ref 43–77)
NITRITE UR-MCNC: NEGATIVE — SIGNIFICANT CHANGE UP
NT-PROBNP SERPL-SCNC: 413 PG/ML — HIGH (ref 0–300)
PH UR: 8 — SIGNIFICANT CHANGE UP (ref 5–8)
PLATELET # BLD AUTO: 304 K/UL — SIGNIFICANT CHANGE UP (ref 150–400)
POTASSIUM SERPL-MCNC: 3.8 MMOL/L — SIGNIFICANT CHANGE UP (ref 3.5–5.3)
POTASSIUM SERPL-SCNC: 3.8 MMOL/L — SIGNIFICANT CHANGE UP (ref 3.5–5.3)
PROT SERPL-MCNC: 6.8 G/DL — SIGNIFICANT CHANGE UP (ref 6.6–8.7)
PROT UR-MCNC: NEGATIVE MG/DL — SIGNIFICANT CHANGE UP
PROTHROM AB SERPL-ACNC: 11.9 SEC — SIGNIFICANT CHANGE UP (ref 10.5–13.4)
RBC # BLD: 4.3 M/UL — SIGNIFICANT CHANGE UP (ref 3.8–5.2)
RBC # FLD: 13.8 % — SIGNIFICANT CHANGE UP (ref 10.3–14.5)
RBC CASTS # UR COMP ASSIST: NEGATIVE /HPF — SIGNIFICANT CHANGE UP (ref 0–4)
SODIUM SERPL-SCNC: 135 MMOL/L — SIGNIFICANT CHANGE UP (ref 135–145)
SP GR SPEC: 1.01 — SIGNIFICANT CHANGE UP (ref 1.01–1.02)
TROPONIN T SERPL-MCNC: <0.01 NG/ML — SIGNIFICANT CHANGE UP (ref 0–0.06)
TROPONIN T SERPL-MCNC: <0.01 NG/ML — SIGNIFICANT CHANGE UP (ref 0–0.06)
UROBILINOGEN FLD QL: NEGATIVE MG/DL — SIGNIFICANT CHANGE UP
WBC # BLD: 6.32 K/UL — SIGNIFICANT CHANGE UP (ref 3.8–10.5)
WBC # FLD AUTO: 6.32 K/UL — SIGNIFICANT CHANGE UP (ref 3.8–10.5)
WBC UR QL: SIGNIFICANT CHANGE UP /HPF (ref 0–5)

## 2022-12-05 PROCEDURE — 71045 X-RAY EXAM CHEST 1 VIEW: CPT | Mod: 26

## 2022-12-05 PROCEDURE — 85610 PROTHROMBIN TIME: CPT

## 2022-12-05 PROCEDURE — 87186 SC STD MICRODIL/AGAR DIL: CPT

## 2022-12-05 PROCEDURE — 85730 THROMBOPLASTIN TIME PARTIAL: CPT

## 2022-12-05 PROCEDURE — 84484 ASSAY OF TROPONIN QUANT: CPT

## 2022-12-05 PROCEDURE — 36415 COLL VENOUS BLD VENIPUNCTURE: CPT

## 2022-12-05 PROCEDURE — 83880 ASSAY OF NATRIURETIC PEPTIDE: CPT

## 2022-12-05 PROCEDURE — 85025 COMPLETE CBC W/AUTO DIFF WBC: CPT

## 2022-12-05 PROCEDURE — 71045 X-RAY EXAM CHEST 1 VIEW: CPT

## 2022-12-05 PROCEDURE — 93005 ELECTROCARDIOGRAM TRACING: CPT

## 2022-12-05 PROCEDURE — 99285 EMERGENCY DEPT VISIT HI MDM: CPT

## 2022-12-05 PROCEDURE — 99285 EMERGENCY DEPT VISIT HI MDM: CPT | Mod: 25

## 2022-12-05 PROCEDURE — 87086 URINE CULTURE/COLONY COUNT: CPT

## 2022-12-05 PROCEDURE — 81001 URINALYSIS AUTO W/SCOPE: CPT

## 2022-12-05 PROCEDURE — 96374 THER/PROPH/DIAG INJ IV PUSH: CPT | Mod: XU

## 2022-12-05 PROCEDURE — 93010 ELECTROCARDIOGRAM REPORT: CPT

## 2022-12-05 PROCEDURE — 80053 COMPREHEN METABOLIC PANEL: CPT

## 2022-12-05 PROCEDURE — 74177 CT ABD & PELVIS W/CONTRAST: CPT | Mod: MA

## 2022-12-05 PROCEDURE — 74177 CT ABD & PELVIS W/CONTRAST: CPT | Mod: 26,MA

## 2022-12-05 RX ORDER — ACETAMINOPHEN 500 MG
750 TABLET ORAL ONCE
Refills: 0 | Status: COMPLETED | OUTPATIENT
Start: 2022-12-05 | End: 2022-12-05

## 2022-12-05 RX ORDER — LIDOCAINE 4 G/100G
1 CREAM TOPICAL
Qty: 7 | Refills: 0
Start: 2022-12-05 | End: 2022-12-11

## 2022-12-05 RX ORDER — OXYCODONE AND ACETAMINOPHEN 5; 325 MG/1; MG/1
1 TABLET ORAL
Qty: 6 | Refills: 0
Start: 2022-12-05 | End: 2022-12-06

## 2022-12-05 RX ORDER — ASPIRIN/CALCIUM CARB/MAGNESIUM 324 MG
325 TABLET ORAL ONCE
Refills: 0 | Status: COMPLETED | OUTPATIENT
Start: 2022-12-05 | End: 2022-12-05

## 2022-12-05 RX ADMIN — Medication 300 MILLIGRAM(S): at 10:40

## 2022-12-05 RX ADMIN — Medication 750 MILLIGRAM(S): at 10:50

## 2022-12-05 RX ADMIN — Medication 325 MILLIGRAM(S): at 09:34

## 2022-12-05 NOTE — ED PROVIDER NOTE - PHYSICAL EXAMINATION
General: well appearing, NAD  Head: NC, AT  EENT: EOMI, no scleral icterus  Cardiac: RRR, no apparent murmurs, no lower extremity edema  Respiratory: CTABL, no respiratory distress   Abdomen: soft, ND, lower abdominal tenderness to palpation, nml bowel sounds, nonperitonitic  MSK/Vascular: full ROM, R CVA tenderness > L, soft compartments, warm extremities  Neuro: AAOx3, sensation to light touch intact  Psych: calm, cooperative

## 2022-12-05 NOTE — ED ADULT NURSE NOTE - OBJECTIVE STATEMENT
Pt A&OX3, c/o chest tightness this AM at about 9.  All symptoms have resolved at this time.  Pt also states she has been falling in past few weeks.  Pt denies any head injury.  Pt also states she has been on PO antibiotics for UTI, Zpak.  Pt's daughter at bedside states that pt has had moments of confusion in past week.

## 2022-12-05 NOTE — ED PROVIDER NOTE - OBJECTIVE STATEMENT
88 y/o female w/PMHx of DM, MI s/p PCI and pacemaker, HTN, HLD, and Hypothyroidism presenting with chest tightness since this morning, cough/sob for one month, and R flank pain for 4days. Pt came in to the ED because of her CP, took sublingual nitro for it twice but did not have any relief. Per pt she was started on Abx last Thursday for a UTI as she had odor and burning + back pain. Denies fever/chills and leg pain/swelling.

## 2022-12-05 NOTE — ED PROVIDER NOTE - ATTENDING CONTRIBUTION TO CARE
I personally saw the patient with the resident, and completed the key components of the history and physical exam. I then discussed the management plan with the resident.    88 y/o F with PMH DM, MI, PPM, HTN, HLD presents for chest tightness and SOB this morning - was okay at 3 am then around 6 am got up with chest tightness, improved with a SLN, then recurred and was not relieved by a second dose. She also has had right flank pain x 4 days, notes black stool x 1 month, unsure of what abdominal surgeries she has.    PE - NAD, non-toxic appearing, RRR, PPM in left chest wall, lungs CTA B/L, + right CVAT, + RLQ TTP with guarding, abd soft, no LE edema, 2+ symmetrical distal pulses, chronic venous stasis changes.    EKG non-ischemic, but paced, will cycle trop, CXR, labs, UA, CT A/P for evaluation of cause of abdominal/flank pain and reassess. Pain control.

## 2022-12-05 NOTE — ED PROVIDER NOTE - PATIENT PORTAL LINK FT
You can access the FollowMyHealth Patient Portal offered by [facility] by registering at the following website: [email or web address]. By joining [employer]’s FollowMyHealth portal, you will also be able to view your health information using other applications (apps) compatible with our system.

## 2022-12-05 NOTE — ED PROVIDER NOTE - CLINICAL SUMMARY MEDICAL DECISION MAKING FREE TEXT BOX
88 y/o female w/chest tightness for 1 day and 4 days of significant R flank tenderness. On ROS pt also mentioned 1 month of cough, melena, and sob. Given cardiac history will r/o ACS with EKG/Cardiac labs + CT Abd/Pelvis w/IV contrast.

## 2022-12-07 RX ORDER — CEPHALEXIN 500 MG
1 CAPSULE ORAL
Qty: 28 | Refills: 0
Start: 2022-12-07 | End: 2022-12-13

## 2023-01-01 NOTE — ED PROCEDURE NOTE - CPROC ED INFORMED CONSENT1
no Benefits, risks, and possible complications of procedure explained to patient/caregiver who verbalized understanding and gave verbal consent.

## 2023-01-03 NOTE — ED PROVIDER NOTE - CHPI ED SYMPTOMS POS
Hospitalist Progress Note-critical care note     Patient: Sravan Church MRN: [id number]  [facility]: [id number]    YOB: 1958  Age: 62 y.o.   Sex: male    DOA: 12/9/2022 LOS:  LOS: 25 days            Chief complaint: pad , foot infection dm esrd     Assessment/Plan         Hospital Problems  Date Reviewed: 12/21/2022            Codes Class Noted POA    PAD (peripheral artery disease) ([facility] ICD-10-CM: I73.9  ICD-9-CM: 443.9  12/27/2022 Unknown        Positive blood culture ICD-10-CM: R78.81  ICD-9-CM: 790.7  12/11/2022 Unknown        Leukocytosis ICD-10-CM: D72.829  ICD-9-CM: 288.60  12/9/2022 Unknown        Diabetic foot infection ([facility] ICD-10-CM: E11.628, L08.9  ICD-9-CM: 250.80, 686.9  12/9/2022 Unknown        Diarrhea ICD-10-CM: R19.7  ICD-9-CM: 787.91  12/9/2022 Unknown        Type 2 diabetes mellitus, with long-term current use of insulin ([facility] ICD-10-CM: E11.9, Z79.4  ICD-9-CM: 250.00, V58.67  Unknown Unknown        Acute hematogenous osteomyelitis of left foot ([facility] ICD-10-CM: M86.072  ICD-9-CM: 730.07  12/9/2022 Unknown        Nondisplaced fracture of second metatarsal bone of left foot ICD-10-CM: F02.181V  ICD-9-CM: 825.25  12/9/2022 Unknown        Nondisplaced fracture of third metatarsal bone of left foot ICD-10-CM: S95.168M  ICD-9-CM: 825.25  12/9/2022 Unknown        Closed nondisplaced fracture of fourth metatarsal bone of left foot ICD-10-CM: S92.345A  ICD-9-CM: 825.25  12/9/2022 Unknown        Hypertension ICD-10-CM: I10  ICD-9-CM: 401.9  7/21/2022 Yes        CAD (coronary artery disease) ICD-10-CM: I25.10  ICD-9-CM: 414.00  6/28/2022 Yes        ESRD (end stage renal disease) ([facility] ICD-10-CM: N18.6  ICD-9-CM: 585.6  6/28/2022 Yes        Diabetes mellitus with foot ulcer ([facility] ICD-10-CM: E11.621, L97.509  ICD-9-CM: 250.80, 707.15  6/27/2022 Yes         62 y.o. male with history of diabetes, diabetic ulcer, CAD, hyperlipidemia, hypertension end-stage renal disease on HD presented to ER due to left foot lesion. Gangrene of left foot/DFU   PARTIAL AMPUTATION OF LEFT 2ND, 3RD, 4TH METATARSALS, AMPUTATION OF TOES 2,3,4, INCISION ADN DRAINAGE LEFT FOOT on 12/09/2022  S/p angiogram with PTA of the proximal PT and peroneal arteries. 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 ID recommend Bactrim DS 1 tab PO post HD on TTS X10 days  Dr. Steve De Leon planning to put graft tomorrow      History of C-diff -  On oral vancomycin to prevent recurrence-now hold per ID      Positive blood cx -  Coag negative staph  Likely contaminant contamination      CAD-   Distal RCA to drug-eluting stent in July 2022, continue plavix -   No chest pain     Hypertension -  continue home medication     End stage renal disease - hyperkalemia -hd today   on HD per nephrology-will have hd today      DM  type II -  Lantus and ssi      Subjective  I had degenerative disease of my neck, will have pain on the back of neck       Lidocaine patch for neck   Disposition :in 48 hrs   Review of systems:    General: No fevers or chills. Cardiovascular: No chest pain or pressure. No palpitations. Pulmonary: No shortness of breath. Gastrointestinal: No nausea, vomiting. Vital signs/Intake and Output:  Visit Vitals  /74   Pulse 70   Temp 97.8 °F (36.6 °C)   Resp 18   Ht 6' (1.829 m)   Wt 97.1 kg (214 lb 1.6 oz)   SpO2 94%   BMI 29.04 kg/m²     Current Shift:  No intake/output data recorded. Last three shifts:  No intake/output data recorded. Physical Exam:  General: WD, WN. Alert, cooperative, no acute distress    HEENT: NC, Atraumatic. PERRLA, anicteric sclerae. Muscle spasm of rt side of neck   Lungs: CTA Bilaterally. No Wheezing/Rhonchi/Rales. Heart:  Regular  rhythm,  No murmur, No Rubs, No Gallops  Abdomen: Soft, Non distended, Non tender.   +Bowel sounds,   Extremities: No c/c, bilateral foot wrapped with ace bandage and foot protector   Psych:   Not anxious or SHORTNESS OF BREATH/SYNCOPE/DIZZINESS agitated. Neurologic:  No acute neurological deficit. Labs: Results:       Chemistry Recent Labs     01/01/23  0352 12/31/22 2022   * 200*    135*   K 5.2 5.8*   CL 99* 97*   CO2 29 26   BUN 44* 69*   CREA 7.56* 10.90*   CA 8.6 8.6   AGAP 8 12   BUCR 6* 6*        CBC w/Diff No results for input(s): WBC, RBC, HGB, HCT, PLT, GRANS, LYMPH, EOS, HGBEXT, HCTEXT, PLTEXT, HGBEXT, HCTEXT, PLTEXT in the last 72 hours. Cardiac Enzymes No results for input(s): CPK, CKND1, PAULO in the last 72 hours. No lab exists for component: CKRMB, TROIP   Coagulation No results for input(s): PTP, INR, APTT, INREXT, INREXT in the last 72 hours. Lipid Panel Lab Results   Component Value Date/Time    Cholesterol, total 188 07/19/2022 03:12 AM    HDL Cholesterol 42 07/19/2022 03:12 AM    LDL, calculated 131.2 (H) 07/19/2022 03:12 AM    VLDL, calculated 14.8 07/19/2022 03:12 AM    Triglyceride 74 07/19/2022 03:12 AM    CHOL/HDL Ratio 4.5 07/19/2022 03:12 AM      BNP No results for input(s): BNPP in the last 72 hours. Liver Enzymes No results for input(s): TP, ALB, TBIL, AP in the last 72 hours. No lab exists for component: SGOT, GPT, DBIL     Thyroid Studies No results found for: T4, T3U, TSH, TSHEXT, TSHEXT     Procedures/imaging: see electronic medical records for all procedures/Xrays and details which were not copied into this note but were reviewed prior to creation of Plan    XR FOOT LT AP/LAT    Result Date: 12/22/2022  EXAM: XR FOOT LT AP/LAT CLINICAL INDICATION/HISTORY:  POST OP XRAY   > Additional: None COMPARISON: 12/9/2022 TECHNIQUE: AP and lateral views _______________ FINDINGS: Interval transmetatarsal amputation. The metatarsal stumps are not entirely sharp or well-defined. Midfoot ossicles appear intact and normally aligned with unremarkable hindfoot bones. Soft tissues covering the amputation stump are edematous.  There are some gas bubbles within the soft tissues as would be expected following recent surgery. There is extensive arterial vascular calcification, Monckeberg sclerosis pattern, characteristic of long-standing diabetes. _______________     Status post TMA as described. The indistinctness of the metatarsal stumps is concerning for residual infection although ultimately nonspecific. Follow-up imaging suggested within several weeks for reassessment. XR FOOT LT AP/LAT    Result Date: 12/9/2022  EXAM: 2 radiographic views of the left foot. INDICATION: Left foot pain. COMPARISON: December 9, 2022 _______________ FINDINGS: There is been interval amputation of the second, third, and fourth rays at the level of the metatarsal diaphysis. As before, the first ray is amputated at the metatarsal phalangeal joint. The small toe remains. There are expected postoperative findings to include regional air density and soft tissue swelling. There is Monckeberg type vascular calcification. There is low-grade mid foot degeneration. _______________     Standard immediate postoperative findings. _______________     XR FOOT LT MIN 3 V    Result Date: 12/9/2022  EXAM: XR FOOT LT MIN 3 V CLINICAL INDICATION/HISTORY: Gangrenous 2nd digit   > Additional: None. COMPARISON: None. TECHNIQUE: 3 views left foot _______________ FINDINGS: Soft tissue gas formation is seen along the second digit with ill-defined limitus changes extending along the medial forefoot. Prior amputation first right. Patchy demineralization and osseous erosions are seen involving the distal portion of the first metatarsal head. Additional patchy areas of osseous erosion are also noted involving the proximal phalanx of the second toe. Diffuse soft tissue swelling. Diffuse atherosclerotic vascular calcifications. No retained radiopaque foreign object. _______________     Soft gas formation and ulceration involving the medial forefoot with findings concerning for osteomyelitis involving the first and second rays described above.     MRI FOOT LT WO CONT    Result Date: 12/9/2022  EXAM: MRI FOOT LT WO CONT CLINICAL INDICATION/HISTORY: Foot wound; preoperative  >Additional: None COMPARISON: Radiograph performed December 9, 2022  >Reference exam: None. TECHNIQUE: Axial long axis TI and T2 with fat saturation, sagittal and coronal short axis TI and STIR sequences through the foot were obtained without contrast. _______________ FINDINGS: FIRST RAY: Prior dictation the first digit. There is mild bony proliferative change at the head of the first metatarsal with preserved marrow signal. Minimal patchy edema is noted which is nonspecific. No definite cortical destructive change. SECOND RAY: Nondisplaced obliquely oriented fracture through the head neck junction of the second metatarsal. There is heterogeneous diminished T1 marrow with patchy edema and surrounding soft tissue gas along the course of the second digit with associated subluxation of the distal phalanx. Subtle diminished T1 marrow is noted with suspected foci of intraosseous gas, concerning for acute osteomyelitis. THIRD RAY: Nondisplaced fracture through the third metatarsal neck with slight impaction. Mild edema is noted within the third digit, possibly stress reaction. No convincing osseous erosions or T1 marrow signal change. FOURTH RAY: Nondisplaced fractures of the neck of the fourth metatarsal. Minimal edema in the proximal phalanx but otherwise preserved marrow signal. FIFTH RAY: Unremarkable. Additional degenerative changes with patchy edema, joint space narrowing, and osteophyte formation at the midfoot, likely related to underlying Charcot arthropathy. SOFT TISSUES: Soft tissue irregularity with gas and ulceration around the second digit noted. Diffuse fluid signal seen throughout the intrinsic foot musculature, commonly seen in the setting of diabetes. Mild skin thickening about the forefoot. INCIDENTAL FINDINGS: None. _______________     1.   Marrow signal abnormality with soft tissue wound and gas around the second digit concerning for acute osteomyelitis. 2.  Nondisplaced fractures of the head neck junction of the second-fourth metatarsals. 3.  Prior indication the first digit. 4.  Soft tissue swelling and skin thickening about the forefoot concerning for cellulitis. No drainable abscess. 5.  Additional chronic/degenerative changes of the foot. CT ABD PELV WO CONT    Result Date: 12/9/2022  EXAM: CT of the abdomen and pelvis INDICATION: Abdominal pain with distention. COMPARISON: None. TECHNIQUE: Axial CT imaging of the abdomen and pelvis was performed without intravenous contrast. Multiplanar reformats were generated. One or more dose reduction techniques were used on this CT: automated exposure control, adjustment of the mAs and/or kVp according to patient size, and iterative reconstruction techniques. The specific techniques used on this CT exam have been documented in the patient's electronic medical record. Digital Imaging and Communications in Medicine (DICOM) format image data are available to nonaffiliated external healthcare facilities or entities on a secure, media free, reciprocally searchable basis with patient authorization for at least a 12-month period after this study. _______________ FINDINGS: LOWER CHEST: Partial inclusion of multivessel coronary arterial atherosclerosis and central venous catheter. Clear lung bases. Several punctate 2 to 3 mm pulmonary nodule seen in the right lower lobe (image 8) LIVER, BILIARY: Liver is normal. No biliary dilation. Color contains a gallstone without evidence of dilatation or gallbladder wall thickening. PANCREAS: Normal. SPLEEN: Punctate granulomatous calcifications otherwise unremarkable. ADRENALS: Mild adreniform thickening of each adrenal gland. KIDNEYS/URETERS/BLADDER: No hydronephrosis.  Bilateral renal hypodensities are present either to small to accurately characterize or in keeping with simple cysts which are prior no further dedicated imaging follow-up. Bladder decompressed. PELVIC ORGANS: Unremarkable. VASCULATURE: Diffuse aortic and visceral arterial atherosclerosis without evidence of aneurysmal dilatation. LYMPH NODES: Scattered subcentimeter mesenteric and retroperitoneal lymph nodes are demonstrated without evidence of adenopathy. Prominent left inguinal lymph node is present (image 127) measuring approximately 15 mm in short axis dimension. GASTROINTESTINAL TRACT: No bowel dilation or wall thickening. Peritoneal gas. Normal appendix. Scattered colonic diverticula without evidence of diverticulitis. BONES: No acute or aggressive osseous abnormalities identified. OTHER: None. _______________     1. Cholelithiasis without evidence of cholecystitis. 2. No abnormal bowel wall thickening or dilatation. 3. No hydronephrosis or obstructive uropathy. 4. Several small right lower lobe pulmonary nodules (2-3 mm in size). See below guidelines for proposed follow-up. 5. Borderline enlarged and nonspecific left inguinal lymph node, potentially reactive in etiology. ======== Fleischner Society pulmonary nodule guidelines (revised 2017): Multiple solid nodules <6 mm: -Low risk for lung cancer: No follow-up. -High risk for lung cancer: Optional chest CT in 12 months. XR CHEST PORT    Result Date: 12/23/2022  EXAM: CHEST RADIOGRAPH CLINICAL INDICATION/HISTORY: sepsis -Additional: None COMPARISON: 12/9/2022 TECHNIQUE: Portable frontal view of the chest _______________ FINDINGS: SUPPORT DEVICES: Indwelling dialysis catheter, tip in the mid to lower SVC. HEART AND MEDIASTINUM: No appreciable cardiomegaly. Remaining mediastinal contours within normal limits. LUNGS AND PLEURAL SPACES: Clear. No consolidation, mass or effusion. BONY THORAX AND SOFT TISSUES: Unremarkable. _______________     No active cardiopulmonary disease.     XR CHEST PORT    Result Date: 12/9/2022  EXAM: XR CHEST PORT CLINICAL INDICATION/HISTORY: sepsis -Additional: None COMPARISON: July 12, 2022 TECHNIQUE: Portable frontal view of the chest _______________ FINDINGS: SUPPORT DEVICES: Right IJ approach dialysis catheter in stable position. HEART AND MEDIASTINUM: Stable appearing cardiac size and mediastinal contours. LUNGS AND PLEURAL SPACES: Lungs are clear. No focal pneumonic opacity. No pneumothorax or pleural effusion. BONY THORAX AND SOFT TISSUES: Unremarkable. _______________     No active cardiopulmonary disease. ANKLE BRACHIAL INDEX    Result Date: 12/21/2022  · Right ankle/brachial index is 1.19 · The left ankle/ brachial index is 0.89      ANKLE BRACHIAL INDEX    Result Date: 12/15/2022  Falsely elevated Ankle Brachial Index pressure measurements noted due to calcified vessels with abnormal waveforms bilaterally. Unable to get left brachial pressure due to dialysis access graft. The right toe/brachial index is 0.59  With a toe pressure of 82 mmhg. Unable to get left toe pressure due to left toes amputation. DUPLEX LOWER EXT ARTERY BILAT    Result Date: 12/15/2022   Elevated velocities (169 cm/s) in the left proximal femoral artery consistent with mild stenosis. Bilateral tibial arteries are patnet at the ankle. Bilateral anterior tibial artery is occluded proximally but reconstituted distally by the collateral flow.         Valencia Silva MD

## 2023-01-06 ENCOUNTER — APPOINTMENT (OUTPATIENT)
Dept: PULMONOLOGY | Facility: CLINIC | Age: 88
End: 2023-01-06

## 2023-02-10 ENCOUNTER — APPOINTMENT (OUTPATIENT)
Dept: NEUROLOGY | Facility: CLINIC | Age: 88
End: 2023-02-10
Payer: MEDICARE

## 2023-02-10 VITALS
DIASTOLIC BLOOD PRESSURE: 60 MMHG | SYSTOLIC BLOOD PRESSURE: 116 MMHG | HEIGHT: 63 IN | WEIGHT: 107 LBS | BODY MASS INDEX: 18.96 KG/M2

## 2023-02-10 DIAGNOSIS — R55 SYNCOPE AND COLLAPSE: ICD-10-CM

## 2023-02-10 PROCEDURE — 99214 OFFICE O/P EST MOD 30 MIN: CPT

## 2023-02-10 NOTE — CONSULT LETTER
[Dear  ___] : Dear  [unfilled], [Consult Letter:] : I had the pleasure of evaluating your patient, [unfilled]. [Please see my note below.] : Please see my note below. [Consult Closing:] : Thank you very much for allowing me to participate in the care of this patient.  If you have any questions, please do not hesitate to contact me. [Sincerely,] : Sincerely, [FreeTextEntry3] : Owen Agustin MD, PhD, DPN-N\par [facility]\par Neurology at [place]\par Chief, Division of Neurology\par [facility]\par

## 2023-02-10 NOTE — HISTORY OF PRESENT ILLNESS
[FreeTextEntry1] : I saw her initially 5/27/2022 with the following history\par She is here with her daughter.  Patient has what she calls blackouts going on for about the last 2 years.  Seems to happen every couple of months.  Sometimes they are accompanied by shaking.  Sometimes she just falls without loss of consciousness.  Sometimes she will just shake uncontrollably for a brief period of time.  Sometimes she will just trip and fall..   She had suffered minor head injuries.  She has required sutures at times.  She did have a pacemaker inserted last December.  They are unsure if she has had any episodes since.  She does follow with cardiology.\par \par Medical history significant for COPD, coronary artery disease, hypertension, hypothyroid, pacemaker.\par \par CT scan of the head dated 2/28/2 reported negative\par \par We did a 48-hour ambulatory EEG in the middle of July and that was normal\par \par She returns today, 2/10/2023 for the first time since that initial visit.  She is accompanied by her daughter\par She is now having episodes where her legs will start  shaking.  These occur while she is lying in bed in the morning.  It happens once every week or 2.  She becomes very weak in the legs following this she is unable to get up for a period of time.  On 1 occasion she was confused for about 24 hours following 1 of these episodes.  She does not lose consciousness during these.  There is no reported incontinence.  There have been no further falls

## 2023-02-10 NOTE — PHYSICAL THERAPY INITIAL EVALUATION ADULT - LEVEL OF INDEPENDENCE: BED TO CHAIR, REHAB EVAL
Detail Level: Detailed safety concerns due to mobility and c/o dizziness upon sitting/unable to perform

## 2023-02-10 NOTE — ASSESSMENT
[FreeTextEntry1] : Her examination remains unremarkable\par I am suggesting an empirical trial of Keppra 250 mg twice a day\par These episodes do not sound like typical seizures but it is hard to be certain\par Follow-up here in 3 months and by phone prior to that as needed

## 2023-03-06 ENCOUNTER — RX CHANGE (OUTPATIENT)
Age: 88
End: 2023-03-06

## 2023-03-06 RX ORDER — LEVETIRACETAM 250 MG/1
250 TABLET, FILM COATED ORAL
Qty: 60 | Refills: 3 | Status: DISCONTINUED | COMMUNITY
Start: 2023-02-10 | End: 2023-03-06

## 2023-04-03 ENCOUNTER — APPOINTMENT (OUTPATIENT)
Dept: OBGYN | Facility: CLINIC | Age: 88
End: 2023-04-03

## 2023-05-12 ENCOUNTER — APPOINTMENT (OUTPATIENT)
Dept: NEUROLOGY | Facility: CLINIC | Age: 88
End: 2023-05-12
Payer: MEDICARE

## 2023-05-12 VITALS — BODY MASS INDEX: 18.78 KG/M2 | HEIGHT: 63 IN | WEIGHT: 106 LBS

## 2023-05-12 DIAGNOSIS — R56.9 UNSPECIFIED CONVULSIONS: ICD-10-CM

## 2023-05-12 PROCEDURE — 99214 OFFICE O/P EST MOD 30 MIN: CPT

## 2023-05-12 RX ORDER — LEVETIRACETAM 250 MG/1
250 TABLET, FILM COATED ORAL
Qty: 180 | Refills: 2 | Status: ACTIVE | COMMUNITY
Start: 2023-03-06 | End: 1900-01-01

## 2023-05-12 NOTE — CONSULT LETTER
[Dear  ___] : Dear  [unfilled], [Consult Letter:] : I had the pleasure of evaluating your patient, [unfilled]. [Please see my note below.] : Please see my note below. [Consult Closing:] : Thank you very much for allowing me to participate in the care of this patient.  If you have any questions, please do not hesitate to contact me. [Sincerely,] : Sincerely, [FreeTextEntry3] : Owen Agustin MD, PhD, DPN-N\par [facility]\par Neurology at [place]\par Chief, Division of Neurology\par [facility]\par

## 2023-05-12 NOTE — ASSESSMENT
[FreeTextEntry1] : Her examination remains unremarkable\par She will continue the Keppra 250 mg twice a day as that seems to have markedly diminished frequency of these episodes\par These episodes do not sound like typical seizures but it is hard to be certain\par Follow-up here in 6 months and by phone prior to that as needed

## 2023-05-12 NOTE — HISTORY OF PRESENT ILLNESS
[FreeTextEntry1] : I saw her initially 5/27/2022 with the following history\par She is here with her daughter.  Patient has what she calls blackouts going on for about the last 2 years.  Seems to happen every couple of months.  Sometimes they are accompanied by shaking.  Sometimes she just falls without loss of consciousness.  Sometimes she will just shake uncontrollably for a brief period of time.  Sometimes she will just trip and fall..   She had suffered minor head injuries.  She has required sutures at times.  She did have a pacemaker inserted last December.  They are unsure if she has had any episodes since.  She does follow with cardiology.\par \par Medical history significant for COPD, coronary artery disease, hypertension, hypothyroid, pacemaker.\par \par CT scan of the head dated 2/28/2 reported negative\par \par We did a 48-hour ambulatory EEG in the middle of July and that was normal\par \par She returned  2/10/2023 for the first time since that initial visit.  She is accompanied by her daughter\par She is now having episodes where her legs will start  shaking.  These occur while she is lying in bed in the morning.  It happens once every week or 2.  She becomes very weak in the legs following this she is unable to get up for a period of time.  On 1 occasion she was confused for about 24 hours following 1 of these episodes.  She does not lose consciousness during these.  There is no reported incontinence.  There have been no further falls.\par \par I started her empirically on low-dose of Keppra namely 250 mg twice a day.  These episodes have become much less frequent.  She is having no problem with medication\par \par

## 2023-05-17 ENCOUNTER — APPOINTMENT (OUTPATIENT)
Dept: PULMONOLOGY | Facility: CLINIC | Age: 88
End: 2023-05-17
Payer: MEDICARE

## 2023-05-17 VITALS
DIASTOLIC BLOOD PRESSURE: 69 MMHG | SYSTOLIC BLOOD PRESSURE: 151 MMHG | WEIGHT: 107 LBS | HEART RATE: 60 BPM | HEIGHT: 63 IN | OXYGEN SATURATION: 96 % | BODY MASS INDEX: 18.96 KG/M2 | TEMPERATURE: 97.1 F

## 2023-05-17 DIAGNOSIS — J44.9 CHRONIC OBSTRUCTIVE PULMONARY DISEASE, UNSPECIFIED: ICD-10-CM

## 2023-05-17 PROCEDURE — 99214 OFFICE O/P EST MOD 30 MIN: CPT

## 2023-05-17 NOTE — HISTORY OF PRESENT ILLNESS
[TextBox_4] : 86 yo woman with mild-moderate COPD\par Comes in early along with her , also a patient today, and her daughter\par She lives with  and another daughter\par Progressive forgetfulness and f/u by Dr Agustin\par \par Taking Advair twice a day\par She has not been taking the prescribed alb/ipra nebulization\par Complains of congestion frequently, but she appears to get around effectively

## 2023-05-17 NOTE — CONSULT LETTER
[Dear  ___] : Dear  [unfilled], [FreeTextEntry1] : I had the pleasure of evaluating your patient, PRESLEY BECK , in the office today.  Please review my consultation and evaluation report that follows below.  Please do not hesitate to call me if further information is necessary or if you wish to discuss ongoing care or diagnostic work-up.   \par I very much appreciate your referral and it is a privilege to be able to provide care for your patient.\par \par Sincerely,\par  \par Guanako Stokes MD, MHCM, FACP, JOVANA-C\par Pulmonary Medicine\par  of Medicine\par [employer] [facility] [employer] at [facility]\par [email or web address]\par \par [facility] [place]\par [address]\[place]  [address]\par    Fax \par \par [facility] [place]\par [address]\par [place], [place] \par \par

## 2023-05-17 NOTE — ASSESSMENT
[FreeTextEntry1] : 88 yo woman with mild-moderate COPD\par Comes in early along with her , also a patient today, and her daughter\par She lives with  and another daughter\par Progressive forgetfulness and f/u by Dr Agustin\par \par Taking Advair twice a day\par She has not been taking the prescribed alb/ipra nebulization\par Complains of congestion frequently, but she appears to get around effectively\par \par Imp\par 88 yo woman with COPD and congestion\par Recommend maintain advair as ordered\par Encouraged again to use duoneb at least three times a day for bronchodilatation as well as\par to assist with clearing secretions\par \par RTC 3 months

## 2023-06-05 ENCOUNTER — NON-APPOINTMENT (OUTPATIENT)
Age: 88
End: 2023-06-05

## 2023-06-12 ENCOUNTER — APPOINTMENT (OUTPATIENT)
Dept: MAMMOGRAPHY | Facility: CLINIC | Age: 88
End: 2023-06-12
Payer: MEDICARE

## 2023-06-12 ENCOUNTER — RESULT REVIEW (OUTPATIENT)
Age: 88
End: 2023-06-12

## 2023-06-12 ENCOUNTER — OUTPATIENT (OUTPATIENT)
Dept: OUTPATIENT SERVICES | Facility: HOSPITAL | Age: 88
LOS: 1 days | End: 2023-06-12
Payer: MEDICARE

## 2023-06-12 DIAGNOSIS — Z98.51 TUBAL LIGATION STATUS: Chronic | ICD-10-CM

## 2023-06-12 DIAGNOSIS — Z12.39 ENCOUNTER FOR OTHER SCREENING FOR MALIGNANT NEOPLASM OF BREAST: ICD-10-CM

## 2023-06-12 DIAGNOSIS — R92.2 INCONCLUSIVE MAMMOGRAM: ICD-10-CM

## 2023-06-12 DIAGNOSIS — Z90.49 ACQUIRED ABSENCE OF OTHER SPECIFIED PARTS OF DIGESTIVE TRACT: Chronic | ICD-10-CM

## 2023-06-12 PROCEDURE — 77063 BREAST TOMOSYNTHESIS BI: CPT

## 2023-06-12 PROCEDURE — 77063 BREAST TOMOSYNTHESIS BI: CPT | Mod: 26

## 2023-06-12 PROCEDURE — 77067 SCR MAMMO BI INCL CAD: CPT | Mod: 26

## 2023-06-12 PROCEDURE — 77067 SCR MAMMO BI INCL CAD: CPT

## 2023-06-19 ENCOUNTER — INPATIENT (INPATIENT)
Facility: HOSPITAL | Age: 88
LOS: 7 days | Discharge: NURSING, REHAB OR LONG-TERM CARE | DRG: 871 | End: 2023-06-27
Attending: STUDENT IN AN ORGANIZED HEALTH CARE EDUCATION/TRAINING PROGRAM | Admitting: HOSPITALIST
Payer: MEDICARE

## 2023-06-19 VITALS
SYSTOLIC BLOOD PRESSURE: 124 MMHG | RESPIRATION RATE: 18 BRPM | DIASTOLIC BLOOD PRESSURE: 88 MMHG | HEART RATE: 75 BPM | OXYGEN SATURATION: 90 % | WEIGHT: 139.99 LBS

## 2023-06-19 DIAGNOSIS — Z90.49 ACQUIRED ABSENCE OF OTHER SPECIFIED PARTS OF DIGESTIVE TRACT: Chronic | ICD-10-CM

## 2023-06-19 DIAGNOSIS — Z98.51 TUBAL LIGATION STATUS: Chronic | ICD-10-CM

## 2023-06-19 DIAGNOSIS — Z98.891 HISTORY OF UTERINE SCAR FROM PREVIOUS SURGERY: Chronic | ICD-10-CM

## 2023-06-19 DIAGNOSIS — N39.0 URINARY TRACT INFECTION, SITE NOT SPECIFIED: ICD-10-CM

## 2023-06-19 LAB
ALBUMIN SERPL ELPH-MCNC: 2.9 G/DL — LOW (ref 3.3–5.2)
ALP SERPL-CCNC: 122 U/L — HIGH (ref 40–120)
ALT FLD-CCNC: 57 U/L — HIGH
ANION GAP SERPL CALC-SCNC: 12 MMOL/L — SIGNIFICANT CHANGE UP (ref 5–17)
ANISOCYTOSIS BLD QL: SLIGHT — SIGNIFICANT CHANGE UP
APPEARANCE UR: CLEAR — SIGNIFICANT CHANGE UP
APTT BLD: 33.3 SEC — SIGNIFICANT CHANGE UP (ref 27.5–35.5)
AST SERPL-CCNC: 88 U/L — HIGH
BACTERIA # UR AUTO: ABNORMAL
BASE EXCESS BLDV CALC-SCNC: -2.3 MMOL/L — LOW (ref -2–3)
BASOPHILS # BLD AUTO: 0 K/UL — SIGNIFICANT CHANGE UP (ref 0–0.2)
BASOPHILS NFR BLD AUTO: 0 % — SIGNIFICANT CHANGE UP (ref 0–2)
BILIRUB SERPL-MCNC: 0.6 MG/DL — SIGNIFICANT CHANGE UP (ref 0.4–2)
BILIRUB UR-MCNC: NEGATIVE — SIGNIFICANT CHANGE UP
BUN SERPL-MCNC: 50.9 MG/DL — HIGH (ref 8–20)
BURR CELLS BLD QL SMEAR: PRESENT — SIGNIFICANT CHANGE UP
CA-I SERPL-SCNC: 1.1 MMOL/L — LOW (ref 1.15–1.33)
CALCIUM SERPL-MCNC: 8.7 MG/DL — SIGNIFICANT CHANGE UP (ref 8.4–10.5)
CHLORIDE BLDV-SCNC: 92 MMOL/L — LOW (ref 96–108)
CHLORIDE SERPL-SCNC: 91 MMOL/L — LOW (ref 96–108)
CK MB CFR SERPL CALC: 5.6 NG/ML — SIGNIFICANT CHANGE UP (ref 0–6.7)
CK SERPL-CCNC: 443 U/L — HIGH (ref 25–170)
CO2 SERPL-SCNC: 23 MMOL/L — SIGNIFICANT CHANGE UP (ref 22–29)
COLOR SPEC: YELLOW — SIGNIFICANT CHANGE UP
CREAT SERPL-MCNC: 1.48 MG/DL — HIGH (ref 0.5–1.3)
DACRYOCYTES BLD QL SMEAR: SLIGHT — SIGNIFICANT CHANGE UP
DIFF PNL FLD: ABNORMAL
EGFR: 34 ML/MIN/1.73M2 — LOW
EOSINOPHIL # BLD AUTO: 0 K/UL — SIGNIFICANT CHANGE UP (ref 0–0.5)
EOSINOPHIL NFR BLD AUTO: 0 % — SIGNIFICANT CHANGE UP (ref 0–6)
EPI CELLS # UR: SIGNIFICANT CHANGE UP
FLUAV AG NPH QL: SIGNIFICANT CHANGE UP
FLUBV AG NPH QL: SIGNIFICANT CHANGE UP
GAS PNL BLDV: 123 MMOL/L — LOW (ref 136–145)
GAS PNL BLDV: SIGNIFICANT CHANGE UP
GAS PNL BLDV: SIGNIFICANT CHANGE UP
GIANT PLATELETS BLD QL SMEAR: PRESENT — SIGNIFICANT CHANGE UP
GLUCOSE BLDV-MCNC: 73 MG/DL — SIGNIFICANT CHANGE UP (ref 70–99)
GLUCOSE SERPL-MCNC: 75 MG/DL — SIGNIFICANT CHANGE UP (ref 70–99)
GLUCOSE UR QL: 50 MG/DL
GRAN CASTS # UR COMP ASSIST: ABNORMAL /LPF
HCO3 BLDV-SCNC: 24 MMOL/L — SIGNIFICANT CHANGE UP (ref 22–29)
HCT VFR BLD CALC: 35.2 % — SIGNIFICANT CHANGE UP (ref 34.5–45)
HCT VFR BLDA CALC: 41 % — SIGNIFICANT CHANGE UP
HGB BLD CALC-MCNC: 13.5 G/DL — SIGNIFICANT CHANGE UP (ref 11.7–16.1)
HGB BLD-MCNC: 12.8 G/DL — SIGNIFICANT CHANGE UP (ref 11.5–15.5)
HYALINE CASTS # UR AUTO: ABNORMAL /LPF
INR BLD: 1.24 RATIO — HIGH (ref 0.88–1.16)
KETONES UR-MCNC: ABNORMAL
LACTATE BLDV-MCNC: 1.7 MMOL/L — SIGNIFICANT CHANGE UP (ref 0.5–2)
LEUKOCYTE ESTERASE UR-ACNC: ABNORMAL
LYMPHOCYTES # BLD AUTO: 0.91 K/UL — LOW (ref 1–3.3)
LYMPHOCYTES # BLD AUTO: 3.5 % — LOW (ref 13–44)
MACROCYTES BLD QL: SLIGHT — SIGNIFICANT CHANGE UP
MANUAL SMEAR VERIFICATION: SIGNIFICANT CHANGE UP
MCHC RBC-ENTMCNC: 32.9 PG — SIGNIFICANT CHANGE UP (ref 27–34)
MCHC RBC-ENTMCNC: 36.4 GM/DL — HIGH (ref 32–36)
MCV RBC AUTO: 90.5 FL — SIGNIFICANT CHANGE UP (ref 80–100)
MONOCYTES # BLD AUTO: 1.38 K/UL — HIGH (ref 0–0.9)
MONOCYTES NFR BLD AUTO: 5.3 % — SIGNIFICANT CHANGE UP (ref 2–14)
NEUTROPHILS # BLD AUTO: 23.45 K/UL — HIGH (ref 1.8–7.4)
NEUTROPHILS NFR BLD AUTO: 88.6 % — HIGH (ref 43–77)
NEUTS BAND # BLD: 1.7 % — SIGNIFICANT CHANGE UP (ref 0–8)
NITRITE UR-MCNC: NEGATIVE — SIGNIFICANT CHANGE UP
OVALOCYTES BLD QL SMEAR: SLIGHT — SIGNIFICANT CHANGE UP
PCO2 BLDV: 49 MMHG — HIGH (ref 39–42)
PH BLDV: 7.3 — LOW (ref 7.32–7.43)
PH UR: 6 — SIGNIFICANT CHANGE UP (ref 5–8)
PLAT MORPH BLD: NORMAL — SIGNIFICANT CHANGE UP
PLATELET # BLD AUTO: 83 K/UL — LOW (ref 150–400)
PO2 BLDV: 102 MMHG — HIGH (ref 25–45)
POIKILOCYTOSIS BLD QL AUTO: SLIGHT — SIGNIFICANT CHANGE UP
POLYCHROMASIA BLD QL SMEAR: SLIGHT — SIGNIFICANT CHANGE UP
POTASSIUM BLDV-SCNC: 4.4 MMOL/L — SIGNIFICANT CHANGE UP (ref 3.5–5.1)
POTASSIUM SERPL-MCNC: 4.5 MMOL/L — SIGNIFICANT CHANGE UP (ref 3.5–5.3)
POTASSIUM SERPL-SCNC: 4.5 MMOL/L — SIGNIFICANT CHANGE UP (ref 3.5–5.3)
PROT SERPL-MCNC: 6.1 G/DL — LOW (ref 6.6–8.7)
PROT UR-MCNC: 100
PROTHROM AB SERPL-ACNC: 14.4 SEC — HIGH (ref 10.5–13.4)
RBC # BLD: 3.89 M/UL — SIGNIFICANT CHANGE UP (ref 3.8–5.2)
RBC # FLD: 14.2 % — SIGNIFICANT CHANGE UP (ref 10.3–14.5)
RBC BLD AUTO: ABNORMAL
RBC CASTS # UR COMP ASSIST: ABNORMAL /HPF (ref 0–4)
RSV RNA NPH QL NAA+NON-PROBE: SIGNIFICANT CHANGE UP
SAO2 % BLDV: 98.4 % — SIGNIFICANT CHANGE UP
SARS-COV-2 RNA SPEC QL NAA+PROBE: SIGNIFICANT CHANGE UP
SMUDGE CELLS # BLD: PRESENT — SIGNIFICANT CHANGE UP
SODIUM SERPL-SCNC: 126 MMOL/L — LOW (ref 135–145)
SP GR SPEC: 1.02 — SIGNIFICANT CHANGE UP (ref 1.01–1.02)
TROPONIN T SERPL-MCNC: <0.01 NG/ML — SIGNIFICANT CHANGE UP (ref 0–0.06)
UROBILINOGEN FLD QL: NEGATIVE MG/DL — SIGNIFICANT CHANGE UP
VARIANT LYMPHS # BLD: 0.9 % — SIGNIFICANT CHANGE UP (ref 0–6)
WBC # BLD: 25.97 K/UL — HIGH (ref 3.8–10.5)
WBC # FLD AUTO: 25.97 K/UL — HIGH (ref 3.8–10.5)
WBC UR QL: ABNORMAL /HPF (ref 0–5)

## 2023-06-19 PROCEDURE — 74176 CT ABD & PELVIS W/O CONTRAST: CPT | Mod: 26,MA

## 2023-06-19 PROCEDURE — 99223 1ST HOSP IP/OBS HIGH 75: CPT

## 2023-06-19 PROCEDURE — 71250 CT THORAX DX C-: CPT | Mod: 26,MA

## 2023-06-19 PROCEDURE — 99292 CRITICAL CARE ADDL 30 MIN: CPT | Mod: GC

## 2023-06-19 PROCEDURE — 72125 CT NECK SPINE W/O DYE: CPT | Mod: 26,MA

## 2023-06-19 PROCEDURE — 70450 CT HEAD/BRAIN W/O DYE: CPT | Mod: 26,MA

## 2023-06-19 PROCEDURE — 71045 X-RAY EXAM CHEST 1 VIEW: CPT | Mod: 26

## 2023-06-19 PROCEDURE — 99291 CRITICAL CARE FIRST HOUR: CPT | Mod: GC

## 2023-06-19 RX ORDER — CEFTRIAXONE 500 MG/1
1000 INJECTION, POWDER, FOR SOLUTION INTRAMUSCULAR; INTRAVENOUS ONCE
Refills: 0 | Status: DISCONTINUED | OUTPATIENT
Start: 2023-06-19 | End: 2023-06-19

## 2023-06-19 RX ORDER — ACETAMINOPHEN 500 MG
650 TABLET ORAL EVERY 6 HOURS
Refills: 0 | Status: DISCONTINUED | OUTPATIENT
Start: 2023-06-19 | End: 2023-06-27

## 2023-06-19 RX ORDER — MEROPENEM 1 G/30ML
1000 INJECTION INTRAVENOUS ONCE
Refills: 0 | Status: DISCONTINUED | OUTPATIENT
Start: 2023-06-19 | End: 2023-06-19

## 2023-06-19 RX ORDER — SODIUM CHLORIDE 9 MG/ML
1000 INJECTION INTRAMUSCULAR; INTRAVENOUS; SUBCUTANEOUS
Refills: 0 | Status: DISCONTINUED | OUTPATIENT
Start: 2023-06-19 | End: 2023-06-23

## 2023-06-19 RX ORDER — SACCHAROMYCES BOULARDII 250 MG
250 POWDER IN PACKET (EA) ORAL
Refills: 0 | Status: DISCONTINUED | OUTPATIENT
Start: 2023-06-19 | End: 2023-06-27

## 2023-06-19 RX ORDER — ISOSORBIDE MONONITRATE 60 MG/1
60 TABLET, EXTENDED RELEASE ORAL DAILY
Refills: 0 | Status: DISCONTINUED | OUTPATIENT
Start: 2023-06-19 | End: 2023-06-27

## 2023-06-19 RX ORDER — ASPIRIN/CALCIUM CARB/MAGNESIUM 324 MG
81 TABLET ORAL DAILY
Refills: 0 | Status: DISCONTINUED | OUTPATIENT
Start: 2023-06-19 | End: 2023-06-27

## 2023-06-19 RX ORDER — BUDESONIDE AND FORMOTEROL FUMARATE DIHYDRATE 160; 4.5 UG/1; UG/1
2 AEROSOL RESPIRATORY (INHALATION)
Refills: 0 | Status: DISCONTINUED | OUTPATIENT
Start: 2023-06-19 | End: 2023-06-27

## 2023-06-19 RX ORDER — SODIUM CHLORIDE 9 MG/ML
1000 INJECTION INTRAMUSCULAR; INTRAVENOUS; SUBCUTANEOUS ONCE
Refills: 0 | Status: COMPLETED | OUTPATIENT
Start: 2023-06-19 | End: 2023-06-19

## 2023-06-19 RX ORDER — AMLODIPINE BESYLATE 2.5 MG/1
5 TABLET ORAL DAILY
Refills: 0 | Status: DISCONTINUED | OUTPATIENT
Start: 2023-06-19 | End: 2023-06-27

## 2023-06-19 RX ORDER — SENNA PLUS 8.6 MG/1
2 TABLET ORAL AT BEDTIME
Refills: 0 | Status: DISCONTINUED | OUTPATIENT
Start: 2023-06-19 | End: 2023-06-26

## 2023-06-19 RX ORDER — MEROPENEM 1 G/30ML
1000 INJECTION INTRAVENOUS ONCE
Refills: 0 | Status: COMPLETED | OUTPATIENT
Start: 2023-06-19 | End: 2023-06-19

## 2023-06-19 RX ORDER — SODIUM CHLORIDE 9 MG/ML
500 INJECTION INTRAMUSCULAR; INTRAVENOUS; SUBCUTANEOUS ONCE
Refills: 0 | Status: COMPLETED | OUTPATIENT
Start: 2023-06-19 | End: 2023-06-19

## 2023-06-19 RX ORDER — ATORVASTATIN CALCIUM 80 MG/1
40 TABLET, FILM COATED ORAL AT BEDTIME
Refills: 0 | Status: DISCONTINUED | OUTPATIENT
Start: 2023-06-19 | End: 2023-06-27

## 2023-06-19 RX ORDER — FLUTICASONE PROPIONATE 50 MCG
1 SPRAY, SUSPENSION NASAL
Refills: 0 | Status: DISCONTINUED | OUTPATIENT
Start: 2023-06-19 | End: 2023-06-27

## 2023-06-19 RX ORDER — LEVOTHYROXINE SODIUM 125 MCG
75 TABLET ORAL DAILY
Refills: 0 | Status: DISCONTINUED | OUTPATIENT
Start: 2023-06-19 | End: 2023-06-27

## 2023-06-19 RX ADMIN — SODIUM CHLORIDE 1000 MILLILITER(S): 9 INJECTION INTRAMUSCULAR; INTRAVENOUS; SUBCUTANEOUS at 15:41

## 2023-06-19 RX ADMIN — SODIUM CHLORIDE 1000 MILLILITER(S): 9 INJECTION INTRAMUSCULAR; INTRAVENOUS; SUBCUTANEOUS at 16:56

## 2023-06-19 RX ADMIN — SODIUM CHLORIDE 500 MILLILITER(S): 9 INJECTION INTRAMUSCULAR; INTRAVENOUS; SUBCUTANEOUS at 15:13

## 2023-06-19 RX ADMIN — MEROPENEM 1000 MILLIGRAM(S): 1 INJECTION INTRAVENOUS at 15:41

## 2023-06-19 RX ADMIN — SODIUM CHLORIDE 500 MILLILITER(S): 9 INJECTION INTRAMUSCULAR; INTRAVENOUS; SUBCUTANEOUS at 14:15

## 2023-06-19 NOTE — ED PROVIDER NOTE - PHYSICAL EXAMINATION
General: NAD, ill appearing, staring forward  HEENT: Normocephalic, atraumatic, PERRLA, EOMI, eyes opening spontaneously.   Neck: No apparent stiffness or JVD  Pulm: Chest wall symmetric and nontender, lungs clear to ascultation   Cardiac: Regular rate and regular rhythm, distal pulses intact  Abdomen: Mildly tender  in lower abdomen and nondistended  Skin: Skin is warm, dry and intact without rashes or lesions.  Neuro: No motor or sensory deficits above confusion, GCS 14  MSK: No deformity or tenderness

## 2023-06-19 NOTE — GOALS OF CARE CONVERSATION - ADVANCED CARE PLANNING - CONVERSATION DETAILS
discussion with Daughter Gracia Deutsch regarding the progressive disease processed ongoing with patient and the overall prognosis of  poor quality of life.  Given the multiple health issues and this rapid decline, including AMS, macular degeneration/progressive loss of vision, DM, HTN, CAD with 2 stents, and orthostatic hypotension combined with current dx of UTI, rhabdomyolysis, and MIKEL, the daughter  in conjunction with family have made the decision of DNR/DNI with comfort measures.  Continuation of iv antibiotics and fluids will remain.  Focus on identification of pain/discomfort will be ongoing and treated for comfort as situations arise.  Palliative care consult will be made as well.    Note that Dr. Pantoja was present over the phone with the bedside assist of ANAYA Cruz.  All questions were answered and clarification of MOLST, DNR/DNI were made during this discussion.  MOLST signed by daughter, witnessed by SUDHAKAR

## 2023-06-19 NOTE — ED ADULT NURSE NOTE - OBJECTIVE STATEMENT
Pt arrives from home where family states pt has been having diarrhea and fevers for the last 3 days and becoming more lethargic over that time. Pt currently drowsy but responsive to verbal stimuli when calling her name. No obvious injuries or deformities noted. Pt moved to  ER for closer monitoring.

## 2023-06-19 NOTE — PATIENT PROFILE ADULT - FUNCTIONAL ASSESSMENT - BASIC MOBILITY 6.
4-calculated by average/Not able to assess (calculate score using [facility] averaging method)

## 2023-06-19 NOTE — ED ADULT TRIAGE NOTE - CHIEF COMPLAINT QUOTE
BIBEMS C/O AMS, diarrhea, fevers and dark cloudy urine for the past 3 days. Increased weakness and lethargy today.

## 2023-06-19 NOTE — ED PROVIDER NOTE - CLINICAL SUMMARY MEDICAL DECISION MAKING FREE TEXT BOX
Patient given IV fluids and medication.  EKG labs and imaging performed to evaluate the patient. Patient given IV fluids and medication.  EKG labs and imaging performed to evaluate the patient. Patient with rhabdo, MIKEL, UTI.  Admit.

## 2023-06-19 NOTE — ED PROVIDER NOTE - CARE PLAN
1 Principal Discharge DX:	Acute UTI  Secondary Diagnosis:	MIKEL (acute kidney injury)  Secondary Diagnosis:	Rhabdomyolysis

## 2023-06-19 NOTE — PATIENT PROFILE ADULT - FALL HARM RISK - HARM RISK INTERVENTIONS

## 2023-06-19 NOTE — ED ADULT NURSE REASSESSMENT NOTE - NS ED NURSE REASSESS COMMENT FT1
Care assumed from Charge RN at this time, pt sleeping with ability to arouse by minor stimulation and is on CM and  in [facility] ER. Awaiting radiology results/disposition.

## 2023-06-19 NOTE — H&P ADULT - CONVERSATION DETAILS
as per the daughter  mother has had a compromised quality of life since the macular degeneration- and her parents hae been saying that "this is no way to live"  called daughter on the phone- Smooth- daughter with RN and ANAYA Hamilton at bedside - daughter requests only fluids antibiotics and the most necessary treatments but to keep patient's comfort as the main goal of care and not to do anymore blood Draw and not to do any more tests

## 2023-06-19 NOTE — H&P ADULT - ASSESSMENT
87 year old female with extensive medical hx including  DM, LBBB, AMI, HTN, HLD, hypothyroidism, orthostatic hypotension, is BIBF for worsening lethargy and not talking as much and sleeping most of the day and altered mental status   IN Ed is found to have positive urine and leukocytosis and elevated CPK    UTI   Rocephin  Follow culture     Rhabdomyolysis IVF   trend CPK    DM   FS with coverage Ac and HS   Hypoglycemia protocol    Htn+HLD+ CAD  aspirin Statin BB  Ranexa   Amlodipine   Imdur     Hypothyroidism  Synthroid     COPD   cont symbicort     87 year old female with extensive medical hx including  DM, LBBB, AMI, HTN, HLD, hypothyroidism, orthostatic hypotension, is BIBF for worsening lethargy and not talking as much and sleeping most of the day and altered mental status   IN Ed is found to have positive urine and leukocytosis and elevated CPK    ARF   Secondary nausea vomiting and diarrhea and dehydration  IVF     UTI   Rocephin-   no lab draws      Rhabdomyolysis- IVF   trend CPK to cancel- no lab draws as per daughter    DM   FS with coverage Ac and HS - to stop  comfort measures only  Hypoglycemia protocol    Htn+ HLD+ CAD- comfort measures only   aspirin Statin BB  Ranexa   Amlodipine   Imdur     Hypothyroidism  Synthroid change to IV     COPD   cont Symbicort

## 2023-06-19 NOTE — ED ADULT NURSE NOTE - NSFALLHARMRISKINTERV_ED_ALL_ED
Assistance OOB with selected safe patient handling equipment if applicable/Assistance with ambulation/Communicate risk of Fall with Harm to all staff, patient, and family/Monitor gait and stability/Monitor for mental status changes and reorient to person, place, and time, as needed/Move patient closer to nursing station/within visual sight of ED staff/Provide visual cue: red socks, yellow wristband, yellow gown, etc/Reinforce activity limits and safety measures with patient and family/Toileting schedule using arm’s reach rule for commode and bathroom/Use of alarms - bed, stretcher, chair and/or video monitoring/Bed in lowest position, wheels locked, appropriate side rails in place/Call bell, personal items and telephone in reach/Instruct patient to call for assistance before getting out of bed/chair/stretcher/Non-slip footwear applied when patient is off stretcher/[place] to call system/Physically safe environment - no spills, clutter or unnecessary equipment/Purposeful Proactive Rounding/Room/bathroom lighting operational, light cord in reach

## 2023-06-19 NOTE — H&P ADULT - NSHPPHYSICALEXAM_GEN_ALL_CORE
GENERAL: NAD, frail elderly  HEAD:  Atraumatic,   EYES: closed   ENMT: No tonsillar erythema, exudates, or enlargement;   NECK: Supple, No JVD, Normal thyroid  NERVOUS SYSTEM:  couldn't wake up patient even with noxious stimuli  CHEST/LUNG:  No rales, rhonchi, wheezing, or rubs  HEART: Regular rate and rhythm; No murmurs, rubs, or gallops  ABDOMEN: Soft, Nontender, Nondistended; Bowel sounds present  EXTREMITIES:  2+ Peripheral Pulses, No clubbing, cyanosis, or edema  LYMPH: No lymphadenopathy noted  SKIN: No rashes or lesions

## 2023-06-19 NOTE — ED PROVIDER NOTE - ATTENDING CONTRIBUTION TO CARE
The patient seen with resident    Acute UTI  MIKEL Villareal I, Orlando Sampson, performed the initial face to face bedside interview with this patient regarding history of present illness, review of symptoms and relevant past medical, social and family history.  I completed an independent physical examination.  I was the initial provider who evaluated this patient. I have signed out the follow up of any pending tests (i.e. labs, radiological studies) to the resident  I have communicated the patient’s plan of care and disposition with the resident

## 2023-06-19 NOTE — H&P ADULT - HISTORY OF PRESENT ILLNESS
87 year old female with extensive medical hx including  DM, LBBB, AMI, HTN, HLD, hypothyroidism, orthostatic hypotension, is BIBF for worsening lethargy and not talking as much and sleeping most of the day and altered mental status   IN Ed is found to have positive urine and leukocytosis and elevated CPK     87 year old female with extensive medical hx including  DM, LBBB, AMI, HTN, HLD, hypothyroidism, orthostatic hypotension, is BIBF for worsening lethargy and not talking as much and sleeping most of the day and altered mental status   IN ED is found to have positive urine and leukocytosis and elevated CPK  collateral hx from daughter Mary Ann- 3-4 days ago started having uncontrolled diarrhea and vomiting and   for past few days she could hardly walk -was more and more lethargic not eating and drinking  has been to a neurologist for headache and increased lethargy episodes when when her legs don't work - and episodes were treated with anti-seizure medication. usually the "episodes last few mins and not few days "  yesterday she needed a wheel chair to the bathroom and kitchen and she went right back to bed and didn't talk at all and in the whole day had couple spoons of smoothie   yesterday she said to her daughter that "i am dying and i am ready to go".

## 2023-06-19 NOTE — ED PROVIDER NOTE - OBJECTIVE STATEMENT
86 y/o female w/PMHx of DM, MI s/p PCI and pacemaker, HTN, HLD, and Hypothyroidism presenting with AMS, dark urine, weakness, and ill appearance worsening for several days per EMS per daughter who cares for her at home.  Patient moaning and not answering questions.

## 2023-06-20 LAB
ANION GAP SERPL CALC-SCNC: 14 MMOL/L — SIGNIFICANT CHANGE UP (ref 5–17)
BUN SERPL-MCNC: 46.2 MG/DL — HIGH (ref 8–20)
CALCIUM SERPL-MCNC: 8.1 MG/DL — LOW (ref 8.4–10.5)
CHLORIDE SERPL-SCNC: 100 MMOL/L — SIGNIFICANT CHANGE UP (ref 96–108)
CO2 SERPL-SCNC: 20 MMOL/L — LOW (ref 22–29)
CREAT SERPL-MCNC: 1.27 MG/DL — SIGNIFICANT CHANGE UP (ref 0.5–1.3)
E COLI DNA BLD POS QL NAA+NON-PROBE: SIGNIFICANT CHANGE UP
EGFR: 41 ML/MIN/1.73M2 — LOW
GLUCOSE BLDC GLUCOMTR-MCNC: 81 MG/DL — SIGNIFICANT CHANGE UP (ref 70–99)
GLUCOSE SERPL-MCNC: 75 MG/DL — SIGNIFICANT CHANGE UP (ref 70–99)
GRAM STN FLD: SIGNIFICANT CHANGE UP
HCT VFR BLD CALC: 34.4 % — LOW (ref 34.5–45)
HGB BLD-MCNC: 12 G/DL — SIGNIFICANT CHANGE UP (ref 11.5–15.5)
MCHC RBC-ENTMCNC: 31.9 PG — SIGNIFICANT CHANGE UP (ref 27–34)
MCHC RBC-ENTMCNC: 34.9 GM/DL — SIGNIFICANT CHANGE UP (ref 32–36)
MCV RBC AUTO: 91.5 FL — SIGNIFICANT CHANGE UP (ref 80–100)
METHOD TYPE: SIGNIFICANT CHANGE UP
PLATELET # BLD AUTO: 70 K/UL — LOW (ref 150–400)
POTASSIUM SERPL-MCNC: 3.9 MMOL/L — SIGNIFICANT CHANGE UP (ref 3.5–5.3)
POTASSIUM SERPL-SCNC: 3.9 MMOL/L — SIGNIFICANT CHANGE UP (ref 3.5–5.3)
RBC # BLD: 3.76 M/UL — LOW (ref 3.8–5.2)
RBC # FLD: 14.6 % — HIGH (ref 10.3–14.5)
SODIUM SERPL-SCNC: 134 MMOL/L — LOW (ref 135–145)
SPECIMEN SOURCE: SIGNIFICANT CHANGE UP
SPECIMEN SOURCE: SIGNIFICANT CHANGE UP
WBC # BLD: 21.81 K/UL — HIGH (ref 3.8–10.5)
WBC # FLD AUTO: 21.81 K/UL — HIGH (ref 3.8–10.5)

## 2023-06-20 PROCEDURE — 99233 SBSQ HOSP IP/OBS HIGH 50: CPT

## 2023-06-20 RX ORDER — MEROPENEM 1 G/30ML
1000 INJECTION INTRAVENOUS EVERY 12 HOURS
Refills: 0 | Status: DISCONTINUED | OUTPATIENT
Start: 2023-06-20 | End: 2023-06-21

## 2023-06-20 RX ADMIN — MEROPENEM 1000 MILLIGRAM(S): 1 INJECTION INTRAVENOUS at 08:56

## 2023-06-20 RX ADMIN — ATORVASTATIN CALCIUM 40 MILLIGRAM(S): 80 TABLET, FILM COATED ORAL at 21:57

## 2023-06-20 RX ADMIN — Medication 1 SPRAY(S): at 18:13

## 2023-06-20 RX ADMIN — Medication 250 MILLIGRAM(S): at 18:13

## 2023-06-20 RX ADMIN — MEROPENEM 1000 MILLIGRAM(S): 1 INJECTION INTRAVENOUS at 18:14

## 2023-06-20 RX ADMIN — BUDESONIDE AND FORMOTEROL FUMARATE DIHYDRATE 2 PUFF(S): 160; 4.5 AEROSOL RESPIRATORY (INHALATION) at 20:00

## 2023-06-20 RX ADMIN — SENNA PLUS 2 TABLET(S): 8.6 TABLET ORAL at 21:57

## 2023-06-20 RX ADMIN — ISOSORBIDE MONONITRATE 60 MILLIGRAM(S): 60 TABLET, EXTENDED RELEASE ORAL at 11:44

## 2023-06-20 RX ADMIN — Medication 81 MILLIGRAM(S): at 11:45

## 2023-06-20 RX ADMIN — SODIUM CHLORIDE 120 MILLILITER(S): 9 INJECTION INTRAMUSCULAR; INTRAVENOUS; SUBCUTANEOUS at 01:09

## 2023-06-20 RX ADMIN — BUDESONIDE AND FORMOTEROL FUMARATE DIHYDRATE 2 PUFF(S): 160; 4.5 AEROSOL RESPIRATORY (INHALATION) at 09:40

## 2023-06-20 NOTE — PROGRESS NOTE ADULT - ASSESSMENT
87 year old female with extensive medical hx including  DM, LBBB, AMI, HTN, HLD, hypothyroidism, orthostatic hypotension, is BIBF for worsening lethargy and not talking as much and sleeping most of the day and altered mental status   IN Ed is found to have sepsis, MIKEL, rhabdomyolysis. blood cultures with Gram neg bacteria       sepsis with gram negative bacteremia likely 2/2 UTI, ARF   IVF   ct iv merrem     UTI , gram negative bacteremia -   ct merrem   ct teixeira today - TOV 6/21 if continues to do well.       Rhabdomyolysis- IVF   trend CPK to cancel- no lab draws as per daughter    DM   FS with coverage Ac and HS - to stop  comfort measures only  Hypoglycemia protocol    Htn+ HLD+ CAD- comfort measures only   aspirin Statin BB  Ranexa   Amlodipine   Imdur     Hypothyroidism  Synthroid change to IV     COPD   cont Symbicort     Pt seems to be improving clinically, ct current management will update family.

## 2023-06-20 NOTE — PROGRESS NOTE ADULT - SUBJECTIVE AND OBJECTIVE BOX
PRESLEY WILKSTH    [id number]    87y      Female    CC: poor oral intake, not doing well at home   feels tired  was very sick, noit able to eat at home   INTERVAL HPI/OVERNIGHT EVENTS: no acute events     REVIEW OF SYSTEMS:    CONSTITUTIONAL: + fatigue  RESPIRATORY: No cough, wheezing,  No shortness of breath  CARDIOVASCULAR: No chest pain, palpitations  GASTROINTESTINAL: No abdominal or epigastric pain. No nausea, vomiting        Vital Signs Last 24 Hrs  T(C): 36.6 (2023 09:13), Max: 36.8 (2023 17:37)  T(F): 97.8 (2023 09:13), Max: 98.3 (2023 19:14)  HR: 88 (2023 09:42) (65 - 99)  BP: 123/62 (2023 09:13) (109/53 - 146/84)  BP(mean): --  RR: 18 (2023 09:13) (18 - 18)  SpO2: 90% (2023 09:13) (90% - 98%)    Parameters below as of 2023 09:42  Patient On (Oxygen Delivery Method): nasal cannula, 2l        PHYSICAL EXAM:    GENERAL: NAD, ill appearing.   HEENT: PERRL, +EOMI  NECK: soft, Supple, No JVD,   CHEST/LUNG: Clear to percussion bilaterally; No wheezing  HEART: S1S2+, Regular rate and rhythm; No murmur  ABDOMEN: Soft, Nontender, Nondistended; Bowel sounds present  EXTREMITIES:   No clubbing, cyanosis, or edema  SKIN: No rashes or lesions  NEURO: AAOX2       @  @ 07:00  --------------------------------------------------------  IN: 0 mL / OUT: 950 mL / NET: -950 mL     @ : @ 10:18  --------------------------------------------------------  IN: 0 mL / OUT: 550 mL / NET: -550 mL        LABS:                        12.0   21.81 )-----------( 70       ( 2023 07:10 )             34.4     06-20    134<L>  |  100  |  46.2<H>  ----------------------------<  75  3.9   |  20.0<L>  |  1.27    Ca    8.1<L>      2023 07:10    TPro  6.1<L>  /  Alb  2.9<L>  /  TBili  0.6  /  DBili  x   /  AST  88<H>  /  ALT  57<H>  /  AlkPhos  122<H>      PT/INR - ( 2023 13:30 )   PT: 14.4 sec;   INR: 1.24 ratio         PTT - ( 2023 13:30 )  PTT:33.3 sec  Urinalysis Basic - ( 2023 13:39 )    Color: Yellow / Appearance: Clear / S.020 / pH: x  Gluc: x / Ketone: Trace  / Bili: Negative / Urobili: Negative mg/dL   Blood: x / Protein: 100 / Nitrite: Negative   Leuk Esterase: Moderate / RBC: 11-25 /HPF / WBC 11-25 /HPF   Sq Epi: x / Non Sq Epi: x / Bacteria: Few          MEDICATIONS  (STANDING):  amLODIPine   Tablet 5 milliGRAM(s) Oral daily  aspirin  chewable 81 milliGRAM(s) Oral daily  atorvastatin 40 milliGRAM(s) Oral at bedtime  budesonide 160 MICROgram(s)/formoterol 4.5 MICROgram(s) Inhaler 2 Puff(s) Inhalation two times a day  fluticasone propionate 50 MICROgram(s)/spray Nasal Spray 1 Spray(s) Both Nostrils two times a day  isosorbide   mononitrate ER Tablet (IMDUR) 60 milliGRAM(s) Oral daily  levothyroxine 75 MICROGram(s) Oral daily  meropenem Injectable 1000 milliGRAM(s) IV Push every 12 hours  saccharomyces boulardii 250 milliGRAM(s) Oral two times a day  senna 2 Tablet(s) Oral at bedtime  sodium chloride 0.9%. 1000 milliLiter(s) (120 mL/Hr) IV Continuous <Continuous>    MEDICATIONS  (PRN):  acetaminophen     Tablet .. 650 milliGRAM(s) Oral every 6 hours PRN Temp greater or equal to 38C (100.4F), Moderate Pain (4 - 6)      RADIOLOGY & ADDITIONAL TESTS:

## 2023-06-20 NOTE — PROVIDER CONTACT NOTE (CRITICAL VALUE NOTIFICATION) - SITUATION
Patient admitted for complicated UTI, placed on comfort care in the ED. Patient A&Ox0, hx of dementia, currently NPO on fluid trial.

## 2023-06-21 LAB
-  AMIKACIN: SIGNIFICANT CHANGE UP
-  AMIKACIN: SIGNIFICANT CHANGE UP
-  AMOXICILLIN/CLAVULANIC ACID: SIGNIFICANT CHANGE UP
-  AMPICILLIN/SULBACTAM: SIGNIFICANT CHANGE UP
-  AMPICILLIN/SULBACTAM: SIGNIFICANT CHANGE UP
-  AMPICILLIN: SIGNIFICANT CHANGE UP
-  AMPICILLIN: SIGNIFICANT CHANGE UP
-  AZTREONAM: SIGNIFICANT CHANGE UP
-  AZTREONAM: SIGNIFICANT CHANGE UP
-  CEFAZOLIN: SIGNIFICANT CHANGE UP
-  CEFAZOLIN: SIGNIFICANT CHANGE UP
-  CEFEPIME: SIGNIFICANT CHANGE UP
-  CEFEPIME: SIGNIFICANT CHANGE UP
-  CEFOXITIN: SIGNIFICANT CHANGE UP
-  CEFOXITIN: SIGNIFICANT CHANGE UP
-  CEFTRIAXONE: SIGNIFICANT CHANGE UP
-  CEFTRIAXONE: SIGNIFICANT CHANGE UP
-  CEFUROXIME: SIGNIFICANT CHANGE UP
-  CIPROFLOXACIN: SIGNIFICANT CHANGE UP
-  CIPROFLOXACIN: SIGNIFICANT CHANGE UP
-  ERTAPENEM: SIGNIFICANT CHANGE UP
-  ERTAPENEM: SIGNIFICANT CHANGE UP
-  GENTAMICIN: SIGNIFICANT CHANGE UP
-  GENTAMICIN: SIGNIFICANT CHANGE UP
-  IMIPENEM: SIGNIFICANT CHANGE UP
-  IMIPENEM: SIGNIFICANT CHANGE UP
-  LEVOFLOXACIN: SIGNIFICANT CHANGE UP
-  LEVOFLOXACIN: SIGNIFICANT CHANGE UP
-  MEROPENEM: SIGNIFICANT CHANGE UP
-  MEROPENEM: SIGNIFICANT CHANGE UP
-  NITROFURANTOIN: SIGNIFICANT CHANGE UP
-  PIPERACILLIN/TAZOBACTAM: SIGNIFICANT CHANGE UP
-  PIPERACILLIN/TAZOBACTAM: SIGNIFICANT CHANGE UP
-  TOBRAMYCIN: SIGNIFICANT CHANGE UP
-  TOBRAMYCIN: SIGNIFICANT CHANGE UP
-  TRIMETHOPRIM/SULFAMETHOXAZOLE: SIGNIFICANT CHANGE UP
-  TRIMETHOPRIM/SULFAMETHOXAZOLE: SIGNIFICANT CHANGE UP
CULTURE RESULTS: SIGNIFICANT CHANGE UP
METHOD TYPE: SIGNIFICANT CHANGE UP
METHOD TYPE: SIGNIFICANT CHANGE UP
ORGANISM # SPEC MICROSCOPIC CNT: SIGNIFICANT CHANGE UP
SPECIMEN SOURCE: SIGNIFICANT CHANGE UP

## 2023-06-21 PROCEDURE — 99232 SBSQ HOSP IP/OBS MODERATE 35: CPT

## 2023-06-21 RX ORDER — CEFTRIAXONE 500 MG/1
2000 INJECTION, POWDER, FOR SOLUTION INTRAMUSCULAR; INTRAVENOUS EVERY 24 HOURS
Refills: 0 | Status: DISCONTINUED | OUTPATIENT
Start: 2023-06-21 | End: 2023-06-27

## 2023-06-21 RX ADMIN — Medication 1 SPRAY(S): at 19:12

## 2023-06-21 RX ADMIN — CEFTRIAXONE 2000 MILLIGRAM(S): 500 INJECTION, POWDER, FOR SOLUTION INTRAMUSCULAR; INTRAVENOUS at 19:11

## 2023-06-21 RX ADMIN — Medication 250 MILLIGRAM(S): at 06:11

## 2023-06-21 RX ADMIN — Medication 81 MILLIGRAM(S): at 12:49

## 2023-06-21 RX ADMIN — Medication 75 MICROGRAM(S): at 06:11

## 2023-06-21 RX ADMIN — AMLODIPINE BESYLATE 5 MILLIGRAM(S): 2.5 TABLET ORAL at 06:10

## 2023-06-21 RX ADMIN — ISOSORBIDE MONONITRATE 60 MILLIGRAM(S): 60 TABLET, EXTENDED RELEASE ORAL at 12:49

## 2023-06-21 RX ADMIN — SENNA PLUS 2 TABLET(S): 8.6 TABLET ORAL at 21:55

## 2023-06-21 RX ADMIN — Medication 250 MILLIGRAM(S): at 19:07

## 2023-06-21 RX ADMIN — MEROPENEM 1000 MILLIGRAM(S): 1 INJECTION INTRAVENOUS at 06:10

## 2023-06-21 RX ADMIN — Medication 650 MILLIGRAM(S): at 12:50

## 2023-06-21 RX ADMIN — ATORVASTATIN CALCIUM 40 MILLIGRAM(S): 80 TABLET, FILM COATED ORAL at 21:55

## 2023-06-21 RX ADMIN — BUDESONIDE AND FORMOTEROL FUMARATE DIHYDRATE 2 PUFF(S): 160; 4.5 AEROSOL RESPIRATORY (INHALATION) at 20:06

## 2023-06-21 NOTE — DIETITIAN INITIAL EVALUATION ADULT - PERTINENT LABORATORY DATA
06-20    134<L>  |  100  |  46.2<H>  ----------------------------<  75  3.9   |  20.0<L>  |  1.27    Ca    8.1<L>      20 Jun 2023 07:10    TPro  6.1<L>  /  Alb  2.9<L>  /  TBili  0.6  /  DBili  x   /  AST  88<H>  /  ALT  57<H>  /  AlkPhos  122<H>  06-19

## 2023-06-21 NOTE — DIETITIAN INITIAL EVALUATION ADULT - PERTINENT MEDS FT
MEDICATIONS  (STANDING):  amLODIPine   Tablet 5 milliGRAM(s) Oral daily  aspirin  chewable 81 milliGRAM(s) Oral daily  atorvastatin 40 milliGRAM(s) Oral at bedtime  budesonide 160 MICROgram(s)/formoterol 4.5 MICROgram(s) Inhaler 2 Puff(s) Inhalation two times a day  fluticasone propionate 50 MICROgram(s)/spray Nasal Spray 1 Spray(s) Both Nostrils two times a day  isosorbide   mononitrate ER Tablet (IMDUR) 60 milliGRAM(s) Oral daily  levothyroxine 75 MICROGram(s) Oral daily  meropenem Injectable 1000 milliGRAM(s) IV Push every 12 hours  saccharomyces boulardii 250 milliGRAM(s) Oral two times a day  senna 2 Tablet(s) Oral at bedtime  sodium chloride 0.9%. 1000 milliLiter(s) (120 mL/Hr) IV Continuous <Continuous>    MEDICATIONS  (PRN):  acetaminophen     Tablet .. 650 milliGRAM(s) Oral every 6 hours PRN Temp greater or equal to 38C (100.4F), Moderate Pain (4 - 6)

## 2023-06-21 NOTE — DIETITIAN INITIAL EVALUATION ADULT - OTHER INFO
87 year old female with extensive medical hx including  DM, LBBB, AMI, HTN, HLD, hypothyroidism, orthostatic hypotension, is BIBF for worsening lethargy and not talking as much and sleeping most of the day and altered mental status   IN Ed is found to have sepsis, MIKEL, rhabdomyolysis. blood cultures with Gram neg bacteria.

## 2023-06-21 NOTE — PHARMACOTHERAPY INTERVENTION NOTE - COMMENTS
Blood cultures growing E Coli susceptible to ceftriaxone. Discussed with attending, de-escalated therapy.

## 2023-06-21 NOTE — CDI QUERY NOTE - NSCDIOTHERTXTBX_GEN_ALL_CORE_HH
Please specify the etiology of AMS    - AMS due to metabolic encephalopathy secondary to sepsis/ UTI  - Other ( please specify)  - Unknown etiology    Documentation    ED- 87y Female complaining of altered mental status.  Patient moaning and not answering questions.  Principal Discharge DX:	Acute UTI  Secondary Diagnosis:	MIKEL (acute kidney injury)  Secondary Diagnosis:	Rhabdomyolysis.    H&P- BIBF for worsening lethargy and not talking as much and sleeping most of the day and altered mental status   ARF   Secondary nausea vomiting and diarrhea and dehydration  UTI     6/20 Hosp-sepsis with gram negative bacteremia likely 2/2 UTI, ARF   Pt seems to be improving clinically

## 2023-06-21 NOTE — PROGRESS NOTE ADULT - SUBJECTIVE AND OBJECTIVE BOX
PRESLEY [facility]    [id number]    87y      Female    CC: poor oral intake, not doing well at home   c/o dry mouth, no other complaints    INTERVAL HPI/OVERNIGHT EVENTS: no acute events     REVIEW OF SYSTEMS:    CONSTITUTIONAL: + fatigue  RESPIRATORY: No cough, wheezing,  No shortness of breath  CARDIOVASCULAR: No chest pain, palpitations  GASTROINTESTINAL: No abdominal or epigastric pain. No nausea, vomiting        Vital Signs Last 24 Hrs  T(C): --  T(F): --  HR: 88 (21 Jun 2023 06:00) (74 - 88)  BP: 176/71 (21 Jun 2023 06:00) (176/71 - 176/71)  BP(mean): --  RR: --  SpO2: 98% (20 Jun 2023 20:01) (98% - 98%)    Parameters below as of 21 Jun 2023 08:30  Patient On (Oxygen Delivery Method): nasal cannula            PHYSICAL EXAM:    GENERAL: NAD, ill appearing.   HEENT: PERRL, +EOMI  NECK: soft, Supple, No JVD,   CHEST/LUNG: Clear to percussion bilaterally; No wheezing  HEART: S1S2+, Regular rate and rhythm; No murmur  ABDOMEN: Soft, Nontender, Nondistended; Bowel sounds present  EXTREMITIES:   No clubbing, cyanosis, or edema  SKIN: No rashes or lesions  NEURO: AAOX2            LABS:                                            12.0   21.81 )-----------( 70       ( 20 Jun 2023 07:10 )             34.4   06-20    134<L>  |  100  |  46.2<H>  ----------------------------<  75  3.9   |  20.0<L>  |  1.27    Ca    8.1<L>      20 Jun 2023 07:10        MEDICATIONS  (STANDING):  amLODIPine   Tablet 5 milliGRAM(s) Oral daily  aspirin  chewable 81 milliGRAM(s) Oral daily  atorvastatin 40 milliGRAM(s) Oral at bedtime  budesonide 160 MICROgram(s)/formoterol 4.5 MICROgram(s) Inhaler 2 Puff(s) Inhalation two times a day  cefTRIAXone Injectable. 2000 milliGRAM(s) IV Push every 24 hours  fluticasone propionate 50 MICROgram(s)/spray Nasal Spray 1 Spray(s) Both Nostrils two times a day  isosorbide   mononitrate ER Tablet (IMDUR) 60 milliGRAM(s) Oral daily  levothyroxine 75 MICROGram(s) Oral daily  saccharomyces boulardii 250 milliGRAM(s) Oral two times a day  senna 2 Tablet(s) Oral at bedtime  sodium chloride 0.9%. 1000 milliLiter(s) (120 mL/Hr) IV Continuous <Continuous>    MEDICATIONS  (PRN):  acetaminophen     Tablet .. 650 milliGRAM(s) Oral every 6 hours PRN Temp greater or equal to 38C (100.4F), Moderate Pain (4 - 6)        RADIOLOGY & ADDITIONAL TESTS:

## 2023-06-21 NOTE — PROGRESS NOTE ADULT - ASSESSMENT
87 year old female with extensive medical hx including  DM, LBBB, AMI, HTN, HLD, hypothyroidism, orthostatic hypotension, is BIBF for worsening lethargy and not talking as much and sleeping most of the day and altered mental status   IN Ed is found to have sepsis, MIKEL, rhabdomyolysis. blood cultures with Gram neg bacteria       sepsis with E coli  bacteremia likely 2/2 UTI, ARF   ct iv rocephin     UTI , Ecoli bacteremia -   ct merrem   ct teixeira today - TOV 6/21 if continues to do well.       Rhabdomyolysis- IVF   trend CPK to cancel- no lab draws as per daughter    DM   FS with coverage Ac and HS - to stop  comfort measures only  Hypoglycemia protocol    Htn+ HLD+ CAD- comfort measures only   aspirin Statin BB  Ranexa   Amlodipine   Imdur     Hypothyroidism  Synthroid     COPD   cont Symbicort     Pt seems to be improving clinically, ct current management , hospice/palliative consult to arrange home with hospice

## 2023-06-21 NOTE — DIETITIAN INITIAL EVALUATION ADULT - ORAL INTAKE PTA/DIET HISTORY
Unable to interview pt. As per EMR pt with decreased po intake, increased fatigue and AMS. Pt on comfort Measures.

## 2023-06-22 ENCOUNTER — APPOINTMENT (OUTPATIENT)
Dept: OBGYN | Facility: CLINIC | Age: 88
End: 2023-06-22

## 2023-06-22 PROCEDURE — 99223 1ST HOSP IP/OBS HIGH 75: CPT

## 2023-06-22 PROCEDURE — 99233 SBSQ HOSP IP/OBS HIGH 50: CPT

## 2023-06-22 RX ORDER — ONDANSETRON 8 MG/1
4 TABLET, FILM COATED ORAL ONCE
Refills: 0 | Status: COMPLETED | OUTPATIENT
Start: 2023-06-22 | End: 2023-06-22

## 2023-06-22 RX ADMIN — AMLODIPINE BESYLATE 5 MILLIGRAM(S): 2.5 TABLET ORAL at 05:14

## 2023-06-22 RX ADMIN — Medication 75 MICROGRAM(S): at 05:14

## 2023-06-22 RX ADMIN — CEFTRIAXONE 2000 MILLIGRAM(S): 500 INJECTION, POWDER, FOR SOLUTION INTRAMUSCULAR; INTRAVENOUS at 18:52

## 2023-06-22 RX ADMIN — SODIUM CHLORIDE 120 MILLILITER(S): 9 INJECTION INTRAMUSCULAR; INTRAVENOUS; SUBCUTANEOUS at 05:13

## 2023-06-22 RX ADMIN — SENNA PLUS 2 TABLET(S): 8.6 TABLET ORAL at 21:42

## 2023-06-22 RX ADMIN — Medication 81 MILLIGRAM(S): at 18:53

## 2023-06-22 RX ADMIN — Medication 250 MILLIGRAM(S): at 05:14

## 2023-06-22 RX ADMIN — Medication 250 MILLIGRAM(S): at 18:52

## 2023-06-22 RX ADMIN — Medication 1 SPRAY(S): at 18:53

## 2023-06-22 RX ADMIN — BUDESONIDE AND FORMOTEROL FUMARATE DIHYDRATE 2 PUFF(S): 160; 4.5 AEROSOL RESPIRATORY (INHALATION) at 20:57

## 2023-06-22 RX ADMIN — SODIUM CHLORIDE 120 MILLILITER(S): 9 INJECTION INTRAMUSCULAR; INTRAVENOUS; SUBCUTANEOUS at 22:09

## 2023-06-22 RX ADMIN — Medication 1 SPRAY(S): at 05:14

## 2023-06-22 RX ADMIN — BUDESONIDE AND FORMOTEROL FUMARATE DIHYDRATE 2 PUFF(S): 160; 4.5 AEROSOL RESPIRATORY (INHALATION) at 09:36

## 2023-06-22 RX ADMIN — ISOSORBIDE MONONITRATE 60 MILLIGRAM(S): 60 TABLET, EXTENDED RELEASE ORAL at 18:53

## 2023-06-22 RX ADMIN — ATORVASTATIN CALCIUM 40 MILLIGRAM(S): 80 TABLET, FILM COATED ORAL at 21:42

## 2023-06-22 RX ADMIN — ONDANSETRON 4 MILLIGRAM(S): 8 TABLET, FILM COATED ORAL at 18:55

## 2023-06-22 NOTE — PROGRESS NOTE ADULT - ASSESSMENT
Progress Note - Vascular Surgery   Tyler Sifuentes 80 y o  male MRN: [id number]  Unit/Bed#: [facility] 430-01 Encounter: [id number]      Subjective:  Combative overnight requiring soft belt restraint  Denies left lower extremity pain at rest    Vitals:  /65 (BP Location: Right arm) Comment: 94  Pulse 74   Temp 97 7 °F (36 5 °C) (Oral)   Resp 20   Ht 5' 5" (1 651 m)   Wt 50 3 kg (111 lb)   SpO2 97%   BMI 18 47 kg/m²     I/Os:  I/O last 3 completed shifts: In: 4761 1 [P O :760;  I V :4001 1]  Out: 5767 [KZEID:3719]  I/O this shift:  In: 1425 [P O :960; I V :465]  Out: 1575 [Urine:1575]    Lab Results and Cultures:   CBC with diff:   Lab Results   Component Value Date    WBC 2 95 (L) 11/05/2019    HGB 10 7 (L) 11/05/2019    HCT 33 3 (L) 11/05/2019    MCV 96 11/05/2019     11/05/2019    MCH 30 8 11/05/2019    MCHC 32 1 11/05/2019    RDW 15 4 (H) 11/05/2019    MPV 11 1 11/05/2019    NRBC 0 11/05/2019   ,   BMP/CMP:  Lab Results   Component Value Date    SODIUM 141 11/03/2019    K 4 0 11/03/2019     11/03/2019    CO2 26 11/03/2019    BUN 19 11/03/2019    CREATININE 1 00 11/03/2019    CALCIUM 8 9 11/03/2019    AST 12 11/03/2019    ALT 13 11/03/2019    ALKPHOS 87 11/03/2019    EGFR 76 11/03/2019   ,   Lipid Panel: No results found for: CHOL,   Coags:   Lab Results   Component Value Date    PTT 65 (H) 11/05/2019    INR 1 24 (H) 11/02/2019   ,     Blood Culture: No results found for: BLOODCX,   Urinalysis: No results found for: COLORU, CLARITYU, SPECGRAV, PHUR, LEUKOCYTESUR, NITRITE, PROTEINUA, GLUCOSEU, KETONESU, BILIRUBINUR, BLOODU,   Urine Culture: No results found for: URINECX,   Wound Culure: No results found for: WOUNDCULT    Medications:  Current Facility-Administered Medications   Medication Dose Route Frequency    acetaminophen (TYLENOL) tablet 650 mg  650 mg Oral Q6H PRN    amLODIPine (NORVASC) tablet 5 mg  5 mg Oral Daily    aspirin (ECOTRIN LOW STRENGTH) EC tablet 81 mg  81 mg Oral Daily    atorvastatin (LIPITOR) tablet 40 mg  40 mg Oral Daily With Dinner    bisacodyl (DULCOLAX) rectal suppository 10 mg  10 mg Rectal Daily PRN    gabapentin (NEURONTIN) capsule 100 mg  100 mg Oral Daily    heparin (porcine) 25,000 units in 250 mL infusion (premix)  3-30 Units/kg/hr (Order-Specific) Intravenous Titrated    heparin (porcine) injection 2,000 Units  2,000 Units Intravenous PRN    heparin (porcine) injection 4,000 Units  4,000 Units Intravenous PRN    hydrALAZINE (APRESOLINE) injection 10 mg  10 mg Intravenous Q6H PRN    melatonin tablet 6 mg  6 mg Oral HS    multi-electrolyte (PLASMALYTE-A/ISOLYTE-S PH 7 4) IV solution  100 mL/hr Intravenous Continuous    nicotine (NICODERM CQ) 14 mg/24hr TD 24 hr patch 1 patch  1 patch Transdermal Daily    OLANZapine (ZyPREXA) IM injection 2 5 mg  2 5 mg Intramuscular Q8H PRN    oxyCODONE (ROXICODONE) IR tablet 2 5 mg  2 5 mg Oral Q4H PRN    oxyCODONE (ROXICODONE) IR tablet 5 mg  5 mg Oral Q4H PRN    polyethylene glycol (MIRALAX) packet 17 g  17 g Oral BID    senna-docusate sodium (SENOKOT S) 8 6-50 mg per tablet 1 tablet  1 tablet Oral BID    tamsulosin (FLOMAX) capsule 0 4 mg  0 4 mg Oral Daily With Dinner    thiamine (VITAMIN B1) tablet 100 mg  100 mg Oral Daily       Imaging:      Physical Exam:    General:  No acute distress  CV:  Regular rate and rhythm  Respiratory:  Nonlabored respirations  Abdominal:  Soft, nontender, nondistended  Extremities:  Moves all extremities      Pulse exam:  Palpable right femoral, Doppler signal left femoral artery  Doppler signal right PT    Assessment:  67-year-old male with peripheral arterial disease, severe left lower extremity claudication with left common femoral, proximal SFA and profunda femoral artery occlusion    Plan:  Continue diet as tolerated  Continue heparin drip  Frequent reorientation  Plan for left femoral endarterectomy with retrograde iliac intervention tentatively planned for  87 year old female with extensive medical hx including  DM, LBBB, AMI, HTN, HLD, hypothyroidism, orthostatic hypotension, is BIBF for worsening lethargy and not talking as much and sleeping most of the day and altered mental status   IN Ed is found to have sepsis, MIKEL, rhabdomyolysis. blood cultures with Gram neg bacteria       Sepsis with E coli  bacteremia likely 2/2 UTI, ARF   ct iv rocephin     UTI , Ecoli bacteremia -   ct merrem    TOV 6/22 today        Rhabdomyolysis- IVF   trend CPK to cancel- no lab draws as per daughter    DM   FS with coverage Ac and HS - to stop  comfort measures only  Hypoglycemia protocol    Htn+ HLD+ CAD- comfort measures only   aspirin Statin BB  Ranexa, Amlodipine, Imdur     Hypothyroidism  Synthroid     COPD   cont Symbicort     Pt seems to be improving clinically, ct current management , hospice/palliative consult to arrange home with hospice Friday  Continue statin  Patient may be discharged to home to return on Friday for surgery if patient and family agreeable      Diana Doherty MD  11/5/2019  87 year old female with extensive medical hx including  DM, LBBB, AMI, HTN, HLD, hypothyroidism, orthostatic hypotension, is BIBF for worsening lethargy and not talking as much and sleeping most of the day and altered mental status   IN Ed is found to have sepsis, MIKEL, rhabdomyolysis. blood cultures with Gram neg bacteria       Sepsis with E coli  bacteremia likely 2/2 UTI, ARF   ct iv rocephin     UTI , Ecoli bacteremia -   ct merrem    TOV 6/22 today      Acute metabolic encephalopathy - baseline mental state - unknown  Will clarify with family.     Rhabdomyolysis- IVF   trend CPK to cancel- no lab draws as per daughter    DM   FS with coverage Ac and HS - to stop  comfort measures only  Hypoglycemia protocol    Htn+ HLD+ CAD- comfort measures only   aspirin Statin BB  Ranexa, Amlodipine, Imdur     Hypothyroidism  Synthroid     COPD   cont Symbicort     Pt seems to be improving clinically, ct current management , hospice/palliative consult to arrange home with hospice

## 2023-06-22 NOTE — CONSULT NOTE ADULT - ASSESSMENT
87 year old female with LBBB, AMI DM, , HTN, HLD, hypothyroidism admitted with sepsis 2/2 E coli  bacteremia likely from UTI complicated by rhabdomyolysis    Sepsis/ bacteremia/UTI  s/p IVF bolus  patient appears more alert  complete abx     Rhabdomyolysis  IV fluids  reported family does not want further lab work  patient denies any pains    Encounter for Palliative Care  Palliative Care consulted to assist with goals of care for d/c planning  Per [facility] 6/19 - family wished only fluids abx and most necessary treatments , no blood work   It appears patient more alert and responded to treatment.  Given this hospitalization, she will likely have a decline in functional   Hospice was consulted.   Currently patient without symptoms  Hospice to determine eligibility for home services       87 year old female with LBBB, AMI DM, , HTN, HLD, hypothyroidism admitted with sepsis 2/2 E coli  bacteremia likely from UTI complicated by rhabdomyolysis    Sepsis/ bacteremia/UTI  s/p IVF bolus  patient appears more alert  complete abx     Rhabdomyolysis  IV fluids  reported family does not want further lab work  patient denies any pains    Encounter for Palliative Care  Palliative Care consulted to assist with goals of care for d/c planning  Per [facility] 6/19 - family wished only fluids abx and most necessary treatments , no blood work   It appears patient more alert and responded to treatment.  Given this hospitalization, she will likely have a decline in functional status  Hospice was consulted.   Currently patient without symptoms  Hospice to determine eligibility for home services

## 2023-06-22 NOTE — PROGRESS NOTE ADULT - SUBJECTIVE AND OBJECTIVE BOX
[facility]    [id number]    87y      Female    CC: poor oral intake, not doing well at home    no complaints    INTERVAL HPI/OVERNIGHT EVENTS: no acute events     REVIEW OF SYSTEMS:    CONSTITUTIONAL: + fatigue  RESPIRATORY: No cough, wheezing,  No shortness of breath  CARDIOVASCULAR: No chest pain, palpitations  GASTROINTESTINAL: No abdominal or epigastric pain. No nausea, vomiting      Vital Signs Last 24 Hrs  T(C): 36.3 (22 Jun 2023 12:09), Max: 36.3 (21 Jun 2023 17:27)  T(F): 97.3 (22 Jun 2023 12:09), Max: 97.4 (21 Jun 2023 17:27)  HR: 95 (22 Jun 2023 12:09) (71 - 104)  BP: 131/73 (22 Jun 2023 12:09) (118/73 - 131/73)  BP(mean): --  RR: 18 (22 Jun 2023 12:09) (18 - 18)  SpO2: 92% (22 Jun 2023 12:09) (92% - 97%)    Parameters below as of 22 Jun 2023 12:09  Patient On (Oxygen Delivery Method): nasal cannula  O2 Flow (L/min): 3        PHYSICAL EXAM:    GENERAL: NAD, ill appearing.   HEENT: PERRL, +EOMI  NECK: soft, Supple, No JVD  CHEST/LUNG: Clear to percussion bilaterally; No wheezing  HEART: S1S2+, Regular rate and rhythm; No murmur  ABDOMEN: Soft, Nontender, Nondistended; Bowel sounds present  EXTREMITIES:   No clubbing, cyanosis, or edema  SKIN: No rashes or lesions  NEURO: AAOX2            LABS:      MEDICATIONS  (STANDING):  amLODIPine   Tablet 5 milliGRAM(s) Oral daily  aspirin  chewable 81 milliGRAM(s) Oral daily  atorvastatin 40 milliGRAM(s) Oral at bedtime  budesonide 160 MICROgram(s)/formoterol 4.5 MICROgram(s) Inhaler 2 Puff(s) Inhalation two times a day  cefTRIAXone Injectable. 2000 milliGRAM(s) IV Push every 24 hours  fluticasone propionate 50 MICROgram(s)/spray Nasal Spray 1 Spray(s) Both Nostrils two times a day  isosorbide   mononitrate ER Tablet (IMDUR) 60 milliGRAM(s) Oral daily  levothyroxine 75 MICROGram(s) Oral daily  saccharomyces boulardii 250 milliGRAM(s) Oral two times a day  senna 2 Tablet(s) Oral at bedtime  sodium chloride 0.9%. 1000 milliLiter(s) (120 mL/Hr) IV Continuous <Continuous>    MEDICATIONS  (PRN):  acetaminophen     Tablet .. 650 milliGRAM(s) Oral every 6 hours PRN Temp greater or equal to 38C (100.4F), Moderate Pain (4 - 6)      RADIOLOGY & ADDITIONAL TESTS:

## 2023-06-22 NOTE — CONSULT NOTE ADULT - TIME BILLING
Time spent with review of chart documents, labs, imaging. Direct patient assessment,  formulation of care plan. Discussion with  Interdisciplinary  team  Dr. Edmondson,  LIZA, JOHNNY GALICIA Crump

## 2023-06-22 NOTE — CONSULT NOTE ADULT - SUBJECTIVE AND OBJECTIVE BOX
HPI:  87 year old female with extensive medical hx including  DM, LBBB, AMI, HTN, HLD, hypothyroidism, orthostatic hypotension, is BIBF for worsening lethargy and not talking as much and sleeping most of the day and altered mental status   IN ED is found to have positive urine and leukocytosis and elevated CPK  collateral hx from daughter Mary Ann- 3-4 days ago started having uncontrolled diarrhea and vomiting and   for past few days she could hardly walk -was more and more lethargic not eating and drinking  has been to a neurologist for headache and increased lethargy episodes when when her legs don't work - and episodes were treated with anti-seizure medication. usually the "episodes last few mins and not few days "  yesterday she needed a wheel chair to the bathroom and kitchen and she went right back to bed and didn't talk at all and in the whole day had couple spoons of smoothie   yesterday she said to her daughter that "i am dying and i am ready to go".       (2023 15:39)      PERTINENT PMH REVIEWED: Yes    PAST MEDICAL & SURGICAL HISTORY:  HTN (hypertension)      High cholesterol      Stented coronary artery  x2      MI (myocardial infarction)      Hypothyroid      GERD (gastroesophageal reflux disease)      Myocardial infarction      Diabetes mellitus  Patient denies T2DM      Hiatal hernia      PUD (peptic ulcer disease)      Gastroenteritis      Vocal cord polyps      LBBB (left bundle branch block)      S/P appendectomy      S/P tubal ligation      S/P cholecystectomy      S/P  section          SOCIAL HISTORY:                      Substance history: no reported hx of substance abuse                    Admitted from:  home                     Scientology/spirituality: Christianity                    Cultural concerns:    Baseline ADLs (prior to admission):  Independent                      Surrogate/HCP/Guardian: daughter Mary Ann     FAMILY HISTORY:  Family history of cerebrovascular accident (CVA)    Family history of acute myocardial infarction    Family history of Alzheimer's disease    Diabetes mellitus (Sibling)        Allergies    penicillin (Nausea; Diarrhea)    Intolerances    Bactrim (Vomiting)      http://npcrc.org/files/news/[email or web address]_performance_[email or web address]_[email or web address].pdf    Present Symptoms:   Dyspnea:  No   Nausea/Vomiting:  No   Anxiety:   No    Depression: No  Fatigue: Yes  Loss of appetite: Yes   Constipation:  Not reported       Pain:  No              Location            Character            Duration            Factors            Severity            Effect    Pain AD Score:  [email or web address] (press ctrl + left click to view)    Review of Systems: Reviewed    Unable to obtain due to poorhistorian      MEDICATIONS  (STANDING):  amLODIPine   Tablet 5 milliGRAM(s) Oral daily  aspirin  chewable 81 milliGRAM(s) Oral daily  atorvastatin 40 milliGRAM(s) Oral at bedtime  budesonide 160 MICROgram(s)/formoterol 4.5 MICROgram(s) Inhaler 2 Puff(s) Inhalation two times a day  cefTRIAXone Injectable. 2000 milliGRAM(s) IV Push every 24 hours  fluticasone propionate 50 MICROgram(s)/spray Nasal Spray 1 Spray(s) Both Nostrils two times a day  isosorbide   mononitrate ER Tablet (IMDUR) 60 milliGRAM(s) Oral daily  levothyroxine 75 MICROGram(s) Oral daily  saccharomyces boulardii 250 milliGRAM(s) Oral two times a day  senna 2 Tablet(s) Oral at bedtime  sodium chloride 0.9%. 1000 milliLiter(s) (120 mL/Hr) IV Continuous <Continuous>    MEDICATIONS  (PRN):  acetaminophen     Tablet .. 650 milliGRAM(s) Oral every 6 hours PRN Temp greater or equal to 38C (100.4F), Moderate Pain (4 - 6)      PHYSICAL EXAM:    Vital Signs Last 24 Hrs  T(C): 36.3 (2023 12:09), Max: 36.3 (2023 17:27)  T(F): 97.3 (2023 12:09), Max: 97.4 (2023 17:27)  HR: 95 (2023 12:09) (71 - 104)  BP: 131/73 (2023 12:09) (118/73 - 131/73)  BP(mean): --  RR: 18 (2023 12:09) (18 - 18)  SpO2: 92% (2023 12:09) (92% - 97%)    Parameters below as of 2023 12:09  Patient On (Oxygen Delivery Method): nasal cannula  O2 Flow (L/min): 3      Karnofsky 40 %  General:  Elderly woman awake alert NAD  HEENT: NCAT     Lungs: comfortable  CV: RR  GI:  soft NTND  MSK: normal     Neuro: AO person,place year - no focal deficits  Skin: warm dry  Psych:  calm    LABS:              I&O's Summary    2023 07:01  -  2023 07:00  --------------------------------------------------------  IN: 0 mL / OUT: 800 mL / NET: -800 mL        RADIOLOGY & ADDITIONAL STUDIES:  < from: CT Abdomen and Pelvis No Cont (23 @ 14:16) >  ACC: [id number] EXAM:  CT ABDOMEN AND PELVIS   ORDERED BY: JENI MAYFIELD     ACC: [id number] EXAM:  CT CHEST   ORDERED BY: JENI MAYFIELD     PROCEDURE DATE:  2023          INTERPRETATION:  CLINICAL INFORMATION: Altered mental status    COMPARISON: CT chest 10/7/2021 and CT abdomen pelvis 2022    CONTRAST/COMPLICATIONS:  IV Contrast: NONE  Oral Contrast: NONE  Complications: None reported at time of study completion    PROCEDURE:  CT of the Chest, Abdomen and Pelvis was performed.  Sagittal and coronal reformats were performed.    FINDINGS:  CHEST:  LUNGS AND LARGE AIRWAYS: Patent central airways. No pulmonary nodules.   There is mild emphysema. There is linear atelectasis at the lung bases  PLEURA: No pleural effusion.  VESSELS: Atherosclerotic changes of the aorta and coronary arteries.  HEART: Heart size is normal. No pericardial effusion. Pacemaker leads  MEDIASTINUM AND CARLOS: No lymphadenopathy.  CHEST WALL AND LOWER NECK: Left-sided pacemaker battery.    ABDOMEN AND PELVIS:  LIVER: Punctate calcification posterior right hepatic lobe.  BILE DUCTS: Normal caliber.  GALLBLADDER: Cholecystectomy.  SPLEEN: Within normal limits.  PANCREAS: Within normal limits.  ADRENALS: Within normal limits.  KIDNEYS/URETERS: Within normal limits.    BLADDER: Mejia catheter.  REPRODUCTIVE ORGANS: Uterus and adnexa within normal limits.    BOWEL: No bowel obstruction. Appendix is not visualized. No evidence of   inflammation in the pericecal region. The appendix may be surgically   absent. There is colonic diverticulosis without diverticulitis  PERITONEUM: No ascites.  VESSELS: Atherosclerotic changes. Focal area chronic calcified infrarenal   aortic dissection is unchanged from studies dating back to 2020  RETROPERITONEUM/LYMPH NODES: No lymphadenopathy.  ABDOMINAL WALL: Within normal limits.  BONES: Degenerative changes. Left total hip replacement is appreciated a   vertebral body hemangioma is seen in the T8 vertebral body. Mild chronic   compression deformity of L1    IMPRESSION:  Emphysema.  No evidence of acute infectious focus.  Colonic diverticulosis without diverticulitis    < end of copied text >            ADVANCE DIRECTIVES/TREATMENT PREFERENCES:d   DNR/DNI -  continuing medical treatments

## 2023-06-23 PROCEDURE — 99233 SBSQ HOSP IP/OBS HIGH 50: CPT

## 2023-06-23 PROCEDURE — 99497 ADVNCD CARE PLAN 30 MIN: CPT | Mod: 25

## 2023-06-23 RX ORDER — IPRATROPIUM/ALBUTEROL SULFATE 18-103MCG
3 AEROSOL WITH ADAPTER (GRAM) INHALATION EVERY 6 HOURS
Refills: 0 | Status: DISCONTINUED | OUTPATIENT
Start: 2023-06-23 | End: 2023-06-24

## 2023-06-23 RX ADMIN — Medication 1 SPRAY(S): at 05:42

## 2023-06-23 RX ADMIN — Medication 650 MILLIGRAM(S): at 11:26

## 2023-06-23 RX ADMIN — BUDESONIDE AND FORMOTEROL FUMARATE DIHYDRATE 2 PUFF(S): 160; 4.5 AEROSOL RESPIRATORY (INHALATION) at 20:31

## 2023-06-23 RX ADMIN — CEFTRIAXONE 2000 MILLIGRAM(S): 500 INJECTION, POWDER, FOR SOLUTION INTRAMUSCULAR; INTRAVENOUS at 18:47

## 2023-06-23 RX ADMIN — Medication 650 MILLIGRAM(S): at 12:26

## 2023-06-23 RX ADMIN — ISOSORBIDE MONONITRATE 60 MILLIGRAM(S): 60 TABLET, EXTENDED RELEASE ORAL at 11:25

## 2023-06-23 RX ADMIN — Medication 250 MILLIGRAM(S): at 05:42

## 2023-06-23 RX ADMIN — BUDESONIDE AND FORMOTEROL FUMARATE DIHYDRATE 2 PUFF(S): 160; 4.5 AEROSOL RESPIRATORY (INHALATION) at 08:30

## 2023-06-23 RX ADMIN — Medication 250 MILLIGRAM(S): at 18:48

## 2023-06-23 RX ADMIN — Medication 650 MILLIGRAM(S): at 22:10

## 2023-06-23 RX ADMIN — ATORVASTATIN CALCIUM 40 MILLIGRAM(S): 80 TABLET, FILM COATED ORAL at 21:09

## 2023-06-23 RX ADMIN — Medication 1 SPRAY(S): at 18:48

## 2023-06-23 RX ADMIN — Medication 650 MILLIGRAM(S): at 21:10

## 2023-06-23 RX ADMIN — Medication 3 MILLILITER(S): at 20:31

## 2023-06-23 RX ADMIN — Medication 81 MILLIGRAM(S): at 11:25

## 2023-06-23 RX ADMIN — SODIUM CHLORIDE 120 MILLILITER(S): 9 INJECTION INTRAMUSCULAR; INTRAVENOUS; SUBCUTANEOUS at 05:41

## 2023-06-23 RX ADMIN — SENNA PLUS 2 TABLET(S): 8.6 TABLET ORAL at 21:09

## 2023-06-23 RX ADMIN — Medication 75 MICROGRAM(S): at 05:42

## 2023-06-23 RX ADMIN — AMLODIPINE BESYLATE 5 MILLIGRAM(S): 2.5 TABLET ORAL at 05:42

## 2023-06-23 NOTE — PROGRESS NOTE ADULT - TIME BILLING
Time spent with review of chart documents, labs, imaging. Direct patient assessment,  formulation of care plan. Discussion with  Interdisciplinary  team  Dr. Edmondson  patient daughter, CM,  hospice SW

## 2023-06-23 NOTE — SWALLOW BEDSIDE ASSESSMENT ADULT - NS ASR SWALLOW FINDINGS DISCUS
SUDHAKAR Issa informed/Nursing/Patient/Family SUDHAKAR Issa and ANAYA Ibarra informed/Nursing/Patient/Family

## 2023-06-23 NOTE — SWALLOW BEDSIDE ASSESSMENT ADULT - SWALLOW EVAL: PROGNOSIS
for least restrictive PO diet textures. This service to follow for diet tolerance vs. candidacy for diet advancement as medically optimized. Please monitor diet tolerance closely. Initiate NPO and reconsult this service should respiratory status decline (increasing O2 requirements) and/or clinical indications of aspiration arise.

## 2023-06-23 NOTE — PROGRESS NOTE ADULT - SUBJECTIVE AND OBJECTIVE BOX
[facility]    [id number]    87y      Female    CC: poor oral intake, not doing well at home    no complaints  fesls better , however has dry cough and difficulty swallowing     INTERVAL HPI/OVERNIGHT EVENTS: no acute events     REVIEW OF SYSTEMS:    CONSTITUTIONAL: + fatigue  RESPIRATORY: No cough, wheezing,  No shortness of breath  CARDIOVASCULAR: No chest pain, palpitations  GASTROINTESTINAL: No abdominal or epigastric pain. No nausea, vomiting      Vital Signs Last 24 Hrs  T(C): 36.8 (23 Jun 2023 09:11), Max: 37.1 (22 Jun 2023 16:52)  T(F): 98.2 (23 Jun 2023 09:11), Max: 98.8 (22 Jun 2023 16:52)  HR: 95 (23 Jun 2023 09:11) (88 - 103)  BP: 132/71 (23 Jun 2023 09:11) (115/70 - 132/71)  BP(mean): --  RR: 18 (23 Jun 2023 09:11) (18 - 18)  SpO2: 93% (23 Jun 2023 09:11) (90% - 94%)    Parameters below as of 23 Jun 2023 09:11  Patient On (Oxygen Delivery Method): nasal cannula  O2 Flow (L/min): 3          PHYSICAL EXAM:    GENERAL: NAD, elderly female   HEENT: PERRL, +EOMI  NECK: soft, Supple, No JVD  CHEST/LUNG: b/l exp wheezing, occasional rhonchi   HEART: S1S2+, Regular rate and rhythm; No murmur  ABDOMEN: Soft, Nontender, Nondistended; Bowel sounds present  EXTREMITIES:   No clubbing, cyanosis, or edema  SKIN: No rashes or lesions  NEURO: AAOX3             LABS:    MEDICATIONS  (STANDING):  albuterol/ipratropium for Nebulization 3 milliLiter(s) Nebulizer every 6 hours  amLODIPine   Tablet 5 milliGRAM(s) Oral daily  aspirin  chewable 81 milliGRAM(s) Oral daily  atorvastatin 40 milliGRAM(s) Oral at bedtime  budesonide 160 MICROgram(s)/formoterol 4.5 MICROgram(s) Inhaler 2 Puff(s) Inhalation two times a day  cefTRIAXone Injectable. 2000 milliGRAM(s) IV Push every 24 hours  fluticasone propionate 50 MICROgram(s)/spray Nasal Spray 1 Spray(s) Both Nostrils two times a day  isosorbide   mononitrate ER Tablet (IMDUR) 60 milliGRAM(s) Oral daily  levothyroxine 75 MICROGram(s) Oral daily  saccharomyces boulardii 250 milliGRAM(s) Oral two times a day  senna 2 Tablet(s) Oral at bedtime    MEDICATIONS  (PRN):  acetaminophen     Tablet .. 650 milliGRAM(s) Oral every 6 hours PRN Temp greater or equal to 38C (100.4F), Moderate Pain (4 - 6)        RADIOLOGY & ADDITIONAL TESTS:

## 2023-06-23 NOTE — SWALLOW BEDSIDE ASSESSMENT ADULT - SLP PERTINENT HISTORY OF CURRENT PROBLEM
87 year old female with extensive medical hx including  DM, LBBB, AMI, HTN, HLD, hypothyroidism, orthostatic hypotension, is BIBF for worsening lethargy and not talking as much and sleeping most of the day and altered mental status

## 2023-06-23 NOTE — PROGRESS NOTE ADULT - ASSESSMENT
87 year old female with LBBB, AMI DM, , HTN, HLD, hypothyroidism admitted with sepsis 2/2 E coli  bacteremia likely from UTI complicated by rhabdomyolysis    Sepsis/ bacteremia/UTI  s/p IVF bolus  complete abx     Hypoxia  on O2 - wean as tolerated    Rhabdomyolysis  IV fluids  reported family does not want further lab work  patient denies any pains    Encounter for Palliative Care  Palliative Care consulted to assist with goals of care for d/c planning  Per [facility] 6/19 - family wished only fluids abx and most necessary treatments , no blood work  87 year old female with LBBB, AMI DM, , HTN, HLD, hypothyroidism admitted with sepsis 2/2 E coli  bacteremia likely from UTI complicated by rhabdomyolysis    Sepsis/ bacteremia/UTI  s/p IVF bolus  complete abx     Hypoxia  on O2 - wean as tolerated  daughter states mother with hx of COPD  CXR ordered    COPD  per daughter, patient was recommended by her pulmonologist to use inhalers twice daily    Rhabdomyolysis  IV fluids  reported family does not want further lab work  patient denies any pains    Encounter for Palliative Care  Palliative Care consulted to assist with goals of care for d/c planning  Per [facility] 6/19 - family wished only fluids abx and most necessary treatments , no blood work     Per discussion with Dr. Edmondson - patient agreeable to continue with treatments including blood work, Xray   Met with patient and daughter Mary Ann at bedside.  Informed her of above.  She is happy mother is doing better and also  agreeable to continue with treatments.    Discussed  plan - CXR to rule out PNA, possible aspiration? She states mother with hx of COPD and was recommended inhalers  twice daily by her pulmonologist   Informed her mother was declined by hospice.    Plan is to medically optimize then BRAYDON .    Comfort care orders d/c

## 2023-06-23 NOTE — PROGRESS NOTE ADULT - ASSESSMENT
87 year old female with extensive medical hx including  DM, LBBB, AMI, HTN, HLD, hypothyroidism, orthostatic hypotension, is BIBF for worsening lethargy and not talking as much and sleeping most of the day and altered mental status   IN Ed is found to have sepsis, MIKEL, rhabdomyolysis. blood cultures with Gram neg bacteria. family decided on comfort care on admision however pt significantly improved, was declined by hospice.       Sepsis with E coli  bacteremia likely 2/2 UTI, ARF   ct iv rocephin     UTI , Ecoli bacteremia -   ct merrem    TOV 6/22 today      Acute metabolic encephalopathy - baseline mental state -AOx3     Rhabdomyolysis- IVF   trend CPK to cancel- no lab draws as per daughter    DM   SSI#2    Htn+ HLD+ CAD- comfort measures only   aspirin Statin BB  Ranexa, Amlodipine, Imdur     Hypothyroidism  Synthroid     COPD   cont Symbicort , start duinebs   swallow eval as pt c/o dysphagia ?aspiration      Pt seems to be improving clinically, ct current management , declined by hospice - lack of terminal diagnosis.   PT eval - Eventual BRAYDON   repeat blood work ordered,.

## 2023-06-23 NOTE — SWALLOW BEDSIDE ASSESSMENT ADULT - SWALLOW EVAL: RECOMMENDED FEEDING/EATING TECHNIQUES
smaller, more frequent meals./allow for swallow between intakes/maintain upright posture during/after eating for 30 mins/no straws/oral hygiene/position upright (90 degrees)/small sips/bites

## 2023-06-23 NOTE — SWALLOW BEDSIDE ASSESSMENT ADULT - ORAL PHASE
36.5 Delayed oral transit time cleared with liquid wash/Decreased anterior-posterior movement of the bolus/Delayed oral transit time/Palatal stasis/Lingual stasis

## 2023-06-23 NOTE — SWALLOW BEDSIDE ASSESSMENT ADULT - SLP GENERAL OBSERVATIONS
Patient received alert, upright in bed being fed by daughter, agreeable to participation in the dysphagia evaluation at this time. Spouse also present at the time of the evaluation. Patient reported right sided flank pain, which spontaneously resolved with large belch. Patient AxOx2, tolerating NC 3 liters at the time of the evaluation. Patient left in NAD, VSS, with family.

## 2023-06-23 NOTE — SWALLOW BEDSIDE ASSESSMENT ADULT - PHARYNGEAL PHASE
+wheezing, increased respiratory effort/Delayed pharyngeal swallow/Wet vocal quality post oral intake increased respiratory effort upon exertion/Delayed pharyngeal swallow

## 2023-06-23 NOTE — SWALLOW BEDSIDE ASSESSMENT ADULT - SWALLOW EVAL: DIAGNOSIS
Suspect oropharyngeal dysphagia in setting of deconditioning, COPD and acute infection. Overt s/s of airway protection deficits observed with thin liquids: wheezing and wet, upper respiratory sounds. Given abovementioned, patient judged to benefit from modified diet textures. If in agreement with medical plan, recommend proceeding with puree textures with mildly thick liquids with strict adherence to aspiration and reflux precautions. Patient requires assistance/supervision with PO set up and intake due to deconditioning. Patient remains at increased risk of aspiration if aspiration precautions are not enforced: please defer PO at times of lethargy/tachypnea or SOB. Defer straw, encourage single sips from cup. Suggest smaller, more frequent meals to optimize caloric intake and minimize overexertion. Follow up with nutrition to optimize daily nutrition/hydration plan. Patient judged to benefit from time for medical optimization prior to advancing PO diet, may consider instrumental exam

## 2023-06-23 NOTE — PROGRESS NOTE ADULT - SUBJECTIVE AND OBJECTIVE BOX
CC:  Follow up GOC , Symptoms    OVERNIGHT EVENTS:  reported some cough  on O2    Present Symptoms:   Dyspnea:  No    Nausea/Vomiting:  No    Anxiety:  No  Depression:  No   Fatigue:  Yes     Loss of appetite:  slight  Constipation: No    Pain: No             Location            Duration            Character            Severity            Factors            Effect    Pain AD Score:  [email or web address] (press ctrl + left click to view)    Review of Systems: Reviewed as above  All others negative    MEDICATIONS  (STANDING):  amLODIPine   Tablet 5 milliGRAM(s) Oral daily  aspirin  chewable 81 milliGRAM(s) Oral daily  atorvastatin 40 milliGRAM(s) Oral at bedtime  budesonide 160 MICROgram(s)/formoterol 4.5 MICROgram(s) Inhaler 2 Puff(s) Inhalation two times a day  cefTRIAXone Injectable. 2000 milliGRAM(s) IV Push every 24 hours  fluticasone propionate 50 MICROgram(s)/spray Nasal Spray 1 Spray(s) Both Nostrils two times a day  isosorbide   mononitrate ER Tablet (IMDUR) 60 milliGRAM(s) Oral daily  levothyroxine 75 MICROGram(s) Oral daily  saccharomyces boulardii 250 milliGRAM(s) Oral two times a day  senna 2 Tablet(s) Oral at bedtime  sodium chloride 0.9%. 1000 milliLiter(s) (120 mL/Hr) IV Continuous <Continuous>    MEDICATIONS  (PRN):  acetaminophen     Tablet .. 650 milliGRAM(s) Oral every 6 hours PRN Temp greater or equal to 38C (100.4F), Moderate Pain (4 - 6)      PHYSICAL EXAM:    Vital Signs Last 24 Hrs  T(C): 36.8 (23 Jun 2023 04:21), Max: 37.1 (22 Jun 2023 16:52)  T(F): 98.2 (23 Jun 2023 04:21), Max: 98.8 (22 Jun 2023 16:52)  HR: 94 (23 Jun 2023 04:21) (88 - 103)  BP: 115/70 (23 Jun 2023 04:21) (115/70 - 131/73)  BP(mean): --  RR: 18 (23 Jun 2023 04:21) (18 - 18)  SpO2: 94% (23 Jun 2023 04:21) (90% - 94%)    Parameters below as of 23 Jun 2023 04:21  Patient On (Oxygen Delivery Method): nasal cannula  O2 Flow (L/min): 3    Karnofsky: 40  %  General:  Elderly woman awake alert NAD      HEENT:  NCAT        Lungs: comfortable  non labored  CV:  RR  GI:  soft NTND  MSK: no contractures  Skin:  warm/dry  Neuro  no focal deficits  Psych calm pleasant    LABS:    I&O's Summary    22 Jun 2023 07:01  -  23 Jun 2023 07:00  --------------------------------------------------------  IN: 1440 mL / OUT: 1100 mL / NET: 340 mL        RADIOLOGY & ADDITIONAL STUDIES:  Imaging Reviewed  (   )   < from: Xray Chest 1 View-PORTABLE IMMEDIATE (06.19.23 @ 15:35) >    ACC: [id number] EXAM:  XR CHEST PORTABLE IMMED 1V   ORDERED BY: JENI MAYFIELD     PROCEDURE DATE:  06/19/2023          INTERPRETATION:  AP chest on June 19, 2023 2:51 PM. Patient has altered   mental status.    Heart magnified by technique. Left coronary stent and left-sided   pacemaker again noted.    Present film shows a left base infiltrate new since December 5, 2022.    IMPRESSION: Left base infiltrate at this time. Other findings stable.    --- End of Report ---        < end of copied text >

## 2023-06-24 LAB
ANION GAP SERPL CALC-SCNC: 10 MMOL/L — SIGNIFICANT CHANGE UP (ref 5–17)
BUN SERPL-MCNC: 29 MG/DL — HIGH (ref 8–20)
CALCIUM SERPL-MCNC: 7.9 MG/DL — LOW (ref 8.4–10.5)
CHLORIDE SERPL-SCNC: 105 MMOL/L — SIGNIFICANT CHANGE UP (ref 96–108)
CK SERPL-CCNC: 33 U/L — SIGNIFICANT CHANGE UP (ref 25–170)
CK SERPL-CCNC: 37 U/L — SIGNIFICANT CHANGE UP (ref 25–170)
CO2 SERPL-SCNC: 20 MMOL/L — LOW (ref 22–29)
CREAT SERPL-MCNC: 0.74 MG/DL — SIGNIFICANT CHANGE UP (ref 0.5–1.3)
EGFR: 79 ML/MIN/1.73M2 — SIGNIFICANT CHANGE UP
GLUCOSE SERPL-MCNC: 153 MG/DL — HIGH (ref 70–99)
HCT VFR BLD CALC: 29.7 % — LOW (ref 34.5–45)
HGB BLD-MCNC: 10.6 G/DL — LOW (ref 11.5–15.5)
MCHC RBC-ENTMCNC: 31.7 PG — SIGNIFICANT CHANGE UP (ref 27–34)
MCHC RBC-ENTMCNC: 35.7 GM/DL — SIGNIFICANT CHANGE UP (ref 32–36)
MCV RBC AUTO: 88.9 FL — SIGNIFICANT CHANGE UP (ref 80–100)
PLATELET # BLD AUTO: 153 K/UL — SIGNIFICANT CHANGE UP (ref 150–400)
POTASSIUM SERPL-MCNC: 3.6 MMOL/L — SIGNIFICANT CHANGE UP (ref 3.5–5.3)
POTASSIUM SERPL-SCNC: 3.6 MMOL/L — SIGNIFICANT CHANGE UP (ref 3.5–5.3)
RBC # BLD: 3.34 M/UL — LOW (ref 3.8–5.2)
RBC # FLD: 15.9 % — HIGH (ref 10.3–14.5)
SODIUM SERPL-SCNC: 135 MMOL/L — SIGNIFICANT CHANGE UP (ref 135–145)
WBC # BLD: 24.85 K/UL — HIGH (ref 3.8–10.5)
WBC # FLD AUTO: 24.85 K/UL — HIGH (ref 3.8–10.5)

## 2023-06-24 PROCEDURE — 99232 SBSQ HOSP IP/OBS MODERATE 35: CPT

## 2023-06-24 RX ORDER — IPRATROPIUM/ALBUTEROL SULFATE 18-103MCG
3 AEROSOL WITH ADAPTER (GRAM) INHALATION EVERY 6 HOURS
Refills: 0 | Status: DISCONTINUED | OUTPATIENT
Start: 2023-06-24 | End: 2023-06-27

## 2023-06-24 RX ORDER — POLYETHYLENE GLYCOL 3350 17 G/17G
17 POWDER, FOR SOLUTION ORAL DAILY
Refills: 0 | Status: DISCONTINUED | OUTPATIENT
Start: 2023-06-24 | End: 2023-06-26

## 2023-06-24 RX ADMIN — Medication 75 MICROGRAM(S): at 05:20

## 2023-06-24 RX ADMIN — CEFTRIAXONE 2000 MILLIGRAM(S): 500 INJECTION, POWDER, FOR SOLUTION INTRAMUSCULAR; INTRAVENOUS at 17:44

## 2023-06-24 RX ADMIN — POLYETHYLENE GLYCOL 3350 17 GRAM(S): 17 POWDER, FOR SOLUTION ORAL at 11:53

## 2023-06-24 RX ADMIN — Medication 250 MILLIGRAM(S): at 17:45

## 2023-06-24 RX ADMIN — Medication 10 MILLIGRAM(S): at 08:09

## 2023-06-24 RX ADMIN — ATORVASTATIN CALCIUM 40 MILLIGRAM(S): 80 TABLET, FILM COATED ORAL at 21:04

## 2023-06-24 RX ADMIN — Medication 3 MILLILITER(S): at 08:35

## 2023-06-24 RX ADMIN — Medication 1 SPRAY(S): at 17:44

## 2023-06-24 RX ADMIN — SENNA PLUS 2 TABLET(S): 8.6 TABLET ORAL at 21:03

## 2023-06-24 RX ADMIN — Medication 3 MILLILITER(S): at 03:36

## 2023-06-24 RX ADMIN — ISOSORBIDE MONONITRATE 60 MILLIGRAM(S): 60 TABLET, EXTENDED RELEASE ORAL at 11:52

## 2023-06-24 RX ADMIN — BUDESONIDE AND FORMOTEROL FUMARATE DIHYDRATE 2 PUFF(S): 160; 4.5 AEROSOL RESPIRATORY (INHALATION) at 08:35

## 2023-06-24 RX ADMIN — Medication 650 MILLIGRAM(S): at 03:40

## 2023-06-24 RX ADMIN — Medication 81 MILLIGRAM(S): at 11:52

## 2023-06-24 RX ADMIN — AMLODIPINE BESYLATE 5 MILLIGRAM(S): 2.5 TABLET ORAL at 05:20

## 2023-06-24 RX ADMIN — Medication 650 MILLIGRAM(S): at 10:49

## 2023-06-24 RX ADMIN — Medication 1 SPRAY(S): at 05:20

## 2023-06-24 RX ADMIN — Medication 650 MILLIGRAM(S): at 09:49

## 2023-06-24 RX ADMIN — Medication 650 MILLIGRAM(S): at 04:40

## 2023-06-24 RX ADMIN — Medication 250 MILLIGRAM(S): at 05:21

## 2023-06-24 NOTE — PROGRESS NOTE ADULT - ASSESSMENT
87 year old female with extensive medical hx including  DM, LBBB, AMI, HTN, HLD, hypothyroidism, orthostatic hypotension, is BIBF for worsening lethargy and not talking as much and sleeping most of the day and altered mental status   IN Ed is found to have sepsis, MIKEL, rhabdomyolysis. blood cultures with Gram neg bacteria. family decided on comfort care on admision however pt significantly improved, was declined by hospice.       #Sepsis with E coli  bacteremia likely 2/2 UTI, ARF   ct iv rocephin     #UTI , Ecoli bacteremia -   c/w Ceftriaxone until 6/29       #Acute metabolic encephalopathy - baseline mental state -AOx3   Improving     #Rhabdomyolysis- IVF   no lab draws as per daughter    #DM   SSI#2    #Htn+ HLD+ CAD  aspirin Statin BB  Ranexa, Amlodipine, Imdur     #Hypothyroidism  Synthroid     #COPD   cont Symbicort , c/w duonebs    #Dysphagia  reviewed swallow eval  Puree diet w/ Mildly thick liquids    #Healthcare Maintenance  DVT PPx - SCDs    Pt seems to be improving clinically, ct current management  Awaiting PT Eval, eventual BRAYDON likely

## 2023-06-24 NOTE — PROGRESS NOTE ADULT - SUBJECTIVE AND OBJECTIVE BOX
Hospitalist Progress Note    Chief Complaint:  MIKEL and UTI    SUBJECTIVE / OVERNIGHT EVENTS:  No events overnight, patient seen at bedside, in NAD, complaining of sharp reproducible chest pain located at level of diaphragm worse w/ palpation and deep breaths. Patient denies abd pain, N/V, fever, chills, dysuria or any other complaints. All remainder ROS negative.     MEDICATIONS  (STANDING):  albuterol/ipratropium for Nebulization 3 milliLiter(s) Nebulizer every 6 hours  amLODIPine   Tablet 5 milliGRAM(s) Oral daily  aspirin  chewable 81 milliGRAM(s) Oral daily  atorvastatin 40 milliGRAM(s) Oral at bedtime  budesonide 160 MICROgram(s)/formoterol 4.5 MICROgram(s) Inhaler 2 Puff(s) Inhalation two times a day  cefTRIAXone Injectable. 2000 milliGRAM(s) IV Push every 24 hours  fluticasone propionate 50 MICROgram(s)/spray Nasal Spray 1 Spray(s) Both Nostrils two times a day  isosorbide   mononitrate ER Tablet (IMDUR) 60 milliGRAM(s) Oral daily  levothyroxine 75 MICROGram(s) Oral daily  polyethylene glycol 3350 17 Gram(s) Oral daily  saccharomyces boulardii 250 milliGRAM(s) Oral two times a day  senna 2 Tablet(s) Oral at bedtime    MEDICATIONS  (PRN):  acetaminophen     Tablet .. 650 milliGRAM(s) Oral every 6 hours PRN Temp greater or equal to 38C (100.4F), Moderate Pain (4 - 6)        I&O's Summary    23 Jun 2023 07:01  -  24 Jun 2023 07:00  --------------------------------------------------------  IN: 0 mL / OUT: 900 mL / NET: -900 mL        PHYSICAL EXAM:  Vital Signs Last 24 Hrs  T(C): 36.9 (24 Jun 2023 08:00), Max: 36.9 (24 Jun 2023 08:00)  T(F): 98.5 (24 Jun 2023 08:00), Max: 98.5 (24 Jun 2023 08:00)  HR: 93 (24 Jun 2023 08:40) (86 - 102)  BP: 143/81 (24 Jun 2023 08:00) (111/63 - 143/81)  BP(mean): --  RR: 18 (24 Jun 2023 08:00) (18 - 18)  SpO2: 95% (24 Jun 2023 08:40) (91% - 95%)    Parameters below as of 24 Jun 2023 08:40  Patient On (Oxygen Delivery Method): nasal cannula, 4L      CONSTITUTIONAL: + fatigue  RESPIRATORY: No cough, wheezing,  No shortness of breath  CARDIOVASCULAR: No chest pain, palpitations  GASTROINTESTINAL: No abdominal or epigastric pain. No nausea, vomiting    LABS:                        10.6   24.85 )-----------( 153      ( 24 Jun 2023 06:36 )             29.7     06-24    135  |  105  |  29.0<H>  ----------------------------<  153<H>  3.6   |  20.0<L>  |  0.74    Ca    7.9<L>      24 Jun 2023 06:36            Urinalysis Basic - ( 24 Jun 2023 06:36 )    Color: x / Appearance: x / SG: x / pH: x  Gluc: 153 mg/dL / Ketone: x  / Bili: x / Urobili: x   Blood: x / Protein: x / Nitrite: x   Leuk Esterase: x / RBC: x / WBC x   Sq Epi: x / Non Sq Epi: x / Bacteria: x        CAPILLARY BLOOD GLUCOSE            RADIOLOGY & ADDITIONAL TESTS:  Results Reviewed: Y  Imaging Personally Reviewed: N  Electrocardiogram Personally Reviewed: N

## 2023-06-25 LAB
ANION GAP SERPL CALC-SCNC: 9 MMOL/L — SIGNIFICANT CHANGE UP (ref 5–17)
BUN SERPL-MCNC: 28.2 MG/DL — HIGH (ref 8–20)
CALCIUM SERPL-MCNC: 8 MG/DL — LOW (ref 8.4–10.5)
CHLORIDE SERPL-SCNC: 104 MMOL/L — SIGNIFICANT CHANGE UP (ref 96–108)
CO2 SERPL-SCNC: 21 MMOL/L — LOW (ref 22–29)
CREAT SERPL-MCNC: 0.72 MG/DL — SIGNIFICANT CHANGE UP (ref 0.5–1.3)
EGFR: 81 ML/MIN/1.73M2 — SIGNIFICANT CHANGE UP
GLUCOSE SERPL-MCNC: 141 MG/DL — HIGH (ref 70–99)
HCT VFR BLD CALC: 30.8 % — LOW (ref 34.5–45)
HGB BLD-MCNC: 11.1 G/DL — LOW (ref 11.5–15.5)
MCHC RBC-ENTMCNC: 31.4 PG — SIGNIFICANT CHANGE UP (ref 27–34)
MCHC RBC-ENTMCNC: 36 GM/DL — SIGNIFICANT CHANGE UP (ref 32–36)
MCV RBC AUTO: 87.3 FL — SIGNIFICANT CHANGE UP (ref 80–100)
PLATELET # BLD AUTO: 236 K/UL — SIGNIFICANT CHANGE UP (ref 150–400)
POTASSIUM SERPL-MCNC: 4 MMOL/L — SIGNIFICANT CHANGE UP (ref 3.5–5.3)
POTASSIUM SERPL-SCNC: 4 MMOL/L — SIGNIFICANT CHANGE UP (ref 3.5–5.3)
RBC # BLD: 3.53 M/UL — LOW (ref 3.8–5.2)
RBC # FLD: 15.6 % — HIGH (ref 10.3–14.5)
SODIUM SERPL-SCNC: 134 MMOL/L — LOW (ref 135–145)
WBC # BLD: 21.74 K/UL — HIGH (ref 3.8–10.5)
WBC # FLD AUTO: 21.74 K/UL — HIGH (ref 3.8–10.5)

## 2023-06-25 PROCEDURE — 99232 SBSQ HOSP IP/OBS MODERATE 35: CPT

## 2023-06-25 RX ORDER — LANOLIN ALCOHOL/MO/W.PET/CERES
3 CREAM (GRAM) TOPICAL AT BEDTIME
Refills: 0 | Status: DISCONTINUED | OUTPATIENT
Start: 2023-06-25 | End: 2023-06-27

## 2023-06-25 RX ADMIN — Medication 81 MILLIGRAM(S): at 11:23

## 2023-06-25 RX ADMIN — AMLODIPINE BESYLATE 5 MILLIGRAM(S): 2.5 TABLET ORAL at 05:58

## 2023-06-25 RX ADMIN — Medication 75 MICROGRAM(S): at 05:58

## 2023-06-25 RX ADMIN — ATORVASTATIN CALCIUM 40 MILLIGRAM(S): 80 TABLET, FILM COATED ORAL at 21:25

## 2023-06-25 RX ADMIN — Medication 1 SPRAY(S): at 05:58

## 2023-06-25 RX ADMIN — ISOSORBIDE MONONITRATE 60 MILLIGRAM(S): 60 TABLET, EXTENDED RELEASE ORAL at 11:23

## 2023-06-25 RX ADMIN — Medication 250 MILLIGRAM(S): at 17:01

## 2023-06-25 RX ADMIN — Medication 3 MILLIGRAM(S): at 21:25

## 2023-06-25 RX ADMIN — Medication 250 MILLIGRAM(S): at 05:57

## 2023-06-25 RX ADMIN — Medication 100 MILLIGRAM(S): at 09:33

## 2023-06-25 RX ADMIN — BUDESONIDE AND FORMOTEROL FUMARATE DIHYDRATE 2 PUFF(S): 160; 4.5 AEROSOL RESPIRATORY (INHALATION) at 09:33

## 2023-06-25 RX ADMIN — CEFTRIAXONE 2000 MILLIGRAM(S): 500 INJECTION, POWDER, FOR SOLUTION INTRAMUSCULAR; INTRAVENOUS at 17:01

## 2023-06-25 RX ADMIN — POLYETHYLENE GLYCOL 3350 17 GRAM(S): 17 POWDER, FOR SOLUTION ORAL at 11:23

## 2023-06-25 RX ADMIN — SENNA PLUS 2 TABLET(S): 8.6 TABLET ORAL at 21:25

## 2023-06-25 RX ADMIN — Medication 100 MILLIGRAM(S): at 17:01

## 2023-06-25 NOTE — PROGRESS NOTE ADULT - ASSESSMENT
87 year old female with extensive medical hx including  DM, LBBB, AMI, HTN, HLD, hypothyroidism, orthostatic hypotension, is BIBF for worsening lethargy and not talking as much and sleeping most of the day and altered mental status   IN Ed is found to have sepsis, MIKEL, rhabdomyolysis. blood cultures with Gram neg bacteria. family decided on comfort care on admision however pt significantly improved, was declined by hospice.       #Sepsis with E coli  bacteremia likely 2/2 UTI, ARF   ct iv rocephin   awaiting repeat cultures    #UTI , Ecoli bacteremia -   c/w Ceftriaxone until 6/29       #Acute metabolic encephalopathy - baseline mental state -AOx3   Improving     #Rhabdomyolysis- IVF   resolved    #DM   SSI#2    #Htn+ HLD+ CAD  aspirin Statin BB  Ranexa, Amlodipine, Imdur     #Hypothyroidism  Synthroid     #COPD   cont Symbicort , c/w duonebs    #Dysphagia  reviewed swallow eval  Puree diet w/ Mildly thick liquids    #Healthcare Maintenance  DVT PPx - SCDs    Pt seems to be improving clinically, ct current management  Awaiting PT Eval, eventual BRAYDON likely

## 2023-06-25 NOTE — PROGRESS NOTE ADULT - SUBJECTIVE AND OBJECTIVE BOX
Hospitalist Progress Note    Chief Complaint:  MIKEL and UTI    SUBJECTIVE / OVERNIGHT EVENTS:  No events overnight, patient seen at bedside, in NAD, complaining of cough, pain in diaphragm improved w/ tylenol. Patient denies abd pain, N/V, fever, chills, dysuria or any other complaints. All remainder ROS negative.     MEDICATIONS  (STANDING):  amLODIPine   Tablet 5 milliGRAM(s) Oral daily  aspirin  chewable 81 milliGRAM(s) Oral daily  atorvastatin 40 milliGRAM(s) Oral at bedtime  budesonide 160 MICROgram(s)/formoterol 4.5 MICROgram(s) Inhaler 2 Puff(s) Inhalation two times a day  cefTRIAXone Injectable. 2000 milliGRAM(s) IV Push every 24 hours  fluticasone propionate 50 MICROgram(s)/spray Nasal Spray 1 Spray(s) Both Nostrils two times a day  isosorbide   mononitrate ER Tablet (IMDUR) 60 milliGRAM(s) Oral daily  levothyroxine 75 MICROGram(s) Oral daily  polyethylene glycol 3350 17 Gram(s) Oral daily  saccharomyces boulardii 250 milliGRAM(s) Oral two times a day  senna 2 Tablet(s) Oral at bedtime    MEDICATIONS  (PRN):  acetaminophen     Tablet .. 650 milliGRAM(s) Oral every 6 hours PRN Temp greater or equal to 38C (100.4F), Moderate Pain (4 - 6)  albuterol/ipratropium for Nebulization 3 milliLiter(s) Nebulizer every 6 hours PRN Shortness of Breath and/or Wheezing  benzonatate 100 milliGRAM(s) Oral every 8 hours PRN Cough        I&O's Summary    24 Jun 2023 07:01  -  25 Jun 2023 07:00  --------------------------------------------------------  IN: 0 mL / OUT: 500 mL / NET: -500 mL        PHYSICAL EXAM:  Vital Signs Last 24 Hrs  T(C): 37.1 (25 Jun 2023 05:24), Max: 37.1 (25 Jun 2023 05:24)  T(F): 98.8 (25 Jun 2023 05:24), Max: 98.8 (25 Jun 2023 05:24)  HR: 94 (25 Jun 2023 05:24) (94 - 98)  BP: 126/69 (25 Jun 2023 05:24) (114/67 - 126/69)  BP(mean): --  RR: 18 (25 Jun 2023 05:24) (18 - 18)  SpO2: 96% (25 Jun 2023 05:24) (96% - 97%)    Parameters below as of 25 Jun 2023 05:24  Patient On (Oxygen Delivery Method): nasal cannula  O2 Flow (L/min): 3    CONSTITUTIONAL: + fatigue  RESPIRATORY: No cough, wheezing,  No shortness of breath  CARDIOVASCULAR: No chest pain, palpitations  GASTROINTESTINAL: No abdominal or epigastric pain. No nausea, vomiting  LABS:                        11.1   21.74 )-----------( 236      ( 25 Jun 2023 06:05 )             30.8     06-25    134<L>  |  104  |  28.2<H>  ----------------------------<  141<H>  4.0   |  21.0<L>  |  0.72    Ca    8.0<L>      25 Jun 2023 06:05        CARDIAC MARKERS ( 24 Jun 2023 12:57 )  x     / x     / 37 U/L / x     / x      CARDIAC MARKERS ( 24 Jun 2023 06:36 )  x     / x     / 33 U/L / x     / x          Urinalysis Basic - ( 25 Jun 2023 06:05 )    Color: x / Appearance: x / SG: x / pH: x  Gluc: 141 mg/dL / Ketone: x  / Bili: x / Urobili: x   Blood: x / Protein: x / Nitrite: x   Leuk Esterase: x / RBC: x / WBC x   Sq Epi: x / Non Sq Epi: x / Bacteria: x        CAPILLARY BLOOD GLUCOSE            RADIOLOGY & ADDITIONAL TESTS:  Results Reviewed: Y  Imaging Personally Reviewed: N  Electrocardiogram Personally Reviewed: SHAZIA

## 2023-06-26 ENCOUNTER — TRANSCRIPTION ENCOUNTER (OUTPATIENT)
Age: 88
End: 2023-06-26

## 2023-06-26 LAB
ANION GAP SERPL CALC-SCNC: 10 MMOL/L — SIGNIFICANT CHANGE UP (ref 5–17)
BUN SERPL-MCNC: 27.7 MG/DL — HIGH (ref 8–20)
CALCIUM SERPL-MCNC: 7.8 MG/DL — LOW (ref 8.4–10.5)
CHLORIDE SERPL-SCNC: 102 MMOL/L — SIGNIFICANT CHANGE UP (ref 96–108)
CO2 SERPL-SCNC: 21 MMOL/L — LOW (ref 22–29)
CREAT SERPL-MCNC: 0.66 MG/DL — SIGNIFICANT CHANGE UP (ref 0.5–1.3)
EGFR: 85 ML/MIN/1.73M2 — SIGNIFICANT CHANGE UP
GLUCOSE SERPL-MCNC: 149 MG/DL — HIGH (ref 70–99)
HCT VFR BLD CALC: 28.4 % — LOW (ref 34.5–45)
HGB BLD-MCNC: 10.3 G/DL — LOW (ref 11.5–15.5)
MCHC RBC-ENTMCNC: 31.7 PG — SIGNIFICANT CHANGE UP (ref 27–34)
MCHC RBC-ENTMCNC: 36.3 GM/DL — HIGH (ref 32–36)
MCV RBC AUTO: 87.4 FL — SIGNIFICANT CHANGE UP (ref 80–100)
PLATELET # BLD AUTO: 293 K/UL — SIGNIFICANT CHANGE UP (ref 150–400)
POTASSIUM SERPL-MCNC: 4 MMOL/L — SIGNIFICANT CHANGE UP (ref 3.5–5.3)
POTASSIUM SERPL-SCNC: 4 MMOL/L — SIGNIFICANT CHANGE UP (ref 3.5–5.3)
RBC # BLD: 3.25 M/UL — LOW (ref 3.8–5.2)
RBC # FLD: 15.6 % — HIGH (ref 10.3–14.5)
SARS-COV-2 RNA SPEC QL NAA+PROBE: SIGNIFICANT CHANGE UP
SODIUM SERPL-SCNC: 133 MMOL/L — LOW (ref 135–145)
WBC # BLD: 19.15 K/UL — HIGH (ref 3.8–10.5)
WBC # FLD AUTO: 19.15 K/UL — HIGH (ref 3.8–10.5)

## 2023-06-26 PROCEDURE — 99232 SBSQ HOSP IP/OBS MODERATE 35: CPT

## 2023-06-26 RX ADMIN — Medication 81 MILLIGRAM(S): at 11:13

## 2023-06-26 RX ADMIN — Medication 1 SPRAY(S): at 05:11

## 2023-06-26 RX ADMIN — Medication 250 MILLIGRAM(S): at 05:12

## 2023-06-26 RX ADMIN — CEFTRIAXONE 2000 MILLIGRAM(S): 500 INJECTION, POWDER, FOR SOLUTION INTRAMUSCULAR; INTRAVENOUS at 17:37

## 2023-06-26 RX ADMIN — Medication 250 MILLIGRAM(S): at 17:37

## 2023-06-26 RX ADMIN — ATORVASTATIN CALCIUM 40 MILLIGRAM(S): 80 TABLET, FILM COATED ORAL at 21:19

## 2023-06-26 RX ADMIN — Medication 100 MILLIGRAM(S): at 08:19

## 2023-06-26 RX ADMIN — BUDESONIDE AND FORMOTEROL FUMARATE DIHYDRATE 2 PUFF(S): 160; 4.5 AEROSOL RESPIRATORY (INHALATION) at 21:18

## 2023-06-26 RX ADMIN — ISOSORBIDE MONONITRATE 60 MILLIGRAM(S): 60 TABLET, EXTENDED RELEASE ORAL at 11:13

## 2023-06-26 RX ADMIN — AMLODIPINE BESYLATE 5 MILLIGRAM(S): 2.5 TABLET ORAL at 05:11

## 2023-06-26 RX ADMIN — Medication 1 SPRAY(S): at 17:37

## 2023-06-26 RX ADMIN — BUDESONIDE AND FORMOTEROL FUMARATE DIHYDRATE 2 PUFF(S): 160; 4.5 AEROSOL RESPIRATORY (INHALATION) at 08:20

## 2023-06-26 RX ADMIN — Medication 75 MICROGRAM(S): at 05:12

## 2023-06-26 RX ADMIN — Medication 3 MILLIGRAM(S): at 21:19

## 2023-06-26 NOTE — PROGRESS NOTE ADULT - SUBJECTIVE AND OBJECTIVE BOX
Hospitalist Progress Note    Chief Complaint:  MIKEL and UTI    SUBJECTIVE / OVERNIGHT EVENTS:  No events overnight, patient seen at bedside, in NAD, improvement in pain. Patient denies abd pain, N/V, fever, chills, dysuria or any other complaints. All remainder ROS negative.     MEDICATIONS  (STANDING):  amLODIPine   Tablet 5 milliGRAM(s) Oral daily  aspirin  chewable 81 milliGRAM(s) Oral daily  atorvastatin 40 milliGRAM(s) Oral at bedtime  budesonide 160 MICROgram(s)/formoterol 4.5 MICROgram(s) Inhaler 2 Puff(s) Inhalation two times a day  cefTRIAXone Injectable. 2000 milliGRAM(s) IV Push every 24 hours  fluticasone propionate 50 MICROgram(s)/spray Nasal Spray 1 Spray(s) Both Nostrils two times a day  isosorbide   mononitrate ER Tablet (IMDUR) 60 milliGRAM(s) Oral daily  levothyroxine 75 MICROGram(s) Oral daily  melatonin 3 milliGRAM(s) Oral at bedtime  polyethylene glycol 3350 17 Gram(s) Oral daily  saccharomyces boulardii 250 milliGRAM(s) Oral two times a day  senna 2 Tablet(s) Oral at bedtime    MEDICATIONS  (PRN):  acetaminophen     Tablet .. 650 milliGRAM(s) Oral every 6 hours PRN Temp greater or equal to 38C (100.4F), Moderate Pain (4 - 6)  albuterol/ipratropium for Nebulization 3 milliLiter(s) Nebulizer every 6 hours PRN Shortness of Breath and/or Wheezing  benzonatate 100 milliGRAM(s) Oral every 8 hours PRN Cough  guaiFENesin Oral Liquid (Sugar-Free) 100 milliGRAM(s) Oral every 6 hours PRN Cough        I&O's Summary    25 Jun 2023 07:01  -  26 Jun 2023 07:00  --------------------------------------------------------  IN: 0 mL / OUT: 550 mL / NET: -550 mL        PHYSICAL EXAM:  Vital Signs Last 24 Hrs  T(C): 36.7 (26 Jun 2023 04:58), Max: 36.7 (26 Jun 2023 04:58)  T(F): 98 (26 Jun 2023 04:58), Max: 98 (26 Jun 2023 04:58)  HR: 86 (26 Jun 2023 04:58) (86 - 93)  BP: 108/63 (26 Jun 2023 04:58) (108/63 - 133/69)  BP(mean): --  RR: 18 (26 Jun 2023 04:58) (18 - 18)  SpO2: 97% (26 Jun 2023 04:58) (93% - 97%)    Parameters below as of 26 Jun 2023 04:58  Patient On (Oxygen Delivery Method): nasal cannula  O2 Flow (L/min): 3      CONSTITUTIONAL: + fatigue  RESPIRATORY: No cough, wheezing,  No shortness of breath  CARDIOVASCULAR: No chest pain, palpitations  GASTROINTESTINAL: No abdominal or epigastric pain. No nausea, vomiting    LABS:                        10.3   19.15 )-----------( 293      ( 26 Jun 2023 03:46 )             28.4     06-26    133<L>  |  102  |  27.7<H>  ----------------------------<  149<H>  4.0   |  21.0<L>  |  0.66    Ca    7.8<L>      26 Jun 2023 03:46        CARDIAC MARKERS ( 24 Jun 2023 12:57 )  x     / x     / 37 U/L / x     / x          Urinalysis Basic - ( 26 Jun 2023 03:46 )    Color: x / Appearance: x / SG: x / pH: x  Gluc: 149 mg/dL / Ketone: x  / Bili: x / Urobili: x   Blood: x / Protein: x / Nitrite: x   Leuk Esterase: x / RBC: x / WBC x   Sq Epi: x / Non Sq Epi: x / Bacteria: x        Culture - Blood (collected 24 Jun 2023 12:57)  Source: .Blood Blood-Peripheral  Preliminary Report (25 Jun 2023 22:02):    No growth to date.    Culture - Blood (collected 24 Jun 2023 12:50)  Source: .Blood Blood-Peripheral  Preliminary Report (25 Jun 2023 22:02):    No growth to date.      CAPILLARY BLOOD GLUCOSE            RADIOLOGY & ADDITIONAL TESTS:  Results Reviewed: Y  Imaging Personally Reviewed: N  Electrocardiogram Personally Reviewed: N

## 2023-06-26 NOTE — DISCHARGE NOTE PROVIDER - HOSPITAL COURSE
87 year old female with extensive medical hx including  DM, LBBB, AMI, HTN, HLD, hypothyroidism, orthostatic hypotension, is BIBF for worsening lethargy and not talking as much and sleeping most of the day and altered mental status. In ED is found to have sepsis, MIKEL, rhabdomyolysis. blood cultures with Gram neg bacteria. family decided on comfort care on admission however pt significantly improved, was declined by hospice. Pt is now medically stable for discharge with appropriate outpatient follow up.    All electrolyte abnormalities were monitored carefully and repleted as necessary during this hospitalization. At the time of discharge patient was hemodynamically stable and amenable to all terms of discharge.

## 2023-06-26 NOTE — DISCHARGE NOTE PROVIDER - NSDCCPCAREPLAN_GEN_ALL_CORE_FT
PRINCIPAL DISCHARGE DIAGNOSIS  Diagnosis: Acute UTI  Assessment and Plan of Treatment: - You were treated with antibiotics during this hospitalization  - Follow up with PCP in 1 week      SECONDARY DISCHARGE DIAGNOSES  Diagnosis: MIKEL (acute kidney injury)  Assessment and Plan of Treatment: - Improved  - Follow up with PCP in 1 week

## 2023-06-26 NOTE — PROGRESS NOTE ADULT - ASSESSMENT
87 year old female with extensive medical hx including  DM, LBBB, AMI, HTN, HLD, hypothyroidism, orthostatic hypotension, is BIBF for worsening lethargy and not talking as much and sleeping most of the day and altered mental status   IN Ed is found to have sepsis, MIKEL, rhabdomyolysis. blood cultures with Gram neg bacteria. family decided on comfort care on admision however pt significantly improved, was declined by hospice.       #Sepsis with E coli  bacteremia likely 2/2 UTI, ARF   ct iv rocephin   awaiting repeat cultures    #UTI , Ecoli bacteremia -   c/w Ceftriaxone until 6/29       #Acute metabolic encephalopathy - baseline mental state -AOx3   Improving     #Rhabdomyolysis- IVF   resolved    #DM   SSI#2    #Htn+ HLD+ CAD  aspirin Statin BB  Ranexa, Amlodipine, Imdur     #Hypothyroidism  Synthroid     #COPD   cont Symbicort , c/w duonebs    #Dysphagia  reviewed swallow eval  Puree diet w/ Mildly thick liquids    #Healthcare Maintenance  DVT PPx - SCDs    ct current management  BRAYDON pending auth

## 2023-06-26 NOTE — PROGRESS NOTE ADULT - NUTRITIONAL ASSESSMENT
This patient has been assessed with a concern for Malnutrition and has been determined to have a diagnosis/diagnoses of Moderate protein-calorie malnutrition.    This patient is being managed with:   Diet Pureed-  Mildly Thick Liquids (MILDTHICKLIQS)  Entered: Jun 23 2023  3:57PM  
This patient has been assessed with a concern for Malnutrition and has been determined to have a diagnosis/diagnoses of Moderate protein-calorie malnutrition.    This patient is being managed with:   Diet Pureed-  Mildly Thick Liquids (MILDTHICKLIQS)  Entered: Jun 23 2023  3:57PM  
This patient has been assessed with a concern for Malnutrition and has been determined to have a diagnosis/diagnoses of Moderate protein-calorie malnutrition.    This patient is being managed with:   Diet Regular-  Entered: Jun 20 2023  9:22AM  
This patient has been assessed with a concern for Malnutrition and has been determined to have a diagnosis/diagnoses of Moderate protein-calorie malnutrition.    This patient is being managed with:   Diet Regular-  Entered: Jun 20 2023  9:22AM  
This patient has been assessed with a concern for Malnutrition and has been determined to have a diagnosis/diagnoses of Moderate protein-calorie malnutrition.    This patient is being managed with:   Diet Pureed-  Mildly Thick Liquids (MILDTHICKLIQS)  Entered: Jun 23 2023  3:57PM  
This patient has been assessed with a concern for Malnutrition and has been determined to have a diagnosis/diagnoses of Moderate protein-calorie malnutrition.    This patient is being managed with:   Diet Regular-  Entered: Jun 20 2023  9:22AM

## 2023-06-26 NOTE — PROGRESS NOTE ADULT - REASON FOR ADMISSION
MIKEL and UTI

## 2023-06-26 NOTE — PROGRESS NOTE ADULT - ASSESSMENT
87 year old female with LBBB, AMI DM, , HTN, HLD, hypothyroidism admitted with sepsis 2/2 E coli  bacteremia likely from UTI complicated by rhabdomyolysis    Sepsis/ bacteremia/UTI  s/p IVF bolus  complete abx   Cxs NGTD    Hypoxia  on O2 - wean as tolerated  daughter states mother with hx of COPD  CXR ordered    Rhabdomyolysis  s/p IV fluids  CK normal    Encounter for Palliative Care     87 year old female with LBBB, AMI DM, , HTN, HLD, hypothyroidism admitted with sepsis 2/2 E coli  bacteremia likely from UTI complicated by rhabdomyolysis    Sepsis/ bacteremia/UTI  s/p IVF bolus  complete abx   Cxs NGTD    Hypoxia  on O2 - wean as tolerated  daughter states mother with hx of COPD  CXR ordered    Rhabdomyolysis  s/p IV fluids  CK normal    Encounter for Palliative Care  Completing treatment course  Plan for [facility]  Palliative Care to sign off

## 2023-06-26 NOTE — PROGRESS NOTE ADULT - SUBJECTIVE AND OBJECTIVE BOX
CC:  Follow up GOC , Symptoms    OVERNIGHT EVENTS:  nurse reports patient doing well, alert      Present Symptoms:   Dyspnea:  No    Nausea/Vomiting:  No   Anxiety:  No     Depression:  No    Fatigue:  Yes     Loss of appetite:  No Yes   NA   Constipation: Not Reported   Yes    Pain: No   No Signs            Location            Duration            Character            Severity            Factors            Effect    Pain AD Score:  [email or web address] (press ctrl + left click to view)    Review of Systems: Reviewed as above  All others negative  Further ROS unable to  obtain due to poor mentation historian      MEDICATIONS  (STANDING):  amLODIPine   Tablet 5 milliGRAM(s) Oral daily  aspirin  chewable 81 milliGRAM(s) Oral daily  atorvastatin 40 milliGRAM(s) Oral at bedtime  budesonide 160 MICROgram(s)/formoterol 4.5 MICROgram(s) Inhaler 2 Puff(s) Inhalation two times a day  cefTRIAXone Injectable. 2000 milliGRAM(s) IV Push every 24 hours  fluticasone propionate 50 MICROgram(s)/spray Nasal Spray 1 Spray(s) Both Nostrils two times a day  isosorbide   mononitrate ER Tablet (IMDUR) 60 milliGRAM(s) Oral daily  levothyroxine 75 MICROGram(s) Oral daily  melatonin 3 milliGRAM(s) Oral at bedtime  polyethylene glycol 3350 17 Gram(s) Oral daily  saccharomyces boulardii 250 milliGRAM(s) Oral two times a day  senna 2 Tablet(s) Oral at bedtime    MEDICATIONS  (PRN):  acetaminophen     Tablet .. 650 milliGRAM(s) Oral every 6 hours PRN Temp greater or equal to 38C (100.4F), Moderate Pain (4 - 6)  albuterol/ipratropium for Nebulization 3 milliLiter(s) Nebulizer every 6 hours PRN Shortness of Breath and/or Wheezing  benzonatate 100 milliGRAM(s) Oral every 8 hours PRN Cough  guaiFENesin Oral Liquid (Sugar-Free) 100 milliGRAM(s) Oral every 6 hours PRN Cough      PHYSICAL EXAM:    Vital Signs Last 24 Hrs  T(C): 36.7 (26 Jun 2023 04:58), Max: 36.7 (26 Jun 2023 04:58)  T(F): 98 (26 Jun 2023 04:58), Max: 98 (26 Jun 2023 04:58)  HR: 86 (26 Jun 2023 04:58) (86 - 93)  BP: 108/63 (26 Jun 2023 04:58) (108/63 - 133/69)  BP(mean): --  RR: 18 (26 Jun 2023 04:58) (18 - 18)  SpO2: 97% (26 Jun 2023 04:58) (93% - 97%)    Parameters below as of 26 Jun 2023 04:58  Patient On (Oxygen Delivery Method): nasal cannula  O2 Flow (L/min): 3      Karnofsky:  %  General:         HEENT:  NCAT       (  )  ET tube   (  )  NGT  Lungs: comfortable  non labored  CV:    GI:             (  ) PEG  MSK:   Skin:  warm/dry  Neuro    Psych    LABS:                          10.3   19.15 )-----------( 293      ( 26 Jun 2023 03:46 )             28.4     06-26    133<L>  |  102  |  27.7<H>  ----------------------------<  149<H>  4.0   |  21.0<L>  |  0.66    Ca    7.8<L>      26 Jun 2023 03:46        Urinalysis Basic - ( 26 Jun 2023 03:46 )    Color: x / Appearance: x / SG: x / pH: x  Gluc: 149 mg/dL / Ketone: x  / Bili: x / Urobili: x   Blood: x / Protein: x / Nitrite: x   Leuk Esterase: x / RBC: x / WBC x   Sq Epi: x / Non Sq Epi: x / Bacteria: x      I&O's Summary    25 Jun 2023 07:01  -  26 Jun 2023 07:00  --------------------------------------------------------  IN: 0 mL / OUT: 550 mL / NET: -550 mL        RADIOLOGY & ADDITIONAL STUDIES:  Imaging Reviewed  (   )           ADVANCE DIRECTIVE PREFERENCES    Full Code  DNR/I  and continuing medical treatments  DNR with Trial of Intubation and continuing medical treatments   DNR/I  and comfort measures CC:  Follow up GOC , Symptoms    OVERNIGHT EVENTS:  nurse reports patient doing well, alert      Present Symptoms:   Dyspnea:  No    Nausea/Vomiting:  No   Anxiety:  No     Depression:  No    Fatigue:  Yes     Loss of appetite: slight  Constipation: Not Reported       Pain:    No Signs            Location            Duration            Character            Severity            Factors            Effect    Pain AD Score:  [email or web address] (press ctrl + left click to view)    Review of Systems: Reviewed as above  All others negative    MEDICATIONS  (STANDING):  amLODIPine   Tablet 5 milliGRAM(s) Oral daily  aspirin  chewable 81 milliGRAM(s) Oral daily  atorvastatin 40 milliGRAM(s) Oral at bedtime  budesonide 160 MICROgram(s)/formoterol 4.5 MICROgram(s) Inhaler 2 Puff(s) Inhalation two times a day  cefTRIAXone Injectable. 2000 milliGRAM(s) IV Push every 24 hours  fluticasone propionate 50 MICROgram(s)/spray Nasal Spray 1 Spray(s) Both Nostrils two times a day  isosorbide   mononitrate ER Tablet (IMDUR) 60 milliGRAM(s) Oral daily  levothyroxine 75 MICROGram(s) Oral daily  melatonin 3 milliGRAM(s) Oral at bedtime  polyethylene glycol 3350 17 Gram(s) Oral daily  saccharomyces boulardii 250 milliGRAM(s) Oral two times a day  senna 2 Tablet(s) Oral at bedtime    MEDICATIONS  (PRN):  acetaminophen     Tablet .. 650 milliGRAM(s) Oral every 6 hours PRN Temp greater or equal to 38C (100.4F), Moderate Pain (4 - 6)  albuterol/ipratropium for Nebulization 3 milliLiter(s) Nebulizer every 6 hours PRN Shortness of Breath and/or Wheezing  benzonatate 100 milliGRAM(s) Oral every 8 hours PRN Cough  guaiFENesin Oral Liquid (Sugar-Free) 100 milliGRAM(s) Oral every 6 hours PRN Cough      PHYSICAL EXAM:    Vital Signs Last 24 Hrs  T(C): 36.7 (26 Jun 2023 04:58), Max: 36.7 (26 Jun 2023 04:58)  T(F): 98 (26 Jun 2023 04:58), Max: 98 (26 Jun 2023 04:58)  HR: 86 (26 Jun 2023 04:58) (86 - 93)  BP: 108/63 (26 Jun 2023 04:58) (108/63 - 133/69)  BP(mean): --  RR: 18 (26 Jun 2023 04:58) (18 - 18)  SpO2: 97% (26 Jun 2023 04:58) (93% - 97%)    Parameters below as of 26 Jun 2023 04:58  Patient On (Oxygen Delivery Method): nasal cannula  O2 Flow (L/min): 3      Karnofsky: 50 %  General: Elderly woman NAD        HEENT:  NCAT    Lungs: comfortable  non labored  CV:  RR  GI: soft NTND  MSK: no contractures  Skin:  warm/dry  Neuro  no focal deficits  Psych    LABS:                          10.3   19.15 )-----------( 293      ( 26 Jun 2023 03:46 )             28.4     06-26    133<L>  |  102  |  27.7<H>  ----------------------------<  149<H>  4.0   |  21.0<L>  |  0.66    Ca    7.8<L>      26 Jun 2023 03:46        Urinalysis Basic - ( 26 Jun 2023 03:46 )    Color: x / Appearance: x / SG: x / pH: x  Gluc: 149 mg/dL / Ketone: x  / Bili: x / Urobili: x   Blood: x / Protein: x / Nitrite: x   Leuk Esterase: x / RBC: x / WBC x   Sq Epi: x / Non Sq Epi: x / Bacteria: x      I&O's Summary    25 Jun 2023 07:01  -  26 Jun 2023 07:00  --------------------------------------------------------  IN: 0 mL / OUT: 550 mL / NET: -550 mL        RADIOLOGY & ADDITIONAL STUDIES:  Imaging Reviewed  ( x  )   < from: Xray Chest 1 View-PORTABLE IMMEDIATE (06.19.23 @ 15:35) >    ACC: [id number] EXAM:  XR CHEST PORTABLE IMMED 1V   ORDERED BY: JENI MAYFIELD     PROCEDURE DATE:  06/19/2023          INTERPRETATION:  AP chest on June 19, 2023 2:51 PM. Patient has altered   mental status.    Heart magnified by technique. Left coronary stent and left-sided   pacemaker again noted.    Present film shows a left base infiltrate new since December 5, 2022.    IMPRESSION: Left base infiltrate at this time. Other findings stable.    --- End of Report ---      < end of copied text >        ADVANCE DIRECTIVE PREFERENCES    DNR/I  and continuing medical treatments   CC:  Follow up GOC , Symptoms    OVERNIGHT EVENTS:  nurse reports patient doing well, alert      Present Symptoms:   Dyspnea:  No    Nausea/Vomiting:  No   Anxiety:  No     Depression:  No    Fatigue:  Yes     Loss of appetite: slight  Constipation: Not Reported       Pain:    No Signs            Location            Duration            Character            Severity            Factors            Effect    Pain AD Score:  [email or web address] (press ctrl + left click to view)    Review of Systems: Reviewed as above  All others negative    MEDICATIONS  (STANDING):  amLODIPine   Tablet 5 milliGRAM(s) Oral daily  aspirin  chewable 81 milliGRAM(s) Oral daily  atorvastatin 40 milliGRAM(s) Oral at bedtime  budesonide 160 MICROgram(s)/formoterol 4.5 MICROgram(s) Inhaler 2 Puff(s) Inhalation two times a day  cefTRIAXone Injectable. 2000 milliGRAM(s) IV Push every 24 hours  fluticasone propionate 50 MICROgram(s)/spray Nasal Spray 1 Spray(s) Both Nostrils two times a day  isosorbide   mononitrate ER Tablet (IMDUR) 60 milliGRAM(s) Oral daily  levothyroxine 75 MICROGram(s) Oral daily  melatonin 3 milliGRAM(s) Oral at bedtime  polyethylene glycol 3350 17 Gram(s) Oral daily  saccharomyces boulardii 250 milliGRAM(s) Oral two times a day  senna 2 Tablet(s) Oral at bedtime    MEDICATIONS  (PRN):  acetaminophen     Tablet .. 650 milliGRAM(s) Oral every 6 hours PRN Temp greater or equal to 38C (100.4F), Moderate Pain (4 - 6)  albuterol/ipratropium for Nebulization 3 milliLiter(s) Nebulizer every 6 hours PRN Shortness of Breath and/or Wheezing  benzonatate 100 milliGRAM(s) Oral every 8 hours PRN Cough  guaiFENesin Oral Liquid (Sugar-Free) 100 milliGRAM(s) Oral every 6 hours PRN Cough      PHYSICAL EXAM:    Vital Signs Last 24 Hrs  T(C): 36.7 (26 Jun 2023 04:58), Max: 36.7 (26 Jun 2023 04:58)  T(F): 98 (26 Jun 2023 04:58), Max: 98 (26 Jun 2023 04:58)  HR: 86 (26 Jun 2023 04:58) (86 - 93)  BP: 108/63 (26 Jun 2023 04:58) (108/63 - 133/69)  BP(mean): --  RR: 18 (26 Jun 2023 04:58) (18 - 18)  SpO2: 97% (26 Jun 2023 04:58) (93% - 97%)    Parameters below as of 26 Jun 2023 04:58  Patient On (Oxygen Delivery Method): nasal cannula  O2 Flow (L/min): 3      Karnofsky: 50 %  General: Elderly woman NAD        HEENT:  NCAT    Lungs: comfortable  non labored  CV:  RR  GI: soft NTND  MSK: no contractures  Skin:  warm/dry  Neuro  no focal deficits    LABS:                          10.3   19.15 )-----------( 293      ( 26 Jun 2023 03:46 )             28.4     06-26    133<L>  |  102  |  27.7<H>  ----------------------------<  149<H>  4.0   |  21.0<L>  |  0.66    Ca    7.8<L>      26 Jun 2023 03:46        Urinalysis Basic - ( 26 Jun 2023 03:46 )    Color: x / Appearance: x / SG: x / pH: x  Gluc: 149 mg/dL / Ketone: x  / Bili: x / Urobili: x   Blood: x / Protein: x / Nitrite: x   Leuk Esterase: x / RBC: x / WBC x   Sq Epi: x / Non Sq Epi: x / Bacteria: x      I&O's Summary    25 Jun 2023 07:01  -  26 Jun 2023 07:00  --------------------------------------------------------  IN: 0 mL / OUT: 550 mL / NET: -550 mL        RADIOLOGY & ADDITIONAL STUDIES:  Imaging Reviewed  ( x  )   < from: Xray Chest 1 View-PORTABLE IMMEDIATE (06.19.23 @ 15:35) >    ACC: [id number] EXAM:  XR CHEST PORTABLE IMMED 1V   ORDERED BY: JENI MAYFIELD     PROCEDURE DATE:  06/19/2023          INTERPRETATION:  AP chest on June 19, 2023 2:51 PM. Patient has altered   mental status.    Heart magnified by technique. Left coronary stent and left-sided   pacemaker again noted.    Present film shows a left base infiltrate new since December 5, 2022.    IMPRESSION: Left base infiltrate at this time. Other findings stable.    --- End of Report ---      < end of copied text >        ADVANCE DIRECTIVE PREFERENCES    DNR/I  and continuing medical treatments

## 2023-06-26 NOTE — DISCHARGE NOTE PROVIDER - DETAILS OF MALNUTRITION DIAGNOSIS/DIAGNOSES
This patient has been assessed with a concern for Malnutrition and was treated during this hospitalization for the following Nutrition diagnosis/diagnoses:     -  06/21/2023: Moderate protein-calorie malnutrition

## 2023-06-26 NOTE — DISCHARGE NOTE PROVIDER - NSDCMRMEDTOKEN_GEN_ALL_CORE_FT
acetaminophen 325 mg oral tablet: 2 tab(s) orally every 6 hours, As needed, Moderate Pain (4 - 6)  albuterol 90 mcg/inh inhalation aerosol: 2 puff(s) inhaled every 12 hours, As needed, Shortness of Breath and/or Wheezing  amLODIPine 5 mg oral tablet: 1 tab(s) orally once a day  aspirin 81 mg oral tablet, chewable: 1 tab(s) orally once a day  atorvastatin 40 mg oral tablet: 1 tab(s) orally once a day (at bedtime)  budesonide-formoterol 160 mcg-4.5 mcg/inh inhalation aerosol: 2 puff(s) inhaled 2 times a day  cephalexin 500 mg oral capsule: 1 cap(s) orally 4 times a day   ciprofloxacin 250 mg oral tablet: 1 tab(s) orally every 12 hours  fluticasone 50 mcg/inh nasal spray: 1 spray(s) nasal 2 times a day  isosorbide mononitrate 60 mg oral tablet, extended release: 1 tab(s) orally once a day  levothyroxine 75 mcg (0.075 mg) oral tablet: 1 tab(s) orally once a day  lidocaine 4% topical film: Apply topically to affected area once a day   Lidoderm 5% topical film: Apply topically to affected area once a day   Lovenox 40 mg/0.4 mL injectable solution: 40 milligram(s) subcutaneously once a day. Start 5/21/21. Last date of treatment is 5/30/21.  magnesium hydroxide 8% oral suspension: 30 milliliter(s) orally once a day, As needed, Constipation  oxycodone-acetaminophen 5 mg-300 mg oral tablet: 1 tab(s) orally every 8 hours, As Needed -for severe pain MDD:3 tab(s)  phenazopyridine 100 mg oral tablet: 1 tab(s) orally 3 times a day  ranolazine 500 mg oral tablet, extended release: 2 tab(s) orally once a day in the MORNING  1 tab(s) orally once a day in the EVENING  saccharomyces boulardii lyo 250 mg oral capsule: 1 cap(s) orally 2 times a day  senna oral tablet: 2 tab(s) orally once a day (at bedtime)  sodium chloride 0.65% nasal spray: 1 spray(s) in each nostril 2 times a day, As Needed -for dry nasal passages    01-May-2022 acetaminophen 325 mg oral tablet: 2 tab(s) orally every 6 hours, As needed, Moderate Pain (4 - 6)  albuterol 90 mcg/inh inhalation aerosol: 2 puff(s) inhaled every 12 hours, As needed, Shortness of Breath and/or Wheezing  amLODIPine 5 mg oral tablet: 1 tab(s) orally once a day  aspirin 81 mg oral tablet, chewable: 1 tab(s) orally once a day  atorvastatin 40 mg oral tablet: 1 tab(s) orally once a day (at bedtime)  budesonide-formoterol 160 mcg-4.5 mcg/inh inhalation aerosol: 2 puff(s) inhaled 2 times a day  cefpodoxime 200 mg oral tablet: 1 tab(s) orally 2 times a day  fluticasone 50 mcg/inh nasal spray: 1 spray(s) nasal 2 times a day  isosorbide mononitrate 60 mg oral tablet, extended release: 1 tab(s) orally once a day  levothyroxine 75 mcg (0.075 mg) oral tablet: 1 tab(s) orally once a day  lidocaine 4% topical film: Apply topically to affected area once a day   Lidoderm 5% topical film: Apply topically to affected area once a day   saccharomyces boulardii lyo 250 mg oral capsule: 1 cap(s) orally 2 times a day

## 2023-06-26 NOTE — PROGRESS NOTE ADULT - PROVIDER SPECIALTY LIST ADULT
Hospitalist
Palliative Care
Hospitalist
Palliative Care
Hospitalist

## 2023-06-27 ENCOUNTER — TRANSCRIPTION ENCOUNTER (OUTPATIENT)
Age: 88
End: 2023-06-27

## 2023-06-27 VITALS
SYSTOLIC BLOOD PRESSURE: 121 MMHG | TEMPERATURE: 98 F | HEART RATE: 92 BPM | RESPIRATION RATE: 18 BRPM | DIASTOLIC BLOOD PRESSURE: 57 MMHG | OXYGEN SATURATION: 97 %

## 2023-06-27 LAB
ANION GAP SERPL CALC-SCNC: 9 MMOL/L — SIGNIFICANT CHANGE UP (ref 5–17)
BUN SERPL-MCNC: 21.2 MG/DL — HIGH (ref 8–20)
CALCIUM SERPL-MCNC: 7.9 MG/DL — LOW (ref 8.4–10.5)
CHLORIDE SERPL-SCNC: 101 MMOL/L — SIGNIFICANT CHANGE UP (ref 96–108)
CO2 SERPL-SCNC: 23 MMOL/L — SIGNIFICANT CHANGE UP (ref 22–29)
CREAT SERPL-MCNC: 0.6 MG/DL — SIGNIFICANT CHANGE UP (ref 0.5–1.3)
EGFR: 87 ML/MIN/1.73M2 — SIGNIFICANT CHANGE UP
GLUCOSE SERPL-MCNC: 141 MG/DL — HIGH (ref 70–99)
HCT VFR BLD CALC: 34.3 % — LOW (ref 34.5–45)
HGB BLD-MCNC: 11.9 G/DL — SIGNIFICANT CHANGE UP (ref 11.5–15.5)
MCHC RBC-ENTMCNC: 31.4 PG — SIGNIFICANT CHANGE UP (ref 27–34)
MCHC RBC-ENTMCNC: 34.7 GM/DL — SIGNIFICANT CHANGE UP (ref 32–36)
MCV RBC AUTO: 90.5 FL — SIGNIFICANT CHANGE UP (ref 80–100)
PLATELET # BLD AUTO: 353 K/UL — SIGNIFICANT CHANGE UP (ref 150–400)
POTASSIUM SERPL-MCNC: 4.8 MMOL/L — SIGNIFICANT CHANGE UP (ref 3.5–5.3)
POTASSIUM SERPL-SCNC: 4.8 MMOL/L — SIGNIFICANT CHANGE UP (ref 3.5–5.3)
RBC # BLD: 3.79 M/UL — LOW (ref 3.8–5.2)
RBC # FLD: 15.9 % — HIGH (ref 10.3–14.5)
SODIUM SERPL-SCNC: 133 MMOL/L — LOW (ref 135–145)
WBC # BLD: 15.54 K/UL — HIGH (ref 3.8–10.5)
WBC # FLD AUTO: 15.54 K/UL — HIGH (ref 3.8–10.5)

## 2023-06-27 PROCEDURE — 71045 X-RAY EXAM CHEST 1 VIEW: CPT

## 2023-06-27 PROCEDURE — 87635 SARS-COV-2 COVID-19 AMP PRB: CPT

## 2023-06-27 PROCEDURE — 81001 URINALYSIS AUTO W/SCOPE: CPT

## 2023-06-27 PROCEDURE — 94640 AIRWAY INHALATION TREATMENT: CPT

## 2023-06-27 PROCEDURE — 84132 ASSAY OF SERUM POTASSIUM: CPT

## 2023-06-27 PROCEDURE — 72125 CT NECK SPINE W/O DYE: CPT | Mod: MA

## 2023-06-27 PROCEDURE — 82435 ASSAY OF BLOOD CHLORIDE: CPT

## 2023-06-27 PROCEDURE — 99239 HOSP IP/OBS DSCHRG MGMT >30: CPT

## 2023-06-27 PROCEDURE — 87186 SC STD MICRODIL/AGAR DIL: CPT

## 2023-06-27 PROCEDURE — 83605 ASSAY OF LACTIC ACID: CPT

## 2023-06-27 PROCEDURE — 84295 ASSAY OF SERUM SODIUM: CPT

## 2023-06-27 PROCEDURE — 85027 COMPLETE CBC AUTOMATED: CPT

## 2023-06-27 PROCEDURE — 82330 ASSAY OF CALCIUM: CPT

## 2023-06-27 PROCEDURE — 85610 PROTHROMBIN TIME: CPT

## 2023-06-27 PROCEDURE — 82947 ASSAY GLUCOSE BLOOD QUANT: CPT

## 2023-06-27 PROCEDURE — 87086 URINE CULTURE/COLONY COUNT: CPT

## 2023-06-27 PROCEDURE — 70450 CT HEAD/BRAIN W/O DYE: CPT | Mod: MA

## 2023-06-27 PROCEDURE — 80053 COMPREHEN METABOLIC PANEL: CPT

## 2023-06-27 PROCEDURE — 82962 GLUCOSE BLOOD TEST: CPT

## 2023-06-27 PROCEDURE — 74176 CT ABD & PELVIS W/O CONTRAST: CPT | Mod: MA

## 2023-06-27 PROCEDURE — 87637 SARSCOV2&INF A&B&RSV AMP PRB: CPT

## 2023-06-27 PROCEDURE — 82553 CREATINE MB FRACTION: CPT

## 2023-06-27 PROCEDURE — 85014 HEMATOCRIT: CPT

## 2023-06-27 PROCEDURE — 96360 HYDRATION IV INFUSION INIT: CPT

## 2023-06-27 PROCEDURE — 85025 COMPLETE CBC W/AUTO DIFF WBC: CPT

## 2023-06-27 PROCEDURE — 87077 CULTURE AEROBIC IDENTIFY: CPT

## 2023-06-27 PROCEDURE — 82550 ASSAY OF CK (CPK): CPT

## 2023-06-27 PROCEDURE — 82803 BLOOD GASES ANY COMBINATION: CPT

## 2023-06-27 PROCEDURE — 36415 COLL VENOUS BLD VENIPUNCTURE: CPT

## 2023-06-27 PROCEDURE — 84484 ASSAY OF TROPONIN QUANT: CPT

## 2023-06-27 PROCEDURE — 71250 CT THORAX DX C-: CPT | Mod: MA

## 2023-06-27 PROCEDURE — 92610 EVALUATE SWALLOWING FUNCTION: CPT

## 2023-06-27 PROCEDURE — 99291 CRITICAL CARE FIRST HOUR: CPT

## 2023-06-27 PROCEDURE — 87040 BLOOD CULTURE FOR BACTERIA: CPT

## 2023-06-27 PROCEDURE — 94760 N-INVAS EAR/PLS OXIMETRY 1: CPT

## 2023-06-27 PROCEDURE — 80048 BASIC METABOLIC PNL TOTAL CA: CPT

## 2023-06-27 PROCEDURE — 85730 THROMBOPLASTIN TIME PARTIAL: CPT

## 2023-06-27 PROCEDURE — 87150 DNA/RNA AMPLIFIED PROBE: CPT

## 2023-06-27 PROCEDURE — 85018 HEMOGLOBIN: CPT

## 2023-06-27 RX ORDER — CEFPODOXIME PROXETIL 100 MG
1 TABLET ORAL
Qty: 14 | Refills: 0
Start: 2023-06-27 | End: 2023-07-03

## 2023-06-27 RX ADMIN — BUDESONIDE AND FORMOTEROL FUMARATE DIHYDRATE 2 PUFF(S): 160; 4.5 AEROSOL RESPIRATORY (INHALATION) at 09:32

## 2023-06-27 RX ADMIN — Medication 250 MILLIGRAM(S): at 05:29

## 2023-06-27 RX ADMIN — Medication 75 MICROGRAM(S): at 05:29

## 2023-06-27 RX ADMIN — Medication 100 MILLIGRAM(S): at 09:24

## 2023-06-27 RX ADMIN — AMLODIPINE BESYLATE 5 MILLIGRAM(S): 2.5 TABLET ORAL at 05:29

## 2023-06-27 RX ADMIN — Medication 1 SPRAY(S): at 05:30

## 2023-06-27 NOTE — DISCHARGE NOTE NURSING/CASE MANAGEMENT/SOCIAL WORK - NSDCCRNAME_GEN_ALL_CORE_FT
[facility] at [facility]  [place] [address]

## 2023-06-27 NOTE — DISCHARGE NOTE NURSING/CASE MANAGEMENT/SOCIAL WORK - NSDCVIVACCINE_GEN_ALL_CORE_FT
Tdap; 24-Aug-2018 14:29; Ankur Lawler (RN); [employer]; [id number]; IntraMuscular; Deltoid Right.; 0.5 milliLiter(s); VIS (VIS Published: 09-May-2013, VIS Presented: 24-Aug-2018);   Tdap; 07-Oct-2021 10:53; Eden Cabrera); [employer]; [id number] (Exp. Date: 15-Jul-2023); IntraMuscular; Deltoid Left.; 0.5 milliLiter(s); VIS (VIS Published: 09-May-2013, VIS Presented: 07-Oct-2021);

## 2023-06-27 NOTE — DISCHARGE NOTE NURSING/CASE MANAGEMENT/SOCIAL WORK - PATIENT PORTAL LINK FT
You can access the FollowMyHealth Patient Portal offered by [facility] by registering at the following website: [email or web address]. By joining [employer]’s FollowMyHealth portal, you will also be able to view your health information using other applications (apps) compatible with our system.

## 2023-06-29 LAB
CULTURE RESULTS: SIGNIFICANT CHANGE UP
CULTURE RESULTS: SIGNIFICANT CHANGE UP
SPECIMEN SOURCE: SIGNIFICANT CHANGE UP
SPECIMEN SOURCE: SIGNIFICANT CHANGE UP

## 2023-09-14 NOTE — DIETITIAN INITIAL EVALUATION ADULT. - ADD RECOMMEND
No Provider
Rx: MVI daily. Noted with low Na, low K+ and low phos- continue to monitor and replete as needed. Continue Florastor to support gut integrity. Encourage po intake, monitor diet tolerance, and provide assistance at meals as needed. Obtain daily weights to monitor trends.

## 2023-11-15 ENCOUNTER — APPOINTMENT (OUTPATIENT)
Dept: NEUROLOGY | Facility: CLINIC | Age: 88
End: 2023-11-15

## 2024-02-29 ENCOUNTER — TRANSCRIPTION ENCOUNTER (OUTPATIENT)
Age: 89
End: 2024-02-29

## 2024-02-29 ENCOUNTER — INPATIENT (INPATIENT)
Facility: HOSPITAL | Age: 89
LOS: 5 days | Discharge: NURSING, REHAB OR LONG-TERM CARE | DRG: 522 | End: 2024-03-06
Attending: STUDENT IN AN ORGANIZED HEALTH CARE EDUCATION/TRAINING PROGRAM | Admitting: HOSPITALIST
Payer: MEDICARE

## 2024-02-29 VITALS
SYSTOLIC BLOOD PRESSURE: 143 MMHG | OXYGEN SATURATION: 91 % | DIASTOLIC BLOOD PRESSURE: 74 MMHG | WEIGHT: 110.01 LBS | RESPIRATION RATE: 18 BRPM | HEART RATE: 80 BPM | TEMPERATURE: 99 F

## 2024-02-29 DIAGNOSIS — Z98.51 TUBAL LIGATION STATUS: Chronic | ICD-10-CM

## 2024-02-29 DIAGNOSIS — S72.001A FRACTURE OF UNSPECIFIED PART OF NECK OF RIGHT FEMUR, INITIAL ENCOUNTER FOR CLOSED FRACTURE: ICD-10-CM

## 2024-02-29 DIAGNOSIS — Z90.49 ACQUIRED ABSENCE OF OTHER SPECIFIED PARTS OF DIGESTIVE TRACT: Chronic | ICD-10-CM

## 2024-02-29 DIAGNOSIS — Z98.891 HISTORY OF UTERINE SCAR FROM PREVIOUS SURGERY: Chronic | ICD-10-CM

## 2024-02-29 LAB
ALBUMIN SERPL ELPH-MCNC: 3.7 G/DL — SIGNIFICANT CHANGE UP (ref 3.3–5.2)
ALP SERPL-CCNC: 74 U/L — SIGNIFICANT CHANGE UP (ref 40–120)
ALT FLD-CCNC: 21 U/L — SIGNIFICANT CHANGE UP
ANION GAP SERPL CALC-SCNC: 14 MMOL/L — SIGNIFICANT CHANGE UP (ref 5–17)
APTT BLD: 24.4 SEC — LOW (ref 24.5–35.6)
AST SERPL-CCNC: 22 U/L — SIGNIFICANT CHANGE UP
BASOPHILS # BLD AUTO: 0.11 K/UL — SIGNIFICANT CHANGE UP (ref 0–0.2)
BASOPHILS NFR BLD AUTO: 0.9 % — SIGNIFICANT CHANGE UP (ref 0–2)
BILIRUB SERPL-MCNC: 0.5 MG/DL — SIGNIFICANT CHANGE UP (ref 0.4–2)
BLD GP AB SCN SERPL QL: SIGNIFICANT CHANGE UP
BUN SERPL-MCNC: 17.4 MG/DL — SIGNIFICANT CHANGE UP (ref 8–20)
CALCIUM SERPL-MCNC: 9.3 MG/DL — SIGNIFICANT CHANGE UP (ref 8.4–10.5)
CHLORIDE SERPL-SCNC: 97 MMOL/L — SIGNIFICANT CHANGE UP (ref 96–108)
CO2 SERPL-SCNC: 26 MMOL/L — SIGNIFICANT CHANGE UP (ref 22–29)
CREAT SERPL-MCNC: 1.02 MG/DL — SIGNIFICANT CHANGE UP (ref 0.5–1.3)
EGFR: 53 ML/MIN/1.73M2 — LOW
EOSINOPHIL # BLD AUTO: 0 K/UL — SIGNIFICANT CHANGE UP (ref 0–0.5)
EOSINOPHIL NFR BLD AUTO: 0 % — SIGNIFICANT CHANGE UP (ref 0–6)
GLUCOSE SERPL-MCNC: 120 MG/DL — HIGH (ref 70–99)
HCT VFR BLD CALC: 41.3 % — SIGNIFICANT CHANGE UP (ref 34.5–45)
HGB BLD-MCNC: 13.9 G/DL — SIGNIFICANT CHANGE UP (ref 11.5–15.5)
INR BLD: 1.03 RATIO — SIGNIFICANT CHANGE UP (ref 0.85–1.18)
LYMPHOCYTES # BLD AUTO: 0.33 K/UL — LOW (ref 1–3.3)
LYMPHOCYTES # BLD AUTO: 2.6 % — LOW (ref 13–44)
MANUAL SMEAR VERIFICATION: SIGNIFICANT CHANGE UP
MCHC RBC-ENTMCNC: 31.2 PG — SIGNIFICANT CHANGE UP (ref 27–34)
MCHC RBC-ENTMCNC: 33.7 GM/DL — SIGNIFICANT CHANGE UP (ref 32–36)
MCV RBC AUTO: 92.8 FL — SIGNIFICANT CHANGE UP (ref 80–100)
MONOCYTES # BLD AUTO: 0.67 K/UL — SIGNIFICANT CHANGE UP (ref 0–0.9)
MONOCYTES NFR BLD AUTO: 5.3 % — SIGNIFICANT CHANGE UP (ref 2–14)
MRSA PCR RESULT.: SIGNIFICANT CHANGE UP
NEUTROPHILS # BLD AUTO: 11.16 K/UL — HIGH (ref 1.8–7.4)
NEUTROPHILS NFR BLD AUTO: 86.8 % — HIGH (ref 43–77)
NEUTS BAND # BLD: 1.8 % — SIGNIFICANT CHANGE UP (ref 0–8)
OVALOCYTES BLD QL SMEAR: SLIGHT — SIGNIFICANT CHANGE UP
PLAT MORPH BLD: NORMAL — SIGNIFICANT CHANGE UP
PLATELET # BLD AUTO: 177 K/UL — SIGNIFICANT CHANGE UP (ref 150–400)
POLYCHROMASIA BLD QL SMEAR: SIGNIFICANT CHANGE UP
POTASSIUM SERPL-MCNC: 4.2 MMOL/L — SIGNIFICANT CHANGE UP (ref 3.5–5.3)
POTASSIUM SERPL-SCNC: 4.2 MMOL/L — SIGNIFICANT CHANGE UP (ref 3.5–5.3)
PROT SERPL-MCNC: 6.7 G/DL — SIGNIFICANT CHANGE UP (ref 6.6–8.7)
PROTHROM AB SERPL-ACNC: 11.4 SEC — SIGNIFICANT CHANGE UP (ref 9.5–13)
RBC # BLD: 4.45 M/UL — SIGNIFICANT CHANGE UP (ref 3.8–5.2)
RBC # FLD: 13.2 % — SIGNIFICANT CHANGE UP (ref 10.3–14.5)
RBC BLD AUTO: ABNORMAL
S AUREUS DNA NOSE QL NAA+PROBE: SIGNIFICANT CHANGE UP
SODIUM SERPL-SCNC: 137 MMOL/L — SIGNIFICANT CHANGE UP (ref 135–145)
VARIANT LYMPHS # BLD: 2.6 % — SIGNIFICANT CHANGE UP (ref 0–6)
WBC # BLD: 12.6 K/UL — HIGH (ref 3.8–10.5)
WBC # FLD AUTO: 12.6 K/UL — HIGH (ref 3.8–10.5)

## 2024-02-29 PROCEDURE — 70450 CT HEAD/BRAIN W/O DYE: CPT | Mod: 26,MC

## 2024-02-29 PROCEDURE — 99223 1ST HOSP IP/OBS HIGH 75: CPT

## 2024-02-29 PROCEDURE — 73552 X-RAY EXAM OF FEMUR 2/>: CPT | Mod: 26,RT

## 2024-02-29 PROCEDURE — 72125 CT NECK SPINE W/O DYE: CPT | Mod: 26,MC

## 2024-02-29 PROCEDURE — 93010 ELECTROCARDIOGRAM REPORT: CPT

## 2024-02-29 PROCEDURE — 99285 EMERGENCY DEPT VISIT HI MDM: CPT

## 2024-02-29 PROCEDURE — 99223 1ST HOSP IP/OBS HIGH 75: CPT | Mod: 57

## 2024-02-29 PROCEDURE — 71045 X-RAY EXAM CHEST 1 VIEW: CPT | Mod: 26

## 2024-02-29 PROCEDURE — 73502 X-RAY EXAM HIP UNI 2-3 VIEWS: CPT | Mod: 26,RT

## 2024-02-29 RX ORDER — FAMOTIDINE 10 MG/ML
1 INJECTION INTRAVENOUS
Refills: 0 | DISCHARGE

## 2024-02-29 RX ORDER — IPRATROPIUM/ALBUTEROL SULFATE 18-103MCG
3 AEROSOL WITH ADAPTER (GRAM) INHALATION EVERY 6 HOURS
Refills: 0 | Status: DISCONTINUED | OUTPATIENT
Start: 2024-02-29 | End: 2024-03-01

## 2024-02-29 RX ORDER — ATORVASTATIN CALCIUM 80 MG/1
40 TABLET, FILM COATED ORAL AT BEDTIME
Refills: 0 | Status: DISCONTINUED | OUTPATIENT
Start: 2024-02-29 | End: 2024-03-01

## 2024-02-29 RX ORDER — LISINOPRIL 2.5 MG/1
1 TABLET ORAL
Refills: 0 | DISCHARGE

## 2024-02-29 RX ORDER — SODIUM CHLORIDE 9 MG/ML
1 INJECTION INTRAMUSCULAR; INTRAVENOUS; SUBCUTANEOUS
Refills: 0 | DISCHARGE

## 2024-02-29 RX ORDER — LACTOBACILLUS ACIDOPHILUS 100MM CELL
1 CAPSULE ORAL
Refills: 0 | DISCHARGE

## 2024-02-29 RX ORDER — MORPHINE SULFATE 50 MG/1
4 CAPSULE, EXTENDED RELEASE ORAL ONCE
Refills: 0 | Status: DISCONTINUED | OUTPATIENT
Start: 2024-02-29 | End: 2024-02-29

## 2024-02-29 RX ORDER — LORATADINE 10 MG/1
1 TABLET ORAL
Refills: 0 | DISCHARGE

## 2024-02-29 RX ORDER — SENNOSIDES/DOCUSATE SODIUM 8.6MG-50MG
2 TABLET ORAL
Refills: 0 | DISCHARGE

## 2024-02-29 RX ORDER — ACETAMINOPHEN 500 MG
2 TABLET ORAL
Refills: 0 | DISCHARGE

## 2024-02-29 RX ORDER — HYDROCORTISONE 1 %
1 OINTMENT (GRAM) TOPICAL
Refills: 0 | DISCHARGE

## 2024-02-29 RX ORDER — ONDANSETRON 8 MG/1
4 TABLET, FILM COATED ORAL EVERY 6 HOURS
Refills: 0 | Status: DISCONTINUED | OUTPATIENT
Start: 2024-02-29 | End: 2024-03-01

## 2024-02-29 RX ORDER — MORPHINE SULFATE 50 MG/1
2 CAPSULE, EXTENDED RELEASE ORAL EVERY 6 HOURS
Refills: 0 | Status: DISCONTINUED | OUTPATIENT
Start: 2024-02-29 | End: 2024-03-01

## 2024-02-29 RX ORDER — POTASSIUM CITRATE MONOHYDRATE 100 %
2 POWDER (GRAM) MISCELLANEOUS
Refills: 0 | DISCHARGE

## 2024-02-29 RX ORDER — POVIDONE-IODINE 5 %
1 AEROSOL (ML) TOPICAL ONCE
Refills: 0 | Status: COMPLETED | OUTPATIENT
Start: 2024-02-29 | End: 2024-03-01

## 2024-02-29 RX ORDER — METOPROLOL TARTRATE 50 MG
25 TABLET ORAL DAILY
Refills: 0 | Status: DISCONTINUED | OUTPATIENT
Start: 2024-02-29 | End: 2024-03-01

## 2024-02-29 RX ORDER — METOPROLOL TARTRATE 50 MG
1 TABLET ORAL
Refills: 0 | DISCHARGE

## 2024-02-29 RX ORDER — OMEGA-3 ACID ETHYL ESTERS 1 G
1 CAPSULE ORAL
Refills: 0 | DISCHARGE

## 2024-02-29 RX ORDER — TAMSULOSIN HYDROCHLORIDE 0.4 MG/1
1 CAPSULE ORAL
Refills: 0 | DISCHARGE

## 2024-02-29 RX ORDER — ACETAMINOPHEN 500 MG
750 TABLET ORAL ONCE
Refills: 0 | Status: COMPLETED | OUTPATIENT
Start: 2024-02-29 | End: 2024-02-29

## 2024-02-29 RX ORDER — TRANEXAMIC ACID 100 MG/ML
1000 INJECTION, SOLUTION INTRAVENOUS ONCE
Refills: 0 | Status: COMPLETED | OUTPATIENT
Start: 2024-02-29 | End: 2024-02-29

## 2024-02-29 RX ORDER — AMLODIPINE BESYLATE 2.5 MG/1
1 TABLET ORAL
Refills: 0 | DISCHARGE

## 2024-02-29 RX ORDER — LEVOTHYROXINE SODIUM 125 MCG
75 TABLET ORAL DAILY
Refills: 0 | Status: DISCONTINUED | OUTPATIENT
Start: 2024-02-29 | End: 2024-03-01

## 2024-02-29 RX ORDER — LEVOTHYROXINE SODIUM 125 MCG
1 TABLET ORAL
Refills: 0 | DISCHARGE

## 2024-02-29 RX ORDER — ZIPRASIDONE HYDROCHLORIDE 20 MG/1
1 CAPSULE ORAL
Refills: 0 | DISCHARGE

## 2024-02-29 RX ORDER — ASPIRIN/CALCIUM CARB/MAGNESIUM 324 MG
0 TABLET ORAL
Refills: 0 | DISCHARGE

## 2024-02-29 RX ORDER — MUPIROCIN 20 MG/G
1 OINTMENT TOPICAL
Refills: 0 | Status: DISCONTINUED | OUTPATIENT
Start: 2024-02-29 | End: 2024-03-01

## 2024-02-29 RX ORDER — ERGOCALCIFEROL 1.25 MG/1
1 CAPSULE ORAL
Refills: 0 | DISCHARGE

## 2024-02-29 RX ORDER — PRIMIDONE 250 MG/1
1 TABLET ORAL
Refills: 0 | DISCHARGE

## 2024-02-29 RX ORDER — LATANOPROST 0.05 MG/ML
1 SOLUTION/ DROPS OPHTHALMIC; TOPICAL
Refills: 0 | DISCHARGE

## 2024-02-29 RX ORDER — FLUTICASONE PROPIONATE 50 MCG
2 SPRAY, SUSPENSION NASAL
Refills: 0 | DISCHARGE

## 2024-02-29 RX ORDER — LINACLOTIDE 145 UG/1
1 CAPSULE, GELATIN COATED ORAL
Refills: 0 | DISCHARGE

## 2024-02-29 RX ORDER — EZETIMIBE 10 MG/1
1 TABLET ORAL
Refills: 0 | DISCHARGE

## 2024-02-29 RX ORDER — ASPIRIN/CALCIUM CARB/MAGNESIUM 324 MG
81 TABLET ORAL DAILY
Refills: 0 | Status: DISCONTINUED | OUTPATIENT
Start: 2024-02-29 | End: 2024-03-01

## 2024-02-29 RX ORDER — ACETAMINOPHEN 500 MG
650 TABLET ORAL EVERY 6 HOURS
Refills: 0 | Status: DISCONTINUED | OUTPATIENT
Start: 2024-02-29 | End: 2024-03-01

## 2024-02-29 RX ORDER — CEFAZOLIN SODIUM 1 G
2000 VIAL (EA) INJECTION ONCE
Refills: 0 | Status: DISCONTINUED | OUTPATIENT
Start: 2024-03-01 | End: 2024-03-01

## 2024-02-29 RX ORDER — FOLIC ACID 0.8 MG
1 TABLET ORAL
Refills: 0 | DISCHARGE

## 2024-02-29 RX ORDER — METHENAMINE MANDELATE 1 G
1 TABLET ORAL
Refills: 0 | DISCHARGE

## 2024-02-29 RX ORDER — ATORVASTATIN CALCIUM 80 MG/1
1 TABLET, FILM COATED ORAL
Refills: 0 | DISCHARGE

## 2024-02-29 RX ORDER — ENOXAPARIN SODIUM 100 MG/ML
40 INJECTION SUBCUTANEOUS ONCE
Refills: 0 | Status: DISCONTINUED | OUTPATIENT
Start: 2024-02-29 | End: 2024-03-01

## 2024-02-29 RX ORDER — LACOSAMIDE 50 MG/1
1 TABLET ORAL
Refills: 0 | DISCHARGE

## 2024-02-29 RX ORDER — VANCOMYCIN HCL 1 G
750 VIAL (EA) INTRAVENOUS ONCE
Refills: 0 | Status: DISCONTINUED | OUTPATIENT
Start: 2024-03-01 | End: 2024-03-01

## 2024-02-29 RX ORDER — ESLICARBAZEPINE ACETATE 800 MG/1
2 TABLET ORAL
Refills: 0 | DISCHARGE

## 2024-02-29 RX ADMIN — Medication 750 MILLIGRAM(S): at 11:42

## 2024-02-29 RX ADMIN — MORPHINE SULFATE 2 MILLIGRAM(S): 50 CAPSULE, EXTENDED RELEASE ORAL at 22:49

## 2024-02-29 RX ADMIN — MUPIROCIN 1 APPLICATION(S): 20 OINTMENT TOPICAL at 22:17

## 2024-02-29 RX ADMIN — TRANEXAMIC ACID 220 MILLIGRAM(S): 100 INJECTION, SOLUTION INTRAVENOUS at 13:33

## 2024-02-29 RX ADMIN — MORPHINE SULFATE 2 MILLIGRAM(S): 50 CAPSULE, EXTENDED RELEASE ORAL at 16:39

## 2024-02-29 RX ADMIN — MORPHINE SULFATE 2 MILLIGRAM(S): 50 CAPSULE, EXTENDED RELEASE ORAL at 23:32

## 2024-02-29 RX ADMIN — MORPHINE SULFATE 4 MILLIGRAM(S): 50 CAPSULE, EXTENDED RELEASE ORAL at 14:04

## 2024-02-29 RX ADMIN — ONDANSETRON 4 MILLIGRAM(S): 8 TABLET, FILM COATED ORAL at 15:33

## 2024-02-29 RX ADMIN — Medication 300 MILLIGRAM(S): at 10:32

## 2024-02-29 NOTE — CONSULT NOTE ADULT - SUBJECTIVE AND OBJECTIVE BOX
Pt Name: PRESLEY BECK    MRN: [id number]      Patient is a 88y Female presenting to the emergency department with a chief complaint of right hip pain s/p mechanical fall. Patient denies HS or LOC. Patient complains of right hip/groin pain. Pain is worse with motion/palpation. Patient offers no additional orthopedic complaints at this time. Patient denies numbness/tingling/weakness. Patient had broken left FN previously and had a hemiarthroplasty done at our facility.       REVIEW OF SYSTEMS    General: Alert, responsive, in NAD    Skin/Breast: No rashes, no pruritis   	  Ophthalmologic: No visual changes. No redness.   	  ENMT:	No discharge. No swelling.    Respiratory and Thorax: No difficulty breathing. No cough.  	   Cardiovascular:	No chest pain. No palpitations.    Gastrointestinal:	 No abdominal pain. No diarrhea.     Genitourinary: No dysuria. No bleeding.    Musculoskeletal: SEE HPI.    Neurological: No sensory or motor changes.     Psychiatric: No anxiety or depression.    Hematology/Lymphatics: No swelling.    Endocrine: No Hx of diabetes.    ROS is otherwise negative.    PAST MEDICAL & SURGICAL HISTORY:    HTN (hypertension)      High cholesterol      Stented coronary artery  x2      MI (myocardial infarction)      Hypothyroid      GERD (gastroesophageal reflux disease)      Myocardial infarction      Diabetes mellitus  Patient denies T2DM      Hiatal hernia      PUD (peptic ulcer disease)      Gastroenteritis      Vocal cord polyps      LBBB (left bundle branch block)      S/P appendectomy      S/P tubal ligation      S/P cholecystectomy      S/P  section          Allergies: Bactrim (Vomiting)  penicillin (Nausea; Diarrhea)      Medications: mupirocin 2% Ointment 1 Application(s) Both Nostrils two times a day  povidone iodine 5% Nasal Swab 1 Application(s) Both Nostrils once  tranexamic acid IVPB 1000 milliGRAM(s) IV Intermittent Once      FAMILY HISTORY:  Family history of cerebrovascular accident (CVA)    Family history of acute myocardial infarction    Family history of Alzheimer's disease    Diabetes mellitus (Sibling)                          13.9   12.60 )-----------( 177      ( 2024 10:26 )             41.3           137  |  97  |  17.4  ----------------------------<  120<H>  4.2   |  26.0  |  1.02    Ca    9.3      2024 10:26    TPro  6.7  /  Alb  3.7  /  TBili  0.5  /  DBili  x   /  AST  22  /  ALT  21  /  AlkPhos  74        Vital Signs Last 24 Hrs  T(C): 36.8 (2024 11:17), Max: 37.2 (2024 09:44)  T(F): 98.2 (2024 11:17), Max: 99 (2024 09:44)  HR: 72 (2024 11:17) (72 - 80)  BP: 134/80 (2024 11:17) (134/80 - 143/74)  BP(mean): --  RR: 18 (2024 11:17) (18 - 18)  SpO2: 95% (2024 11:17) (91% - 95%)    Parameters below as of 2024 11:17  Patient On (Oxygen Delivery Method): nasal cannula  O2 Flow (L/min): 2      Daily     Daily       PHYSICAL EXAM:      Appearance: Alert, responsive, in no acute distress.    Skin: no rash on visible skin. Skin is clean, dry and intact.     Musculoskeletal:       Right Lower Extremity: Extremity shortened and externally rotated. Skin warm and intact. +TTP of hip/groin. Hip/knee ROM not assessed secondary to fracture. SILT distally at foot. +TA/GSC/EHL/FHL. BCR. +DP pulse.     Imaging Studies: Displaced right femoral neck fracture.     A/P: Pt is a 88y Female with a right displaced femoral neck fracture.     PLAN:   * OR tomorrow for hemiarthroplasty.   * NPO after midnight.  * Pre-operative ABx ordered.  * Pain control.   * Medical clearance requested for procedure.  * Bed rest.  * Imaging reviewed.  * Would recommend single dose anticoagulation today assuming no contraindications.

## 2024-02-29 NOTE — ED ADULT NURSE NOTE - NSFALLHARMRISKINTERV_ED_ALL_ED

## 2024-02-29 NOTE — ED ADULT NURSE NOTE - OBJECTIVE STATEMENT
Pt a&ox3 complains of right hip pain after fall at home yesterday. Pt VSS, respirations even and unlabored on 3 L n/c, pt denies O2 use at home, no distress noted. Pt labs sent, IV Tylenol administered for pain, pt pending imaging.

## 2024-02-29 NOTE — CONSULT NOTE ADULT - SUBJECTIVE AND OBJECTIVE BOX
[facility] - [facility], THE HEART CENTER                                   [address] [place] [address]                                                      PHONE: [phone]                                                         FAX: [phone]  [email or web address]  ---------------------------------------------------------------------------------------------------------------------------------    Reason for Consult: PREOP CV EVAL    HPI:  PRESLEY BECK is an 88y Female S/P MECHANICAL FALL s/p R Hip fracture planning to go to OR called to evaluate , denies any CP or SOB, had L hip Surgery after a fall in   Hx of CAD S/P REMOTE PCI, PPM    PAST MEDICAL & SURGICAL HISTORY:  HTN (hypertension)      High cholesterol      Stented coronary artery  x2      MI (myocardial infarction)      Hypothyroid      GERD (gastroesophageal reflux disease)      Myocardial infarction      Diabetes mellitus  Patient denies T2DM      Hiatal hernia      PUD (peptic ulcer disease)      Gastroenteritis      Vocal cord polyps      LBBB (left bundle branch block)      S/P appendectomy      S/P tubal ligation      S/P cholecystectomy      S/P  section          Bactrim (Vomiting)  penicillin (Nausea; Diarrhea)      MEDICATIONS  (STANDING):  aspirin  chewable 81 milliGRAM(s) Oral daily  atorvastatin 40 milliGRAM(s) Oral at bedtime  enoxaparin Injectable 40 milliGRAM(s) SubCutaneous once  levothyroxine 75 MICROGram(s) Oral daily  metoprolol succinate ER 25 milliGRAM(s) Oral daily  mupirocin 2% Ointment 1 Application(s) Both Nostrils two times a day  povidone iodine 5% Nasal Swab 1 Application(s) Both Nostrils once    MEDICATIONS  (PRN):  acetaminophen     Tablet .. 650 milliGRAM(s) Oral every 6 hours PRN Temp greater or equal to 38C (100.4F), Mild Pain (1 - 3)  morphine  - Injectable 2 milliGRAM(s) IV Push every 6 hours PRN Moderate Pain (4 - 6)  ondansetron Injectable 4 milliGRAM(s) IV Push every 6 hours PRN Nausea and/or Vomiting      Social History:  Cigarettes:                    Alchohol:                 Illicit Drug Abuse:      REVIEW OF SYSTEMS:    Constitutional: No fever, weight loss or fatigue  Eyes: No eye pain, visual disturbances, or discharge  ENMT:  No difficulty hearing, tinnitus, vertigo; No sinus or throat pain  Neck: No pain or stiffness  Respiratory: No cough, wheezing, chills or hemoptysis  Cardiovascular: No chest pain, palpitations, shortness of breath, dizziness or leg swelling  Gastrointestinal: No abdominal or epigastric pain. No nausea, vomiting or hematemesis; No diarrhea or constipation. No melena or hematochezia.  Genitourinary: No dysuria, frequency, hematuria or incontinence  Rectal: No pain, hemorrhoids or incontinence  Neurological: No headaches, memory loss, loss of strength, numbness or tremors  Skin: No itching, burning, rashes or lesions   Lymph Nodes: No enlarged glands  Endocrine: No heat or cold intolerance; No hair loss  Musculoskeletal: No joint pain or swelling; No muscle, back or extremity pain  Psychiatric: No depression, anxiety, mood swings or difficulty sleeping  Heme/Lymph: No easy bruising or bleeding gums  Allergy and Immunologic: No hives or eczema    Vital Signs Last 24 Hrs  T(C): 36.8 (2024 15:18), Max: 37.2 (2024 09:44)  T(F): 98.2 (2024 11:17), Max: 99 (2024 09:44)  HR: 72 (2024 15:18) (72 - 80)  BP: 134/80 (2024 15:18) (134/80 - 143/74)  BP(mean): --  RR: 18 (2024 15:18) (18 - 18)  SpO2: 95% (2024 15:18) (91% - 95%)    Parameters below as of 2024 15:18    O2 Flow (L/min): 2    ICU Vital Signs Last 24 Hrs  [facility]  I&O's Detail    I&O's Summary    Drug Dosing Weight  [facility]      PHYSICAL EXAM:  General: Appears alert and cooperative.  HEENT: Head; normocephalic, atraumatic.  Eyes: Pupils reactive, cornea wnl.  Neck: Supple, no nodes adenopathy, no NVD or carotid bruit or thyromegaly.  CARDIOVASCULAR: Normal S1 and S2, No murmur, rub, gallop or lift.   LUNGS: No rales, rhonchi or wheeze. Normal breath sounds bilaterally.  ABDOMEN: Soft, nontender without mass or organomegaly. bowel sounds normoactive.  EXTREMITIES: No clubbing, cyanosis or edema. Distal pulses wnl.   SKIN: warm and dry with normal turgor.  NEURO: Alert/oriented x 3/normal motor exam. No pathologic reflexes.    PSYCH: normal affect.      >>> <<<  LABS:                        13.9   12.60 )-----------( 177      ( 2024 10:26 )             41.3         137  |  97  |  17.4  ----------------------------<  120<H>  4.2   |  26.0  |  1.02    Ca    9.3      2024 10:26    TPro  6.7  /  Alb  3.7  /  TBili  0.5  /  DBili  x   /  AST  22  /  ALT  21  /  AlkPhos  74      [facility]      PT/INR - ( 2024 10:26 )   PT: 11.4 sec;   INR: 1.03 ratio         PTT - ( 2024 10:26 )  PTT:24.4 sec  Urinalysis Basic - ( 2024 10:26 )    Color: x / Appearance: x / SG: x / pH: x  Gluc: 120 mg/dL / Ketone: x  / Bili: x / Urobili: x   Blood: x / Protein: x / Nitrite: x   Leuk Esterase: x / RBC: x / WBC x   Sq Epi: x / Non Sq Epi: x / Bacteria: x        RADIOLOGY & ADDITIONAL STUDIES:    INTERPRETATION OF TELEMETRY (personally reviewed):    ECG:   < from: 12 Lead ECG (22 @ 08:27) >  Diagnosis Line *** Poor data quality, interpretation may be adversely affected  Atrial-sensed ventricular-paced rhythm  Left atrial enlargement  Abnormal ECG    < end of copied text >    ECHO:   < from: TTE Echo Complete w/ Contrast w/ Doppler (20 @ 14:34) >  Summary:   1. Normal global left ventricular systolic function. Left ventricular ejection fraction, by visual estimation, is 65 to 70%.   2. Spectral Doppler shows impaired relaxation pattern of left ventricular myocardial filling (Grade I diastolic dysfunction).   3. There is mild concentric left ventricular hypertrophy.   4. Normal right ventricular size and function.   5. Mild mitral valve regurgitation.   6. There is no evidence of pericardial effusion.   7. ** Compared to TTE dated 2016, no significant changes.    < end of copied text >    STRESS TEST:    CARDIAC CATHETERIZATION:    < from: Cardiac Cath Lab - Adult (18 @ 10:42) >  CORONARY VESSELS: The coronary circulation is right dominant.  LM:   --  LM: Normal.  LAD:   --  Proximal LAD: There was a 0 % stenosis at the site of a prior  stent.  CX:   --  Proximal circumflex: There was a 40 % stenosis.  --  Mid circumflex: There was a 95 % stenosis.  RCA:   --  Proximal RCA: There was a 0 % stenosis at the site of a prior  stent.  --  Distal RCA: There was a 70 % stenosis.  COMPLICATIONS: There were no complications. No complications occurred  during the cath lab visit.  DIAGNOSTIC IMPRESSIONS: Prior stents in proximal LAD and RCA are patent  with severe mid LCX and distal RCA disease treated with PTCA/STENT  (CYNDI-CHARLIE) with IVUS  DIAGNOSTIC RECOMMENDATIONS: ASA and Plavix  INTERVENTIONAL IMPRESSIONS: Prior stents in proximal LAD and RCA are patent  with severe mid LCX and distal RCA disease treated with PTCA/STENT  (CYNDI-CHARLIE) with IVUS  INTERVENTIONAL RECOMMENDATIONS: ASA and Plavix  Prepared and signed by  Tee Sampson MD    < end of copied text >    ACTIVE PROBLEMS:  HEALTH ISSUES - PROBLEM Dx:          Assessment and Plan:    In summary, PRESLEY BECK is an 88y Female with past medical history significant for     1) Preoperative Optimization       -No Cardiovascular Contraindication to proceed with surgery       -Patient is acceptable risk for a low    moderate  risk surgery       -Continue cardiac medications as scheduled       -Postoperative Telemetry       -Anticoagulation--Restart when ok with surgery       -Will follow           Please recall with any questions or concerns   MILY KING MD, FACC, HAYDEE                 [facility] - [facility], [facility]ART C[facility]                                   [address] [place] [address]                                                      PHONE: [phone]                                                         FAX: [phone]  [email or web address]  ---------------------------------------------------------------------------------------------------------------------------------    Reason for Consult: PREOP CV EVAL    HPI:  PRESLEY BECK is an 88y Female S/P MECHANICAL FALL s/p R Hip fracture planning to go to OR called to evaluate , denies any CP or SOB, had L hip Surgery after a fall in   Hx of CAD S/P REMOTE PCI, PPM  Patient poor historian but clearly denies any CP at this time    PAST MEDICAL & SURGICAL HISTORY:  HTN (hypertension)      High cholesterol      Stented coronary artery  x2      MI (myocardial infarction)      Hypothyroid      GERD (gastroesophageal reflux disease)      Myocardial infarction      Diabetes mellitus  Patient denies T2DM      Hiatal hernia      PUD (peptic ulcer disease)      Gastroenteritis      Vocal cord polyps      LBBB (left bundle branch block)      S/P appendectomy      S/P tubal ligation      S/P cholecystectomy      S/P  section          Bactrim (Vomiting)  penicillin (Nausea; Diarrhea)      MEDICATIONS  (STANDING):  aspirin  chewable 81 milliGRAM(s) Oral daily  atorvastatin 40 milliGRAM(s) Oral at bedtime  enoxaparin Injectable 40 milliGRAM(s) SubCutaneous once  levothyroxine 75 MICROGram(s) Oral daily  metoprolol succinate ER 25 milliGRAM(s) Oral daily  mupirocin 2% Ointment 1 Application(s) Both Nostrils two times a day  povidone iodine 5% Nasal Swab 1 Application(s) Both Nostrils once    MEDICATIONS  (PRN):  acetaminophen     Tablet .. 650 milliGRAM(s) Oral every 6 hours PRN Temp greater or equal to 38C (100.4F), Mild Pain (1 - 3)  morphine  - Injectable 2 milliGRAM(s) IV Push every 6 hours PRN Moderate Pain (4 - 6)  ondansetron Injectable 4 milliGRAM(s) IV Push every 6 hours PRN Nausea and/or Vomiting      Social History:  Cigarettes:                    Alchohol:                 Illicit Drug Abuse:      REVIEW OF SYSTEMS:    Constitutional: No fever, weight loss or fatigue  Eyes: No eye pain, visual disturbances, or discharge  ENMT:  No difficulty hearing, tinnitus, vertigo; No sinus or throat pain  Neck: No pain or stiffness  Respiratory: No cough, wheezing, chills or hemoptysis  Cardiovascular: No chest pain, palpitations, shortness of breath, dizziness or leg swelling  Gastrointestinal: No abdominal or epigastric pain. No nausea, vomiting or hematemesis; No diarrhea or constipation. No melena or hematochezia.  Genitourinary: No dysuria, frequency, hematuria or incontinence  Rectal: No pain, hemorrhoids or incontinence  Neurological: No headaches, memory loss, loss of strength, numbness or tremors  Skin: No itching, burning, rashes or lesions   Lymph Nodes: No enlarged glands  Endocrine: No heat or cold intolerance; No hair loss  Musculoskeletal: No joint pain or swelling; No muscle, back or extremity pain  Psychiatric: No depression, anxiety, mood swings or difficulty sleeping  Heme/Lymph: No easy bruising or bleeding gums  Allergy and Immunologic: No hives or eczema    Vital Signs Last 24 Hrs  T(C): 36.8 (2024 15:18), Max: 37.2 (2024 09:44)  T(F): 98.2 (2024 11:17), Max: 99 (2024 09:44)  HR: 72 (2024 15:18) (72 - 80)  BP: 134/80 (2024 15:18) (134/80 - 143/74)  BP(mean): --  RR: 18 (2024 15:18) (18 - 18)  SpO2: 95% (2024 15:18) (91% - 95%)    Parameters below as of 2024 15:18    O2 Flow (L/min): 2    ICU Vital Signs Last 24 Hrs  [facility]  I&O's Detail    I&O's Summary    Drug Dosing Weight  [facility]      PHYSICAL EXAM:  General: Appears alert and cooperative.  HEENT: Head; normocephalic, atraumatic.  Eyes: Pupils reactive, cornea wnl.  Neck: Supple, no nodes adenopathy, no NVD or carotid bruit or thyromegaly.  CARDIOVASCULAR: Normal S1 and S2, No murmur, rub, gallop or lift.   LUNGS: No rales, rhonchi or wheeze. Normal breath sounds bilaterally.  ABDOMEN: Soft, nontender without mass or organomegaly. bowel sounds normoactive.  EXTREMITIES: No clubbing, cyanosis or edema. Distal pulses wnl.   SKIN: warm and dry with normal turgor.  NEURO: Alert/oriented x 3/normal motor exam. No pathologic reflexes.    PSYCH: normal affect.      >>> <<<  LABS:                        13.9   12.60 )-----------( 177      ( 2024 10:26 )             41.3         137  |  97  |  17.4  ----------------------------<  120<H>  4.2   |  26.0  |  1.02    Ca    9.3      2024 10:26    TPro  6.7  /  Alb  3.7  /  TBili  0.5  /  DBili  x   /  AST  22  /  ALT  21  /  AlkPhos  74      [facility]      PT/INR - ( 2024 10:26 )   PT: 11.4 sec;   INR: 1.03 ratio         PTT - ( 2024 10:26 )  PTT:24.4 sec  Urinalysis Basic - ( 2024 10:26 )    Color: x / Appearance: x / SG: x / pH: x  Gluc: 120 mg/dL / Ketone: x  / Bili: x / Urobili: x   Blood: x / Protein: x / Nitrite: x   Leuk Esterase: x / RBC: x / WBC x   Sq Epi: x / Non Sq Epi: x / Bacteria: x        RADIOLOGY & ADDITIONAL STUDIES:    INTERPRETATION OF TELEMETRY (personally reviewed):    ECG:   < from: 12 Lead ECG (22 @ 08:27) >  Diagnosis Line *** Poor data quality, interpretation may be adversely affected  Atrial-sensed ventricular-paced rhythm  Left atrial enlargement  Abnormal ECG    < end of copied text >        ECHO:   < from: TTE Echo Complete w/ Contrast w/ Doppler (20 @ 14:34) >  Summary:   1. Normal global left ventricular systolic function. Left ventricular ejection fraction, by visual estimation, is 65 to 70%.   2. Spectral Doppler shows impaired relaxation pattern of left ventricular myocardial filling (Grade I diastolic dysfunction).   3. There is mild concentric left ventricular hypertrophy.   4. Normal right ventricular size and function.   5. Mild mitral valve regurgitation.   6. There is no evidence of pericardial effusion.   7. ** Compared to TTE dated 2016, no significant changes.    < end of copied text >        CARDIAC CATHETERIZATION:    < from: Cardiac Cath Lab - Adult (18 @ 10:42) >  CORONARY VESSELS: The coronary circulation is right dominant.  LM:   --  LM: Normal.  LAD:   --  Proximal LAD: There was a 0 % stenosis at the site of a prior  stent.  CX:   --  Proximal circumflex: There was a 40 % stenosis.  --  Mid circumflex: There was a 95 % stenosis.  RCA:   --  Proximal RCA: There was a 0 % stenosis at the site of a prior  stent.  --  Distal RCA: There was a 70 % stenosis.  COMPLICATIONS: There were no complications. No complications occurred  during the cath lab visit.  DIAGNOSTIC IMPRESSIONS: Prior stents in proximal LAD and RCA are patent  with severe mid LCX and distal RCA disease treated with PTCA/STENT  (CYNDI-CHARLIE) with IVUS  DIAGNOSTIC RECOMMENDATIONS: ASA and Plavix  INTERVENTIONAL IMPRESSIONS: Prior stents in proximal LAD and RCA are patent  with severe mid LCX and distal RCA disease treated with PTCA/STENT  (CYNDI-CHARLIE) with IVUS  INTERVENTIONAL RECOMMENDATIONS: ASA and Plavix  Prepared and signed by  Tee Sampson MD    < end of copied text >    ACTIVE PROBLEMS:  HEALTH ISSUES - PROBLEM Dx:          Assessment and Plan:    In summary,   PRESLEY BECK is an 88y Female S/P MECHANICAL FALL s/p R Hip fracture planning to go to OR called to evaluate , denies any CP or SOB, had L hip Surgery after a fall in   Hx of CAD S/P REMOTE PCI, PPM  Patient poor historian but clearly denies any CP at this time      1) Preoperative Optimization       -No Cardiovascular Contraindication to proceed with surgery       -Patient is acceptable risk for a moderate  risk surgery       -Continue cardiac medications as scheduled       -Please recall with any questions or concerns     MILY KING MD, FACC, HAYDEE

## 2024-02-29 NOTE — H&P ADULT - HISTORY OF PRESENT ILLNESS
88F who presented with an episode of fall at home. The patient reported that she had tripped and fell resulting in pain to her right hip and she was unable to get up. The patient denied any symptoms prior to the fall and was without chest pain, palpitations, or lightheadedness. She denied any injury to her head or loss of consciousness. She denied any history of frequent falls. Additional information was obtained from the patient's daughter on the telephone. The patient was reported to have had improved in her condition since her prior hospitalization and treatment at the rehabilitation facility. She progressed and was now walking daily with the assistance of a cane. There was no mention or dyspnea or chest pain. She was noted to have some dementia but did not require significant assistance with ambulation. There were no recent fevers, chills, or sick contacts.

## 2024-02-29 NOTE — ED PROVIDER NOTE - CLINICAL SUMMARY MEDICAL DECISION MAKING FREE TEXT BOX
Mechanical fall with right femoral neck fracture, orthopedics planning for OR tomorrow. No other injuries noted by exam, CTH negative. Will admit to medicine for clearance and optimization of comorbidities prior to OR

## 2024-02-29 NOTE — CONSULT NOTE ADULT - NS ATTEND AMEND GEN_ALL_CORE FT
Orthopaedic Trauma Surgeon Addendum:    I have reviewed the physician assistant note and agree with the history, exam, and plan of care, except as noted.    Orthopedic Surgery is ready to proceed with surgery pending medical optimization and adequate Operating Room availability. Risks of surgical delay discussed with other providers and staff. Please call with any questions or concerns.     Marquez Abad MD  Orthopaedic Trauma Surgeon  [facility]

## 2024-02-29 NOTE — PATIENT PROFILE ADULT - FALL HARM RISK - HARM RISK INTERVENTIONS

## 2024-02-29 NOTE — ED ADULT TRIAGE NOTE - MODE OF ARRIVAL
[facility] Dapsone Counseling: I discussed with the patient the risks of dapsone including but not limited to hemolytic anemia, agranulocytosis, rashes, methemoglobinemia, kidney failure, peripheral neuropathy, headaches, GI upset, and liver toxicity.  Patients who start dapsone require monitoring including baseline LFTs and weekly CBCs for the first month, then every month thereafter.  The patient verbalized understanding of the proper use and possible adverse effects of dapsone.  All of the patient's questions and concerns were addressed.

## 2024-02-29 NOTE — H&P ADULT - ASSESSMENT
88F with a history of dementia, coronary artery disease, pacemaker, COPD, hypothyroidism, borderline diabetes, hypertension, and hyperlipidemia who presented with a mechanical fall found to have a right hip fracture.    Hip fracture  - Discussed with Orthopedic Surgery, thought to benefit from surgical repair  - Cardiology evaluation noted, no contraindication to the planned procedure  - Analgesic medications as needed    Coronary artery disease / Hypertension  - On aspirin and atorvastatin  - Continue on metoprolol  - Resumption of amlodipine if needed for blood pressure control  - EKG with paced rhythm    COPD  - No in exacerbation  - Nebulizer treatments as needed    Hypothyroidism  - On levothyroxine    Prediabetes  - No hyperglycemia noted  - Insulin coverage if needed    Discussed with the patient's daughter Belkys ([phone]) on the telephone.

## 2024-02-29 NOTE — H&P ADULT - NSHPPHYSICALEXAM_GEN_ALL_CORE
Vital Signs Last 24 Hrs  T(C): 36.8 (29 Feb 2024 15:18), Max: 37.2 (29 Feb 2024 09:44)  T(F): 98.2 (29 Feb 2024 11:17), Max: 99 (29 Feb 2024 09:44)  HR: 72 (29 Feb 2024 15:18) (72 - 80)  BP: 134/80 (29 Feb 2024 15:18) (134/80 - 143/74)  BP(mean): --  RR: 18 (29 Feb 2024 15:18) (18 - 18)  SpO2: 95% (29 Feb 2024 15:18) (91% - 95%)    Parameters below as of 29 Feb 2024 15:18    O2 Flow (L/min): 2    General appearance: No acute distress, Awake, Alert, Disoriented  HEENT: Normocephalic, Atraumatic, Conjunctiva clear, EOMI  Neck: Supple, No JVD, No tenderness  Lungs: Breath sound equal bilaterally, No wheezes, No rales  Cardiovascular: S1S2, Regular rhythm  Abdomen: Soft, Nontender, Nondistended, No guarding/rebound, Positive bowel sounds  Extremities: No clubbing, No cyanosis, No edema, No calf tenderness, Right hip tenderness  Neuro: Strength equal bilaterally, No tremors  Psychiatric: Appropriate mood, Normal affect

## 2024-02-29 NOTE — ED ADULT NURSE NOTE - NS ED NOTE ABUSE RESPONSE YN
Diabetes is unchanged.   Dietary recommendations for ADA diet.  Regular aerobic exercise.  Discussed ways to avoid symptomatic hypoglycemia.  Discussed sick day management.  Discussed foot care.  Reminded to get yearly retinal exam.  Diabetes will be reassessed in 3 months.   Yes

## 2024-02-29 NOTE — ED PROVIDER NOTE - OBJECTIVE STATEMENT
88yof tripped and fell forward in her home landing on and injuring the right hip. Was unable to get off the floor or bear weight after the fall. Unsure about a head strike. Localizes pain and tenderness to the right anterior hip, denies any other injury.

## 2024-02-29 NOTE — ED ADULT TRIAGE NOTE - CHIEF COMPLAINT QUOTE
mechanical trip and fall at home. pain to Right hip with external rotation, denies blood thinners. on 2LNC

## 2024-03-01 ENCOUNTER — TRANSCRIPTION ENCOUNTER (OUTPATIENT)
Age: 89
End: 2024-03-01

## 2024-03-01 LAB
ANION GAP SERPL CALC-SCNC: 12 MMOL/L — SIGNIFICANT CHANGE UP (ref 5–17)
BUN SERPL-MCNC: 21.7 MG/DL — HIGH (ref 8–20)
CALCIUM SERPL-MCNC: 8.8 MG/DL — SIGNIFICANT CHANGE UP (ref 8.4–10.5)
CHLORIDE SERPL-SCNC: 100 MMOL/L — SIGNIFICANT CHANGE UP (ref 96–108)
CO2 SERPL-SCNC: 27 MMOL/L — SIGNIFICANT CHANGE UP (ref 22–29)
CREAT SERPL-MCNC: 1.18 MG/DL — SIGNIFICANT CHANGE UP (ref 0.5–1.3)
EGFR: 45 ML/MIN/1.73M2 — LOW
GLUCOSE BLDC GLUCOMTR-MCNC: 115 MG/DL — HIGH (ref 70–99)
GLUCOSE BLDC GLUCOMTR-MCNC: 116 MG/DL — HIGH (ref 70–99)
GLUCOSE BLDC GLUCOMTR-MCNC: 118 MG/DL — HIGH (ref 70–99)
GLUCOSE BLDC GLUCOMTR-MCNC: 158 MG/DL — HIGH (ref 70–99)
GLUCOSE SERPL-MCNC: 126 MG/DL — HIGH (ref 70–99)
HCT VFR BLD CALC: 41.1 % — SIGNIFICANT CHANGE UP (ref 34.5–45)
HGB BLD-MCNC: 13.6 G/DL — SIGNIFICANT CHANGE UP (ref 11.5–15.5)
MCHC RBC-ENTMCNC: 31.6 PG — SIGNIFICANT CHANGE UP (ref 27–34)
MCHC RBC-ENTMCNC: 33.1 GM/DL — SIGNIFICANT CHANGE UP (ref 32–36)
MCV RBC AUTO: 95.4 FL — SIGNIFICANT CHANGE UP (ref 80–100)
PLATELET # BLD AUTO: 152 K/UL — SIGNIFICANT CHANGE UP (ref 150–400)
POTASSIUM SERPL-MCNC: 4.9 MMOL/L — SIGNIFICANT CHANGE UP (ref 3.5–5.3)
POTASSIUM SERPL-SCNC: 4.9 MMOL/L — SIGNIFICANT CHANGE UP (ref 3.5–5.3)
RBC # BLD: 4.31 M/UL — SIGNIFICANT CHANGE UP (ref 3.8–5.2)
RBC # FLD: 13.5 % — SIGNIFICANT CHANGE UP (ref 10.3–14.5)
SODIUM SERPL-SCNC: 139 MMOL/L — SIGNIFICANT CHANGE UP (ref 135–145)
WBC # BLD: 14.91 K/UL — HIGH (ref 3.8–10.5)
WBC # FLD AUTO: 14.91 K/UL — HIGH (ref 3.8–10.5)

## 2024-03-01 PROCEDURE — 99233 SBSQ HOSP IP/OBS HIGH 50: CPT

## 2024-03-01 PROCEDURE — 73502 X-RAY EXAM HIP UNI 2-3 VIEWS: CPT | Mod: 26,RT

## 2024-03-01 PROCEDURE — 27236 TREAT THIGH FRACTURE: CPT | Mod: RT

## 2024-03-01 PROCEDURE — 27495 REINFORCE THIGH: CPT | Mod: RT

## 2024-03-01 DEVICE — IMPLANTABLE DEVICE: Type: IMPLANTABLE DEVICE | Site: RIGHT | Status: FUNCTIONAL

## 2024-03-01 DEVICE — CEMENT PALACOS R: Type: IMPLANTABLE DEVICE | Site: RIGHT | Status: FUNCTIONAL

## 2024-03-01 DEVICE — HEAD FEM 12/14 TAPR 28 PLUS0 NK: Type: IMPLANTABLE DEVICE | Site: RIGHT | Status: FUNCTIONAL

## 2024-03-01 DEVICE — ZIMMER FEMORAL BONE CEMENT KIT: Type: IMPLANTABLE DEVICE | Site: RIGHT | Status: FUNCTIONAL

## 2024-03-01 DEVICE — CABLE C CLAMP ACCORD 2.0MM: Type: IMPLANTABLE DEVICE | Site: RIGHT | Status: FUNCTIONAL

## 2024-03-01 RX ORDER — SODIUM CHLORIDE 9 MG/ML
500 INJECTION INTRAMUSCULAR; INTRAVENOUS; SUBCUTANEOUS ONCE
Refills: 0 | Status: COMPLETED | OUTPATIENT
Start: 2024-03-01 | End: 2024-03-01

## 2024-03-01 RX ORDER — SODIUM CHLORIDE 9 MG/ML
1000 INJECTION INTRAMUSCULAR; INTRAVENOUS; SUBCUTANEOUS
Refills: 0 | Status: DISCONTINUED | OUTPATIENT
Start: 2024-03-01 | End: 2024-03-06

## 2024-03-01 RX ORDER — HYDROMORPHONE HYDROCHLORIDE 2 MG/ML
2 INJECTION INTRAMUSCULAR; INTRAVENOUS; SUBCUTANEOUS
Refills: 0 | Status: DISCONTINUED | OUTPATIENT
Start: 2024-03-01 | End: 2024-03-06

## 2024-03-01 RX ORDER — HYDROMORPHONE HYDROCHLORIDE 2 MG/ML
0.25 INJECTION INTRAMUSCULAR; INTRAVENOUS; SUBCUTANEOUS
Refills: 0 | Status: DISCONTINUED | OUTPATIENT
Start: 2024-03-01 | End: 2024-03-01

## 2024-03-01 RX ORDER — ASPIRIN/CALCIUM CARB/MAGNESIUM 324 MG
81 TABLET ORAL DAILY
Refills: 0 | Status: DISCONTINUED | OUTPATIENT
Start: 2024-03-01 | End: 2024-03-06

## 2024-03-01 RX ORDER — CEFAZOLIN SODIUM 1 G
2000 VIAL (EA) INJECTION
Refills: 0 | Status: COMPLETED | OUTPATIENT
Start: 2024-03-01 | End: 2024-03-01

## 2024-03-01 RX ORDER — SODIUM CHLORIDE 9 MG/ML
1000 INJECTION, SOLUTION INTRAVENOUS
Refills: 0 | Status: DISCONTINUED | OUTPATIENT
Start: 2024-03-01 | End: 2024-03-01

## 2024-03-01 RX ORDER — ACETAMINOPHEN 500 MG
975 TABLET ORAL EVERY 8 HOURS
Refills: 0 | Status: DISCONTINUED | OUTPATIENT
Start: 2024-03-01 | End: 2024-03-06

## 2024-03-01 RX ORDER — METOPROLOL TARTRATE 50 MG
25 TABLET ORAL DAILY
Refills: 0 | Status: DISCONTINUED | OUTPATIENT
Start: 2024-03-01 | End: 2024-03-06

## 2024-03-01 RX ORDER — MAGNESIUM HYDROXIDE 400 MG/1
30 TABLET, CHEWABLE ORAL DAILY
Refills: 0 | Status: DISCONTINUED | OUTPATIENT
Start: 2024-03-01 | End: 2024-03-06

## 2024-03-01 RX ORDER — PANTOPRAZOLE SODIUM 20 MG/1
40 TABLET, DELAYED RELEASE ORAL
Refills: 0 | Status: DISCONTINUED | OUTPATIENT
Start: 2024-03-01 | End: 2024-03-06

## 2024-03-01 RX ORDER — KETOROLAC TROMETHAMINE 30 MG/ML
15 SYRINGE (ML) INJECTION EVERY 6 HOURS
Refills: 0 | Status: DISCONTINUED | OUTPATIENT
Start: 2024-03-01 | End: 2024-03-02

## 2024-03-01 RX ORDER — ONDANSETRON 8 MG/1
8 TABLET, FILM COATED ORAL ONCE
Refills: 0 | Status: DISCONTINUED | OUTPATIENT
Start: 2024-03-01 | End: 2024-03-06

## 2024-03-01 RX ORDER — ENOXAPARIN SODIUM 100 MG/ML
40 INJECTION SUBCUTANEOUS EVERY 24 HOURS
Refills: 0 | Status: DISCONTINUED | OUTPATIENT
Start: 2024-03-02 | End: 2024-03-06

## 2024-03-01 RX ORDER — SENNA PLUS 8.6 MG/1
2 TABLET ORAL AT BEDTIME
Refills: 0 | Status: DISCONTINUED | OUTPATIENT
Start: 2024-03-01 | End: 2024-03-06

## 2024-03-01 RX ORDER — ONDANSETRON 8 MG/1
4 TABLET, FILM COATED ORAL ONCE
Refills: 0 | Status: DISCONTINUED | OUTPATIENT
Start: 2024-03-01 | End: 2024-03-01

## 2024-03-01 RX ORDER — OXYCODONE HYDROCHLORIDE 5 MG/1
10 TABLET ORAL
Refills: 0 | Status: DISCONTINUED | OUTPATIENT
Start: 2024-03-01 | End: 2024-03-06

## 2024-03-01 RX ORDER — OXYCODONE HYDROCHLORIDE 5 MG/1
5 TABLET ORAL
Refills: 0 | Status: DISCONTINUED | OUTPATIENT
Start: 2024-03-01 | End: 2024-03-06

## 2024-03-01 RX ORDER — ACETAMINOPHEN 500 MG
750 TABLET ORAL ONCE
Refills: 0 | Status: COMPLETED | OUTPATIENT
Start: 2024-03-01 | End: 2024-03-01

## 2024-03-01 RX ORDER — LEVOTHYROXINE SODIUM 125 MCG
75 TABLET ORAL DAILY
Refills: 0 | Status: DISCONTINUED | OUTPATIENT
Start: 2024-03-01 | End: 2024-03-06

## 2024-03-01 RX ORDER — ATORVASTATIN CALCIUM 80 MG/1
40 TABLET, FILM COATED ORAL AT BEDTIME
Refills: 0 | Status: DISCONTINUED | OUTPATIENT
Start: 2024-03-01 | End: 2024-03-06

## 2024-03-01 RX ADMIN — SENNA PLUS 2 TABLET(S): 8.6 TABLET ORAL at 23:51

## 2024-03-01 RX ADMIN — Medication 15 MILLIGRAM(S): at 15:05

## 2024-03-01 RX ADMIN — Medication 81 MILLIGRAM(S): at 15:20

## 2024-03-01 RX ADMIN — ATORVASTATIN CALCIUM 40 MILLIGRAM(S): 80 TABLET, FILM COATED ORAL at 23:51

## 2024-03-01 RX ADMIN — MUPIROCIN 1 APPLICATION(S): 20 OINTMENT TOPICAL at 05:22

## 2024-03-01 RX ADMIN — Medication 15 MILLIGRAM(S): at 18:43

## 2024-03-01 RX ADMIN — SODIUM CHLORIDE 500 MILLILITER(S): 9 INJECTION INTRAMUSCULAR; INTRAVENOUS; SUBCUTANEOUS at 10:00

## 2024-03-01 RX ADMIN — Medication 2000 MILLIGRAM(S): at 17:43

## 2024-03-01 RX ADMIN — Medication 15 MILLIGRAM(S): at 23:52

## 2024-03-01 RX ADMIN — SODIUM CHLORIDE 75 MILLILITER(S): 9 INJECTION INTRAMUSCULAR; INTRAVENOUS; SUBCUTANEOUS at 17:43

## 2024-03-01 RX ADMIN — Medication 300 MILLIGRAM(S): at 11:00

## 2024-03-01 RX ADMIN — Medication 2000 MILLIGRAM(S): at 23:52

## 2024-03-01 RX ADMIN — Medication 1 APPLICATION(S): at 06:28

## 2024-03-01 RX ADMIN — Medication 975 MILLIGRAM(S): at 23:51

## 2024-03-01 RX ADMIN — Medication 15 MILLIGRAM(S): at 17:43

## 2024-03-01 RX ADMIN — Medication 750 MILLIGRAM(S): at 11:30

## 2024-03-01 NOTE — DISCHARGE NOTE PROVIDER - CARE PROVIDER_API CALL
Marquez Abad  Orthopaedic Surgery  [address] [place] [id number]  Phone: [phone]  Fax: [phone]  Follow Up Time:

## 2024-03-01 NOTE — ASU PREOP CHECKLIST - PATIENT'S PERSONAL PROPERTY GIVEN TO
on unit [employer] Preamble Text (Leave Blank If You Do Not Want): Undermining was performed with blunt dissection.

## 2024-03-01 NOTE — DISCHARGE NOTE PROVIDER - HOSPITAL COURSE
88F with a history of dementia, coronary artery disease, pacemaker, COPD, hypothyroidism, borderline diabetes, hypertension, and hyperlipidemia who presented with a mechanical fall found to have a right hip fracture. s/p Hip fx repair 3/2, seen by PT and rec [facility]. Now stable to dc to [facility].

## 2024-03-01 NOTE — PROGRESS NOTE ADULT - SUBJECTIVE AND OBJECTIVE BOX
Hospitalist Progress Note    Chief Complaint:  Hip Fx    SUBJECTIVE / OVERNIGHT EVENTS:  No events overnight, patient seen at bedside, in NAD, complaining of Hip pain. Patient denies chest pain, SOB, abd pain, N/V, fever, chills, dysuria or any other complaints. All remainder ROS negative.     MEDICATIONS  (STANDING):  acetaminophen     Tablet .. 975 milliGRAM(s) Oral every 8 hours  aspirin  chewable 81 milliGRAM(s) Oral daily  atorvastatin 40 milliGRAM(s) Oral at bedtime  ceFAZolin  Injectable. 2000 milliGRAM(s) IV Push <User Schedule>  ketorolac   Injectable 15 milliGRAM(s) IV Push every 6 hours  lactated ringers. 1000 milliLiter(s) (75 mL/Hr) IV Continuous <Continuous>  levothyroxine 75 MICROGram(s) Oral daily  metoprolol succinate ER 25 milliGRAM(s) Oral daily  ondansetron Injectable 8 milliGRAM(s) IV Push once  pantoprazole    Tablet 40 milliGRAM(s) Oral before breakfast  senna 2 Tablet(s) Oral at bedtime  sodium chloride 0.9%. 1000 milliLiter(s) (75 mL/Hr) IV Continuous <Continuous>    MEDICATIONS  (PRN):  HYDROmorphone   Tablet 2 milliGRAM(s) Oral every 3 hours PRN Severe Pain (7 - 10)  HYDROmorphone  Injectable 0.25 milliGRAM(s) IV Push every 10 minutes PRN Severe Pain (7 - 10)  magnesium hydroxide Suspension 30 milliLiter(s) Oral daily PRN Constipation  ondansetron Injectable 4 milliGRAM(s) IV Push once PRN Nausea and/or Vomiting  oxyCODONE    IR 5 milliGRAM(s) Oral every 3 hours PRN Mild Pain (1 - 3)  oxyCODONE    IR 10 milliGRAM(s) Oral every 3 hours PRN Moderate Pain (4 - 6)        I&O's Summary      PHYSICAL EXAM:  Vital Signs Last 24 Hrs  T(C): 37.3 (01 Mar 2024 09:25), Max: 37.6 (01 Mar 2024 06:14)  T(F): 99.2 (01 Mar 2024 09:25), Max: 99.7 (01 Mar 2024 06:14)  HR: 81 (01 Mar 2024 11:30) (72 - 98)  BP: 128/47 (01 Mar 2024 11:30) (128/47 - 167/75)  BP(mean): --  RR: 15 (01 Mar 2024 11:30) (15 - 20)  SpO2: 100% (01 Mar 2024 11:30) (93% - 100%)    Parameters below as of 01 Mar 2024 11:30    O2 Flow (L/min): 3      General appearance: No acute distress, Awake, Alert, Disoriented  HEENT: Normocephalic, Atraumatic, Conjunctiva clear, EOMI  Neck: Supple, No JVD, No tenderness  Lungs: Breath sound equal bilaterally, No wheezes, No rales  Cardiovascular: S1S2, Regular rhythm  Abdomen: Soft, Nontender, Nondistended, No guarding/rebound, Positive bowel sounds  Extremities: No clubbing, No cyanosis, No edema, No calf tenderness, Right hip tenderness  Neuro: Strength equal bilaterally, No tremors  Psychiatric: Appropriate mood, Normal affect    LABS:                        13.6   14.91 )-----------( 152      ( 01 Mar 2024 04:01 )             41.1     03-01    139  |  100  |  21.7<H>  ----------------------------<  126<H>  4.9   |  27.0  |  1.18    Ca    8.8      01 Mar 2024 04:01    TPro  6.7  /  Alb  3.7  /  TBili  0.5  /  DBili  x   /  AST  22  /  ALT  21  /  AlkPhos  74  02-29    PT/INR - ( 29 Feb 2024 10:26 )   PT: 11.4 sec;   INR: 1.03 ratio         PTT - ( 29 Feb 2024 10:26 )  PTT:24.4 sec      Urinalysis Basic - ( 01 Mar 2024 04:01 )    Color: x / Appearance: x / SG: x / pH: x  Gluc: 126 mg/dL / Ketone: x  / Bili: x / Urobili: x   Blood: x / Protein: x / Nitrite: x   Leuk Esterase: x / RBC: x / WBC x   Sq Epi: x / Non Sq Epi: x / Bacteria: x        CAPILLARY BLOOD GLUCOSE      POCT Blood Glucose.: 115 mg/dL (01 Mar 2024 09:32)  POCT Blood Glucose.: 116 mg/dL (01 Mar 2024 07:44)  POCT Blood Glucose.: 118 mg/dL (01 Mar 2024 06:33)        RADIOLOGY & ADDITIONAL TESTS:  Results Reviewed: Y  Imaging Personally Reviewed: N  Electrocardiogram Personally Reviewed: SHAZIA

## 2024-03-01 NOTE — PROGRESS NOTE ADULT - SUBJECTIVE AND OBJECTIVE BOX
Ortho Post Op Check    Name: PRESLEY BCEK  MR #: [id number]    Procedure: right hip wang  Surgeon: Jenniffer    Pt comfortable without complaints, pain controlled  Denies CP, SOB, N/V, numbness/tingling     General Exam:  Vital Signs Last 24 Hrs  T(C): 36.6 (03-01-24 @ 17:48), Max: 36.6 (03-01-24 @ 17:48)  T(F): 97.8 (03-01-24 @ 17:48), Max: 97.8 (03-01-24 @ 17:48)  HR: 64 (03-01-24 @ 17:48) (64 - 86)  BP: 159/88 (03-01-24 @ 17:48) (111/42 - 159/88)  BP(mean): --  RR: 19 (03-01-24 @ 17:48) (15 - 22)  SpO2: 100% (03-01-24 @ 17:48) (100% - 100%)    General: Pt Alert and oriented, NAD, controlled pain. +abduction pillow in place  Dressings C/D/I. No bleeding.  Pulses: 2+ dorsalis pedis pulse. Cap refill < 2 sec.  Sensation: Grossly intact to light touch without deficit.  Motor: + EHL/FHL/TA/GS        A/P: 88yFemale POD#0 s/p right hip wang    - Pain Control  - DVT ppx: Lovenox  - Post op abx: as per SCIP  - PT eval pending  - Weight bearing status: WBAT RLE  - posterior precautions

## 2024-03-01 NOTE — BRIEF OPERATIVE NOTE - COMMENTS
Post-op Plan: WBAT, posterior hip precautions, PT/OT, ancef per protocol, contralateral SCD, LMWH (or equivalent) x 4 weeks post-op, likely f/u prn or telehealth due to age, medical comorbidities and limited functional status.

## 2024-03-01 NOTE — DISCHARGE NOTE PROVIDER - NSDCMRMEDTOKEN_GEN_ALL_CORE_FT
amLODIPine 5 mg oral tablet: 1 tab(s) orally once a day  aspirin 81 mg oral tablet: orally once a day  atorvastatin 40 mg oral tablet: 1 tab(s) orally once a day (at bedtime)  levothyroxine 75 mcg (0.075 mg) oral tablet: 1 tab(s) orally once a day  metoprolol succinate 25 mg oral tablet, extended release: 1 tab(s) orally once a day   acetaminophen 325 mg oral tablet: 3 tab(s) orally every 8 hours  amLODIPine 5 mg oral tablet: 1 tab(s) orally once a day  aspirin 81 mg oral tablet: orally once a day  atorvastatin 40 mg oral tablet: 1 tab(s) orally once a day (at bedtime)  bisacodyl 10 mg rectal suppository: 1 suppository(ies) rectal once As needed Constipation  enoxaparin: 40 milligram(s) subcutaneous once a day  levothyroxine 75 mcg (0.075 mg) oral tablet: 1 tab(s) orally once a day  metoprolol succinate 25 mg oral tablet, extended release: 1 tab(s) orally once a day  oxyCODONE 10 mg oral tablet: 1 tab(s) orally every 3 hours As needed Moderate Pain (4 - 6)  oxyCODONE 5 mg oral tablet: 1 tab(s) orally every 3 hours As needed Mild Pain (1 - 3)  pantoprazole 40 mg oral delayed release tablet: 1 tab(s) orally once a day (before a meal)  senna leaf extract oral tablet: 2 tab(s) orally once a day (at bedtime)

## 2024-03-01 NOTE — DISCHARGE NOTE PROVIDER - NSDCFUADDINST_GEN_ALL_CORE_FT
The patient will be seen in the office between 2-3 weeks for wound check.   **  The silver based dressing is to be removed 7 days from the date of surgery.   ** CONTACT THE OFFICE IF THE FOLLOWING DEVELOP:  - the dressing becomes soiled or saturated  - you develop a fever greater that 101F  - the wound becomes red or you develop blistering around the wound  * Patient may shower after post-op day #3.   * The patient is FULL weight bearing.  *** The patient will continue LOVENOX for 4 weeks for blood clot prevention. *** While on aspirin, the patient will take daily omeprazole or other similar medication to protect the stomach from irritation.   * The patient will take OXYCODONE AND TYLENOL for pain control and adjust according to prescription and patient needs. Contact the office if pain increases while taking prescribed pain medications or related concerns develop.  * Celebrex will be taken twice daily for 3 weeks for pain control and prevention of excessive bone growth. Additional prescription may be requested at your office follow-up visit.  * The patient will take Senna S while taking oxycodone to prevent narcotic associated constipation.  Additionally, increase water intake (drink at least 8 glasses of water daily) and try adding fiber to the diet by eating fruits, vegetables and foods that are rich in grains. If constipation is experienced, contact the medical/primary care provider to discuss further treatment options.  * To avoid injury at home:  - continue use of rolling walker until cleared by physical therapist  - have family or friend remove all throw rug or objects in hallways that may present a trip hazard.  - if you experience any dizziness or medical concerns, call your medical doctor or  911.  * The implant may activate metal detection devices.

## 2024-03-01 NOTE — PROGRESS NOTE ADULT - ASSESSMENT
88F with a history of dementia, coronary artery disease, pacemaker, COPD, hypothyroidism, borderline diabetes, hypertension, and hyperlipidemia who presented with a mechanical fall found to have a right hip fracture.    Hip fracture  - s/p Hip fx repair  - PT eval  - Analgesic medications as needed    Coronary artery disease / Hypertension  - On aspirin and atorvastatin  - Continue on metoprolol  - Resumption of amlodipine if needed for blood pressure control  - EKG with paced rhythm    COPD  - No in exacerbation  - Nebulizer treatments as needed    Hypothyroidism  - On levothyroxine    Prediabetes  - No hyperglycemia noted  - Insulin coverage if needed    Healthcare Maintenance  - DVT PPx - SCDs  - Dispo - Pending PT eval likely rehab

## 2024-03-01 NOTE — DISCHARGE NOTE PROVIDER - ATTENDING DISCHARGE PHYSICAL EXAMINATION:
VITALS:   T(C): 36.9 (03-06-24 @ 08:49), Max: 37.1 (03-05-24 @ 13:20)  HR: 68 (03-06-24 @ 08:49) (67 - 76)  BP: 147/75 (03-06-24 @ 08:49) (140/63 - 154/66)  RR: 18 (03-06-24 @ 10:28) (18 - 18)  SpO2: 93% (03-06-24 @ 10:28) (92% - 98%)    GENERAL: NAD, sitting up in chair  HEAD:  Atraumatic, Normocephalic  EYES: EOMI, PERRLA, conjunctiva and sclera clear  ENT: Moist mucous membranes  NECK: Supple, No JVD  CHEST/LUNG: Clear to auscultation bilaterally; No rales, rhonchi, wheezing, or rubs. Unlabored respirations  HEART: Regular rate and rhythm; No murmurs, rubs, or gallops  ABDOMEN: BSx4; Soft, nontender, nondistended  EXTREMITIES:  2+ Peripheral Pulses, brisk capillary refill. No clubbing, cyanosis, or edema  NERVOUS SYSTEM:  A&Ox3, no focal deficits   SKIN: No rashes or lesions  PSYCH: Normal affect, euthymic mood

## 2024-03-02 LAB
ANION GAP SERPL CALC-SCNC: 12 MMOL/L — SIGNIFICANT CHANGE UP (ref 5–17)
BUN SERPL-MCNC: 38.8 MG/DL — HIGH (ref 8–20)
CALCIUM SERPL-MCNC: 8.4 MG/DL — SIGNIFICANT CHANGE UP (ref 8.4–10.5)
CHLORIDE SERPL-SCNC: 102 MMOL/L — SIGNIFICANT CHANGE UP (ref 96–108)
CO2 SERPL-SCNC: 25 MMOL/L — SIGNIFICANT CHANGE UP (ref 22–29)
CREAT SERPL-MCNC: 1.15 MG/DL — SIGNIFICANT CHANGE UP (ref 0.5–1.3)
EGFR: 46 ML/MIN/1.73M2 — LOW
GLUCOSE BLDC GLUCOMTR-MCNC: 133 MG/DL — HIGH (ref 70–99)
GLUCOSE SERPL-MCNC: 138 MG/DL — HIGH (ref 70–99)
HCT VFR BLD CALC: 36 % — SIGNIFICANT CHANGE UP (ref 34.5–45)
HGB BLD-MCNC: 11.9 G/DL — SIGNIFICANT CHANGE UP (ref 11.5–15.5)
MCHC RBC-ENTMCNC: 31.5 PG — SIGNIFICANT CHANGE UP (ref 27–34)
MCHC RBC-ENTMCNC: 33.1 GM/DL — SIGNIFICANT CHANGE UP (ref 32–36)
MCV RBC AUTO: 95.2 FL — SIGNIFICANT CHANGE UP (ref 80–100)
PLATELET # BLD AUTO: 131 K/UL — LOW (ref 150–400)
POTASSIUM SERPL-MCNC: 4.6 MMOL/L — SIGNIFICANT CHANGE UP (ref 3.5–5.3)
POTASSIUM SERPL-SCNC: 4.6 MMOL/L — SIGNIFICANT CHANGE UP (ref 3.5–5.3)
RBC # BLD: 3.78 M/UL — LOW (ref 3.8–5.2)
RBC # FLD: 13.8 % — SIGNIFICANT CHANGE UP (ref 10.3–14.5)
SODIUM SERPL-SCNC: 139 MMOL/L — SIGNIFICANT CHANGE UP (ref 135–145)
WBC # BLD: 12.86 K/UL — HIGH (ref 3.8–10.5)
WBC # FLD AUTO: 12.86 K/UL — HIGH (ref 3.8–10.5)

## 2024-03-02 PROCEDURE — 99232 SBSQ HOSP IP/OBS MODERATE 35: CPT

## 2024-03-02 RX ADMIN — Medication 975 MILLIGRAM(S): at 06:21

## 2024-03-02 RX ADMIN — Medication 975 MILLIGRAM(S): at 21:12

## 2024-03-02 RX ADMIN — Medication 975 MILLIGRAM(S): at 22:12

## 2024-03-02 RX ADMIN — Medication 75 MICROGRAM(S): at 06:10

## 2024-03-02 RX ADMIN — SENNA PLUS 2 TABLET(S): 8.6 TABLET ORAL at 21:12

## 2024-03-02 RX ADMIN — OXYCODONE HYDROCHLORIDE 10 MILLIGRAM(S): 5 TABLET ORAL at 13:07

## 2024-03-02 RX ADMIN — ENOXAPARIN SODIUM 40 MILLIGRAM(S): 100 INJECTION SUBCUTANEOUS at 06:10

## 2024-03-02 RX ADMIN — Medication 15 MILLIGRAM(S): at 06:09

## 2024-03-02 RX ADMIN — Medication 975 MILLIGRAM(S): at 12:59

## 2024-03-02 RX ADMIN — Medication 975 MILLIGRAM(S): at 06:10

## 2024-03-02 RX ADMIN — OXYCODONE HYDROCHLORIDE 10 MILLIGRAM(S): 5 TABLET ORAL at 12:07

## 2024-03-02 RX ADMIN — PANTOPRAZOLE SODIUM 40 MILLIGRAM(S): 20 TABLET, DELAYED RELEASE ORAL at 06:10

## 2024-03-02 RX ADMIN — ATORVASTATIN CALCIUM 40 MILLIGRAM(S): 80 TABLET, FILM COATED ORAL at 21:12

## 2024-03-02 RX ADMIN — Medication 15 MILLIGRAM(S): at 00:00

## 2024-03-02 RX ADMIN — Medication 81 MILLIGRAM(S): at 12:08

## 2024-03-02 RX ADMIN — Medication 975 MILLIGRAM(S): at 00:00

## 2024-03-02 RX ADMIN — Medication 15 MILLIGRAM(S): at 06:21

## 2024-03-02 RX ADMIN — Medication 25 MILLIGRAM(S): at 06:10

## 2024-03-02 RX ADMIN — Medication 975 MILLIGRAM(S): at 13:59

## 2024-03-02 NOTE — DIETITIAN NUTRITION RISK NOTIFICATION - ADDITIONAL COMMENTS/DIETITIAN RECOMMENDATIONS
1) Add Ensure HP/Enlive BID   2) Rx MVI and vit C 500mg daily   3) Encourage HBV protein sources   4) Monitor weights daily for trend/accuracy   5) Provide encouragement/assistance as needed during mealtimes to inc PO

## 2024-03-02 NOTE — PROGRESS NOTE ADULT - SUBJECTIVE AND OBJECTIVE BOX
Pal Allen M.D.    Patient is a 88y old  Female who presents with a chief complaint of CLEARANCE (29 Feb 2024 15:44)      SUBJECTIVE / OVERNIGHT EVENTS: still with some soreness at the hip. No other concerns.     Patient denies chest pain, SOB, abd pain, N/V, fever, chills, dysuria or any other complaints. All remainder ROS negative.     MEDICATIONS  (STANDING):  acetaminophen     Tablet .. 975 milliGRAM(s) Oral every 8 hours  aspirin  chewable 81 milliGRAM(s) Oral daily  atorvastatin 40 milliGRAM(s) Oral at bedtime  enoxaparin Injectable 40 milliGRAM(s) SubCutaneous every 24 hours  levothyroxine 75 MICROGram(s) Oral daily  metoprolol succinate ER 25 milliGRAM(s) Oral daily  ondansetron Injectable 8 milliGRAM(s) IV Push once  pantoprazole    Tablet 40 milliGRAM(s) Oral before breakfast  senna 2 Tablet(s) Oral at bedtime  sodium chloride 0.9%. 1000 milliLiter(s) (75 mL/Hr) IV Continuous <Continuous>    MEDICATIONS  (PRN):  HYDROmorphone   Tablet 2 milliGRAM(s) Oral every 3 hours PRN Severe Pain (7 - 10)  magnesium hydroxide Suspension 30 milliLiter(s) Oral daily PRN Constipation  oxyCODONE    IR 5 milliGRAM(s) Oral every 3 hours PRN Mild Pain (1 - 3)  oxyCODONE    IR 10 milliGRAM(s) Oral every 3 hours PRN Moderate Pain (4 - 6)      I&O's Summary    01 Mar 2024 07:01  -  02 Mar 2024 07:00  --------------------------------------------------------  IN: 990 mL / OUT: 600 mL / NET: 390 mL        PHYSICAL EXAM:  Vital Signs Last 24 Hrs  T(C): 36.4 (02 Mar 2024 04:54), Max: 36.6 (01 Mar 2024 17:48)  T(F): 97.6 (02 Mar 2024 04:54), Max: 97.8 (01 Mar 2024 17:48)  HR: 65 (02 Mar 2024 04:54) (61 - 86)  BP: 130/66 (02 Mar 2024 04:54) (111/42 - 159/88)  BP(mean): --  RR: 20 (02 Mar 2024 04:54) (15 - 22)  SpO2: 95% (02 Mar 2024 04:54) (95% - 100%)    Parameters below as of 02 Mar 2024 04:54  Patient On (Oxygen Delivery Method): nasal cannula    CONSTITUTIONAL: NAD, well-groomed  RESPIRATORY: Normal respiratory effort; lungs are clear to auscultation bilaterally  CARDIOVASCULAR: Regular rate and rhythm; No lower extremity edema  ABDOMEN: Nontender to palpation, normoactive bowel sounds  PSYCH: A+O x3; affect appropriate  NEUROLOGY: CN 2-12 are intact and symmetric; no gross sensory deficits;   SKIN: No rashes; no palpable lesions    LABS:                        11.9   12.86 )-----------( 131      ( 02 Mar 2024 05:51 )             36.0     03-02    139  |  102  |  38.8<H>  ----------------------------<  138<H>  4.6   |  25.0  |  1.15    Ca    8.4      02 Mar 2024 05:51            Urinalysis Basic - ( 02 Mar 2024 05:51 )    Color: x / Appearance: x / SG: x / pH: x  Gluc: 138 mg/dL / Ketone: x  / Bili: x / Urobili: x   Blood: x / Protein: x / Nitrite: x   Leuk Esterase: x / RBC: x / WBC x   Sq Epi: x / Non Sq Epi: x / Bacteria: x        CAPILLARY BLOOD GLUCOSE      POCT Blood Glucose.: 133 mg/dL (02 Mar 2024 04:37)  POCT Blood Glucose.: 158 mg/dL (01 Mar 2024 14:32)      RADIOLOGY & ADDITIONAL TESTS:  Results Reviewed:   Imaging Personally Reviewed:  Electrocardiogram Personally Reviewed:

## 2024-03-02 NOTE — DIETITIAN INITIAL EVALUATION ADULT - ETIOLOGY
related to inability to meet sufficient protein-energy needs in setting of R hip fx s/p hemiarthroplasty

## 2024-03-02 NOTE — DIETITIAN INITIAL EVALUATION ADULT - ADD RECOMMEND
Add Ensure HP/Enlive BID; Rx MVI and vit C 500mg daily to facilitate wound healing; Encourage HBV protein sources

## 2024-03-02 NOTE — DIETITIAN INITIAL EVALUATION ADULT - PERTINENT MEDS FT
MEDICATIONS  (STANDING):  acetaminophen     Tablet .. 975 milliGRAM(s) Oral every 8 hours  aspirin  chewable 81 milliGRAM(s) Oral daily  atorvastatin 40 milliGRAM(s) Oral at bedtime  enoxaparin Injectable 40 milliGRAM(s) SubCutaneous every 24 hours  levothyroxine 75 MICROGram(s) Oral daily  metoprolol succinate ER 25 milliGRAM(s) Oral daily  ondansetron Injectable 8 milliGRAM(s) IV Push once  pantoprazole    Tablet 40 milliGRAM(s) Oral before breakfast  senna 2 Tablet(s) Oral at bedtime  sodium chloride 0.9%. 1000 milliLiter(s) (75 mL/Hr) IV Continuous <Continuous>    MEDICATIONS  (PRN):  HYDROmorphone   Tablet 2 milliGRAM(s) Oral every 3 hours PRN Severe Pain (7 - 10)  magnesium hydroxide Suspension 30 milliLiter(s) Oral daily PRN Constipation  oxyCODONE    IR 5 milliGRAM(s) Oral every 3 hours PRN Mild Pain (1 - 3)  oxyCODONE    IR 10 milliGRAM(s) Oral every 3 hours PRN Moderate Pain (4 - 6)

## 2024-03-02 NOTE — DIETITIAN INITIAL EVALUATION ADULT - ORAL INTAKE PTA/DIET HISTORY
Pt reports having a fair appetite in house and PTA, denies following any dietary restrictions/food allergies, states that she eats plain/bland foods. UBW ~120lbs has remained stable per Pt, consistent with RD bed scale weight reading 123lbs and visually appears accurate. Per previous RD assessment June 2023 weight documented 140lbs signs of muscle/fat loss noted. SLP eval completed at that admission with recommendations for puree/mild thick, Pt is tolerating regular consistency diet at this time. Discussed importance of adequate protein-energy intake, Pt agreeable to Ensure supplement, prefers vanilla.

## 2024-03-02 NOTE — PHYSICAL THERAPY INITIAL EVALUATION ADULT - ADDITIONAL COMMENTS
Pt reports ambulating with [facility] PTA, owns no other DME. Pt lives with daughter in private home, reports she does not negotiate stairs.

## 2024-03-02 NOTE — DIETITIAN INITIAL EVALUATION ADULT - OTHER INFO
88F who presented with an episode of fall at home. The patient reported that she had tripped and fell resulting in pain to her right hip and she was unable to get up. The patient denied any symptoms prior to the fall and was without chest pain, palpitations, or lightheadedness. She denied any injury to her head or loss of consciousness. She denied any history of frequent falls. Additional information was obtained from the patient's daughter on the telephone. The patient was reported to have had improved in her condition since her prior hospitalization and treatment at the rehabilitation facility. She progressed and was now walking daily with the assistance of a cane. There was no mention or dyspnea or chest pain. She was noted to have some dementia but did not require significant assistance with ambulation. Pt s/p R hemiarthroplasty (3/1).

## 2024-03-02 NOTE — PROGRESS NOTE ADULT - SUBJECTIVE AND OBJECTIVE BOX
[facility]    [id number]    History: Patient is status post right hip wang POD #1. Patient is doing well and is comfortable. The patient's pain is controlled using the prescribed pain medications. The patient is participating in physical therapy. Denies CP, SOB, dizziness, HA, fever/chills, numbness or tingling. No new complaints.    Vital Signs Last 24 Hrs  T(C): 36.4 (02 Mar 2024 04:54), Max: 36.6 (01 Mar 2024 17:48)  T(F): 97.6 (02 Mar 2024 04:54), Max: 97.8 (01 Mar 2024 17:48)  HR: 65 (02 Mar 2024 04:54) (61 - 86)  BP: 130/66 (02 Mar 2024 04:54) (111/42 - 159/88)  BP(mean): --  RR: 20 (02 Mar 2024 04:54) (15 - 22)  SpO2: 95% (02 Mar 2024 04:54) (95% - 100%)    Parameters below as of 02 Mar 2024 04:54  Patient On (Oxygen Delivery Method): nasal cannula                              11.9   12.86 )-----------( 131      ( 02 Mar 2024 05:51 )             36.0     03-02    139  |  102  |  38.8<H>  ----------------------------<  138<H>  4.6   |  25.0  |  1.15    Ca    8.4      02 Mar 2024 05:51    TPro  6.7  /  Alb  3.7  /  TBili  0.5  /  DBili  x   /  AST  22  /  ALT  21  /  AlkPhos  74  02-29      General: Alert, awake, NAD  Physical exam: The right hip dressing is clean, dry and intact. No drainage, discharge, erythema, blistering or ecchymosis. The calf is supple nontender b/l. SILT. +EHL/FHL/AT/GC. 2+ DP pulse. BCR. No cyanosis.    Plan:   - DVT prophylaxis as prescribed, including use of compression devices and ankle pumps  -  Continue physical therapy  - Weight bearing status of surgical extremity: WBAT  - Incentive spirometry encouraged  - Pain control as clinically indicated  - Posterior hip precautions reviewed with patient  - Discharge planning

## 2024-03-02 NOTE — DIETITIAN INITIAL EVALUATION ADULT - PERTINENT LABORATORY DATA
03-02    139  |  102  |  38.8<H>  ----------------------------<  138<H>  4.6   |  25.0  |  1.15    Ca    8.4      02 Mar 2024 05:51    POCT Blood Glucose.: 133 mg/dL (03-02-24 @ 04:37)

## 2024-03-02 NOTE — PHYSICAL THERAPY INITIAL EVALUATION ADULT - PERTINENT HX OF CURRENT PROBLEM, REHAB EVAL
Pt is 88F with a history of dementia, coronary artery disease, pacemaker, COPD, hypothyroidism, borderline diabetes, hypertension, and hyperlipidemia who presented with a mechanical fall found to have a right hip fracture. Now s/p Right hip wang 3/1

## 2024-03-02 NOTE — CHART NOTE - NSCHARTNOTEFT_GEN_A_CORE
Rapid called by RN    Patient is POD1 s/p right hip wang. Concern for facial droop and worsening confusion for which Rapid Response called.   I came and evaluated patient, patient is AAOx2 to self, and place, not to time which according to nurse is appropriate for her baseline as patient is known to her from night prior.   I examined patient, no noted facial drop or other cranial nerve abnormalities. Negative pronator drift. Sensation intact x 4 extremities. Patient answering questions appropriately.   Hemodynamically stable. I was unable to replicate the concern for which rapid response was called. Upon discussion with Nursing team at bedside, decision made to cancel rapid as initiating concern was unfounded. I advised nursing team to recall me should any change in status occur again. Debrief conducted at nurses station, all concerns addressed to nurses satisfaction.   Rapid Response cancelled.

## 2024-03-02 NOTE — PROGRESS NOTE ADULT - ASSESSMENT
88F with a history of dementia, coronary artery disease, pacemaker, COPD, hypothyroidism, borderline diabetes, hypertension, and hyperlipidemia who presented with a mechanical fall found to have a right hip fracture.    Hip fracture  - s/p Hip fx repair 3/2  - PT eval  - Analgesic medications as needed    Coronary artery disease / Hypertension  - On aspirin and atorvastatin  - Continue on metoprolol  - Resumption of amlodipine if needed for blood pressure control  - EKG with paced rhythm    COPD  - No in exacerbation  - Nebulizer treatments as needed    Hypothyroidism  - On levothyroxine    Prediabetes  - No hyperglycemia noted  - Insulin coverage if needed    DVT PPx - SCDs  Dispo - Pending PT eval

## 2024-03-03 LAB
ANION GAP SERPL CALC-SCNC: 8 MMOL/L — SIGNIFICANT CHANGE UP (ref 5–17)
BUN SERPL-MCNC: 27.1 MG/DL — HIGH (ref 8–20)
CALCIUM SERPL-MCNC: 7.8 MG/DL — LOW (ref 8.4–10.5)
CHLORIDE SERPL-SCNC: 99 MMOL/L — SIGNIFICANT CHANGE UP (ref 96–108)
CO2 SERPL-SCNC: 26 MMOL/L — SIGNIFICANT CHANGE UP (ref 22–29)
CREAT SERPL-MCNC: 0.86 MG/DL — SIGNIFICANT CHANGE UP (ref 0.5–1.3)
EGFR: 65 ML/MIN/1.73M2 — SIGNIFICANT CHANGE UP
GLUCOSE SERPL-MCNC: 162 MG/DL — HIGH (ref 70–99)
HCT VFR BLD CALC: 34.3 % — LOW (ref 34.5–45)
HGB BLD-MCNC: 11.3 G/DL — LOW (ref 11.5–15.5)
MCHC RBC-ENTMCNC: 31.8 PG — SIGNIFICANT CHANGE UP (ref 27–34)
MCHC RBC-ENTMCNC: 32.9 GM/DL — SIGNIFICANT CHANGE UP (ref 32–36)
MCV RBC AUTO: 96.6 FL — SIGNIFICANT CHANGE UP (ref 80–100)
PLATELET # BLD AUTO: 123 K/UL — LOW (ref 150–400)
POTASSIUM SERPL-MCNC: 4.1 MMOL/L — SIGNIFICANT CHANGE UP (ref 3.5–5.3)
POTASSIUM SERPL-SCNC: 4.1 MMOL/L — SIGNIFICANT CHANGE UP (ref 3.5–5.3)
RBC # BLD: 3.55 M/UL — LOW (ref 3.8–5.2)
RBC # FLD: 13.9 % — SIGNIFICANT CHANGE UP (ref 10.3–14.5)
SODIUM SERPL-SCNC: 133 MMOL/L — LOW (ref 135–145)
WBC # BLD: 9.45 K/UL — SIGNIFICANT CHANGE UP (ref 3.8–10.5)
WBC # FLD AUTO: 9.45 K/UL — SIGNIFICANT CHANGE UP (ref 3.8–10.5)

## 2024-03-03 PROCEDURE — 99232 SBSQ HOSP IP/OBS MODERATE 35: CPT

## 2024-03-03 RX ADMIN — Medication 975 MILLIGRAM(S): at 06:06

## 2024-03-03 RX ADMIN — HYDROMORPHONE HYDROCHLORIDE 2 MILLIGRAM(S): 2 INJECTION INTRAMUSCULAR; INTRAVENOUS; SUBCUTANEOUS at 12:58

## 2024-03-03 RX ADMIN — Medication 975 MILLIGRAM(S): at 14:00

## 2024-03-03 RX ADMIN — ENOXAPARIN SODIUM 40 MILLIGRAM(S): 100 INJECTION SUBCUTANEOUS at 05:07

## 2024-03-03 RX ADMIN — PANTOPRAZOLE SODIUM 40 MILLIGRAM(S): 20 TABLET, DELAYED RELEASE ORAL at 05:07

## 2024-03-03 RX ADMIN — Medication 975 MILLIGRAM(S): at 05:06

## 2024-03-03 RX ADMIN — Medication 975 MILLIGRAM(S): at 13:10

## 2024-03-03 RX ADMIN — Medication 75 MICROGRAM(S): at 05:06

## 2024-03-03 RX ADMIN — Medication 81 MILLIGRAM(S): at 11:49

## 2024-03-03 RX ADMIN — HYDROMORPHONE HYDROCHLORIDE 2 MILLIGRAM(S): 2 INJECTION INTRAMUSCULAR; INTRAVENOUS; SUBCUTANEOUS at 11:49

## 2024-03-03 RX ADMIN — Medication 25 MILLIGRAM(S): at 05:07

## 2024-03-03 RX ADMIN — SENNA PLUS 2 TABLET(S): 8.6 TABLET ORAL at 22:59

## 2024-03-03 RX ADMIN — ATORVASTATIN CALCIUM 40 MILLIGRAM(S): 80 TABLET, FILM COATED ORAL at 22:58

## 2024-03-03 RX ADMIN — SODIUM CHLORIDE 75 MILLILITER(S): 9 INJECTION INTRAMUSCULAR; INTRAVENOUS; SUBCUTANEOUS at 06:26

## 2024-03-03 RX ADMIN — Medication 975 MILLIGRAM(S): at 22:59

## 2024-03-03 NOTE — PROGRESS NOTE ADULT - ASSESSMENT
88F with a history of dementia, coronary artery disease, pacemaker, COPD, hypothyroidism, borderline diabetes, hypertension, and hyperlipidemia who presented with a mechanical fall found to have a right hip fracture.    Hip fracture  - s/p Hip fx repair 3/2  - PT eval - [facility]  - Family wants acute rehab, pending PM&R eval  - Analgesic medications as needed    Coronary artery disease / Hypertension  - On aspirin and atorvastatin  - Continue on metoprolol  - Resumption of amlodipine if needed for blood pressure control  - EKG with paced rhythm    COPD, hypoxia  - No in exacerbation  - Nebulizer treatments as needed  - on NC, wean as tolerated    Hypothyroidism  - On levothyroxine    Prediabetes  - No hyperglycemia noted  - Insulin coverage if needed    DVT PPx - SCDs  Dispo - Pending PM&R eval

## 2024-03-03 NOTE — PROGRESS NOTE ADULT - SUBJECTIVE AND OBJECTIVE BOX
Pal Allen M.D.    Patient is a 88y old  Female who presents with a chief complaint of Fracture of unspecified part of neck of right femur, initial encounter for closed fracture     (02 Mar 2024 10:49)      SUBJECTIVE / OVERNIGHT EVENTS: no concerns.     Patient denies chest pain, SOB, abd pain, N/V, fever, chills, dysuria or any other complaints. All remainder ROS negative.     MEDICATIONS  (STANDING):  acetaminophen     Tablet .. 975 milliGRAM(s) Oral every 8 hours  aspirin  chewable 81 milliGRAM(s) Oral daily  atorvastatin 40 milliGRAM(s) Oral at bedtime  enoxaparin Injectable 40 milliGRAM(s) SubCutaneous every 24 hours  levothyroxine 75 MICROGram(s) Oral daily  metoprolol succinate ER 25 milliGRAM(s) Oral daily  ondansetron Injectable 8 milliGRAM(s) IV Push once  pantoprazole    Tablet 40 milliGRAM(s) Oral before breakfast  senna 2 Tablet(s) Oral at bedtime  sodium chloride 0.9%. 1000 milliLiter(s) (75 mL/Hr) IV Continuous <Continuous>    MEDICATIONS  (PRN):  bisacodyl Suppository 10 milliGRAM(s) Rectal once PRN Constipation  HYDROmorphone   Tablet 2 milliGRAM(s) Oral every 3 hours PRN Severe Pain (7 - 10)  magnesium hydroxide Suspension 30 milliLiter(s) Oral daily PRN Constipation  oxyCODONE    IR 5 milliGRAM(s) Oral every 3 hours PRN Mild Pain (1 - 3)  oxyCODONE    IR 10 milliGRAM(s) Oral every 3 hours PRN Moderate Pain (4 - 6)      I&O's Summary    02 Mar 2024 07:01  -  03 Mar 2024 07:00  --------------------------------------------------------  IN: 750 mL / OUT: 700 mL / NET: 50 mL        PHYSICAL EXAM:  Vital Signs Last 24 Hrs  T(C): 37.2 (03 Mar 2024 09:05), Max: 37.3 (02 Mar 2024 23:25)  T(F): 98.9 (03 Mar 2024 09:05), Max: 99.1 (02 Mar 2024 23:25)  HR: 71 (03 Mar 2024 09:05) (65 - 78)  BP: 129/81 (03 Mar 2024 09:05) (123/58 - 164/64)  BP(mean): --  RR: 18 (03 Mar 2024 09:05) (18 - 19)  SpO2: 93% (03 Mar 2024 09:05) (93% - 100%)    Parameters below as of 03 Mar 2024 09:05  Patient On (Oxygen Delivery Method): nasal cannula  O2 Flow (L/min): 3    CONSTITUTIONAL: NAD, well-groomed, on NC  RESPIRATORY: Normal respiratory effort; course bs b/l, no crackles  CARDIOVASCULAR: Regular rate and rhythm; No lower extremity edema  ABDOMEN: Nontender to palpation, normoactive bowel sounds  PSYCH: A+O x3; affect appropriate  NEUROLOGY: CN 2-12 are intact and symmetric; no gross sensory deficits;   SKIN: No rashes; no palpable lesions    LABS:                        11.3   9.45  )-----------( 123      ( 03 Mar 2024 05:28 )             34.3     03-03    133<L>  |  99  |  27.1<H>  ----------------------------<  162<H>  4.1   |  26.0  |  0.86    Ca    7.8<L>      03 Mar 2024 05:28            Urinalysis Basic - ( 03 Mar 2024 05:28 )    Color: x / Appearance: x / SG: x / pH: x  Gluc: 162 mg/dL / Ketone: x  / Bili: x / Urobili: x   Blood: x / Protein: x / Nitrite: x   Leuk Esterase: x / RBC: x / WBC x   Sq Epi: x / Non Sq Epi: x / Bacteria: x        CAPILLARY BLOOD GLUCOSE          RADIOLOGY & ADDITIONAL TESTS:  Results Reviewed:   Imaging Personally Reviewed:  Electrocardiogram Personally Reviewed:

## 2024-03-03 NOTE — PROGRESS NOTE ADULT - SUBJECTIVE AND OBJECTIVE BOX
Ortho Post Op Check    Name: PRESLEY BECK    MR #: [id number]    Procedure: right hip hemiarthroplasty  Surgeon: Jenniffer    Pt comfortable without complaints, pain controlled. Denies CP, SOB, N/V, numbness/tingling. Patient removed dressing and pulled IV out this AM.    General Exam:  Vital Signs Last 24 Hrs  T(C): 37.1 (03-03-24 @ 04:04), Max: 37.1 (03-03-24 @ 04:04)  T(F): 98.8 (03-03-24 @ 04:04), Max: 98.8 (03-03-24 @ 04:04)  HR: 73 (03-03-24 @ 04:04) (73 - 73)  BP: 154/60 (03-03-24 @ 04:04) (154/60 - 154/60)  BP(mean): --  RR: 18 (03-03-24 @ 04:04) (18 - 18)  SpO2: 96% (03-03-24 @ 04:04) (96% - 96%)    General: Pt Alert and oriented, NAD, controlled pain.  Prineo intact, incision intact. No bleeding. New dry dressing placed.   Pulses: 2+ dorsalis pedis pulse. Cap refill < 2 sec.  Sensation: Grossly intact to light touch without deficit.  Motor: + EHL/FHL/TA/GS    T(C): 37.1 (03-03-24 @ 04:04), Max: 37.3 (03-02-24 @ 23:25)  HR: 73 (03-03-24 @ 04:04) (65 - 78)  BP: 154/60 (03-03-24 @ 04:04) (123/58 - 164/64)  RR: 18 (03-03-24 @ 04:04) (18 - 19)  SpO2: 96% (03-03-24 @ 04:04) (93% - 100%)    A/P: 88yFemale POD#2 s/p right hip hemiarthroplasty  - Stable  - Pain Control  - DVT ppx: lovenox  - WBAT  - posterior hip precautions

## 2024-03-04 LAB
ANION GAP SERPL CALC-SCNC: 9 MMOL/L — SIGNIFICANT CHANGE UP (ref 5–17)
BUN SERPL-MCNC: 17.3 MG/DL — SIGNIFICANT CHANGE UP (ref 8–20)
CALCIUM SERPL-MCNC: 8.3 MG/DL — LOW (ref 8.4–10.5)
CHLORIDE SERPL-SCNC: 102 MMOL/L — SIGNIFICANT CHANGE UP (ref 96–108)
CO2 SERPL-SCNC: 26 MMOL/L — SIGNIFICANT CHANGE UP (ref 22–29)
CREAT SERPL-MCNC: 0.7 MG/DL — SIGNIFICANT CHANGE UP (ref 0.5–1.3)
EGFR: 84 ML/MIN/1.73M2 — SIGNIFICANT CHANGE UP
GLUCOSE SERPL-MCNC: 116 MG/DL — HIGH (ref 70–99)
HCT VFR BLD CALC: 35.1 % — SIGNIFICANT CHANGE UP (ref 34.5–45)
HGB BLD-MCNC: 11.7 G/DL — SIGNIFICANT CHANGE UP (ref 11.5–15.5)
MCHC RBC-ENTMCNC: 31.5 PG — SIGNIFICANT CHANGE UP (ref 27–34)
MCHC RBC-ENTMCNC: 33.3 GM/DL — SIGNIFICANT CHANGE UP (ref 32–36)
MCV RBC AUTO: 94.6 FL — SIGNIFICANT CHANGE UP (ref 80–100)
PLATELET # BLD AUTO: 131 K/UL — LOW (ref 150–400)
POTASSIUM SERPL-MCNC: 4.2 MMOL/L — SIGNIFICANT CHANGE UP (ref 3.5–5.3)
POTASSIUM SERPL-SCNC: 4.2 MMOL/L — SIGNIFICANT CHANGE UP (ref 3.5–5.3)
RBC # BLD: 3.71 M/UL — LOW (ref 3.8–5.2)
RBC # FLD: 13.8 % — SIGNIFICANT CHANGE UP (ref 10.3–14.5)
SODIUM SERPL-SCNC: 137 MMOL/L — SIGNIFICANT CHANGE UP (ref 135–145)
WBC # BLD: 8.15 K/UL — SIGNIFICANT CHANGE UP (ref 3.8–10.5)
WBC # FLD AUTO: 8.15 K/UL — SIGNIFICANT CHANGE UP (ref 3.8–10.5)

## 2024-03-04 PROCEDURE — 71045 X-RAY EXAM CHEST 1 VIEW: CPT | Mod: 26

## 2024-03-04 PROCEDURE — 99232 SBSQ HOSP IP/OBS MODERATE 35: CPT

## 2024-03-04 PROCEDURE — 99223 1ST HOSP IP/OBS HIGH 75: CPT | Mod: GC

## 2024-03-04 RX ADMIN — Medication 25 MILLIGRAM(S): at 05:39

## 2024-03-04 RX ADMIN — Medication 975 MILLIGRAM(S): at 05:39

## 2024-03-04 RX ADMIN — Medication 975 MILLIGRAM(S): at 02:03

## 2024-03-04 RX ADMIN — Medication 975 MILLIGRAM(S): at 21:39

## 2024-03-04 RX ADMIN — ENOXAPARIN SODIUM 40 MILLIGRAM(S): 100 INJECTION SUBCUTANEOUS at 05:40

## 2024-03-04 RX ADMIN — OXYCODONE HYDROCHLORIDE 10 MILLIGRAM(S): 5 TABLET ORAL at 02:07

## 2024-03-04 RX ADMIN — OXYCODONE HYDROCHLORIDE 10 MILLIGRAM(S): 5 TABLET ORAL at 02:55

## 2024-03-04 RX ADMIN — Medication 75 MICROGRAM(S): at 05:40

## 2024-03-04 RX ADMIN — PANTOPRAZOLE SODIUM 40 MILLIGRAM(S): 20 TABLET, DELAYED RELEASE ORAL at 05:40

## 2024-03-04 RX ADMIN — Medication 975 MILLIGRAM(S): at 15:44

## 2024-03-04 RX ADMIN — SENNA PLUS 2 TABLET(S): 8.6 TABLET ORAL at 21:39

## 2024-03-04 RX ADMIN — Medication 975 MILLIGRAM(S): at 14:56

## 2024-03-04 RX ADMIN — Medication 81 MILLIGRAM(S): at 11:04

## 2024-03-04 RX ADMIN — Medication 975 MILLIGRAM(S): at 22:39

## 2024-03-04 RX ADMIN — ATORVASTATIN CALCIUM 40 MILLIGRAM(S): 80 TABLET, FILM COATED ORAL at 21:39

## 2024-03-04 NOTE — PROGRESS NOTE ADULT - SUBJECTIVE AND OBJECTIVE BOX
Ortho Post Op Check    Name: PRESLEY BECK    MR #: [id number]    Procedure: right hip hemiarthroplasty  Surgeon: Jenniffer    Pt comfortable without complaints, pain controlled. Denies CP, SOB, N/V, numbness/tingling. No dressing on hip.    General Exam:  Vital Signs Last 24 Hrs  T(C): 36.6 (04 Mar 2024 04:56), Max: 37.3 (03 Mar 2024 22:50)  T(F): 97.8 (04 Mar 2024 04:56), Max: 99.2 (03 Mar 2024 22:50)  HR: 68 (04 Mar 2024 04:56) (66 - 80)  BP: 127/67 (04 Mar 2024 04:56) (127/67 - 161/63)  BP(mean): 96 (03 Mar 2024 22:50) (96 - 96)  RR: 18 (04 Mar 2024 04:56) (18 - 18)  SpO2: 94% (04 Mar 2024 04:56) (93% - 96%)    Parameters below as of 04 Mar 2024 04:56  Patient On (Oxygen Delivery Method): nasal cannula  O2 Flow (L/min): 3      General: Pt Alert and oriented, NAD, controlled pain.  Prineo intact, incision intact. No bleeding. New dry dressing placed.   Pulses: 2+ dorsalis pedis pulse. Cap refill < 2 sec.  Sensation: Grossly intact to light touch without deficit.  Motor: + EHL/FHL/TA/GS      A/P: 88yFemale POD#3 s/p right hip hemiarthroplasty  - Stable  - Pain Control  - DVT ppx: lovenox  - WBAT  - posterior hip precautions  - ortho stable.

## 2024-03-04 NOTE — PROGRESS NOTE ADULT - SUBJECTIVE AND OBJECTIVE BOX
Pal Allen M.D.    Patient is a 88y old  Female who presents with a chief complaint of Fracture of unspecified part of neck of right femur, initial encounter for closed fracture     (02 Mar 2024 10:49)      SUBJECTIVE / OVERNIGHT EVENTS: No event overnight.     Patient denies chest pain, SOB, abd pain, N/V, fever, chills, dysuria or any other complaints. All remainder ROS negative.     MEDICATIONS  (STANDING):  acetaminophen     Tablet .. 975 milliGRAM(s) Oral every 8 hours  aspirin  chewable 81 milliGRAM(s) Oral daily  atorvastatin 40 milliGRAM(s) Oral at bedtime  enoxaparin Injectable 40 milliGRAM(s) SubCutaneous every 24 hours  levothyroxine 75 MICROGram(s) Oral daily  metoprolol succinate ER 25 milliGRAM(s) Oral daily  ondansetron Injectable 8 milliGRAM(s) IV Push once  pantoprazole    Tablet 40 milliGRAM(s) Oral before breakfast  senna 2 Tablet(s) Oral at bedtime  sodium chloride 0.9%. 1000 milliLiter(s) (75 mL/Hr) IV Continuous <Continuous>    MEDICATIONS  (PRN):  bisacodyl Suppository 10 milliGRAM(s) Rectal once PRN Constipation  HYDROmorphone   Tablet 2 milliGRAM(s) Oral every 3 hours PRN Severe Pain (7 - 10)  magnesium hydroxide Suspension 30 milliLiter(s) Oral daily PRN Constipation  oxyCODONE    IR 5 milliGRAM(s) Oral every 3 hours PRN Mild Pain (1 - 3)  oxyCODONE    IR 10 milliGRAM(s) Oral every 3 hours PRN Moderate Pain (4 - 6)      I&O's Summary    03 Mar 2024 07:01  -  04 Mar 2024 07:00  --------------------------------------------------------  IN: 240 mL / OUT: 1150 mL / NET: -910 mL        PHYSICAL EXAM:  Vital Signs Last 24 Hrs  T(C): 37 (04 Mar 2024 09:16), Max: 37.3 (03 Mar 2024 22:50)  T(F): 98.6 (04 Mar 2024 09:16), Max: 99.2 (03 Mar 2024 22:50)  HR: 71 (04 Mar 2024 09:16) (66 - 80)  BP: 153/63 (04 Mar 2024 09:16) (127/67 - 161/63)  BP(mean): 96 (03 Mar 2024 22:50) (96 - 96)  RR: 18 (04 Mar 2024 09:16) (18 - 18)  SpO2: 94% (04 Mar 2024 09:16) (94% - 96%)    Parameters below as of 04 Mar 2024 09:16  Patient On (Oxygen Delivery Method): nasal cannula  O2 Flow (L/min): 3    CONSTITUTIONAL: NAD, well-groomed, on NC  RESPIRATORY: Normal respiratory effort; course bs b/l, no crackles  CARDIOVASCULAR: Regular rate and rhythm; No lower extremity edema  ABDOMEN: Nontender to palpation, normoactive bowel sounds  PSYCH: A+O x2 (name and location); affect appropriate  NEUROLOGY: CN 2-12 are intact and symmetric; no gross sensory deficits;   SKIN: No rashes; no palpable lesions    LABS:                        11.7   8.15  )-----------( 131      ( 04 Mar 2024 06:10 )             35.1     03-04    137  |  102  |  17.3  ----------------------------<  116<H>  4.2   |  26.0  |  0.70    Ca    8.3<L>      04 Mar 2024 06:10            Urinalysis Basic - ( 04 Mar 2024 06:10 )    Color: x / Appearance: x / SG: x / pH: x  Gluc: 116 mg/dL / Ketone: x  / Bili: x / Urobili: x   Blood: x / Protein: x / Nitrite: x   Leuk Esterase: x / RBC: x / WBC x   Sq Epi: x / Non Sq Epi: x / Bacteria: x        CAPILLARY BLOOD GLUCOSE          RADIOLOGY & ADDITIONAL TESTS:  Results Reviewed:   Imaging Personally Reviewed:  Electrocardiogram Personally Reviewed:

## 2024-03-04 NOTE — CONSULT NOTE ADULT - SUBJECTIVE AND OBJECTIVE BOX
HPI:  88yF PMH dementia, hypothyroidism, HTN, MI (s/p stent x2, pacemaker), COPD, GERD, and osteoporosis (s/p  L hip hemiarthroplasty for fem neck fx), admitted to [facility] 2024 for displaced R hip subcapital fracture. S/p  R hip hemiarthroplasty. PM&R consulted  for rehab disposition.    Imaging Reviewed Today:  XR R hip   CT head/c-spine   XR R hip/pelvis/femur     ----------------------------------------------------------------------------------------  CHIEF COMPLAINT          ----------------------------------------------------------------------------------------  VITALS  T(C): 36.6 (24 @ 04:56), Max: 37.3 (24 @ 22:50)  HR: 68 (24 @ 04:56) (66 - 80)  BP: 127/67 (24 @ 04:56) (127/67 - 161/63)  RR: 18 (24 @ 04:56) (18 - 18)  SpO2: 94% (24 @ 04:56) (93% - 96%)  Wt(kg): --    PAST MEDICAL & SURGICAL HISTORY  HTN (hypertension)    High cholesterol    Stented coronary artery    MI (myocardial infarction)    Hypothyroid    GERD (gastroesophageal reflux disease)    Myocardial infarction    Diabetes mellitus    Hiatal hernia    PUD (peptic ulcer disease)    Gastroenteritis    Vocal cord polyps    LBBB (left bundle branch block)    No significant past surgical history    S/P appendectomy    S/P tubal ligation    S/P cholecystectomy    S/P  section          LABS  REVIEWED    CBC Full  -  ( 04 Mar 2024 06:10 )  WBC Count : 8.15 K/uL  RBC Count : 3.71 M/uL  Hemoglobin : 11.7 g/dL  Hematocrit : 35.1 %  Platelet Count - Automated : 131 K/uL  Mean Cell Volume : 94.6 fl  Mean Cell Hemoglobin : 31.5 pg  Mean Cell Hemoglobin Concentration : 33.3 gm/dL    137  |  102  |  17.3  ----------------------------<  116<H>  4.2   |  26.0  |  0.70    Ca    8.3<L>      04 Mar 2024 06:10    < from: Xray Hip w/ Pelvis 2 or 3 Views, Right (24 @ 13:33) >  Prosthetic Hip replacement in place.    < end of copied text >    < from: CT Head No Cont (24 @ 12:33) >  CT head:   mild to moderate periventricular and subcortical white matter   ischemia.    CT cervical spine: No vertebral fracture is recognized.  Moderate to   severe degenerative disc disease and spondylosis at C3-4 through C7-T1   with loss of disc height and associated degenerative endplate changes.   There is narrowing of the BILATERAL C2-3, RIGHT C3-4, BILATERAL C4-5,   C5-6, LEFT-7 and BILATERAL C7-T1 neural foramina due to uncovertebral   spurring and facet osteophytic hypertrophy. Mild posterior osteophytic   ridge/disc complex flattens the ventral cord at C6-7.    < end of copied text >    < from: Xray Femur 2 Views, Right (24 @ 11:14) >   Displaced RIGHT hip subcapital fracture.    < end of copied text >      ALLERGIES  Bactrim (Vomiting)  penicillin (Nausea; Diarrhea)      MEDICATIONS   acetaminophen     Tablet .. 975 milliGRAM(s) Oral every 8 hours  aspirin  chewable 81 milliGRAM(s) Oral daily  atorvastatin 40 milliGRAM(s) Oral at bedtime  bisacodyl Suppository 10 milliGRAM(s) Rectal once PRN  enoxaparin Injectable 40 milliGRAM(s) SubCutaneous every 24 hours  HYDROmorphone   Tablet 2 milliGRAM(s) Oral every 3 hours PRN  levothyroxine 75 MICROGram(s) Oral daily  magnesium hydroxide Suspension 30 milliLiter(s) Oral daily PRN  metoprolol succinate ER 25 milliGRAM(s) Oral daily  ondansetron Injectable 8 milliGRAM(s) IV Push once  oxyCODONE    IR 5 milliGRAM(s) Oral every 3 hours PRN  oxyCODONE    IR 10 milliGRAM(s) Oral every 3 hours PRN  pantoprazole    Tablet 40 milliGRAM(s) Oral before breakfast  senna 2 Tablet(s) Oral at bedtime  sodium chloride 0.9%. 1000 milliLiter(s) IV Continuous <Continuous>        ----------------------------------------------------------------------------------------  FUNCTIONAL HISTORY  Lives w/ daughter in private home. 0 SAMI. 0 inside.  Independent for ADLs and ambulation w/ SAC. Does not navigate stairs.    CURRENT FUNCTIONAL STATUS  bed mobility: maxA  transfers: modA  gait: modA w/ RW for 5ft    ----------------------------------------------------------------------------------------  PHYSICAL EXAM  Constitutional - NAD, Comfortable  HEENT - NCAT, EOMI  Neck - Supple, No limited ROM  Chest - Breathing comfortably, No wheezing  Cardiovascular - S1S2   Abdomen - Soft   Extremities - No C/C/E, No calf tenderness   Neurologic Exam -                    Cognitive - AAO to self, place, date, year, situation     Communication - Fluent, No dysarthria     Cranial Nerves - CN 2-12 intact     FUNCTIONAL MOTOR EXAM - No focal deficits                    LEFT    UE - ShAB 5/5, EF 5/5, EE 5/5, WE 5/5,  5/5                    RIGHT UE - ShAB 5/5, EF 5/5, EE 5/5, WE 5/5,  5/5                    LEFT    LE - HF 5/5, KE 5/5, DF 5/5, PF 5/5                    RIGHT LE - HF 5/5, KE 5/5, DF 5/5, PF         Sensory - Intact to LT     Reflexes - DTR Intact, No primitive reflexive     Coordination - FTN intact     OculoVestibular - No saccades, No nystagmus, VOR         Balance - WNL Static  Psychiatric - Mood stable, Affect WNL  ----------------------------------------------------------------------------------------  ASSESSMENT/PLAN  88yFemale with functional deficits after displaced R hip subcapital fx s/p hemiarthroplasty.   Pain - Tylenol 975mg q8h, oxycodone PRN, hydromorphone PRN  DVT PPX - enoxaparin  Impaired Mobility/Function/Rehab Recommendations -  HPI:  88yF PMH dementia, hypothyroidism, HTN, MI (s/p stent x2, pacemaker), COPD, GERD, and osteoporosis (s/p  L hip hemiarthroplasty for fem neck fx), admitted to [facility] 2024 for displaced R hip subcapital fracture. S/p  R hip hemiarthroplasty. PM&R consulted  for rehab disposition.    Imaging Reviewed Today:  XR R hip   CT head/c-spine   XR R hip/pelvis/femur     ----------------------------------------------------------------------------------------  CHIEF COMPLAINT    Seen at bedside w/ daughter this AM. History limited by drowsiness.  Reports R hip pain controlled on current medications. Sleeping okay. Does not feel constipated.    ----------------------------------------------------------------------------------------  VITALS  T(C): 36.6 (24 @ 04:56), Max: 37.3 (24 @ 22:50)  HR: 68 (24 @ 04:56) (66 - 80)  BP: 127/67 (24 @ 04:56) (127/67 - 161/63)  RR: 18 (24 @ 04:56) (18 - 18)  SpO2: 94% (24 @ 04:56) (93% - 96%)  Wt(kg): --    PAST MEDICAL & SURGICAL HISTORY  HTN (hypertension)    High cholesterol    Stented coronary artery    MI (myocardial infarction)    Hypothyroid    GERD (gastroesophageal reflux disease)    Myocardial infarction    Diabetes mellitus    Hiatal hernia    PUD (peptic ulcer disease)    Gastroenteritis    Vocal cord polyps    LBBB (left bundle branch block)    No significant past surgical history    S/P appendectomy    S/P tubal ligation    S/P cholecystectomy    S/P  section          LABS  REVIEWED    CBC Full  -  ( 04 Mar 2024 06:10 )  WBC Count : 8.15 K/uL  RBC Count : 3.71 M/uL  Hemoglobin : 11.7 g/dL  Hematocrit : 35.1 %  Platelet Count - Automated : 131 K/uL  Mean Cell Volume : 94.6 fl  Mean Cell Hemoglobin : 31.5 pg  Mean Cell Hemoglobin Concentration : 33.3 gm/dL    137  |  102  |  17.3  ----------------------------<  116<H>  4.2   |  26.0  |  0.70    Ca    8.3<L>      04 Mar 2024 06:10    Xray hip : Prosthetic Hip replacement in place.    CT head :   mild to moderate periventricular and subcortical white matter ischemia.    CT cervical spine : No vertebral fracture is recognized.  Moderate to severe degenerative disc disease and spondylosis at C3-4 through C7-T1 with loss of disc height and associated degenerative endplate changes. There is narrowing of the BILATERAL C2-3, RIGHT C3-4, BILATERAL C4-5, C5-6, LEFT-7 and BILATERAL C7-T1 neural foramina due to uncovertebral spurring and facet osteophytic hypertrophy. Mild posterior osteophytic ridge/disc complex flattens the ventral cord at C6-7.    Xray hip : Displaced RIGHT hip subcapital fracture.      ALLERGIES  Bactrim (Vomiting)  penicillin (Nausea; Diarrhea)      MEDICATIONS   acetaminophen     Tablet .. 975 milliGRAM(s) Oral every 8 hours  aspirin  chewable 81 milliGRAM(s) Oral daily  atorvastatin 40 milliGRAM(s) Oral at bedtime  bisacodyl Suppository 10 milliGRAM(s) Rectal once PRN  enoxaparin Injectable 40 milliGRAM(s) SubCutaneous every 24 hours  HYDROmorphone   Tablet 2 milliGRAM(s) Oral every 3 hours PRN  levothyroxine 75 MICROGram(s) Oral daily  magnesium hydroxide Suspension 30 milliLiter(s) Oral daily PRN  metoprolol succinate ER 25 milliGRAM(s) Oral daily  ondansetron Injectable 8 milliGRAM(s) IV Push once  oxyCODONE    IR 5 milliGRAM(s) Oral every 3 hours PRN  oxyCODONE    IR 10 milliGRAM(s) Oral every 3 hours PRN  pantoprazole    Tablet 40 milliGRAM(s) Oral before breakfast  senna 2 Tablet(s) Oral at bedtime  sodium chloride 0.9%. 1000 milliLiter(s) IV Continuous <Continuous>  ----------------------------------------------------------------------------------------  FUNCTIONAL HISTORY  Lives w/ daughter in private home. 0 SAMI. 0 inside.  Independent for ADLs and ambulation w/ SAC. Regularly walks block outside. Does not navigate stairs.    CURRENT FUNCTIONAL STATUS  bed mobility: maxA  transfers: modA  gait: modA w/ RW for 5ft    ----------------------------------------------------------------------------------------  PHYSICAL EXAM  Constitutional - NAD, Comfortable  HEENT - NCAT, EOMI  Neck - Supple, No limited ROM  Chest - Breathing comfortably, No wheezing  Cardiovascular - S1S2   Abdomen - Soft   Extremities - No C/C/E, No calf tenderness   Neurologic Exam -     Mental status -                     FUNCTIONAL MOTOR EXAM - limited by arousal                    LEFT    LE - HF 5/5, KE 5/5, DF 5/5, PF 5/5                    RIGHT LE - HF 5/5, KE 5/5, DF 5/5, PF 5/5        Sensory - Intact to LT     Reflexes - DTR Intact, No primitive reflexive     Coordination - FTN intact     OculoVestibular - No saccades, No nystagmus, VOR         Balance - WNL Static  Psychiatric - Mood stable, Affect WNL  ----------------------------------------------------------------------------------------  ASSESSMENT/PLAN  88yFemale with functional deficits after displaced R hip subcapital fx s/p hemiarthroplasty.   Pain - Tylenol 975mg q8h, oxycodone PRN, hydromorphone PRN  DVT PPX - enoxaparin  Impaired Mobility/Function/Rehab Recommendations -  HPI:  88yF PMH dementia, hypothyroidism, HTN, MI (s/p stent x2, pacemaker), COPD, GERD, and osteoporosis (s/p  left hip hemiarthroplasty for fem neck fx), admitted to [facility] 2024 for displaced right hip subcapital fracture. S/p  R hip hemiarthroplasty. PM&R consulted  for rehab disposition.    Imaging Reviewed Today:  XR R hip   CT head/c-spine   XR R hip/pelvis/femur     ----------------------------------------------------------------------------------------  CHIEF COMPLAINT    Seen at bedside w/ hospitalist and later w/ daughter this AM. History limited by drowsiness.  Reports right hip pain controlled on current medications. Sleeping okay. Does not feel constipated.  Following left hip hemiarthroplasty , patient tolerated acute rehab course at [place] and achieved discharge home. Daughter reports pt current cognitive and functional baseline similar to that time.    ----------------------------------------------------------------------------------------  VITALS  T(C): 36.6 (24 @ 04:56), Max: 37.3 (24 @ 22:50)  HR: 68 (24 @ 04:56) (66 - 80)  BP: 127/67 (24 @ 04:56) (127/67 - 161/63)  RR: 18 (24 @ 04:56) (18 - 18)  SpO2: 94% (24 @ 04:56) (93% - 96%)  Wt(kg): --    PAST MEDICAL & SURGICAL HISTORY  HTN (hypertension)    High cholesterol    Stented coronary artery    MI (myocardial infarction)    Hypothyroid    GERD (gastroesophageal reflux disease)    Myocardial infarction    Diabetes mellitus    Hiatal hernia    PUD (peptic ulcer disease)    Gastroenteritis    Vocal cord polyps    LBBB (left bundle branch block)    No significant past surgical history    S/P appendectomy    S/P tubal ligation    S/P cholecystectomy    S/P  section          LABS  REVIEWED    CBC Full  -  ( 04 Mar 2024 06:10 )  WBC Count : 8.15 K/uL  RBC Count : 3.71 M/uL  Hemoglobin : 11.7 g/dL  Hematocrit : 35.1 %  Platelet Count - Automated : 131 K/uL  Mean Cell Volume : 94.6 fl  Mean Cell Hemoglobin : 31.5 pg  Mean Cell Hemoglobin Concentration : 33.3 gm/dL    137  |  102  |  17.3  ----------------------------<  116<H>  4.2   |  26.0  |  0.70    Ca    8.3<L>      04 Mar 2024 06:10    Xray hip : Prosthetic Hip replacement in place.    CT head :   mild to moderate periventricular and subcortical white matter ischemia.    CT cervical spine : No vertebral fracture is recognized.  Moderate to severe degenerative disc disease and spondylosis at C3-4 through C7-T1 with loss of disc height and associated degenerative endplate changes. There is narrowing of the BILATERAL C2-3, RIGHT C3-4, BILATERAL C4-5, C5-6, LEFT-7 and BILATERAL C7-T1 neural foramina due to uncovertebral spurring and facet osteophytic hypertrophy. Mild posterior osteophytic ridge/disc complex flattens the ventral cord at C6-7.    Xray hip : Displaced RIGHT hip subcapital fracture.      ALLERGIES  Bactrim (Vomiting)  penicillin (Nausea; Diarrhea)      MEDICATIONS   acetaminophen     Tablet .. 975 milliGRAM(s) Oral every 8 hours  aspirin  chewable 81 milliGRAM(s) Oral daily  atorvastatin 40 milliGRAM(s) Oral at bedtime  bisacodyl Suppository 10 milliGRAM(s) Rectal once PRN  enoxaparin Injectable 40 milliGRAM(s) SubCutaneous every 24 hours  HYDROmorphone   Tablet 2 milliGRAM(s) Oral every 3 hours PRN  levothyroxine 75 MICROGram(s) Oral daily  magnesium hydroxide Suspension 30 milliLiter(s) Oral daily PRN  metoprolol succinate ER 25 milliGRAM(s) Oral daily  ondansetron Injectable 8 milliGRAM(s) IV Push once  oxyCODONE    IR 5 milliGRAM(s) Oral every 3 hours PRN  oxyCODONE    IR 10 milliGRAM(s) Oral every 3 hours PRN  pantoprazole    Tablet 40 milliGRAM(s) Oral before breakfast  senna 2 Tablet(s) Oral at bedtime  sodium chloride 0.9%. 1000 milliLiter(s) IV Continuous <Continuous>  ----------------------------------------------------------------------------------------  FUNCTIONAL HISTORY  Lives w/ daughter in private home. 0 SAMI. 0 inside.  Independent for ADLs and ambulation w/ SAC. Regularly walks block outside. Does not navigate stairs.    CURRENT FUNCTIONAL STATUS  bed mobility: maxA  transfers: modA  gait: modA w/ RW for 5ft    ----------------------------------------------------------------------------------------  PHYSICAL EXAM  Constitutional - NAD, Comfortable  HEENT - NCAT, EOMI, hard of hearing  Chest - Breathing comfortably on 1L NC  Abdomen - Nondistended, soft, nontender  Extremities - No C/C/E, No calf tenderness   Neurologic Exam -     Mental status - drowsy, oriented to self, reports in [place], does not choose "hospital" from multiple choice, knows she fell and fractured hip, does not say whether she had surgery, does not offer date when questioned and given multiple choice, follows simple commands with repetition and cueing                 FUNCTIONAL MOTOR EXAM - limited by arousal, moves all extremities spontaneously, right KE/DF/PF all present  Psychiatric - some perseverative speech  ----------------------------------------------------------------------------------------  ASSESSMENT/PLAN  88yFemale with functional deficits after displaced R hip subcapital fx s/p hemiarthroplasty.   Prec - fall, WBAT right LE  Pain - Tylenol 975mg q8h, oxycodone PRN, hydromorphone PRN  DVT PPX - enoxaparin  CAD - aspirin, atorvastatin, Toprol-XL  Hypothyroid - levothyroxine  GERD - pantoprazole  Osteoporosis - patient and hospitalist unsure of current management if present, medication options may be limited by GERD  Impaired Mobility/Function/Rehab Recommendations - Patient would benefit from continued participation in therapy for continued mobilization and  HPI:  88yF PMH dementia, hypothyroidism, HTN, MI (s/p stent x2, pacemaker), COPD, GERD, and osteoporosis (s/p  left hip hemiarthroplasty for fem neck fx), admitted to [facility] 2024 for displaced right hip subcapital fracture. S/p  R hip hemiarthroplasty. PM&R consulted  for rehab disposition.    Imaging Reviewed Today:  XR R hip   CT head/c-spine   XR R hip/pelvis/femur     ----------------------------------------------------------------------------------------  CHIEF COMPLAINT    Seen at bedside w/ hospitalist and later w/ daughter this AM. History limited by drowsiness.  Reports right hip pain controlled on current medications. Sleeping okay. Does not feel constipated.  Following left hip hemiarthroplasty , patient tolerated acute rehab course at [place] and achieved discharge home. Daughter reports pt current cognitive and functional baseline similar to that time.    ----------------------------------------------------------------------------------------  VITALS  T(C): 36.6 (24 @ 04:56), Max: 37.3 (24 @ 22:50)  HR: 68 (24 @ 04:56) (66 - 80)  BP: 127/67 (24 @ 04:56) (127/67 - 161/63)  RR: 18 (24 @ 04:56) (18 - 18)  SpO2: 94% (24 @ 04:56) (93% - 96%)  Wt(kg): --    PAST MEDICAL & SURGICAL HISTORY  HTN (hypertension)    High cholesterol    Stented coronary artery    MI (myocardial infarction)    Hypothyroid    GERD (gastroesophageal reflux disease)    Myocardial infarction    Diabetes mellitus    Hiatal hernia    PUD (peptic ulcer disease)    Gastroenteritis    Vocal cord polyps    LBBB (left bundle branch block)    No significant past surgical history    S/P appendectomy    S/P tubal ligation    S/P cholecystectomy    S/P  section          LABS  REVIEWED    CBC Full  -  ( 04 Mar 2024 06:10 )  WBC Count : 8.15 K/uL  RBC Count : 3.71 M/uL  Hemoglobin : 11.7 g/dL  Hematocrit : 35.1 %  Platelet Count - Automated : 131 K/uL  Mean Cell Volume : 94.6 fl  Mean Cell Hemoglobin : 31.5 pg  Mean Cell Hemoglobin Concentration : 33.3 gm/dL    137  |  102  |  17.3  ----------------------------<  116<H>  4.2   |  26.0  |  0.70    Ca    8.3<L>      04 Mar 2024 06:10    Xray hip : Prosthetic Hip replacement in place.    CT head :   mild to moderate periventricular and subcortical white matter ischemia.    CT cervical spine : No vertebral fracture is recognized.  Moderate to severe degenerative disc disease and spondylosis at C3-4 through C7-T1 with loss of disc height and associated degenerative endplate changes. There is narrowing of the BILATERAL C2-3, RIGHT C3-4, BILATERAL C4-5, C5-6, LEFT-7 and BILATERAL C7-T1 neural foramina due to uncovertebral spurring and facet osteophytic hypertrophy. Mild posterior osteophytic ridge/disc complex flattens the ventral cord at C6-7.    Xray hip : Displaced RIGHT hip subcapital fracture.      ALLERGIES  Bactrim (Vomiting)  penicillin (Nausea; Diarrhea)      MEDICATIONS   acetaminophen     Tablet .. 975 milliGRAM(s) Oral every 8 hours  aspirin  chewable 81 milliGRAM(s) Oral daily  atorvastatin 40 milliGRAM(s) Oral at bedtime  bisacodyl Suppository 10 milliGRAM(s) Rectal once PRN  enoxaparin Injectable 40 milliGRAM(s) SubCutaneous every 24 hours  HYDROmorphone   Tablet 2 milliGRAM(s) Oral every 3 hours PRN  levothyroxine 75 MICROGram(s) Oral daily  magnesium hydroxide Suspension 30 milliLiter(s) Oral daily PRN  metoprolol succinate ER 25 milliGRAM(s) Oral daily  ondansetron Injectable 8 milliGRAM(s) IV Push once  oxyCODONE    IR 5 milliGRAM(s) Oral every 3 hours PRN  oxyCODONE    IR 10 milliGRAM(s) Oral every 3 hours PRN  pantoprazole    Tablet 40 milliGRAM(s) Oral before breakfast  senna 2 Tablet(s) Oral at bedtime  sodium chloride 0.9%. 1000 milliLiter(s) IV Continuous <Continuous>  ----------------------------------------------------------------------------------------  FUNCTIONAL HISTORY  Lives w/ daughter in private home. 0 SAMI. 0 inside.  Independent for ADLs and ambulation w/ SAC. Regularly walks block outside. Does not navigate stairs.    CURRENT FUNCTIONAL STATUS  bed mobility: maxA  transfers: modA  gait: modA w/ RW for 5ft    ----------------------------------------------------------------------------------------  PHYSICAL EXAM  Constitutional - NAD, Comfortable  HEENT - NCAT, EOMI, hard of hearing  Chest - Breathing comfortably on 1L NC  Abdomen - Nondistended, soft, nontender  Extremities - No C/C/E, No calf tenderness   Neurologic Exam -     Mental status - drowsy, oriented to self, reports in [place], does not choose "hospital" from multiple choice, knows she fell and fractured hip, does not say whether she had surgery, does not offer date when questioned and given multiple choice, follows simple commands with repetition and cueing                 FUNCTIONAL MOTOR EXAM - limited by arousal, moves all extremities spontaneously, right KE/DF/PF all present  Psychiatric - some perseverative speech  ----------------------------------------------------------------------------------------  ASSESSMENT/PLAN  88yFemale with functional deficits after displaced R hip subcapital fx s/p hemiarthroplasty.   Prec - fall, WBAT right LE  Pain - Tylenol 975mg q8h, oxycodone PRN, hydromorphone PRN  DVT PPX - enoxaparin  CAD - aspirin, atorvastatin, Toprol-XL  Hypothyroid - levothyroxine  GERD - pantoprazole  Osteoporosis - patient and hospitalist unsure of current management if present, medication options may be limited by GERD  Impaired Mobility/Function/Rehab Recommendations - Patient requires continued hospitalization for aforementioned medical issues. Patient would benefit from continued therapies for functional deficits and regular mobilization.    Given patient's prior success with acute rehab for similar injury and surgery, she could potentially benefit from another course of acute rehab; however, patient's candidacy at this time limited by poor arousal on exam and limited participation with therapies. For cognitive status, recommend delirium precautions and maintenance of good sleep-wake cycles. Defer medical work-up to primary team at this time.    PM&R will continue follow along and offer recommendations. The patient is a 47y Female complaining of

## 2024-03-04 NOTE — PROGRESS NOTE ADULT - ASSESSMENT
88F with a history of dementia, coronary artery disease, pacemaker, COPD, hypothyroidism, borderline diabetes, hypertension, and hyperlipidemia who presented with a mechanical fall found to have a right hip fracture.    Hip fracture  - s/p Hip fx repair 3/2  - PT eval - BRAYDON  - Family wants acute rehab, pending PM&R eval  - Analgesic medications as needed    Coronary artery disease / Hypertension  - On aspirin and atorvastatin  - Continue on metoprolol  - Resumption of amlodipine if needed for blood pressure control  - EKG with paced rhythm    COPD, hypoxia  - No in exacerbation  - Nebulizer treatments as needed  - on NC, wean as tolerated  - check CXR    Hypothyroidism  - On levothyroxine    Prediabetes  - No hyperglycemia noted  - Insulin coverage if needed    DVT PPx - SCDs  Dispo - Pending PM&R eval and CXR read

## 2024-03-04 NOTE — CONSULT NOTE ADULT - ATTENDING COMMENTS
Patient seen and examined. Case discussed with Dr. Giron. Agree with recommendations.     Rehab/Impaired mobility and function - Patient continues to require hospitalization for the above diagnoses and ongoing active management of comorbid complications (impaired wakefulness, cognitive deficits, functional assist needs) that are substantially impairing functional ability and impairing quality of life.     RECOMMEND - OOB daily, Turn Q2 when needed, HOB >30 degrees    Discussed with a daughter present that ability to recommend AR is dependent on patient maintaining ongoing level of alertness to adequately participate in rehab program. Made aware of her functional status and BRAYDON recommendation from PT. Discussed that if medically is cleared for discharge, will have to reassess ongoing participation. Do not expect patient could tolerate a 3hr a day program from 2 years ago. She has since been hospitalized and gone to [facility] for rehab.    Will continue to follow. Rehab recommendations are dependent on how functional progress changes as well as how patient continues to participate and tolerate therapeutic interventions, which may change. Recommend ongoing mobilization by staff to maintain cardiopulmonary function and prevention of secondary complications related to debility. Discussed the specific management and recommendations above with rehab clinical care team/rehab liaison.      Total Time Spent on Encounter (reviewing clinical notes, labs, radiology, medications, patient history/exam, assessment and plan) - 75 minutes

## 2024-03-05 PROCEDURE — 99232 SBSQ HOSP IP/OBS MODERATE 35: CPT

## 2024-03-05 PROCEDURE — 99233 SBSQ HOSP IP/OBS HIGH 50: CPT | Mod: GC

## 2024-03-05 RX ADMIN — OXYCODONE HYDROCHLORIDE 5 MILLIGRAM(S): 5 TABLET ORAL at 22:44

## 2024-03-05 RX ADMIN — Medication 81 MILLIGRAM(S): at 11:07

## 2024-03-05 RX ADMIN — OXYCODONE HYDROCHLORIDE 5 MILLIGRAM(S): 5 TABLET ORAL at 23:30

## 2024-03-05 RX ADMIN — PANTOPRAZOLE SODIUM 40 MILLIGRAM(S): 20 TABLET, DELAYED RELEASE ORAL at 05:06

## 2024-03-05 RX ADMIN — MAGNESIUM HYDROXIDE 30 MILLILITER(S): 400 TABLET, CHEWABLE ORAL at 08:25

## 2024-03-05 RX ADMIN — Medication 975 MILLIGRAM(S): at 22:42

## 2024-03-05 RX ADMIN — Medication 975 MILLIGRAM(S): at 05:44

## 2024-03-05 RX ADMIN — Medication 975 MILLIGRAM(S): at 05:03

## 2024-03-05 RX ADMIN — Medication 975 MILLIGRAM(S): at 13:42

## 2024-03-05 RX ADMIN — Medication 975 MILLIGRAM(S): at 23:30

## 2024-03-05 RX ADMIN — Medication 75 MICROGRAM(S): at 05:04

## 2024-03-05 RX ADMIN — Medication 975 MILLIGRAM(S): at 15:04

## 2024-03-05 RX ADMIN — OXYCODONE HYDROCHLORIDE 5 MILLIGRAM(S): 5 TABLET ORAL at 09:03

## 2024-03-05 RX ADMIN — ENOXAPARIN SODIUM 40 MILLIGRAM(S): 100 INJECTION SUBCUTANEOUS at 05:02

## 2024-03-05 RX ADMIN — OXYCODONE HYDROCHLORIDE 5 MILLIGRAM(S): 5 TABLET ORAL at 10:00

## 2024-03-05 RX ADMIN — Medication 25 MILLIGRAM(S): at 05:03

## 2024-03-05 RX ADMIN — ATORVASTATIN CALCIUM 40 MILLIGRAM(S): 80 TABLET, FILM COATED ORAL at 22:42

## 2024-03-05 NOTE — PROGRESS NOTE ADULT - SUBJECTIVE AND OBJECTIVE BOX
FUNCTIONAL PROGRESS      VITALS  T(C): 36.9 (03-05-24 @ 04:05), Max: 37 (03-04-24 @ 09:16)  HR: 73 (03-05-24 @ 04:20) (71 - 77)  BP: 160/74 (03-05-24 @ 04:20) (148/60 - 167/75)  RR: 18 (03-05-24 @ 04:05) (17 - 18)  SpO2: 96% (03-05-24 @ 04:05) (93% - 97%)  Wt(kg): --    MEDICATIONS   acetaminophen     Tablet .. 975 milliGRAM(s) every 8 hours  aspirin  chewable 81 milliGRAM(s) daily  atorvastatin 40 milliGRAM(s) at bedtime  bisacodyl Suppository 10 milliGRAM(s) once PRN  enoxaparin Injectable 40 milliGRAM(s) every 24 hours  HYDROmorphone   Tablet 2 milliGRAM(s) every 3 hours PRN  levothyroxine 75 MICROGram(s) daily  magnesium hydroxide Suspension 30 milliLiter(s) daily PRN  metoprolol succinate ER 25 milliGRAM(s) daily  ondansetron Injectable 8 milliGRAM(s) once  oxyCODONE    IR 5 milliGRAM(s) every 3 hours PRN  oxyCODONE    IR 10 milliGRAM(s) every 3 hours PRN  pantoprazole    Tablet 40 milliGRAM(s) before breakfast  senna 2 Tablet(s) at bedtime  sodium chloride 0.9%. 1000 milliLiter(s) <Continuous>      RECENT LABS/IMAGING  - Reviewed Today                        11.7   8.15  )-----------( 131      ( 04 Mar 2024 06:10 )             35.1     03-04    137  |  102  |  17.3  ----------------------------<  116<H>  4.2   |  26.0  |  0.70    Ca    8.3<L>      04 Mar 2024 06:10    Xray hip 03/01: Prosthetic Hip replacement in place.    CT head 02/29:   mild to moderate periventricular and subcortical white matter ischemia.    CT cervical spine 02/29: No vertebral fracture is recognized.  Moderate to severe degenerative disc disease and spondylosis at C3-4 through C7-T1 with loss of disc height and associated degenerative endplate changes. There is narrowing of the BILATERAL C2-3, RIGHT C3-4, BILATERAL C4-5, C5-6, LEFT-7 and BILATERAL C7-T1 neural foramina due to uncovertebral spurring and facet osteophytic hypertrophy. Mild posterior osteophytic ridge/disc complex flattens the ventral cord at C6-7.    Xray hip 02/29: Displaced RIGHT hip subcapital fracture.    ----------------------------------------------------------------------------------------  PHYSICAL EXAM  Constitutional - NAD, Comfortable  HEENT - NCAT, EOMI, hard of hearing  Chest - Breathing comfortably on 1L NC  Abdomen - Nondistended, soft, nontender  Extremities - No C/C/E, No calf tenderness   Neurologic Exam -     Mental status - drowsy, oriented to self, reports in [place], does not choose "hospital" from multiple choice, knows she fell and fractured hip, does not say whether she had surgery, does not offer date when questioned and given multiple choice, follows simple commands with repetition and cueing                 FUNCTIONAL MOTOR EXAM - limited by arousal, moves all extremities spontaneously, right KE/DF/PF all present  Psychiatric - some perseverative speech  ----------------------------------------------------------------------------------------  ASSESSMENT/PLAN  88yFemale with functional deficits after displaced R hip subcapital fx s/p hemiarthroplasty.   Prec - fall, WBAT right LE  Pain - Tylenol 975mg q8h, oxycodone PRN, hydromorphone PRN  DVT PPX - enoxaparin  CAD - aspirin, atorvastatin, Toprol-XL  Hypothyroid - levothyroxine  GERD - pantoprazole  Osteoporosis - patient and hospitalist unsure of current management if present, medication options may be limited by GERD  Impaired Mobility/Function/Rehab Recommendations - Patient requires continued hospitalization for aforementioned medical issues. Patient would benefit from continued therapies for functional deficits and regular mobilization.    Given patient's prior success with acute rehab for similar injury and surgery, she could potentially benefit from another course of acute rehab; however, patient's candidacy at this time limited by poor arousal on exam and limited participation with therapies. For cognitive status, recommend delirium precautions and maintenance of good sleep-wake cycles. Defer medical work-up to primary team at this time.    PM&R will continue follow along and offer recommendations.     Seen at bedside this AM.   More awake today. Slept well.  Tolerated 1 hour OOB in chair, limited by right hip pain.   Recalls prior acute rehab stay. Had good experience; however, does not feel she is ready for 3 hours/day therapy at this time.    FUNCTIONAL PROGRESS  OT 03/05  bed mobility: modA x2  transfers: unable to perform 2/2 pain  bathing: maxA  UBD: Shahrzad  LBD: maxA  toileting: maxA  grooming: SBA    VITALS  T(C): 36.9 (03-05-24 @ 04:05), Max: 37 (03-04-24 @ 09:16)  HR: 73 (03-05-24 @ 04:20) (71 - 77)  BP: 160/74 (03-05-24 @ 04:20) (148/60 - 167/75)  RR: 18 (03-05-24 @ 04:05) (17 - 18)  SpO2: 96% (03-05-24 @ 04:05) (93% - 97%)  Wt(kg): --    MEDICATIONS   acetaminophen     Tablet .. 975 milliGRAM(s) every 8 hours  aspirin  chewable 81 milliGRAM(s) daily  atorvastatin 40 milliGRAM(s) at bedtime  bisacodyl Suppository 10 milliGRAM(s) once PRN  enoxaparin Injectable 40 milliGRAM(s) every 24 hours  HYDROmorphone   Tablet 2 milliGRAM(s) every 3 hours PRN  levothyroxine 75 MICROGram(s) daily  magnesium hydroxide Suspension 30 milliLiter(s) daily PRN  metoprolol succinate ER 25 milliGRAM(s) daily  ondansetron Injectable 8 milliGRAM(s) once  oxyCODONE    IR 5 milliGRAM(s) every 3 hours PRN  oxyCODONE    IR 10 milliGRAM(s) every 3 hours PRN  pantoprazole    Tablet 40 milliGRAM(s) before breakfast  senna 2 Tablet(s) at bedtime  sodium chloride 0.9%. 1000 milliLiter(s) <Continuous>      RECENT LABS/IMAGING  - Reviewed Today                        11.7   8.15  )-----------( 131      ( 04 Mar 2024 06:10 )             35.1     03-04    137  |  102  |  17.3  ----------------------------<  116<H>  4.2   |  26.0  |  0.70    Ca    8.3<L>      04 Mar 2024 06:10    Xray hip 03/01: Prosthetic Hip replacement in place.    CT head 02/29:   mild to moderate periventricular and subcortical white matter ischemia.    CT cervical spine 02/29: No vertebral fracture is recognized.  Moderate to severe degenerative disc disease and spondylosis at C3-4 through C7-T1 with loss of disc height and associated degenerative endplate changes. There is narrowing of the BILATERAL C2-3, RIGHT C3-4, BILATERAL C4-5, C5-6, LEFT-7 and BILATERAL C7-T1 neural foramina due to uncovertebral spurring and facet osteophytic hypertrophy. Mild posterior osteophytic ridge/disc complex flattens the ventral cord at C6-7.    Xray hip 02/29: Displaced RIGHT hip subcapital fracture.    ----------------------------------------------------------------------------------------  PHYSICAL EXAM  Constitutional - NAD, Comfortable  HEENT - NCAT, EOMI, hard of hearing  Chest - Breathing comfortably on 1L NC  Abdomen - Nondistended, soft, nontender  Extremities - right hip dressing c/d/i, no peripheral edema  Neurologic Exam -     Mental status - alert, oriented to self/place/situation, correctly identifies month and year from multiple choice        Memory - impaired         FUNCTIONAL MOTOR EXAM - limited by pain        left LE: HF 3/5, KE 3/5, DF 4/5, PF 4/5        right LE: DF 3/5, PF 3/5  Psychiatric - mood stable  ----------------------------------------------------------------------------------------  ASSESSMENT/PLAN  88yFemale with functional deficits after displaced R hip subcapital fx s/p hemiarthroplasty.   Prec - fall, WBAT right LE  Pain - Tylenol 975mg q8h, oxycodone PRN, hydromorphone PRN  DVT PPX - enoxaparin  CAD - aspirin, atorvastatin, Toprol-XL  Hypothyroid - levothyroxine  GERD - pantoprazole  Osteoporosis - patient and hospitalist unsure of current management if present, medication options may be limited by GERD  Impaired Mobility/Function/Rehab Recommendations - Patient requires hospitalization for aforementioned medical issues and discharge planning. Patient would benefit from continued therapies for functional deficits and regular mobilization. Progress appears to be significantly limited by persistent post-op pain.     Patient has had prior success with acute rehab for similar injury and surgery but may have difficulty pursuing intensity of acute rehab at this time due to pain, motivation, and cognition. Patient has been approved for BRAYDON; suspect this is the appropriate level at this time. Will appreciate recs from PT/OT today to finalize recs.    PM&R will continue follow along and offer recommendations.     Seen at bedside this AM.   More awake today. Slept well.  Tolerated 1 hour OOB in chair, limited by right hip pain.   Recalls prior acute rehab stay. Had good experience; however, does not feel she is ready for 3 hours/day therapy at this time.    FUNCTIONAL PROGRESS  OT 03/05  bed mobility: modA x2  transfers: unable to perform 2/2 pain  bathing: maxA  UBD: Shahrzad  LBD: maxA  toileting: maxA  grooming: SBA    VITALS  T(C): 36.9 (03-05-24 @ 04:05), Max: 37 (03-04-24 @ 09:16)  HR: 73 (03-05-24 @ 04:20) (71 - 77)  BP: 160/74 (03-05-24 @ 04:20) (148/60 - 167/75)  RR: 18 (03-05-24 @ 04:05) (17 - 18)  SpO2: 96% (03-05-24 @ 04:05) (93% - 97%)  Wt(kg): --    MEDICATIONS   acetaminophen     Tablet .. 975 milliGRAM(s) every 8 hours  aspirin  chewable 81 milliGRAM(s) daily  atorvastatin 40 milliGRAM(s) at bedtime  bisacodyl Suppository 10 milliGRAM(s) once PRN  enoxaparin Injectable 40 milliGRAM(s) every 24 hours  HYDROmorphone   Tablet 2 milliGRAM(s) every 3 hours PRN  levothyroxine 75 MICROGram(s) daily  magnesium hydroxide Suspension 30 milliLiter(s) daily PRN  metoprolol succinate ER 25 milliGRAM(s) daily  ondansetron Injectable 8 milliGRAM(s) once  oxyCODONE    IR 5 milliGRAM(s) every 3 hours PRN  oxyCODONE    IR 10 milliGRAM(s) every 3 hours PRN  pantoprazole    Tablet 40 milliGRAM(s) before breakfast  senna 2 Tablet(s) at bedtime  sodium chloride 0.9%. 1000 milliLiter(s) <Continuous>      RECENT LABS/IMAGING  - Reviewed Today                        11.7   8.15  )-----------( 131      ( 04 Mar 2024 06:10 )             35.1     03-04    137  |  102  |  17.3  ----------------------------<  116<H>  4.2   |  26.0  |  0.70    Ca    8.3<L>      04 Mar 2024 06:10    Xray hip 03/01: Prosthetic Hip replacement in place.    CT head 02/29:   mild to moderate periventricular and subcortical white matter ischemia.    CT cervical spine 02/29: No vertebral fracture is recognized.  Moderate to severe degenerative disc disease and spondylosis at C3-4 through C7-T1 with loss of disc height and associated degenerative endplate changes. There is narrowing of the BILATERAL C2-3, RIGHT C3-4, BILATERAL C4-5, C5-6, LEFT-7 and BILATERAL C7-T1 neural foramina due to uncovertebral spurring and facet osteophytic hypertrophy. Mild posterior osteophytic ridge/disc complex flattens the ventral cord at C6-7.    Xray hip 02/29: Displaced RIGHT hip subcapital fracture.    ----------------------------------------------------------------------------------------  PHYSICAL EXAM  Constitutional - NAD, Comfortable  HEENT - NCAT, EOMI, hard of hearing  Chest - Breathing comfortably on 1L NC  Abdomen - Nondistended, soft, nontender  Extremities - right hip dressing c/d/i, no peripheral edema  Neurologic Exam -     Mental status - alert, oriented to self/place/situation, correctly identifies month and year from multiple choice        Memory - impaired         FUNCTIONAL MOTOR EXAM - limited by pain        left LE: HF 3/5, KE 3/5, DF 4/5, PF 4/5        right LE: DF 3/5, PF 3/5  Psychiatric - mood stable  ----------------------------------------------------------------------------------------  ASSESSMENT/PLAN  88yFemale with functional deficits after displaced R hip subcapital fx s/p hemiarthroplasty.   Prec - fall, WBAT right LE  Pain - Tylenol 975mg q8h, oxycodone PRN, hydromorphone PRN  DVT PPX - enoxaparin  CAD - aspirin, atorvastatin, Toprol-XL  Hypothyroid - levothyroxine  GERD - pantoprazole  Osteoporosis - patient and hospitalist unsure of current management if present, medication options may be limited by GERD  Impaired Mobility/Function/Rehab Recommendations - Patient approaching medical readiness for discharge, pending pain control and discharge planning. Patient would benefit from continued therapies for functional deficits and regular mobilization. Progress appears to be significantly limited by persistent post-op pain.     Patient has had prior success with acute rehab for similar injury and surgery but may have difficulty pursuing intensity of acute rehab at this time due to pain, motivation, and cognition. Patient has been approved for BRAYDON; suspect this is the appropriate level at this time. Will appreciate recs from PT/OT today to finalize recs.    PM&R will continue follow along and offer recommendations.     Seen at bedside this AM.   More awake today. Slept well.  Tolerated 1 hour OOB in chair, limited by right hip pain.   Recalls prior acute rehab stay. Had good experience; however, does not feel she is ready for 3 hours/day therapy at this time.    FUNCTIONAL PROGRESS  OT 03/05  bed mobility: modA x2  transfers: unable to perform 2/2 pain  bathing: maxA  UBD: Shahrzad  LBD: maxA  toileting: maxA  grooming: SBA    VITALS  T(C): 36.9 (03-05-24 @ 04:05), Max: 37 (03-04-24 @ 09:16)  HR: 73 (03-05-24 @ 04:20) (71 - 77)  BP: 160/74 (03-05-24 @ 04:20) (148/60 - 167/75)  RR: 18 (03-05-24 @ 04:05) (17 - 18)  SpO2: 96% (03-05-24 @ 04:05) (93% - 97%)  Wt(kg): --    MEDICATIONS   acetaminophen     Tablet .. 975 milliGRAM(s) every 8 hours  aspirin  chewable 81 milliGRAM(s) daily  atorvastatin 40 milliGRAM(s) at bedtime  bisacodyl Suppository 10 milliGRAM(s) once PRN  enoxaparin Injectable 40 milliGRAM(s) every 24 hours  HYDROmorphone   Tablet 2 milliGRAM(s) every 3 hours PRN  levothyroxine 75 MICROGram(s) daily  magnesium hydroxide Suspension 30 milliLiter(s) daily PRN  metoprolol succinate ER 25 milliGRAM(s) daily  ondansetron Injectable 8 milliGRAM(s) once  oxyCODONE    IR 5 milliGRAM(s) every 3 hours PRN  oxyCODONE    IR 10 milliGRAM(s) every 3 hours PRN  pantoprazole    Tablet 40 milliGRAM(s) before breakfast  senna 2 Tablet(s) at bedtime  sodium chloride 0.9%. 1000 milliLiter(s) <Continuous>      RECENT LABS/IMAGING  - Reviewed Today                        11.7   8.15  )-----------( 131      ( 04 Mar 2024 06:10 )             35.1     03-04    137  |  102  |  17.3  ----------------------------<  116<H>  4.2   |  26.0  |  0.70    Ca    8.3<L>      04 Mar 2024 06:10    Xray hip 03/01: Prosthetic Hip replacement in place.    CT head 02/29:   mild to moderate periventricular and subcortical white matter ischemia.    CT cervical spine 02/29: No vertebral fracture is recognized.  Moderate to severe degenerative disc disease and spondylosis at C3-4 through C7-T1 with loss of disc height and associated degenerative endplate changes. There is narrowing of the BILATERAL C2-3, RIGHT C3-4, BILATERAL C4-5, C5-6, LEFT-7 and BILATERAL C7-T1 neural foramina due to uncovertebral spurring and facet osteophytic hypertrophy. Mild posterior osteophytic ridge/disc complex flattens the ventral cord at C6-7.    Xray hip 02/29: Displaced RIGHT hip subcapital fracture.    ----------------------------------------------------------------------------------------  PHYSICAL EXAM  Constitutional - NAD, Comfortable  HEENT - NCAT, EOMI, hard of hearing  Chest - Breathing comfortably on 1L NC  Abdomen - Nondistended, soft, nontender  Extremities - right hip dressing c/d/i, no peripheral edema  Neurologic Exam -     Mental status - alert, oriented to self/place/situation, correctly identifies month and year from multiple choice        Memory - impaired         FUNCTIONAL MOTOR EXAM - limited by pain        left LE: HF 3/5, KE 3/5, DF 4/5, PF 4/5        right LE: DF 3/5, PF 3/5  Psychiatric - mood stable  ----------------------------------------------------------------------------------------  ASSESSMENT/PLAN  88yFemale with functional deficits after displaced R hip subcapital fx s/p hemiarthroplasty.   Prec - fall, WBAT right LE  Pain - Tylenol 975mg q8h, oxycodone PRN, hydromorphone PRN  DVT PPX - enoxaparin  CAD - aspirin, atorvastatin, Toprol-XL  Hypothyroid - levothyroxine  GERD - pantoprazole  Osteoporosis - patient and hospitalist unsure of current management if present, medication options may be limited by GERD  Impaired Mobility/Function/Rehab Recommendations - Patient approaching medical readiness for discharge, pending pain control and discharge planning. Patient would benefit from continued therapies for functional deficits and regular mobilization. Progress appears to be significantly limited by persistent post-op pain.     Patient has had prior success with acute rehab for similar injury and surgery but may have difficulty pursuing intensity of acute rehab at this time due to pain, motivation, and cognition. Patient has been approved for BRAYDON; suspect this is the appropriate level at this time. Family reported to be in agreement. Will appreciate recs from PT/OT today to finalize recs.    PM&R will continue follow along and offer recommendations.

## 2024-03-05 NOTE — OCCUPATIONAL THERAPY INITIAL EVALUATION ADULT - PERTINENT HX OF CURRENT PROBLEM, REHAB EVAL
As per MD note: 88F with a history of dementia, coronary artery disease, pacemaker, COPD, hypothyroidism, borderline diabetes, hypertension, and hyperlipidemia who presented with a mechanical fall found to have a right hip fracture.

## 2024-03-05 NOTE — PROGRESS NOTE ADULT - NUTRITIONAL ASSESSMENT
This patient has been assessed with a concern for Malnutrition and has been determined to have a diagnosis/diagnoses of Moderate protein-calorie malnutrition.    This patient is being managed with:   Diet Regular-  Entered: Mar  1 2024  9:11AM  

## 2024-03-05 NOTE — PROGRESS NOTE ADULT - ATTENDING COMMENTS
Patient seen and examined. Case discussed with Dr. Giron. Agree with recommendations.     Rehab/Impaired mobility and function - Patient continues to require hospitalization for the above diagnoses and ongoing active management of comorbid complications (impaired wakefulness, cognitive deficits, functional assist needs) that are substantially impairing functional ability and impairing quality of life.     RECOMMEND - OOB daily    As per PT, although patient performed a bit better today, do not expect patient to tolerate a 3hr a day program. Even in a moment of lucidness of the patient, felt that she couldn't do that type of rehab now. OT also recommends BRAYDON.    Given the demands of an AR program and limited ability to make significant progress to achieve home in a short period of time, recommend BRAYDON. DOES NOT meet acute inpatient rehabilitation criteria. Patient needs a more prolonged stay to achieve transition to community living and would not be able to tolerate a comprehensive/intense rehab program of 3hours/day.     Will sign off at this time. Thank you for allowing me to be part of your patient's care. Please reconsult PMR for additional rehab recommendations or dispo needs if functional status changes. Discussed the specific management and recommendations above with rehab clinical care team/rehab liaison.

## 2024-03-05 NOTE — PROGRESS NOTE ADULT - SUBJECTIVE AND OBJECTIVE BOX
Pal Allen M.D.    Patient is a 88y old  Female who presents with a chief complaint of Fracture of unspecified part of neck of right femur, initial encounter for closed fracture     (02 Mar 2024 10:49)      SUBJECTIVE / OVERNIGHT EVENTS: no concerns.     Patient denies chest pain, SOB, abd pain, N/V, fever, chills, dysuria or any other complaints. All remainder ROS negative.     MEDICATIONS  (STANDING):  acetaminophen     Tablet .. 975 milliGRAM(s) Oral every 8 hours  aspirin  chewable 81 milliGRAM(s) Oral daily  atorvastatin 40 milliGRAM(s) Oral at bedtime  enoxaparin Injectable 40 milliGRAM(s) SubCutaneous every 24 hours  levothyroxine 75 MICROGram(s) Oral daily  metoprolol succinate ER 25 milliGRAM(s) Oral daily  ondansetron Injectable 8 milliGRAM(s) IV Push once  pantoprazole    Tablet 40 milliGRAM(s) Oral before breakfast  senna 2 Tablet(s) Oral at bedtime  sodium chloride 0.9%. 1000 milliLiter(s) (75 mL/Hr) IV Continuous <Continuous>    MEDICATIONS  (PRN):  bisacodyl Suppository 10 milliGRAM(s) Rectal once PRN Constipation  HYDROmorphone   Tablet 2 milliGRAM(s) Oral every 3 hours PRN Severe Pain (7 - 10)  magnesium hydroxide Suspension 30 milliLiter(s) Oral daily PRN Constipation  oxyCODONE    IR 5 milliGRAM(s) Oral every 3 hours PRN Mild Pain (1 - 3)  oxyCODONE    IR 10 milliGRAM(s) Oral every 3 hours PRN Moderate Pain (4 - 6)      I&O's Summary    04 Mar 2024 07:01  -  05 Mar 2024 07:00  --------------------------------------------------------  IN: 50 mL / OUT: 775 mL / NET: -725 mL        PHYSICAL EXAM:  Vital Signs Last 24 Hrs  T(C): 36.9 (05 Mar 2024 04:05), Max: 36.9 (05 Mar 2024 04:05)  T(F): 98.5 (05 Mar 2024 04:05), Max: 98.5 (05 Mar 2024 04:05)  HR: 73 (05 Mar 2024 04:20) (73 - 77)  BP: 160/74 (05 Mar 2024 04:20) (148/60 - 167/75)  BP(mean): --  RR: 18 (05 Mar 2024 04:05) (17 - 18)  SpO2: 96% (05 Mar 2024 04:05) (93% - 97%)    Parameters below as of 05 Mar 2024 04:05  Patient On (Oxygen Delivery Method): nasal cannula        CONSTITUTIONAL: NAD, well-groomed  ENMT: Moist oral mucosa, no pharyngeal injection or exudates; normal dentition  RESPIRATORY: Normal respiratory effort; lungs are clear to auscultation bilaterally  CARDIOVASCULAR: Regular rate and rhythm, normal S1 and S2, no murmur/rub/gallop; No lower extremity edema; Peripheral pulses are 2+ bilaterally  ABDOMEN: Nontender to palpation, normoactive bowel sounds, no rebound/guarding; No hepatosplenomegaly  MUSCLOSKELETAL:  Normal gait; no clubbing or cyanosis of digits; no joint swelling or tenderness to palpation  PSYCH: A+O x3; affect appropriate  NEUROLOGY: CN 2-12 are intact and symmetric; no gross sensory deficits;   SKIN: No rashes; no palpable lesions    LABS:                        11.7   8.15  )-----------( 131      ( 04 Mar 2024 06:10 )             35.1     03-04    137  |  102  |  17.3  ----------------------------<  116<H>  4.2   |  26.0  |  0.70    Ca    8.3<L>      04 Mar 2024 06:10            Urinalysis Basic - ( 04 Mar 2024 06:10 )    Color: x / Appearance: x / SG: x / pH: x  Gluc: 116 mg/dL / Ketone: x  / Bili: x / Urobili: x   Blood: x / Protein: x / Nitrite: x   Leuk Esterase: x / RBC: x / WBC x   Sq Epi: x / Non Sq Epi: x / Bacteria: x        CAPILLARY BLOOD GLUCOSE          RADIOLOGY & ADDITIONAL TESTS:  Results Reviewed:   Imaging Personally Reviewed:  Electrocardiogram Personally Reviewed: Pal Allen M.D.    Patient is a 88y old  Female who presents with a chief complaint of Fracture of unspecified part of neck of right femur, initial encounter for closed fracture     (02 Mar 2024 10:49)      SUBJECTIVE / OVERNIGHT EVENTS: no concerns.     Patient denies chest pain, SOB, abd pain, N/V, fever, chills, dysuria or any other complaints. All remainder ROS negative.     MEDICATIONS  (STANDING):  acetaminophen     Tablet .. 975 milliGRAM(s) Oral every 8 hours  aspirin  chewable 81 milliGRAM(s) Oral daily  atorvastatin 40 milliGRAM(s) Oral at bedtime  enoxaparin Injectable 40 milliGRAM(s) SubCutaneous every 24 hours  levothyroxine 75 MICROGram(s) Oral daily  metoprolol succinate ER 25 milliGRAM(s) Oral daily  ondansetron Injectable 8 milliGRAM(s) IV Push once  pantoprazole    Tablet 40 milliGRAM(s) Oral before breakfast  senna 2 Tablet(s) Oral at bedtime  sodium chloride 0.9%. 1000 milliLiter(s) (75 mL/Hr) IV Continuous <Continuous>    MEDICATIONS  (PRN):  bisacodyl Suppository 10 milliGRAM(s) Rectal once PRN Constipation  HYDROmorphone   Tablet 2 milliGRAM(s) Oral every 3 hours PRN Severe Pain (7 - 10)  magnesium hydroxide Suspension 30 milliLiter(s) Oral daily PRN Constipation  oxyCODONE    IR 5 milliGRAM(s) Oral every 3 hours PRN Mild Pain (1 - 3)  oxyCODONE    IR 10 milliGRAM(s) Oral every 3 hours PRN Moderate Pain (4 - 6)      I&O's Summary    04 Mar 2024 07:01  -  05 Mar 2024 07:00  --------------------------------------------------------  IN: 50 mL / OUT: 775 mL / NET: -725 mL        PHYSICAL EXAM:  Vital Signs Last 24 Hrs  T(C): 36.9 (05 Mar 2024 04:05), Max: 36.9 (05 Mar 2024 04:05)  T(F): 98.5 (05 Mar 2024 04:05), Max: 98.5 (05 Mar 2024 04:05)  HR: 73 (05 Mar 2024 04:20) (73 - 77)  BP: 160/74 (05 Mar 2024 04:20) (148/60 - 167/75)  BP(mean): --  RR: 18 (05 Mar 2024 04:05) (17 - 18)  SpO2: 96% (05 Mar 2024 04:05) (93% - 97%)    Parameters below as of 05 Mar 2024 04:05  Patient On (Oxygen Delivery Method): nasal cannula      CONSTITUTIONAL: NAD, well-groomed  RESPIRATORY: Normal respiratory effort; lungs are clear to auscultation bilaterally  CARDIOVASCULAR: Regular rate and rhythm, no LE edema  ABDOMEN: Nontender to palpation, normoactive bowel sounds  PSYCH: A+O x3; affect appropriate  NEUROLOGY: CN 2-12 are intact and symmetric; no gross sensory deficits;   SKIN: No rashes; no palpable lesions    LABS:                        11.7   8.15  )-----------( 131      ( 04 Mar 2024 06:10 )             35.1     03-04    137  |  102  |  17.3  ----------------------------<  116<H>  4.2   |  26.0  |  0.70    Ca    8.3<L>      04 Mar 2024 06:10            Urinalysis Basic - ( 04 Mar 2024 06:10 )    Color: x / Appearance: x / SG: x / pH: x  Gluc: 116 mg/dL / Ketone: x  / Bili: x / Urobili: x   Blood: x / Protein: x / Nitrite: x   Leuk Esterase: x / RBC: x / WBC x   Sq Epi: x / Non Sq Epi: x / Bacteria: x        CAPILLARY BLOOD GLUCOSE          RADIOLOGY & ADDITIONAL TESTS:  Results Reviewed:   Imaging Personally Reviewed:  Electrocardiogram Personally Reviewed:

## 2024-03-05 NOTE — PROGRESS NOTE ADULT - PROVIDER SPECIALTY LIST ADULT
Hospitalist
Orthopedics
Hospitalist
Hospitalist
Orthopedics
Physiatry
Orthopedics
Hospitalist
Hospitalist

## 2024-03-05 NOTE — PROGRESS NOTE ADULT - ASSESSMENT
88F with a history of dementia, coronary artery disease, pacemaker, COPD, hypothyroidism, borderline diabetes, hypertension, and hyperlipidemia who presented with a mechanical fall found to have a right hip fracture.    Hip fracture  - s/p Hip fx repair 3/2  - PT eval - [facility]  - Family wants acute rehab, pending PM&R eval  - Analgesic medications as needed    Coronary artery disease / Hypertension  - On aspirin and atorvastatin  - Continue on metoprolol  - Resumption of amlodipine if needed for blood pressure control  - EKG with paced rhythm    COPD, hypoxia  - No in exacerbation  - Nebulizer treatments as needed  - on NC, wean as tolerated  - check CXR    Hypothyroidism  - On levothyroxine    Prediabetes  - No hyperglycemia noted  - Insulin coverage if needed    DVT PPx - SCDs  Dispo - Pending PM&R eval and CXR read 88F with a history of dementia, coronary artery disease, pacemaker, COPD, hypothyroidism, borderline diabetes, hypertension, and hyperlipidemia who presented with a mechanical fall found to have a right hip fracture.    Hip fracture  - s/p Hip fx repair 3/2  - PT eval - [facility]  - Family wants acute rehab, pending PM&R eval  - Analgesic medications as needed    Coronary artery disease / Hypertension  - On aspirin and atorvastatin  - Continue on metoprolol  - Resumption of amlodipine if needed for blood pressure control  - EKG with paced rhythm    COPD, hypoxia  - No in exacerbation  - Nebulizer treatments as needed  - on NC, wean as tolerated  - CXr with small left side process, likely atelectasis   - encourage incentive spirometry     Hypothyroidism  - On levothyroxine    Prediabetes  - No hyperglycemia noted  - Insulin coverage if needed    DVT PPx - SCDs  Dispo - medically ready for dc, [facility] vs. acute rehab pending

## 2024-03-06 ENCOUNTER — TRANSCRIPTION ENCOUNTER (OUTPATIENT)
Age: 89
End: 2024-03-06

## 2024-03-06 VITALS
SYSTOLIC BLOOD PRESSURE: 115 MMHG | DIASTOLIC BLOOD PRESSURE: 68 MMHG | RESPIRATION RATE: 16 BRPM | HEART RATE: 86 BPM | TEMPERATURE: 98 F | OXYGEN SATURATION: 91 %

## 2024-03-06 LAB
ANION GAP SERPL CALC-SCNC: 8 MMOL/L — SIGNIFICANT CHANGE UP (ref 5–17)
BUN SERPL-MCNC: 12.7 MG/DL — SIGNIFICANT CHANGE UP (ref 8–20)
CALCIUM SERPL-MCNC: 8.4 MG/DL — SIGNIFICANT CHANGE UP (ref 8.4–10.5)
CHLORIDE SERPL-SCNC: 95 MMOL/L — LOW (ref 96–108)
CO2 SERPL-SCNC: 30 MMOL/L — HIGH (ref 22–29)
CREAT SERPL-MCNC: 0.71 MG/DL — SIGNIFICANT CHANGE UP (ref 0.5–1.3)
EGFR: 82 ML/MIN/1.73M2 — SIGNIFICANT CHANGE UP
GLUCOSE SERPL-MCNC: 131 MG/DL — HIGH (ref 70–99)
MAGNESIUM SERPL-MCNC: 2.3 MG/DL — SIGNIFICANT CHANGE UP (ref 1.6–2.6)
PHOSPHATE SERPL-MCNC: 2.4 MG/DL — SIGNIFICANT CHANGE UP (ref 2.4–4.7)
POTASSIUM SERPL-MCNC: 4.1 MMOL/L — SIGNIFICANT CHANGE UP (ref 3.5–5.3)
POTASSIUM SERPL-SCNC: 4.1 MMOL/L — SIGNIFICANT CHANGE UP (ref 3.5–5.3)
SODIUM SERPL-SCNC: 133 MMOL/L — LOW (ref 135–145)

## 2024-03-06 PROCEDURE — 87640 STAPH A DNA AMP PROBE: CPT

## 2024-03-06 PROCEDURE — 71045 X-RAY EXAM CHEST 1 VIEW: CPT

## 2024-03-06 PROCEDURE — 93005 ELECTROCARDIOGRAM TRACING: CPT

## 2024-03-06 PROCEDURE — 70450 CT HEAD/BRAIN W/O DYE: CPT | Mod: MC

## 2024-03-06 PROCEDURE — 86900 BLOOD TYPING SEROLOGIC ABO: CPT

## 2024-03-06 PROCEDURE — 80048 BASIC METABOLIC PNL TOTAL CA: CPT

## 2024-03-06 PROCEDURE — 73552 X-RAY EXAM OF FEMUR 2/>: CPT

## 2024-03-06 PROCEDURE — 86901 BLOOD TYPING SEROLOGIC RH(D): CPT

## 2024-03-06 PROCEDURE — 72125 CT NECK SPINE W/O DYE: CPT | Mod: MC

## 2024-03-06 PROCEDURE — 96375 TX/PRO/DX INJ NEW DRUG ADDON: CPT

## 2024-03-06 PROCEDURE — 87641 MR-STAPH DNA AMP PROBE: CPT

## 2024-03-06 PROCEDURE — 85025 COMPLETE CBC W/AUTO DIFF WBC: CPT

## 2024-03-06 PROCEDURE — 99239 HOSP IP/OBS DSCHRG MGMT >30: CPT

## 2024-03-06 PROCEDURE — C9399: CPT

## 2024-03-06 PROCEDURE — 84100 ASSAY OF PHOSPHORUS: CPT

## 2024-03-06 PROCEDURE — 36415 COLL VENOUS BLD VENIPUNCTURE: CPT

## 2024-03-06 PROCEDURE — 83735 ASSAY OF MAGNESIUM: CPT

## 2024-03-06 PROCEDURE — 97167 OT EVAL HIGH COMPLEX 60 MIN: CPT

## 2024-03-06 PROCEDURE — 85027 COMPLETE CBC AUTOMATED: CPT

## 2024-03-06 PROCEDURE — 85730 THROMBOPLASTIN TIME PARTIAL: CPT

## 2024-03-06 PROCEDURE — 82962 GLUCOSE BLOOD TEST: CPT

## 2024-03-06 PROCEDURE — 85610 PROTHROMBIN TIME: CPT

## 2024-03-06 PROCEDURE — 96374 THER/PROPH/DIAG INJ IV PUSH: CPT

## 2024-03-06 PROCEDURE — 99285 EMERGENCY DEPT VISIT HI MDM: CPT | Mod: 25

## 2024-03-06 PROCEDURE — 73502 X-RAY EXAM HIP UNI 2-3 VIEWS: CPT

## 2024-03-06 PROCEDURE — C1889: CPT

## 2024-03-06 PROCEDURE — 86850 RBC ANTIBODY SCREEN: CPT

## 2024-03-06 PROCEDURE — C1776: CPT

## 2024-03-06 PROCEDURE — 80053 COMPREHEN METABOLIC PANEL: CPT

## 2024-03-06 RX ORDER — OXYCODONE HYDROCHLORIDE 5 MG/1
1 TABLET ORAL
Qty: 0 | Refills: 0 | DISCHARGE
Start: 2024-03-06

## 2024-03-06 RX ORDER — SENNA PLUS 8.6 MG/1
2 TABLET ORAL
Qty: 0 | Refills: 0 | DISCHARGE
Start: 2024-03-06

## 2024-03-06 RX ORDER — ENOXAPARIN SODIUM 100 MG/ML
40 INJECTION SUBCUTANEOUS
Qty: 0 | Refills: 0 | DISCHARGE
Start: 2024-03-06

## 2024-03-06 RX ORDER — PANTOPRAZOLE SODIUM 20 MG/1
1 TABLET, DELAYED RELEASE ORAL
Qty: 0 | Refills: 0 | DISCHARGE
Start: 2024-03-06

## 2024-03-06 RX ORDER — ACETAMINOPHEN 500 MG
3 TABLET ORAL
Qty: 0 | Refills: 0 | DISCHARGE
Start: 2024-03-06

## 2024-03-06 RX ADMIN — Medication 25 MILLIGRAM(S): at 06:50

## 2024-03-06 RX ADMIN — Medication 975 MILLIGRAM(S): at 13:10

## 2024-03-06 RX ADMIN — Medication 81 MILLIGRAM(S): at 11:45

## 2024-03-06 RX ADMIN — Medication 975 MILLIGRAM(S): at 13:51

## 2024-03-06 RX ADMIN — OXYCODONE HYDROCHLORIDE 10 MILLIGRAM(S): 5 TABLET ORAL at 10:59

## 2024-03-06 RX ADMIN — Medication 75 MICROGRAM(S): at 06:04

## 2024-03-06 RX ADMIN — PANTOPRAZOLE SODIUM 40 MILLIGRAM(S): 20 TABLET, DELAYED RELEASE ORAL at 06:51

## 2024-03-06 RX ADMIN — ENOXAPARIN SODIUM 40 MILLIGRAM(S): 100 INJECTION SUBCUTANEOUS at 06:04

## 2024-03-06 RX ADMIN — Medication 975 MILLIGRAM(S): at 06:50

## 2024-03-06 RX ADMIN — OXYCODONE HYDROCHLORIDE 10 MILLIGRAM(S): 5 TABLET ORAL at 09:40

## 2024-03-06 RX ADMIN — Medication 975 MILLIGRAM(S): at 07:30

## 2024-03-06 NOTE — DISCHARGE NOTE NURSING/CASE MANAGEMENT/SOCIAL WORK - NSDCPEFALRISK_GEN_ALL_CORE
For information on Fall & Injury Prevention, visit: [email or web address] OR  [email or web address] OR  https://www.cdc.gov/steadi/patient.html

## 2024-03-06 NOTE — DISCHARGE NOTE NURSING/CASE MANAGEMENT/SOCIAL WORK - NSDCVIVACCINE_GEN_ALL_CORE_FT
Tdap; 24-Aug-2018 14:29; Ankur Lawler (RN); [employer]; [id number]; IntraMuscular; Deltoid Right.; 0.5 milliLiter(s); VIS (VIS Published: 09-May-2013, VIS Presented: 24-Aug-2018);   Tdap; 07-Oct-2021 10:53; Eden Cabrera); [employer]; [id number] (Exp. Date: 15-Jul-2023); IntraMuscular; Deltoid Left.; 0.5 milliLiter(s); VIS (VIS Published: 09-May-2013, VIS Presented: 07-Oct-2021);

## 2024-03-06 NOTE — DISCHARGE NOTE NURSING/CASE MANAGEMENT/SOCIAL WORK - PATIENT PORTAL LINK FT
You can access the FollowMyHealth Patient Portal offered by [facility] by registering at the following website: [email or web address]. By joining [employer]’s FollowMyHealth portal, you will also be able to view your health information using other applications (apps) compatible with our system.

## 2024-03-11 DIAGNOSIS — S72.001A FRACTURE OF UNSPECIFIED PART OF NECK OF RIGHT FEMUR, INITIAL ENCOUNTER FOR CLOSED FRACTURE: ICD-10-CM

## 2024-03-12 ENCOUNTER — TRANSCRIPTION ENCOUNTER (OUTPATIENT)
Age: 89
End: 2024-03-12

## 2024-03-13 ENCOUNTER — APPOINTMENT (OUTPATIENT)
Dept: ORTHOPEDIC SURGERY | Facility: CLINIC | Age: 89
End: 2024-03-13
Payer: MEDICARE

## 2024-03-13 DIAGNOSIS — S72.001D FRACTURE OF UNSPECIFIED PART OF NECK OF RIGHT FEMUR, SUBSEQUENT ENCOUNTER FOR CLOSED FRACTURE WITH ROUTINE HEALING: ICD-10-CM

## 2024-03-13 PROCEDURE — 73502 X-RAY EXAM HIP UNI 2-3 VIEWS: CPT

## 2024-03-13 PROCEDURE — 99024 POSTOP FOLLOW-UP VISIT: CPT

## 2024-03-13 NOTE — HISTORY OF PRESENT ILLNESS
[___ Weeks Post Op] : [unfilled] weeks post op [Xray (Date:___)] : [unfilled] Xray -  [Clean/Dry/Intact] : clean, dry and intact [Healed] : healed [Erythema] : not erythematous [Discharge] : absent of discharge [Swelling] : swollen [Neuro Intact] : an unremarkable neurological exam [Vascular Intact] : ~T peripheral vascular exam normal [Slow Progress] : is progressing slowly [No Sign of Infection] : is showing no signs of infection [de-identified] : POS: 1. Right hip hemiarthroplasty. 2. Prophylactic fixation of right femoral shaft.  DOS: 03/01/2024 [Adequate Pain Control] : has adequate pain control [de-identified] : PT for WBAT, posterior hip precautions, f/u PRN [de-identified] : 2 views right hip show hemiarthroplasty in place

## 2024-04-12 ENCOUNTER — TRANSCRIPTION ENCOUNTER (OUTPATIENT)
Age: 89
End: 2024-04-12

## 2024-08-28 NOTE — ED PROVIDER NOTE - IV ALTEPLASE ADMIN OUTSIDE HIDDEN
Outpatient care management referral via HRR report 8/26/24. Discharged 8/25/24 to [facility]. Call placed to facility to inform them I will be following the patient during their skilled stay. This Admin Coordinator will continue to monitor via chart review.     
show

## 2024-12-19 NOTE — PATIENT PROFILE ADULT - HAS THE PATIENT RECEIVED THE INFLUENZA VACCINE THIS SEASON?
[FreeTextEntry1] : 73 year old female with PMH of HLD, HTN osteoarthritis, melanoma s/p resection and recent hospitalization for hypertensive urgency and syncope who presents for outpatient follow up.    Patient states that she was in her usual state of health on the day of presentation (12/4/24), when she stood up from her dining room table and while walking around the table had sudden onset lightheadedness without prodrome, followed by a loss of consciousness where she hit her head and nose on the table. Unclear duration of LOC however her  had left the room to use the bathroom just prior to the incident (did not witness the episode), and he was still in the bathroom when patient regained consciousness. Patient awoke on the floor, bleeding from her nose/head and was able to lift herself into a nearby chair.  No postictal symptoms or confusion.  She was brought to the urgent care by her  and was then brought to the [facility] ED for further evaluation.  During that admission, patient had unremarkable Head CT, TTE with normal LVEF, mild TR/RI with negative bubble study, Carotid US with mild L carotid bulb plaque without significant stenosis, and no significant arrhythmias noted on telemetry or ECG.  Patient's BP was elevated upon arrival to the ED at 225/112 mmHg with HR 90bpm, patient had not previously taken medications.  Patient was started on losartan 50mg qD and atorvastatin 40mg qHS prior to discharge, no etiology of syncope was identified.  Of note, patient was previously followed by a cardiologist years ago, states that she had prior cardiac catheterizations with no significant findings at OSH, one catheterization was performed at [facility].  Prior studies TTE: 12/5/2024: LVEF 60-65%, indeterminate LV diastolic function, mild TR, mild RI, mildly dilated LA, negative bubble study for intracardiac shunt  Carotid US 12/5/2024: mild plaque in L carotid bulb with no significant stenosis bilaterally  ECG 12/4/0224: NSR possible RVH, possible LAE, nonspecific ST abnormality Labs: 12/5/2024: Lipid Panel: , HDL 79, , TG 99; BUN/Cr 21/1.0, TSH 1.93, hs-trop 18, Hgb/Hct 14.4/37.3; HgbA1c 5.8% yes...

## 2025-04-23 NOTE — ED ADULT NURSE NOTE - BREATH SOUNDS RML
Detail Level: Detailed
General Sunscreen Counseling: I recommended a broad spectrum sunscreen with a SPF of 30 or higher, with reapplication at least 15 minutes prior to expected sun exposure and then every 2 hours after that as long as sun exposure continues. If swimming or exercising sunscreen should be reapplied every 45 minutes to an hour after getting wet or sweating.  One ounce, or the equivalent of a shot glass full of sunscreen, is adequate to protect the skin not covered by a bathing suit. Suggested SPF lip balm and other sun protective measures as well.
clear

## 2025-05-12 NOTE — ED ADULT NURSE NOTE - HIV OFFER
A1c previously 6.1  Compliant with lifestyle and dietary measures  Limited workup given patient is self-pay    POCT A1c 5.9, stable from prior  Plan:  Continue healthy lifestyle and dietary measures as noted below.  Orders:    POCT hemoglobin A1c    Lipid panel; Future     Previously Declined (within the last year)

## 2025-06-03 NOTE — PRE-ANESTHESIA EVALUATION ADULT - WEIGHT IN KG
Marcela 3, 2025     Jacky Castellanos MD  [address] [facility]  [facility] [address]    Patient: Lisa Thakkar   YOB: 1935   Date of Visit: 6/3/2025       Dear Dr. Jacky Castellanos MD:    Thank you for referring Lisa Thakkar to me for evaluation. Below are my notes for this consultation.  If you have questions, please do not hesitate to call me. I look forward to following your patient along with you.       Sincerely,     Thanh Nuñez MD      CC: Jacky Castellanos MD  ______________________________________________________________________________________    [facility]  Hand and Upper Extremity Service  Initial evaluation / Consultation         Consult requested by Referring Physician: Dr. Castellanos     Chief Complaint: Right middle finger         89 y.o right hand dominant female presenting for trigger finger of her right middle finger. She was recently a patient of Dr. Castillo who has treated her for carpal tunnel syndrome and trigger finger in the past with injections and surgery. About a year ago she developed symptomatic triggering of her right middle finger and was seen by Dr. Montgomery and an injection was provided at that time which resulted in resolution of symptoms but she indicates the injection was painful. Her symptoms have now returned and she is unable to make a fist and has pain with movement of her middle finger. She is requesting injection today. According to her chart, she has an allergy to cortisone but has had multiple cortisone injections in the past without adverse reactions so it is unclear how or why that medication was included in her allergy list.          Please refer to New Patient Intake Form scanned into patient's electronic record for self reported past medical history, past surgical history, medications, allergies, family history, social history and 10 point review of systems    Examination:  Constitutional: Oriented to  person, place, and time.  Appears well-developed and well-nourished.  Head: Normocephalic and atraumatic.  Eyes: Pupils are equal, round, and reactive to light.  Cardiovascular: Intact distal pulses.  Pulmonary/Chest/Breast: Effort normal. No respiratory distress.  Neurological: Alert and oriented to person, place, and time.  Skin: Skin is warm and dry.  Psychiatric: normal mood and affect.  Behavior is normal.  Musculoskeletal: Right hand reveals tenderness to palpation middle finger A1 pulley. Unable to make a full fist due to pain. Unwilling to tolerate full passive extension.        Impression:  Right middle finger trigger finger       Plan: At her request, we have given her a right middle finger trigger injection today. She will monitor her response to injection and can return as needed for recurrence of symptoms.       In Office Procedures Performed: Right middle finger trigger injection     Hand / UE Inj/Asp: R long A1 for trigger finger on 6/3/2025 7:50 PM  Indications: tendon swelling and pain  Details: 25 G needle, volar approach  Medications: 20 mg triamcinolone acetonide 40 mg/mL; 0.5 mL lidocaine 10 mg/mL (1 %)  Outcome: tolerated well, no immediate complications  Procedure, treatment alternatives, risks and benefits explained, specific risks discussed. Consent was given by the patient. Immediately prior to procedure a time out was called to verify the correct patient, procedure, equipment, support staff and site/side marked as required. Patient was prepped and draped in the usual sterile fashion.           Follow up: As needed               Thanh Nuñez MD  [facility]  Department of Orthopaedic Surgery  Hand and Upper Extremity Reconstruction      Scribe Attestation  By signing my name below, IIta , Scrdiann   attest that this documentation has been prepared under the direction and in the presence of Dr. Thanh Nuñez.      Dictation performed with the use  of voice recognition software.  Syntax and grammatical errors may exist. 49.9

## (undated) DEVICE — WOUND IRR SURGIPHOR

## (undated) DEVICE — WARMING BLANKET UPPER ADULT

## (undated) DEVICE — PACK TOTAL HIP

## (undated) DEVICE — DRAPE 3/4 SHEET 52X76"

## (undated) DEVICE — SUT HEWSON RETRIEVER

## (undated) DEVICE — ZIMMER CEMENT MIXING SYSTEM COMPACT VACUUM

## (undated) DEVICE — DRAPE U LONG 47X70"

## (undated) DEVICE — SYR LUER LOK 50CC

## (undated) DEVICE — GLV 8 PROTEXIS (WHITE)

## (undated) DEVICE — DRAPE XL SHEET 77X98"

## (undated) DEVICE — NDL HYPO SAFE 20G X 1.5" (YELLOW)

## (undated) DEVICE — SOL IRR BAG NS 0.9% 3000ML

## (undated) DEVICE — SUT VICRYL 0 27" CT-2 UNDYED

## (undated) DEVICE — SUT MONOCRYL 3-0 27" PS-2 UNDYED

## (undated) DEVICE — SUT ETHIBOND 5 4-30" CCS

## (undated) DEVICE — DRAPE HIP W POUCHES 87X115X134"

## (undated) DEVICE — SOL IRR POUR NS 0.9% 500ML

## (undated) DEVICE — VISITEC 4X4

## (undated) DEVICE — SAW BLADE ZIMMER RECIPROCATING SINGLE SIDED 10X70X1.19MM

## (undated) DEVICE — SUT VICRYL 0 27" CP-1 UNDYED

## (undated) DEVICE — SUT VICRYL 2-0 27" CT-2 UNDYED

## (undated) DEVICE — DRSG TEGADERM 6"X8"

## (undated) DEVICE — DRSG DERMABOND PRINEO 60CM

## (undated) DEVICE — DRAPE 1/2 SHEET 40X57"

## (undated) DEVICE — SOL IRR POUR NS 0.9% 1000ML

## (undated) DEVICE — SOL IRR POUR H2O 1500ML